# Patient Record
Sex: FEMALE | Race: BLACK OR AFRICAN AMERICAN | Employment: OTHER | URBAN - METROPOLITAN AREA
[De-identification: names, ages, dates, MRNs, and addresses within clinical notes are randomized per-mention and may not be internally consistent; named-entity substitution may affect disease eponyms.]

---

## 2017-05-25 ENCOUNTER — HOSPITAL ENCOUNTER (EMERGENCY)
Age: 57
Discharge: HOME | End: 2017-05-25
Attending: STUDENT IN AN ORGANIZED HEALTH CARE EDUCATION/TRAINING PROGRAM | Admitting: STUDENT IN AN ORGANIZED HEALTH CARE EDUCATION/TRAINING PROGRAM
Payer: SELF-PAY

## 2017-05-25 VITALS
BODY MASS INDEX: 20.57 KG/M2 | SYSTOLIC BLOOD PRESSURE: 147 MMHG | DIASTOLIC BLOOD PRESSURE: 70 MMHG | HEART RATE: 107 BPM | TEMPERATURE: 98.1 F | OXYGEN SATURATION: 96 % | RESPIRATION RATE: 18 BRPM | HEIGHT: 66 IN | WEIGHT: 128 LBS

## 2017-05-25 PROCEDURE — 99284 EMERGENCY DEPT VISIT MOD MDM: CPT

## 2017-05-25 NOTE — ED NOTES
Patient walked to restroom, found walking down the rose eating whole sugar packets. Patient escorted back to room and given crackers.

## 2017-05-25 NOTE — ED NOTES
Patient acting out in hallway and pulling own pants down. Patient moved to [address] [place] [address] waiting room in view of nurses.
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
## 2017-05-25 NOTE — ED TRIAGE NOTES
Patient to ER via EMS c/o mental health issues[facility] Patient is oriented to person.

## 2017-05-26 ENCOUNTER — APPOINTMENT (OUTPATIENT)
Dept: GENERAL RADIOLOGY | Age: 57
End: 2017-05-26
Attending: STUDENT IN AN ORGANIZED HEALTH CARE EDUCATION/TRAINING PROGRAM

## 2017-05-26 ENCOUNTER — HOSPITAL ENCOUNTER (EMERGENCY)
Age: 57
Discharge: HOME | End: 2017-05-26
Attending: STUDENT IN AN ORGANIZED HEALTH CARE EDUCATION/TRAINING PROGRAM

## 2017-05-26 VITALS
WEIGHT: 125 LBS | DIASTOLIC BLOOD PRESSURE: 82 MMHG | HEART RATE: 104 BPM | TEMPERATURE: 98.6 F | RESPIRATION RATE: 20 BRPM | OXYGEN SATURATION: 93 % | BODY MASS INDEX: 20.18 KG/M2 | SYSTOLIC BLOOD PRESSURE: 138 MMHG

## 2017-05-26 DIAGNOSIS — D50.9 MICROCYTIC ANEMIA: ICD-10-CM

## 2017-05-26 DIAGNOSIS — M79.10 MYALGIA: Primary | ICD-10-CM

## 2017-05-26 LAB
ALBUMIN SERPL BCP-MCNC: 3 G/DL (ref 3.5–5)
ALBUMIN/GLOB SERPL: 0.5 {RATIO} (ref 1.1–2.2)
ALP SERPL-CCNC: 121 U/L (ref 45–117)
ALT SERPL-CCNC: 30 U/L (ref 12–78)
ANION GAP BLD CALC-SCNC: 8 MMOL/L (ref 5–15)
AST SERPL W P-5'-P-CCNC: 38 U/L (ref 15–37)
ATRIAL RATE: 91 BPM
BASOPHILS # BLD AUTO: 0 K/UL (ref 0–0.1)
BASOPHILS # BLD: 0 % (ref 0–1)
BILIRUB SERPL-MCNC: 0.4 MG/DL (ref 0.2–1)
BUN SERPL-MCNC: 10 MG/DL (ref 6–20)
BUN/CREAT SERPL: 16 (ref 12–20)
CALCIUM SERPL-MCNC: 8.8 MG/DL (ref 8.5–10.1)
CALCULATED P AXIS, ECG09: 69 DEGREES
CALCULATED R AXIS, ECG10: 66 DEGREES
CALCULATED T AXIS, ECG11: 64 DEGREES
CHLORIDE SERPL-SCNC: 105 MMOL/L (ref 97–108)
CO2 SERPL-SCNC: 26 MMOL/L (ref 21–32)
CREAT SERPL-MCNC: 0.62 MG/DL (ref 0.55–1.02)
DIAGNOSIS, 93000: NORMAL
DIFFERENTIAL METHOD BLD: ABNORMAL
EOSINOPHIL # BLD: 0 K/UL (ref 0–0.4)
EOSINOPHIL NFR BLD: 1 % (ref 0–7)
ERYTHROCYTE [DISTWIDTH] IN BLOOD BY AUTOMATED COUNT: 20.3 % (ref 11.5–14.5)
GLOBULIN SER CALC-MCNC: 6.2 G/DL (ref 2–4)
GLUCOSE SERPL-MCNC: 92 MG/DL (ref 65–100)
HCT VFR BLD AUTO: 30.9 % (ref 35–47)
HGB BLD-MCNC: 9 G/DL (ref 11.5–16)
LYMPHOCYTES # BLD AUTO: 33 % (ref 12–49)
LYMPHOCYTES # BLD: 1.3 K/UL (ref 0.8–3.5)
MCH RBC QN AUTO: 20.2 PG (ref 26–34)
MCHC RBC AUTO-ENTMCNC: 29.1 G/DL (ref 30–36.5)
MCV RBC AUTO: 69.4 FL (ref 80–99)
MONOCYTES # BLD: 0.3 K/UL (ref 0–1)
MONOCYTES NFR BLD AUTO: 8 % (ref 5–13)
NEUTS SEG # BLD: 2.3 K/UL (ref 1.8–8)
NEUTS SEG NFR BLD AUTO: 58 % (ref 32–75)
P-R INTERVAL, ECG05: 152 MS
PLATELET # BLD AUTO: 186 K/UL (ref 150–400)
POTASSIUM SERPL-SCNC: 3.7 MMOL/L (ref 3.5–5.1)
PROT SERPL-MCNC: 9.2 G/DL (ref 6.4–8.2)
Q-T INTERVAL, ECG07: 374 MS
QRS DURATION, ECG06: 84 MS
QTC CALCULATION (BEZET), ECG08: 460 MS
RBC # BLD AUTO: 4.45 M/UL (ref 3.8–5.2)
RBC MORPH BLD: ABNORMAL
SODIUM SERPL-SCNC: 139 MMOL/L (ref 136–145)
TROPONIN I SERPL-MCNC: <0.04 NG/ML
VENTRICULAR RATE, ECG03: 91 BPM
WBC # BLD AUTO: 3.9 K/UL (ref 3.6–11)

## 2017-05-26 PROCEDURE — 71010 XR CHEST PORT: CPT

## 2017-05-26 PROCEDURE — 99284 EMERGENCY DEPT VISIT MOD MDM: CPT

## 2017-05-26 PROCEDURE — 84484 ASSAY OF TROPONIN QUANT: CPT | Performed by: STUDENT IN AN ORGANIZED HEALTH CARE EDUCATION/TRAINING PROGRAM

## 2017-05-26 PROCEDURE — 36415 COLL VENOUS BLD VENIPUNCTURE: CPT | Performed by: STUDENT IN AN ORGANIZED HEALTH CARE EDUCATION/TRAINING PROGRAM

## 2017-05-26 PROCEDURE — 93005 ELECTROCARDIOGRAM TRACING: CPT

## 2017-05-26 PROCEDURE — 80053 COMPREHEN METABOLIC PANEL: CPT | Performed by: STUDENT IN AN ORGANIZED HEALTH CARE EDUCATION/TRAINING PROGRAM

## 2017-05-26 PROCEDURE — 85025 COMPLETE CBC W/AUTO DIFF WBC: CPT | Performed by: STUDENT IN AN ORGANIZED HEALTH CARE EDUCATION/TRAINING PROGRAM
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
## 2017-05-26 NOTE — ED NOTES
Discharge instructions reviewed with and give to pt. By ER MD.  Ambulatory on own accord for discharge.

## 2017-05-26 NOTE — DISCHARGE INSTRUCTIONS
We hope that we have addressed all of your medical concerns. The examination and treatment you received in the Emergency Department were for an emergent problem and were not intended as complete care. It is important that you follow up with your healthcare provider(s) for ongoing care. If your symptoms worsen or do not improve as expected, and you are unable to reach your usual health care provider(s), you should return to the Emergency Department. Today's healthcare is undergoing tremendous change, and patient satisfaction surveys are one of the many tools to assess the quality of medical care. You may receive a survey from the [employer] organization regarding your experience in the Emergency Department. I hope that your experience has been completely positive, particularly the medical care that I provided. As such, please participate in the survey; anything less than excellent does not meet my expectations or intentions. [facility] and [facility] participate in nationally recognized quality of care measures. If your blood pressure is greater than 120/80, as reported below, we urge that you seek medical care to address the potential of high blood pressure, commonly known as hypertension. Hypertension can be hereditary or can be caused by certain medical conditions, pain, stress, or \"white coat syndrome. \"       Please make an appointment with your health care provider(s) for follow up of your Emergency Department visit. VITALS:   Patient Vitals for the past 8 hrs:   Temp Pulse Resp BP SpO2   05/26/17 0858 98.6 °F (37 °C) (!) 104 20 138/82 93 %          Thank you for allowing us to provide you with medical care today. We realize that you have many choices for your emergency care needs. Please choose us in the future for any continued health care needs. Mayra Moran, [facility].   Office: [phone]            Recent Results (from the past 24 hour(s))   EKG, 12 LEAD, INITIAL    Collection Time: 05/26/17  9:08 AM   Result Value Ref Range    Ventricular Rate 91 BPM    Atrial Rate 91 BPM    P-R Interval 152 ms    QRS Duration 84 ms    Q-T Interval 374 ms    QTC Calculation (Bezet) 460 ms    Calculated P Axis 69 degrees    Calculated R Axis 66 degrees    Calculated T Axis 64 degrees    Diagnosis       ** Poor data quality, interpretation may be adversely affected  Normal sinus rhythm  Left ventricular hypertrophy  No previous ECGs available     CBC WITH AUTOMATED DIFF    Collection Time: 05/26/17  9:14 AM   Result Value Ref Range    WBC 3.9 3.6 - 11.0 K/uL    RBC 4.45 3.80 - 5.20 M/uL    HGB 9.0 (L) 11.5 - 16.0 g/dL    HCT 30.9 (L) 35.0 - 47.0 %    MCV 69.4 (L) 80.0 - 99.0 FL    MCH 20.2 (L) 26.0 - 34.0 PG    MCHC 29.1 (L) 30.0 - 36.5 g/dL    RDW 20.3 (H) 11.5 - 14.5 %    PLATELET 567 145 - 623 K/uL    NEUTROPHILS 58 32 - 75 %    LYMPHOCYTES 33 12 - 49 %    MONOCYTES 8 5 - 13 %    EOSINOPHILS 1 0 - 7 %    BASOPHILS 0 0 - 1 %    ABS. NEUTROPHILS 2.3 1.8 - 8.0 K/UL    ABS. LYMPHOCYTES 1.3 0.8 - 3.5 K/UL    ABS. MONOCYTES 0.3 0.0 - 1.0 K/UL    ABS. EOSINOPHILS 0.0 0.0 - 0.4 K/UL    ABS.  BASOPHILS 0.0 0.0 - 0.1 K/UL    DF SMEAR SCANNED      RBC COMMENTS ANISOCYTOSIS  1+        RBC COMMENTS HYPOCHROMIA  1+        RBC COMMENTS SCHISTOCYTES  1+       METABOLIC PANEL, COMPREHENSIVE    Collection Time: 05/26/17  9:14 AM   Result Value Ref Range    Sodium 139 136 - 145 mmol/L    Potassium 3.7 3.5 - 5.1 mmol/L    Chloride 105 97 - 108 mmol/L    CO2 26 21 - 32 mmol/L    Anion gap 8 5 - 15 mmol/L    Glucose 92 65 - 100 mg/dL    BUN 10 6 - 20 MG/DL    Creatinine 0.62 0.55 - 1.02 MG/DL    BUN/Creatinine ratio 16 12 - 20      GFR est AA >60 >60 ml/min/1.73m2    GFR est non-AA >60 >60 ml/min/1.73m2    Calcium 8.8 8.5 - 10.1 MG/DL    Bilirubin, total 0.4 0.2 - 1.0 MG/DL    ALT (SGPT) 30 12 - 78 U/L    AST (SGOT) 38 (H) 15 - 37 U/L    Alk. phosphatase 121 (H) 45 - 117 U/L    Protein, total 9.2 (H) 6.4 - 8.2 g/dL    Albumin 3.0 (L) 3.5 - 5.0 g/dL    Globulin 6.2 (H) 2.0 - 4.0 g/dL    A-G Ratio 0.5 (L) 1.1 - 2.2     TROPONIN I    Collection Time: 05/26/17  9:14 AM   Result Value Ref Range    Troponin-I, Qt. <0.04 <0.05 ng/mL       Xr Chest Port    Result Date: 5/26/2017  INDICATION: Chest pain and cough COMPARISON: None A single frontal view was obtained. The time of this study is 0908 hours. Lungs are clear.  The heart and mediastinal shadows are normal.     IMPRESSION: No acute finding

## 2017-05-26 NOTE — ED NOTES
Pt. resting quietly, turning self in bed. Waiting for ER MD to re-eval. Pt.  No changes to assessment.
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
## 2017-05-28 ENCOUNTER — HOSPITAL ENCOUNTER (INPATIENT)
Age: 57
LOS: 1 days | Discharge: LEFT BEFORE DISCHARGE | DRG: 885 | End: 2017-05-29
Attending: PSYCHIATRY & NEUROLOGY | Admitting: PSYCHIATRY & NEUROLOGY
Payer: SELF-PAY

## 2017-05-28 ENCOUNTER — HOSPITAL ENCOUNTER (EMERGENCY)
Age: 57
Discharge: PSYCHIATRIC OR SUBSTANCE USE FACILITY | End: 2017-05-28
Attending: EMERGENCY MEDICINE
Payer: SELF-PAY

## 2017-05-28 VITALS
HEART RATE: 93 BPM | WEIGHT: 137 LBS | RESPIRATION RATE: 18 BRPM | DIASTOLIC BLOOD PRESSURE: 92 MMHG | BODY MASS INDEX: 22.11 KG/M2 | TEMPERATURE: 97.9 F | OXYGEN SATURATION: 95 % | SYSTOLIC BLOOD PRESSURE: 167 MMHG

## 2017-05-28 DIAGNOSIS — F20.89 OTHER SCHIZOPHRENIA (HCC): Primary | ICD-10-CM

## 2017-05-28 LAB
AMPHET UR QL SCN: NEGATIVE
ANION GAP BLD CALC-SCNC: 5 MMOL/L (ref 5–15)
APPEARANCE UR: CLEAR
BACTERIA URNS QL MICRO: NEGATIVE /HPF
BARBITURATES UR QL SCN: NEGATIVE
BASOPHILS # BLD AUTO: 0 K/UL (ref 0–0.1)
BASOPHILS # BLD: 0 % (ref 0–1)
BENZODIAZ UR QL: NEGATIVE
BILIRUB UR QL: NEGATIVE
BUN SERPL-MCNC: 10 MG/DL (ref 6–20)
BUN/CREAT SERPL: 16 (ref 12–20)
CALCIUM SERPL-MCNC: 9.2 MG/DL (ref 8.5–10.1)
CANNABINOIDS UR QL SCN: NEGATIVE
CHLORIDE SERPL-SCNC: 102 MMOL/L (ref 97–108)
CO2 SERPL-SCNC: 29 MMOL/L (ref 21–32)
COCAINE UR QL SCN: NEGATIVE
COLOR UR: ABNORMAL
CREAT SERPL-MCNC: 0.63 MG/DL (ref 0.55–1.02)
DIFFERENTIAL METHOD BLD: ABNORMAL
DRUG SCRN COMMENT,DRGCM: NORMAL
EOSINOPHIL # BLD: 0 K/UL (ref 0–0.4)
EOSINOPHIL NFR BLD: 1 % (ref 0–7)
EPITH CASTS URNS QL MICRO: ABNORMAL /LPF
ERYTHROCYTE [DISTWIDTH] IN BLOOD BY AUTOMATED COUNT: 20.1 % (ref 11.5–14.5)
ETHANOL SERPL-MCNC: <10 MG/DL
GLUCOSE SERPL-MCNC: 100 MG/DL (ref 65–100)
GLUCOSE UR STRIP.AUTO-MCNC: NEGATIVE MG/DL
HCT VFR BLD AUTO: 33.8 % (ref 35–47)
HGB BLD-MCNC: 9.8 G/DL (ref 11.5–16)
HGB UR QL STRIP: ABNORMAL
KETONES UR QL STRIP.AUTO: NEGATIVE MG/DL
LEUKOCYTE ESTERASE UR QL STRIP.AUTO: NEGATIVE
LYMPHOCYTES # BLD AUTO: 36 % (ref 12–49)
LYMPHOCYTES # BLD: 1.3 K/UL (ref 0.8–3.5)
MCH RBC QN AUTO: 20.6 PG (ref 26–34)
MCHC RBC AUTO-ENTMCNC: 29 G/DL (ref 30–36.5)
MCV RBC AUTO: 71.2 FL (ref 80–99)
METHADONE UR QL: NEGATIVE
MONOCYTES # BLD: 0.2 K/UL (ref 0–1)
MONOCYTES NFR BLD AUTO: 6 % (ref 5–13)
NEUTS SEG # BLD: 2.2 K/UL (ref 1.8–8)
NEUTS SEG NFR BLD AUTO: 57 % (ref 32–75)
NITRITE UR QL STRIP.AUTO: NEGATIVE
OPIATES UR QL: NEGATIVE
PCP UR QL: NEGATIVE
PH UR STRIP: 5 [PH] (ref 5–8)
PLATELET # BLD AUTO: 188 K/UL (ref 150–400)
PLATELET COMMENTS,PCOM: ABNORMAL
POTASSIUM SERPL-SCNC: 4.3 MMOL/L (ref 3.5–5.1)
PROT UR STRIP-MCNC: 100 MG/DL
RBC # BLD AUTO: 4.75 M/UL (ref 3.8–5.2)
RBC #/AREA URNS HPF: ABNORMAL /HPF (ref 0–5)
RBC MORPH BLD: ABNORMAL
SODIUM SERPL-SCNC: 136 MMOL/L (ref 136–145)
SP GR UR REFRACTOMETRY: 1.02 (ref 1–1.03)
UROBILINOGEN UR QL STRIP.AUTO: 0.2 EU/DL (ref 0.2–1)
WBC # BLD AUTO: 3.7 K/UL (ref 3.6–11)
WBC URNS QL MICRO: ABNORMAL /HPF (ref 0–4)

## 2017-05-28 PROCEDURE — 81001 URINALYSIS AUTO W/SCOPE: CPT | Performed by: EMERGENCY MEDICINE

## 2017-05-28 PROCEDURE — 80307 DRUG TEST PRSMV CHEM ANLYZR: CPT | Performed by: EMERGENCY MEDICINE

## 2017-05-28 PROCEDURE — 36415 COLL VENOUS BLD VENIPUNCTURE: CPT | Performed by: EMERGENCY MEDICINE

## 2017-05-28 PROCEDURE — 85025 COMPLETE CBC W/AUTO DIFF WBC: CPT | Performed by: EMERGENCY MEDICINE

## 2017-05-28 PROCEDURE — 99284 EMERGENCY DEPT VISIT MOD MDM: CPT

## 2017-05-28 PROCEDURE — 80048 BASIC METABOLIC PNL TOTAL CA: CPT | Performed by: EMERGENCY MEDICINE

## 2017-05-28 PROCEDURE — 65220000003 HC RM SEMIPRIVATE PSYCH

## 2017-05-28 RX ORDER — IBUPROFEN 400 MG/1
400 TABLET ORAL
Status: DISCONTINUED | OUTPATIENT
Start: 2017-05-28 | End: 2017-05-29 | Stop reason: HOSPADM

## 2017-05-28 RX ORDER — OLANZAPINE 5 MG/1
5 TABLET ORAL
Status: DISCONTINUED | OUTPATIENT
Start: 2017-05-28 | End: 2017-05-29 | Stop reason: HOSPADM

## 2017-05-28 RX ORDER — BENZTROPINE MESYLATE 2 MG/1
2 TABLET ORAL
Status: DISCONTINUED | OUTPATIENT
Start: 2017-05-28 | End: 2017-05-29 | Stop reason: HOSPADM

## 2017-05-28 RX ORDER — ADHESIVE BANDAGE
30 BANDAGE TOPICAL DAILY PRN
Status: DISCONTINUED | OUTPATIENT
Start: 2017-05-28 | End: 2017-05-29 | Stop reason: HOSPADM

## 2017-05-28 RX ORDER — IBUPROFEN 200 MG
1 TABLET ORAL
Status: DISCONTINUED | OUTPATIENT
Start: 2017-05-28 | End: 2017-05-29 | Stop reason: HOSPADM

## 2017-05-28 RX ORDER — LORAZEPAM 1 MG/1
1 TABLET ORAL
Status: DISCONTINUED | OUTPATIENT
Start: 2017-05-28 | End: 2017-05-29 | Stop reason: HOSPADM

## 2017-05-28 RX ORDER — BENZTROPINE MESYLATE 1 MG/ML
2 INJECTION INTRAMUSCULAR; INTRAVENOUS
Status: DISCONTINUED | OUTPATIENT
Start: 2017-05-28 | End: 2017-05-29 | Stop reason: HOSPADM

## 2017-05-28 RX ORDER — ZOLPIDEM TARTRATE 10 MG/1
10 TABLET ORAL
Status: DISCONTINUED | OUTPATIENT
Start: 2017-05-28 | End: 2017-05-29 | Stop reason: HOSPADM

## 2017-05-28 RX ORDER — ACETAMINOPHEN 325 MG/1
650 TABLET ORAL
Status: DISCONTINUED | OUTPATIENT
Start: 2017-05-28 | End: 2017-05-29 | Stop reason: HOSPADM

## 2017-05-28 RX ORDER — LORAZEPAM 2 MG/ML
2 INJECTION INTRAMUSCULAR
Status: DISCONTINUED | OUTPATIENT
Start: 2017-05-28 | End: 2017-05-29 | Stop reason: HOSPADM

## 2017-05-28 NOTE — BH NOTES
Pt is admitted voluntarily for being bizarre. Pt visited ER three times in the last three days per ER report. Pt is homeless and is off medications for nine months per ER report. Pt is very disorganized and unable to focus. Body search found no broken skins, no tattoos. Order received from Dr. Patricia Martinez, will monitor q 15 for safety.

## 2017-05-28 NOTE — ED TRIAGE NOTES
TRIAGE NOTE: Pt arrives eating chips and yogurt with complaints of homelessness and \"pain all over the outside of my body\", requesting admission. Pt gesticulating dramatically during triage.

## 2017-05-28 NOTE — BSMART NOTE
Comprehensive Assessment Form Part 1      Section I - Disposition    Axis I - Schizophrenia   Axis II - Deferred  Axis III -   Past Medical History:   Diagnosis Date    Hypertension     Psychotic disorder     Schizophrenia ([facility] [address]        Axis IV - Homeless, medication noncompliance  [place] V - 35      The Medical Doctor to Psychiatrist conference was not completed. The Medical Doctor is in agreement with Psychiatrist disposition because of (reason) Admission pending medical clearance. The plan is admit to [facility]. The on-call Psychiatrist consulted was Dr. Montana Leonard. The admitting Psychiatrist will be Dr. Lilly Najera. The admitting Diagnosis is Schizophrenia. The Payor source is self. Section II - Integrated Summary  Summary:  Patient walked in reporting she \"doesn't feel good. \"  Patient reported body aches at triage. Patient has been seen here Thursday and Friday for similar reasons. Patient has known mental health history and has been admitted psychiatrically on a TDO in 2011. Patient is not actively being treated by any current mental health providers. She is not the best historian but per records has a Schizophrenia diagnosis. She reported she lived in adult homes in the past but on visit Thursday this clinician attempted to call around and find which one and all called either didn't know her or had her in their facility years ago. Patient is currently asking care manager for assistance with housing as she has nowhere to go. Patient does not appear to have ability to formulate a plan and when this clinician asked her what her plan was she reported she wanted to go to [facility]. She denied having family there but claimed she knew friends there. She has no ideas on how to get there or what she would do on arrival.  Patient is not eating properly, not bathing and not taking good care of herself. She is disheveled.   Patient is pacing around the room, throwing trash on the floor,and  spilling coffee on the floor. Patient is oriented to place (hospital, and situation \"I don't feel good. \", season -Spring). Patient denied any hallucinations but is presenting as disorganized. Patient denied any SI or HI. Patient is unable to recall past medications or diagnosis. Patient denied SA other than nicotine. She has agreed to admission to be evaluated psychiatrically if it is recommended. The patienthas demonstrated mental capacity to provide informed consent. The information is given by the patient and past medical records. The Chief Complaint is \"I don't feel good. \"  The Precipitant Factors are homeless, medication noncompliance. Previous Hospitalizations: YES  The patient has been in restraints in the past and has not escaped from them. Current Psychiatrist and/or  is NA. Lethality Assessment:    The potential for suicide noted by the following:Patient is not suicidal .  The potential for homicide is not noted. The patient has not been a perpetrator of sexual or physical abuse. There are not pending charges. The patient is not felt to be at risk for self harm or harm to others. The attending nurse was advised that security has been notified. Section III - Psychosocial  The patient's overall mood and attitude is anxious. Feelings of helplessness and hopelessness are not observed. Generalized anxiety is not observed. Panic is not observed. Phobias are not observed. Obsessive compulsive tendencies are not observed. Section IV - Mental Status Exam  The patient's appearance shows  evidence of impairment/disheveled, poor hygeine. The patient's behavior is restless. The patient is only aware of  time, place and person. The patient's speech shows no evidence of impairment. The patient's mood is anxious. The range of affect is flat. The patient's thought content demonstrates no evidence of impairment. The thought process shows no evidence of impairment.   The patient's perception shows no evidence of impairment. The patient's memory shows no evidence of impairment. The patient's appetite is decreased and shows signs of weight loss. The patient's sleep has evidence of insomnia. The patient shows no insight. The patient's judgement is psychologically impaired. Section V - Substance Abuse  The patient is not using substances. Section VI - Living Arrangements  The patient is single. The patient lives alone. Unknown if patient has children. The patient does not plan to return home upon discharge. The patient does not have legal issues pending. The patient's source of income comes from disability. Christian and cultural practices have not been voiced at this time. The patient's greatest support comes from none and this person will not be involved with the treatment. The patient has not been in an event described as horrible or outside the realm of ordinary life experience either currently or in the past.  The patient has not been a victim of sexual/physical abuse. Section VII - Other Areas of Clinical Concern  The highest grade achieved is NA with the overall quality of school experience being described as NA. The patient is currently disabled and speaks [place] as a primary language. The patient has no communication impairments affecting communication. The patient's preference for learning can be described as: can read and write adequately.   The patient's hearing is normal.  The patient's vision is normal.      Luan Hobbs LPC

## 2017-05-28 NOTE — ED PROVIDER NOTES
HPI Comments: 55 y.o. homeless female with past medical history significant for HTN and schizophrenia who presents from street with chief complaint of generalized body aches. Pt reports she is \"hurting all over\" with 10/10 pain, and her feet hurt because she has been walking around all night. She states she is not supposed to be taking any medications. Pt notes she tried to stay at a homeless shelter yesterday but it was closed. She requests \"a couple hours\" in order to sleep. She states she is originally from [place], and her highest education is 11th grade. There are no other acute medical concerns at this time. Note written by Valorie Huber, as dictated by Lars Roth MD 7:40 AM     The history is provided by the patient. Past Medical History:   Diagnosis Date    Hypertension     Psychotic disorder     Schizophrenia ([facility] [address]        History reviewed. No pertinent surgical history. History reviewed. No pertinent family history. Social History     Social History    Marital status: SINGLE     Spouse name: N/A    Number of children: N/A    Years of education: N/A     Occupational History    Not on file. Social History Main Topics    Smoking status: Current Every Day Smoker     Packs/day: 0.50    Smokeless tobacco: Never Used    Alcohol use 0.5 oz/week     1 Cans of beer per week    Drug use: Yes     Special: Cocaine      Comment: Pt reports a history of crack cocaine use    Sexual activity: Not on file     Other Topics Concern    Not on file     Social History Narrative         ALLERGIES: Review of patient's allergies indicates no known allergies. Review of Systems   Constitutional: Negative for appetite change, chills and fever. HENT: Negative for rhinorrhea, sore throat and trouble swallowing. Eyes: Negative for photophobia. Respiratory: Negative for cough and shortness of breath. Cardiovascular: Negative for chest pain and palpitations.    Gastrointestinal: Negative for abdominal pain, nausea and vomiting. Genitourinary: Negative for dysuria, frequency and hematuria. Musculoskeletal: Positive for myalgias. Negative for arthralgias. Neurological: Negative for dizziness, syncope and weakness. Psychiatric/Behavioral: Negative for behavioral problems. The patient is not nervous/anxious. All other systems reviewed and are negative. Vitals:    05/28/17 0738   BP: (!) 162/111   Pulse: 95   Resp: 18   Temp: 97.9 °F (36.6 °C)   SpO2: 99%   Weight: 62.1 kg (137 lb)            Physical Exam   Constitutional:   Disheveled. Doesn't shake offered hand. More interested in eating yogurt with chips. HENT:   Head: Normocephalic and atraumatic. Mouth/Throat: Oropharynx is clear and moist.   Eyes: EOM are normal. Pupils are equal, round, and reactive to light. Neck: Normal range of motion. Neck supple. Cardiovascular: Normal rate, regular rhythm, normal heart sounds and intact distal pulses. Exam reveals no gallop and no friction rub. No murmur heard. Pulmonary/Chest: Effort normal. No respiratory distress. She has no wheezes. She has no rales. Abdominal: Soft. There is no tenderness. There is no rebound. Musculoskeletal: Normal range of motion. She exhibits no tenderness. Neurological: She is alert. No cranial nerve deficit. Motor; symmetric   Skin: No erythema. Nursing note and vitals reviewed.   Note written by Valorie Dickens, as dictated by Emma Walters MD 7:40 AM    [facility]  ED Course       Procedures

## 2017-05-28 NOTE — IP AVS SNAPSHOT
Brijesh Gibson 
 
 
 [address] Patient: Amie Leonard MRN: [id number] :1960 You are allergic to the following No active allergies Recent Documentation Height Weight Breastfeeding? BMI OB Status Smoking Status 1.6 m 72.6 kg No 28.34 kg/m2 Perimenopausal Current Every Day Smoker Emergency Contacts Name Discharge Info Relation Home Work Mobile No One,To List       
  
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 (Hcc) Your diagnoses also included:  Non-Compliance With Treatment, Essential Hypertension, Alcohol Abuse Providers Seen During Your Hospitalizations Provider Role Specialty Primary office phone Chip Wu MD Attending Provider Psychiatry [phone] Your Primary Care Physician (PCP) Primary Care Physician Office Phone Office Fax Brian Dillon [phone] Follow-up Information Follow up With Details Comments Contact Info [address] [facility]  Please follow up for mental health services with [facility] by accessing walk-in Mon-Thurs starting at 7:30am [address]  [address] 
[phone] [facility]  Please go to [facility] (also called Homeless point of Entry) for shelter, if needed [address] , [address] [place] [address] Darwin Umana MD   [address] [facility] [address] [address] 
[phone] Current Discharge Medication List  
  
Notice You have not been prescribed any medications. Discharge Instructions DISCHARGE SUMMARY from Nurse The following personal items are in your possession at time of discharge: 
 
Dental Appliances: None Visual Aid: None Home Medications: None Clothing: Footwear, Pants, Shirt, Socks Other Valuables: None Personal Items Sent to Safe:  (nothing sent to safe at this time) PATIENT INSTRUCTIONS: 
 
 
 
 
What to do at Home: 
Recommended activity: Activity as tolerated, If I feel that I can not keep these promises and I am at risk of hurting myself or others, I will call the crisis office and speak with a crisis worker who will assist me during my crisis. [address] [facility]  [phone]   [phone] Kian Olivares [facility]  [phone] [facility]  [phone] *  Please give a list of your current medications to your Primary Care Provider. *  Please update this list whenever your medications are discontinued, doses are 
    changed, or new medications (including over-the-counter products) are added. *  Please carry medication information at all times in case of emergency situations. These are general instructions for a healthy lifestyle: No smoking/ No tobacco products/ Avoid exposure to second hand smoke Surgeon General's Warning:  Quitting smoking now greatly reduces serious risk to your health. Obesity, smoking, and sedentary lifestyle greatly increases your risk for illness A healthy diet, regular physical exercise & weight monitoring are important for maintaining a healthy lifestyle Recognize signs and symptoms of STROKE: 
 
F-face looks uneven A-arms unable to move or move unevenly S-speech slurred or non-existent T-time-call 911 as soon as signs and symptoms begin-DO NOT go Back to bed or wait to see if you get better-TIME IS BRAIN. Warning Signs of HEART ATTACK Call 911 if you have these symptoms: ? Chest discomfort. Most heart attacks involve discomfort in the center of the chest that lasts more than a few minutes, or that goes away and comes back. It can feel like uncomfortable pressure, squeezing, fullness, or pain. ? Discomfort in other areas of the upper body. Symptoms can include pain or discomfort in one or both arms, the back, neck, jaw, or stomach. ? Shortness of breath with or without chest discomfort. ? Other signs may include breaking out in a cold sweat, nausea, or lightheadedness. Don't wait more than five minutes to call [address]! Fast action can save your life. Calling 911 is almost always the fastest way to get lifesaving treatment. Emergency Medical Services staff can begin treatment when they arrive  up to an hour sooner than if someone gets to the hospital by car. The discharge information has been reviewed with the patient. The patient verbalized understanding. Discharge medications reviewed with the patient and appropriate educational materials and side effects teaching were provided. Discharge Orders None [employer] Announcement We are excited to announce that we are making your provider's discharge notes available to you in [employer]YouWant. You will see these notes when they are completed and signed by the physician that discharged you from your recent hospital stay. If you have any questions or concerns about any information you see in W[employer], please call the Health Information Department where you were seen or reach out to your Primary Care Provider for more information about your plan of care. Introducing [facility]! Peg Hem introduces [employer] patient portal. Now you can access parts of your medical record, email your doctor's office, and request medication refills online. 1. In your internet browser, go to [email or web address] 2. Click on the First Time User? Click Here link in the Sign In box.  You will see the New Member Sign Up page. 3. Enter your [employer] Access Code exactly as it appears below. You will not need to use this code after youve completed the sign-up process. If you do not sign up before the expiration date, you must request a new code. · [employer] Access Code: [email or web address] Expires: 8/24/2017  8:57 AM 
 
4. Enter the last four digits of your Social Security Number (xxxx) and Date of Birth (mm/dd/yyyy) as indicated and click Submit. You will be taken to the next sign-up page. 5. Create a [employer] ID. This will be your [employer] login ID and cannot be changed, so think of one that is secure and easy to remember. 6. Create a [employer] password. You can change your password at any time. 7. Enter your Password Reset Question and Answer. This can be used at a later time if you forget your password. 8. Enter your e-mail address. You will receive e-mail notification when new information is available in [address] 9. Click Sign Up. You can now view and download portions of your medical record. 10. Click the Download Summary menu link to download a portable copy of your medical information. If you have questions, please visit the Frequently Asked Questions section of the [employer] website. Remember, [employer] is NOT to be used for urgent needs. For medical emergencies, dial 911. Now available from your iPhone and Android! General Information Please provide this summary of care documentation to your next provider. Patient Signature:  ____________________________________________________________ Date:  ____________________________________________________________  
  
Lilliam Damion Provider Signature:  ____________________________________________________________ Date:  ____________________________________________________________

## 2017-05-28 NOTE — ED NOTES
Patient transferred to [facility] via [facility] in stable condition.

## 2017-05-28 NOTE — ED NOTES
Pt was seen here on 5/25 & 5/26 for similar complaints of requesting a bed and having body aches all over

## 2017-05-28 NOTE — PROGRESS NOTES
Received consult regarding pt's need for shelter. Reviewed EMR, spoke with doctor, nurse and [address] [place] [address] counselor in ED. Pt has been to [facility] x 3 days with reports of \"being tired\" and homelessness. Pt has a diagnosis of schizophrenia. CM met with pt at bedside. She explained she has been living on/off streets for past 9 months and has been off of her medication for same time period. When asked about her diagnosis, she couldn't name it but agreed when CM discussed schizophrenia. She reports being from [place], [place], has two brothers and has been in [place] for four years. History of living at different adult residences in [place], [facility], but none current. At present, pt in agreement with stabilizing in hospital on psychotropic medicines. Encouraged pt to remain in ED for treatment and she agreed. However, she seems to quickly change her mind. Pt identified that her biggest needs are to bathe and to rest.      Communicated with MD.    Expect [facility] evaluation.     REANNA Fitzgerald

## 2017-05-29 VITALS
HEIGHT: 63 IN | DIASTOLIC BLOOD PRESSURE: 78 MMHG | WEIGHT: 160 LBS | BODY MASS INDEX: 28.35 KG/M2 | HEART RATE: 86 BPM | TEMPERATURE: 97.2 F | RESPIRATION RATE: 18 BRPM | SYSTOLIC BLOOD PRESSURE: 143 MMHG

## 2017-05-29 PROBLEM — Z91.199 NON-COMPLIANCE WITH TREATMENT: Status: ACTIVE | Noted: 2017-05-29

## 2017-05-29 PROBLEM — F10.10 ALCOHOL ABUSE: Status: ACTIVE | Noted: 2017-05-29

## 2017-05-29 PROBLEM — I10 ESSENTIAL HYPERTENSION: Status: ACTIVE | Noted: 2017-05-29

## 2017-05-29 PROBLEM — F20.9 CHRONIC UNDIFFERENTIATED SCHIZOPHRENIA WITH ACUTE EXACERBATIONS (HCC): Status: ACTIVE | Noted: 2017-05-29

## 2017-05-29 LAB — TSH SERPL DL<=0.05 MIU/L-ACNC: 1.02 UIU/ML (ref 0.36–3.74)

## 2017-05-29 PROCEDURE — 84443 ASSAY THYROID STIM HORMONE: CPT | Performed by: PSYCHIATRY & NEUROLOGY

## 2017-05-29 PROCEDURE — 36415 COLL VENOUS BLD VENIPUNCTURE: CPT | Performed by: PSYCHIATRY & NEUROLOGY

## 2017-05-29 NOTE — H&P
INITIAL PSYCHIATRIC EVALUATION & DISCHARGE SUMMARY           IDENTIFICATION:    Patient Name  Robson Xiao   Date of Birth 1960   [facility] [id number]   Medical Record Number  [id number]      Age  55 y.o. PCP Kd Feldman MD   Admit date:  5/28/2017    Room Number  326/01  @ [facility]   Date of Service  5/29/2017            HISTORY         TYPE OF DISCHARGE: AMA        CONDITION AT DISCHARGE: stable       REASON FOR HOSPITALIZATION:  CC: \"i had cramps\". Pt admitted under a voluntary basis for  psychosis . Pt with 3 ED visits in the last 3 days. HISTORY OF PRESENT ILLNESS:    The patient, Robson Xiao, is a 55 y.o. BLACK OR  female with a past psychiatric history significant for [id number], who presents at this time with complaints of (and/or evidence of) the following emotional symptoms: psychotic behavior. Additional symptomatology include anxiety. The above symptoms have been present for yrs, worse over the last few days. These symptoms are of moderate severity. These symptoms are constant in nature. The patient's condition has been precipitated by psychosocial stressors (homeless--chronically). Patient's condition made worse by treatment noncompliance. UDS: negative; BAL=0. At time of discharge, Robson Xiao is without significant problems of depression and moderate but, baseline problems with psychosis . Patient free of suicidal and homicidal ideations (appears to be at very low risk of suicide or homicide) and reports many positive predictive factors in terms of not attempting suicide or homicide. Overall presentation at time of discharge is most consistent with the diagnosis of schizophrenia. Patient with request for discharge today. There are no grounds to seek a TDO. All members of the treatment team concur with each other in regards to plans for discharge today per patient's request under an AMA basis.   Patient aware and in agreement with discharge and discharge plan. ALLERGIES: No Known Allergies   MEDICATIONS PRIOR TO ADMISSION:   No prescriptions prior to admission. PAST MEDICAL HISTORY:   Past Medical History:   Diagnosis Date    Alcohol abuse 5/29/2017    Hypertension     Psychotic disorder     Schizophrenia ([address]    No past surgical history on file. SOCIAL HISTORY:    Social History     Social History    Marital status: SINGLE     Spouse name: N/A    Number of children: N/A    Years of education: N/A     Occupational History    Not on file. Social History Main Topics    Smoking status: Current Every Day Smoker     Packs/day: 0.50    Smokeless tobacco: Never Used    Alcohol use 0.5 oz/week     1 Cans of beer per week    Drug use: Yes     Special: Cocaine      Comment: Pt reports a history of crack cocaine use    Sexual activity: Not on file     Other Topics Concern    Not on file     Social History Narrative    The patient is single. The patient lives alone. Unknown if patient has children. The patient does not plan to return home upon discharge. The patient does not have legal issues pending. The patient's source of income comes from disability. Mormon and cultural practices have not been voiced at this time. The patient has not been in an event described as horrible or outside the realm of ordinary life experience either currently or in the past.The patient has not been a victim of sexual/physical abuse.           FAMILY HISTORY: History reviewed. No pertinent family history. No family history on file. REVIEW OF SYSTEMS:   Psychological ROS: positive for - disorganized thougth processes--subtle  negative for - hostility, irritability, mood swings or del or leticia  Pertinent items are noted in the History of Present Illness. All other Systems reviewed and are considered negative.            MENTAL STATUS EXAM & VITALS     MENTAL STATUS EXAM (MSE):    MSE FINDINGS ARE WITHIN NORMAL LIMITS (WNL) UNLESS OTHERWISE STATED BELOW. ( ALL OF THE BELOW CATEGORIES OF THE MSE HAVE BEEN REVIEWED (reviewed 5/29/2017) AND UPDATED AS DEEMED APPROPRIATE )  General Presentation age appropriate and disheveled, cooperative   Orientation disorganized, oriented to time, place and person   Vital Signs  See below (reviewed 5/29/2017); Vital Signs (BP, Pulse, & Temp) are within normal limits if not listed below.    Gait and Station Stable/steady, no ataxia   Musculoskeletal System No extrapyramidal symptoms (EPS); no abnormal muscular movements or Tardive Dyskinesia (TD); muscle strength and tone are within normal limits   Language No aphasia or dysarthria   Speech:  normal pitch and normal volume   Thought Processes logical; normal rate of thoughts; fair abstract reasoning/computation   Thought Associations goal directed   Thought Content free of delusions and free of hallucinations   Suicidal Ideations none   Homicidal Ideations none   Mood:  happy   Affect:  euthymic   Memory recent  fair   Memory remote:  fair   Concentration/Attention:  intact   Fund of Knowledge below average   Insight:  limited   Reliability poor   Judgment:  limited          VITALS:     Patient Vitals for the past 24 hrs:   Temp Pulse Resp BP   05/29/17 0541 97.2 °F (36.2 °C) 86 18 143/78   05/28/17 1430 98.3 °F (36.8 °C) 93 18 (!) 136/100     Wt Readings from Last 3 Encounters:   05/28/17 72.6 kg (160 lb)   05/28/17 62.1 kg (137 lb)   05/26/17 56.7 kg (125 lb)     Temp Readings from Last 3 Encounters:   05/29/17 97.2 °F (36.2 °C)   05/28/17 97.9 °F (36.6 °C)   05/26/17 98.6 °F (37 °C)     BP Readings from Last 3 Encounters:   05/29/17 143/78   05/28/17 (!) 167/92   05/26/17 138/82     Pulse Readings from Last 3 Encounters:   05/29/17 86   05/28/17 93   05/26/17 (!) 104            DATA     LABORATORY DATA:  Labs Reviewed   TSH 3RD GENERATION     Admission on 05/28/2017   Component Date Value Ref Range Status    TSH 05/29/2017 1.02  0.36 - 3.74 uIU/mL Final   Admission on 05/28/2017, Discharged on 05/28/2017   Component Date Value Ref Range Status    AMPHETAMINE 05/28/2017 NEGATIVE   NEG   Final    BARBITURATES 05/28/2017 NEGATIVE   NEG   Final    BENZODIAZEPINE 05/28/2017 NEGATIVE   NEG   Final    COCAINE 05/28/2017 NEGATIVE   NEG   Final    METHADONE 05/28/2017 NEGATIVE   NEG   Final    OPIATES 05/28/2017 NEGATIVE   NEG   Final    PCP(PHENCYCLIDINE) 05/28/2017 NEGATIVE   NEG   Final    THC (TH-CANNABINOL) 05/28/2017 NEGATIVE   NEG   Final    Drug screen comment 05/28/2017 (NOTE)   Final    WBC 05/28/2017 3.7  3.6 - 11.0 K/uL Final    RBC 05/28/2017 4.75  3.80 - 5.20 M/uL Final    HGB 05/28/2017 9.8* 11.5 - 16.0 g/dL Final    HCT 05/28/2017 33.8* 35.0 - 47.0 % Final    MCV 05/28/2017 71.2* 80.0 - 99.0 FL Final    MCH 05/28/2017 20.6* 26.0 - 34.0 PG Final    MCHC 05/28/2017 29.0* 30.0 - 36.5 g/dL Final    RDW 05/28/2017 20.1* 11.5 - 14.5 % Final    PLATELET 60/18/6492 988  150 - 400 K/uL Final    NEUTROPHILS 05/28/2017 57  32 - 75 % Final    LYMPHOCYTES 05/28/2017 36  12 - 49 % Final    MONOCYTES 05/28/2017 6  5 - 13 % Final    EOSINOPHILS 05/28/2017 1  0 - 7 % Final    BASOPHILS 05/28/2017 0  0 - 1 % Final    ABS. NEUTROPHILS 05/28/2017 2.2  1.8 - 8.0 K/UL Final    ABS. LYMPHOCYTES 05/28/2017 1.3  0.8 - 3.5 K/UL Final    ABS. MONOCYTES 05/28/2017 0.2  0.0 - 1.0 K/UL Final    ABS. EOSINOPHILS 05/28/2017 0.0  0.0 - 0.4 K/UL Final    ABS.  BASOPHILS 05/28/2017 0.0  0.0 - 0.1 K/UL Final    DF 05/28/2017 SMEAR SCANNED    Final    PLATELET COMMENTS 81/38/7851 LARGE PLATELETS    Final    RBC COMMENTS 05/28/2017     Final                    Value:ANISOCYTOSIS  2+      RBC COMMENTS 05/28/2017     Final                    Value:MICROCYTOSIS  1+      RBC COMMENTS 05/28/2017     Final                    Value:HYPOCHROMIA  1+      Sodium 05/28/2017 136  136 - 145 mmol/L Final    Potassium 05/28/2017 4.3  3.5 - 5.1 mmol/L Final    Chloride 05/28/2017 102  97 - 108 mmol/L Final    CO2 05/28/2017 29  21 - 32 mmol/L Final    Anion gap 05/28/2017 5  5 - 15 mmol/L Final    Glucose 05/28/2017 100  65 - 100 mg/dL Final    BUN 05/28/2017 10  6 - 20 MG/DL Final    Creatinine 05/28/2017 0.63  0.55 - 1.02 MG/DL Final    BUN/Creatinine ratio 05/28/2017 16  12 - 20   Final    GFR est AA 05/28/2017 >60  >60 ml/min/1.73m2 Final    GFR est non-AA 05/28/2017 >60  >60 ml/min/1.73m2 Final    Calcium 05/28/2017 9.2  8.5 - 10.1 MG/DL Final    Color 05/28/2017 YELLOW/STRAW    Final    Appearance 05/28/2017 CLEAR  CLEAR   Final    Specific gravity 05/28/2017 1.023  1.003 - 1.030   Final    pH (UA) 05/28/2017 5.0  5.0 - 8.0   Final    Protein 05/28/2017 100* NEG mg/dL Final    Glucose 05/28/2017 NEGATIVE   NEG mg/dL Final    Ketone 05/28/2017 NEGATIVE   NEG mg/dL Final    Bilirubin 05/28/2017 NEGATIVE   NEG   Final    Blood 05/28/2017 MODERATE* NEG   Final    Urobilinogen 05/28/2017 0.2  0.2 - 1.0 EU/dL Final    Nitrites 05/28/2017 NEGATIVE   NEG   Final    Leukocyte Esterase 05/28/2017 NEGATIVE   NEG   Final    WBC 05/28/2017 0-4  0 - 4 /hpf Final    RBC 05/28/2017 0-5  0 - 5 /hpf Final    Epithelial cells 05/28/2017 FEW  FEW /lpf Final    Bacteria 05/28/2017 NEGATIVE   NEG /hpf Final    ALCOHOL(ETHYL),SERUM 05/28/2017 <10  <10 MG/DL Final        RADIOLOGY REPORTS:    Results from Hospital Encounter encounter on 05/26/17   XR CHEST PORT   Narrative INDICATION: Chest pain and cough    COMPARISON: None    A single frontal view was obtained. The time of this study is 0908 hours. Lungs  are clear. The heart and mediastinal shadows are normal.              Impression IMPRESSION:  No acute finding     Xr Chest Port    Result Date: 5/26/2017  INDICATION: Chest pain and cough COMPARISON: None A single frontal view was obtained. The time of this study is 0908 hours. Lungs are clear.  The heart and mediastinal shadows are normal.     IMPRESSION: No acute finding MEDICATIONS       ALL MEDICATIONS  Current Facility-Administered Medications   Medication Dose Route Frequency    ziprasidone (GEODON) 20 mg in sterile water (preservative free) 1 mL injection  20 mg IntraMUSCular BID PRN    OLANZapine (ZyPREXA) tablet 5 mg  5 mg Oral Q6H PRN    benztropine (COGENTIN) tablet 2 mg  2 mg Oral BID PRN    benztropine (COGENTIN) injection 2 mg  2 mg IntraMUSCular BID PRN    LORazepam (ATIVAN) injection 2 mg  2 mg IntraMUSCular Q4H PRN    LORazepam (ATIVAN) tablet 1 mg  1 mg Oral Q4H PRN    zolpidem (AMBIEN) tablet 10 mg  10 mg Oral QHS PRN    acetaminophen (TYLENOL) tablet 650 mg  650 mg Oral Q4H PRN    ibuprofen (MOTRIN) tablet 400 mg  400 mg Oral Q8H PRN    magnesium hydroxide (MILK OF MAGNESIA) 400 mg/5 mL oral suspension 30 mL  30 mL Oral DAILY PRN    nicotine (NICODERM CQ) 21 mg/24 hr patch 1 Patch  1 Patch TransDERmal DAILY PRN      SCHEDULED MEDICATIONS  Current Facility-Administered Medications   Medication Dose Route Frequency                ASSESSMENT & PLAN        The patient, Esther Quiñonez, is a 55 y.o.  female who presents at this time for treatment of the following diagnoses:  Patient Active Hospital Problem List:   Chronic undifferentiated schizophrenia with acute exacerbations ([facility] [address] (5/29/2017)    Assessment: appears to be close to baseline which would be much better if she were to take meds as directed, she reports no o/p med compliance x couple yrs. Not motivated to get back on them    Plan: encouraged pt to take meds and see an o/p psych f/u   Non-compliance with treatment (5/29/2017)    Assessment: chronic    Plan: pt to consider depot AP   Essential hypertension (5/29/2017)    Assessment: stable, med noncompliant    Plan: f/u internist   Alcohol abuse (5/29/2017)    Assessment: h/o .  Neg UDS    Plan: no need for detox               PROVISIONAL & DISCHARGE DIAGNOSES:    Problem List  Never Reviewed          Codes Class    * (Principal)Chronic undifferentiated schizophrenia with acute exacerbations ([email or web address] ICD-10-CM: F20.5  ICD-9-CM: 295.64         Non-compliance with treatment ICD-10-CM: Z91.19  ICD-9-CM: V15.81         Essential hypertension ICD-10-CM: I10  ICD-9-CM: 401.9         Alcohol abuse ICD-10-CM: F10.10  ICD-9-CM: 305.00               Active Hospital Problems    *Chronic undifferentiated schizophrenia with acute exacerbations ([email or web address]      Non-compliance with treatment      Essential hypertension      Alcohol abuse        DISCHARGE DIAGNOSIS:   Axis I:  SEE ABOVE  Axis II: SEE ABOVE  Axis III: SEE ABOVE  Axis IV:  lack of structure  Axis V:  35 on admission, 40 on discharge (baseline)         A coordinated, multidisplinary treatment team (includes the nurse, unit pharmcist,  and writer) round was conducted for this initial evaluation with the patient present. ESTIMATED LENGTH OF STAY:    1 day per patient's request.       STRENGTHS:  Exercising self-direction/Resourceful and Interpersonal/supportive relationships (family, friends, peers)                 DISPOSITION:    Shelter referral, mother's Home. Patient to f/u with psychiatric/psychotherapy appointments. Pt to f/u with internist as directed. FOLLOW-UP CARE:    Activity as tolerated and no driving for today  Regular Diet  Wound Care: none needed  Follow-up Information     Follow up With Details Comments [id number]  Please follow up for mental health services with [facility] by accessing walk-in Mon-Thurs starting at 7:30am [facility]  [phone]    [facility]  [facility] to [facility] (also called Homeless point of Entry) for shelter, if needed [address], [place]   [address] [place] [address]  [phone]                   PROGNOSIS:  Greatly dependent upon patient's ability to  f/u with psychiatric/psychotherapy appointments. DISCHARGE MEDICATIONS: (no changes made). Informed consent given for the use of following psychotropic medications. There are no discharge medications for this patient.                                    SIGNED:    Mackenzie Lyons MD  5/29/2017

## 2017-05-29 NOTE — BH NOTES
Patient was discharged today. Discharge forms were signed and given to patient after being verified by second nurse. Discharge instructions were explained to patient and patient indicated understanding verbally. Patient was given belongings, and was escorted from the unit by staff.

## 2017-05-29 NOTE — BH NOTES
The patient is acting distracted when you engage her in conversation. She has to be constantly asked to keep her clothing in an appropriate manner. this writer noes the patient tearing up drinking cups in the dayroom and she was asked not to do that. She carries food and juice into her room when she thinks that she is not being watched. In group she distants herself from her peers and watches them. The patient was asked if she has something going on being her behaviors are strange at best and she says no. The patient will continue to be monitored for safety and support Q 15 minutes.

## 2017-05-29 NOTE — BH NOTES
GROUP THERAPY PROGRESS NOTE    Roseannfelicita Singleton is participating in reflections.      Group time: 30 minutes    Personal goal for participation: participate in their treatment plan    Goal orientation: personal    Group therapy participation: Patient sat and listened to group this evening    Therapeutic interventions reviewed and discussed: no, discussion with active group     Impression of participation: Negative participation

## 2017-05-29 NOTE — DISCHARGE SUMMARY
Psychiatric evaluation and discharge summary completed \"all in one\" due to the patient being discharged the same day as seen by writer for the initial evaluation. Please see this report for full details. Patient stable for discharge and considered to be at low risk of harm to self or others. Pt denies s/i and h/i. Pt fully contracts for safety. Pt reports many positive predictive factors in terms of not attempting suicide or homicide. Informed consent given to the use of discharge medications. Treatment rounds held in full. All those in treatment rounds concur with plans for discharge today under an AMA basis.

## 2017-05-29 NOTE — BH NOTES
60-year-old -American female voluntarily admitted for what patient said, \"I wasn't feeling well yesterday, I had cramps\". The patient was pleasant, linear in thought, denied psychotic and depressive symptoms and requested to be discharged. She has been in and out of ER's the past three days for mostly somatic complaints. She has a history of schizophrenia, TBI and said she's not getting treatment anywhere. The patient said she doesn't use drugs, but drinks beer. Her BAL was less than 10 and her drug screen was negative. The patient was knowledgeable about housing resources and said she planned to go to her mother's house today once discharged as they have plans to go out of town this week. Her mother's address is: [address] [place] [address]. The patient does not appear to be an imminent harm to herself or others and was discharged. She was provided with referral to the [facility] and shelter at [address] [place] [address] and continuing care paperwork was faxed. She was provided with bus tickets home.      Antonella Sesay LCSW    [facility]  [address] [facility]  Ph: [phone]  Fax: [phone]

## 2017-05-29 NOTE — DISCHARGE INSTRUCTIONS
DISCHARGE SUMMARY from Nurse    The following personal items are in your possession at time of discharge:    Dental Appliances: None  Visual Aid: None     Home Medications: None     Clothing: Footwear, Pants, Shirt, Socks  Other Valuables: None  Personal Items Sent to Safe:  (nothing sent to safe at this time)          PATIENT INSTRUCTIONS:          What to do at Home:  Recommended activity: Activity as tolerated,     If I feel that I can not keep these promises and I am at risk of hurting myself or others, I will call the crisis office and speak with a crisis worker who will assist me during my crisis. [facility]  [address] [facility]  [phone]         *  Please give a list of your current medications to your Primary Care Provider. *  Please update this list whenever your medications are discontinued, doses are      changed, or new medications (including over-the-counter products) are added. *  Please carry medication information at all times in case of emergency situations. These are general instructions for a healthy lifestyle:    No smoking/ No tobacco products/ Avoid exposure to second hand smoke    Surgeon General's Warning:  Quitting smoking now greatly reduces serious risk to your health. Obesity, smoking, and sedentary lifestyle greatly increases your risk for illness    A healthy diet, regular physical exercise & weight monitoring are important for maintaining a healthy lifestyle      Recognize signs and symptoms of STROKE:    F-face looks uneven    A-arms unable to move or move unevenly    S-speech slurred or non-existent    T-time-call 911 as soon as signs and symptoms begin-DO NOT go       Back to bed or wait to see if you get better-TIME IS BRAIN.     Warning Signs of HEART ATTACK     Call 911 if you have these symptoms:   Chest discomfort. Most heart attacks involve discomfort in the center of the chest that lasts more than a few minutes, or that goes away and comes back. It can feel like uncomfortable pressure, squeezing, fullness, or pain.  Discomfort in other areas of the upper body. Symptoms can include pain or discomfort in one or both arms, the back, neck, jaw, or stomach.  Shortness of breath with or without chest discomfort.  Other signs may include breaking out in a cold sweat, nausea, or lightheadedness. Don't wait more than five minutes to call 911 - MINUTES MATTER! Fast action can save your life. Calling 911 is almost always the fastest way to get lifesaving treatment. Emergency Medical Services staff can begin treatment when they arrive -- up to an hour sooner than if someone gets to the hospital by car. The discharge information has been reviewed with the patient. The patient verbalized understanding. Discharge medications reviewed with the patient and appropriate educational materials and side effects teaching were provided.

## 2020-04-19 ENCOUNTER — HOSPITAL ENCOUNTER (EMERGENCY)
Age: 60
Discharge: HOME | End: 2020-04-19
Attending: EMERGENCY MEDICINE | Admitting: EMERGENCY MEDICINE
Payer: SELF-PAY

## 2020-04-19 VITALS
TEMPERATURE: 98 F | BODY MASS INDEX: 24.8 KG/M2 | HEIGHT: 63 IN | DIASTOLIC BLOOD PRESSURE: 80 MMHG | RESPIRATION RATE: 16 BRPM | WEIGHT: 140 LBS | HEART RATE: 87 BPM | SYSTOLIC BLOOD PRESSURE: 150 MMHG | OXYGEN SATURATION: 100 %

## 2020-04-19 DIAGNOSIS — Z59.00 HOMELESSNESS: Primary | ICD-10-CM

## 2020-04-19 PROCEDURE — 99283 EMERGENCY DEPT VISIT LOW MDM: CPT

## 2020-04-19 SDOH — ECONOMIC STABILITY - HOUSING INSECURITY: HOMELESSNESS UNSPECIFIED: Z59.00

## 2020-04-20 NOTE — ED PROVIDER NOTES
Jeremias Serrano is a 63 y.o. female  who presents by EMS to ER with c/o Patient presents with:  Patient presents with EMS. Per EMS they were called by a bystander who saw patient at [facility] in [place]. Patient complains of being cold and  Needing something to eat. Patient has hospital band from a different hospital on her wrist.     She specifically denies any fevers, chills, nausea, vomiting, chest pain, shortness of breath, headache, rash, diarrhea, abdominal pain, urinary/bowel changes, sweating or weight loss. PCP: Kita Estes MD   PMHx significant for: Past Medical History:  5/29/2017: Alcohol abuse  No date: Hypertension  No date: Psychotic disorder ([facility]  No date: Schizophrenia [facility]   PSHx significant for: History reviewed. No pertinent surgical history. Social Hx: Tobacco use: Social History    Tobacco Use      Smoking status: Current Every Day Smoker        Packs/day: 0.50      Smokeless tobacco: Never Used  ; EtOH use: The patient states she drinks 0 per week.; Illicit Drug use: Allergies:  No Known Allergies    There are no other complaints, changes or physical findings at this time. Past Medical History:   Diagnosis Date    Alcohol abuse 5/29/2017    Hypertension     Psychotic disorder ([facility]     Schizophrenia ([facility]        History reviewed. No pertinent surgical history. History reviewed. No pertinent family history.     Social History     Socioeconomic History    Marital status: SINGLE     Spouse name: Not on file    Number of children: Not on file    Years of education: Not on file    Highest education level: Not on file   Occupational History    Not on file   Social Needs    Financial resource strain: Not on file    Food insecurity     Worry: Not on file     Inability: Not on file    Transportation needs     Medical: Not on file     Non-medical: Not on file   Tobacco Use    Smoking status: Current Every Day Smoker     Packs/day: 0.50    Smokeless tobacco: Never Used   Substance and Sexual Activity    Alcohol use: Yes     Alcohol/week: 0.8 standard drinks     Types: 1 Cans of beer per week    Drug use: Yes     Types: Cocaine     Comment: Pt reports a history of crack cocaine use    Sexual activity: Not on file   Lifestyle    Physical activity     Days per week: Not on file     Minutes per session: Not on file    Stress: Not on file   Relationships    Social connections     Talks on phone: Not on file     Gets together: Not on file     Attends Sikhism service: Not on file     Active member of club or organization: Not on file     Attends meetings of clubs or organizations: Not on file     Relationship status: Not on file    Intimate partner violence     Fear of current or ex partner: Not on file     Emotionally abused: Not on file     Physically abused: Not on file     Forced sexual activity: Not on file   Other Topics Concern    Not on file   Social History Narrative    The patient is single. The patient lives alone. Unknown if patient has children. The patient does not plan to return home upon discharge. The patient does not have legal issues pending. The patient's source of income comes from disability. Hinduism and cultural practices have not been voiced at this time. The patient has not been in an event described as horrible or outside the realm of ordinary life experience either currently or in the past.The patient has not been a victim of sexual/physical abuse.              ALLERGIES: Patient has no known allergies. Review of Systems   Constitutional: Negative for activity change, chills and fever. HENT: Negative for congestion, rhinorrhea and sore throat. Respiratory: Negative for shortness of breath. Cardiovascular: Negative for chest pain and leg swelling. Gastrointestinal: Negative for abdominal pain, diarrhea, nausea and vomiting. Genitourinary: Negative for dysuria, vaginal bleeding and vaginal discharge.    Musculoskeletal: Negative for arthralgias and myalgias. Neurological: Negative for dizziness. Psychiatric/Behavioral: Negative for confusion. All other systems reviewed and are negative. Vitals:    04/19/20 2105   BP: 150/80   Pulse: 87   Resp: 16   Temp: 98 °F (36.7 °C)   SpO2: 100%   Weight: 63.5 kg (140 lb)   Height: 5' 3\" (1.6 m)            Physical Exam  Constitutional:       Appearance: Normal appearance. She is well-developed. HENT:      Head: Normocephalic and atraumatic. Right Ear: External ear normal.      Left Ear: External ear normal.      Nose: Nose normal.      Mouth/Throat:      Pharynx: No oropharyngeal exudate. Eyes:      General: Lids are normal.         Right eye: No discharge. Left eye: No discharge. Conjunctiva/sclera: Conjunctivae normal.   Neck:      Musculoskeletal: Normal range of motion. Thyroid: No thyromegaly. Trachea: No tracheal deviation. Cardiovascular:      Rate and Rhythm: Normal rate and regular rhythm. Heart sounds: Normal heart sounds. Pulmonary:      Effort: Pulmonary effort is normal.      Breath sounds: Normal breath sounds. Abdominal:      General: Bowel sounds are normal.      Palpations: Abdomen is soft. Musculoskeletal: Normal range of motion. Skin:     General: Skin is warm and dry. Neurological:      Mental Status: She is alert and oriented to person, place, and time. Psychiatric:         Judgment: Judgment normal.          MDM  Number of Diagnoses or Management Options  Homelessness:   Diagnosis management comments: Assesment/Plan- 63 y.o. Patient presents with:  Facial Pain  differential includes: homelessness, malingering. Labs and imaging reviewed with N/A. Patient is well appearing, afebrile and tolerating PO. Recommend PCP follow up. Patient educated on reasons to return to the ED.            Procedures

## 2020-04-20 NOTE — ED TRIAGE NOTES
Patient arrives from [facility] in [facility]. Patient states bystander called to help her stay off the streets. Patient arrives with old hospital bands. Patient's only complaints are she is cold and would like something to eat.

## 2020-09-17 ENCOUNTER — HOSPITAL ENCOUNTER (EMERGENCY)
Age: 60
Discharge: PSYCHIATRIC OR SUBSTANCE USE FACILITY | End: 2020-09-19
Attending: EMERGENCY MEDICINE | Admitting: EMERGENCY MEDICINE
Payer: OTHER GOVERNMENT

## 2020-09-17 DIAGNOSIS — F20.1 DISORGANIZED SCHIZOPHRENIA (HCC): Primary | ICD-10-CM

## 2020-09-17 DIAGNOSIS — D72.819 LEUKOPENIA, UNSPECIFIED TYPE: ICD-10-CM

## 2020-09-17 DIAGNOSIS — F14.10 COCAINE ABUSE (HCC): ICD-10-CM

## 2020-09-17 DIAGNOSIS — D64.9 NORMOCYTIC ANEMIA: ICD-10-CM

## 2020-09-17 LAB
ALBUMIN SERPL-MCNC: 2.8 G/DL (ref 3.5–5)
ALBUMIN/GLOB SERPL: 0.5 {RATIO} (ref 1.1–2.2)
ALP SERPL-CCNC: 104 U/L (ref 45–117)
ALT SERPL-CCNC: 38 U/L (ref 12–78)
AMPHET UR QL SCN: NEGATIVE
ANION GAP SERPL CALC-SCNC: 2 MMOL/L (ref 5–15)
APPEARANCE UR: CLEAR
AST SERPL-CCNC: 66 U/L (ref 15–37)
ATRIAL RATE: 90 BPM
BACTERIA URNS QL MICRO: NEGATIVE /HPF
BARBITURATES UR QL SCN: NEGATIVE
BASOPHILS # BLD: 0 K/UL (ref 0–0.1)
BASOPHILS NFR BLD: 0 % (ref 0–1)
BENZODIAZ UR QL: NEGATIVE
BILIRUB SERPL-MCNC: 0.6 MG/DL (ref 0.2–1)
BILIRUB UR QL: NEGATIVE
BUN SERPL-MCNC: 21 MG/DL (ref 6–20)
BUN/CREAT SERPL: 24 (ref 12–20)
CALCIUM SERPL-MCNC: 8.9 MG/DL (ref 8.5–10.1)
CALCULATED P AXIS, ECG09: 54 DEGREES
CALCULATED R AXIS, ECG10: 56 DEGREES
CALCULATED T AXIS, ECG11: 68 DEGREES
CANNABINOIDS UR QL SCN: NEGATIVE
CHLORIDE SERPL-SCNC: 104 MMOL/L (ref 97–108)
CO2 SERPL-SCNC: 30 MMOL/L (ref 21–32)
COCAINE UR QL SCN: POSITIVE
COLOR UR: ABNORMAL
COVID-19 RAPID TEST, COVR: NOT DETECTED
CREAT SERPL-MCNC: 0.89 MG/DL (ref 0.55–1.02)
DIAGNOSIS, 93000: NORMAL
DIFFERENTIAL METHOD BLD: ABNORMAL
DRUG SCRN COMMENT,DRGCM: ABNORMAL
EOSINOPHIL # BLD: 0.1 K/UL (ref 0–0.4)
EOSINOPHIL NFR BLD: 2 % (ref 0–7)
EPITH CASTS URNS QL MICRO: ABNORMAL /LPF
ERYTHROCYTE [DISTWIDTH] IN BLOOD BY AUTOMATED COUNT: 19.3 % (ref 11.5–14.5)
ETHANOL SERPL-MCNC: <10 MG/DL
GLOBULIN SER CALC-MCNC: 5.9 G/DL (ref 2–4)
GLUCOSE SERPL-MCNC: 95 MG/DL (ref 65–100)
GLUCOSE UR STRIP.AUTO-MCNC: NEGATIVE MG/DL
HCT VFR BLD AUTO: 31.4 % (ref 35–47)
HEALTH STATUS, XMCV2T: NORMAL
HGB BLD-MCNC: 10.1 G/DL (ref 11.5–16)
HGB UR QL STRIP: ABNORMAL
HYALINE CASTS URNS QL MICRO: ABNORMAL /LPF (ref 0–5)
IMM GRANULOCYTES # BLD AUTO: 0 K/UL (ref 0–0.04)
IMM GRANULOCYTES NFR BLD AUTO: 0 % (ref 0–0.5)
KETONES UR QL STRIP.AUTO: NEGATIVE MG/DL
LEUKOCYTE ESTERASE UR QL STRIP.AUTO: ABNORMAL
LYMPHOCYTES # BLD: 0.8 K/UL (ref 0.8–3.5)
LYMPHOCYTES NFR BLD: 31 % (ref 12–49)
MCH RBC QN AUTO: 27.9 PG (ref 26–34)
MCHC RBC AUTO-ENTMCNC: 32.2 G/DL (ref 30–36.5)
MCV RBC AUTO: 86.7 FL (ref 80–99)
METHADONE UR QL: NEGATIVE
MONOCYTES # BLD: 0.4 K/UL (ref 0–1)
MONOCYTES NFR BLD: 14 % (ref 5–13)
NEUTS SEG # BLD: 1.2 K/UL (ref 1.8–8)
NEUTS SEG NFR BLD: 53 % (ref 32–75)
NITRITE UR QL STRIP.AUTO: NEGATIVE
NRBC # BLD: 0 K/UL (ref 0–0.01)
NRBC BLD-RTO: 0 PER 100 WBC
OPIATES UR QL: NEGATIVE
P-R INTERVAL, ECG05: 150 MS
PCP UR QL: NEGATIVE
PH UR STRIP: 5 [PH] (ref 5–8)
PLATELET # BLD AUTO: 180 K/UL (ref 150–400)
PMV BLD AUTO: 10.5 FL (ref 8.9–12.9)
POTASSIUM SERPL-SCNC: 3.1 MMOL/L (ref 3.5–5.1)
PROT SERPL-MCNC: 8.7 G/DL (ref 6.4–8.2)
PROT UR STRIP-MCNC: 100 MG/DL
Q-T INTERVAL, ECG07: 390 MS
QRS DURATION, ECG06: 90 MS
QTC CALCULATION (BEZET), ECG08: 477 MS
RBC # BLD AUTO: 3.62 M/UL (ref 3.8–5.2)
RBC #/AREA URNS HPF: ABNORMAL /HPF (ref 0–5)
RBC MORPH BLD: ABNORMAL
SODIUM SERPL-SCNC: 136 MMOL/L (ref 136–145)
SOURCE, COVRS: NORMAL
SP GR UR REFRACTOMETRY: 1.02 (ref 1–1.03)
SPECIMEN SOURCE, FCOV2M: NORMAL
SPECIMEN TYPE, XMCV1T: NORMAL
TSH SERPL DL<=0.05 MIU/L-ACNC: 0.74 UIU/ML (ref 0.36–3.74)
UR CULT HOLD, URHOLD: NORMAL
UROBILINOGEN UR QL STRIP.AUTO: 0.2 EU/DL (ref 0.2–1)
VENTRICULAR RATE, ECG03: 90 BPM
WBC # BLD AUTO: 2.5 K/UL (ref 3.6–11)
WBC URNS QL MICRO: ABNORMAL /HPF (ref 0–4)

## 2020-09-17 PROCEDURE — 80053 COMPREHEN METABOLIC PANEL: CPT

## 2020-09-17 PROCEDURE — 90791 PSYCH DIAGNOSTIC EVALUATION: CPT

## 2020-09-17 PROCEDURE — 87635 SARS-COV-2 COVID-19 AMP PRB: CPT

## 2020-09-17 PROCEDURE — 36415 COLL VENOUS BLD VENIPUNCTURE: CPT

## 2020-09-17 PROCEDURE — 85025 COMPLETE CBC W/AUTO DIFF WBC: CPT

## 2020-09-17 PROCEDURE — 74011250636 HC RX REV CODE- 250/636: Performed by: EMERGENCY MEDICINE

## 2020-09-17 PROCEDURE — 81001 URINALYSIS AUTO W/SCOPE: CPT

## 2020-09-17 PROCEDURE — 99285 EMERGENCY DEPT VISIT HI MDM: CPT

## 2020-09-17 PROCEDURE — 84443 ASSAY THYROID STIM HORMONE: CPT

## 2020-09-17 PROCEDURE — 96372 THER/PROPH/DIAG INJ SC/IM: CPT

## 2020-09-17 PROCEDURE — 93005 ELECTROCARDIOGRAM TRACING: CPT

## 2020-09-17 PROCEDURE — 80307 DRUG TEST PRSMV CHEM ANLYZR: CPT

## 2020-09-17 RX ORDER — LORAZEPAM 2 MG/ML
2 INJECTION INTRAMUSCULAR
Status: COMPLETED | OUTPATIENT
Start: 2020-09-17 | End: 2020-09-17

## 2020-09-17 RX ADMIN — LORAZEPAM 2 MG: 2 INJECTION INTRAMUSCULAR; INTRAVENOUS at 19:08

## 2020-09-17 NOTE — BSMART NOTE
Writer spoke with Carly Lowe of [address] [place] [address] in which a TDO evaluation was requested due to patient lacking capacity to consent and presenting as danger to self and/or others. Carly Lowe stated they will evaluate after patient is medically clear. Sending all TDO requested information minus attending physician's note and test results-will send upon completion. Nursing charge/Harriett notified. Attending nurse notified patient may not leave ED.

## 2020-09-17 NOTE — ED TRIAGE NOTES
Pt arrives with EMS after bystanders called 911 d/t patient running in front of traffic. Per EMS, pt also ran in front of ambulance. Pt refusing vitals. Pt crying intermittently and vocalizing incomprehensibly. On yes/no question pt denies trying to kill herself, (+) hearing voices. Pt oriented to self. States it is 18.

## 2020-09-17 NOTE — BSMART NOTE
Comprehensive Assessment Form Part 1 Section I - Disposition Axis I - Schizophrenia Axis II - R/O Intellectual Disability Axis III - Past Medical History:  
Diagnosis Date  Alcohol abuse 5/29/2017  Hypertension  Psychotic disorder ([facility]  Schizophrenia ([facility] Axis IV - lack of support, treatment noncompliant, SA, homeless The Medical Doctor to Psychiatrist conference was not completed. The Medical Doctor is in agreement with Psychiatrist disposition because of patient meeting admission criteria. The plan is contact [address] [place] [address] for TDO evaluation. The on-call Psychiatrist consulted was Dr. Cj Conner. The admitting Psychiatrist will be Dr. Cj Conner. The admitting Diagnosis is Schizophrenia. The Payor source is self pay. Section II - Integrated Summary Summary: Per triage: \"Pt arrives with EMS after bystanders called 911 d/t patient running in front of traffic. Per EMS, pt also ran in front of ambulance. Pt refusing vitals. Pt crying intermittently and vocalizing incomprehensibly. On yes/no question pt denies trying to kill herself, (+) hearing voices. Pt oriented to self. States it is 18. \" 
 
Writer saw patient face to face at bedside. However, patient only woke a few times as writer attempted to get assessment information. Patient asked if she was suicidal and she stated \"no. \" However, she was found by bystanders running into traffic and then she ran in front of EMS per report. Patient asked if she felt as if she wanted to come into hospital and she stated \"hell no. \" In between falling asleep during assessment patient rambled but what she was saying was incoherent. Patient during rambling event stated she got something to eat. Nurse and attending physician shared patient told one she thought the year was 18 and to the other she thought it to be the year 2000. When writer asked patient if she was hearing or seeing things she mumbled and fell asleep.   However, nurse stated that she did tell her prior to writer coming to access that she was hallucinating. Patient unable to share SA history but due to ED visist 4/2020 patient was consuming etoh daily (beer x1) and using crack cocaine. Patient at this time presents as lacking capacity to care for self and presents as danger to self and/or others. Plan is to call [address] [place] [address] to request TDO evaluation and medically clear patient. The patienthas not demonstrated mental capacity to provide informed consent. The information is given by the patient, EMS personnel and past medical records. The Chief Complaint is reported to be running into traffic and ran in front of EMS. The Precipitant Factors are homeless and medication noncompliance. Previous Hospitalizations: yes The patient has been in restraints in the past and has not escaped from them. Current Psychiatrist and/or  is no one. Lethality Assessment: 
 
The potential for suicide noted by the following: active psychosis . The potential for homicide is not noted. The patient has not been a perpetrator of sexual or physical abuse. There are not pending charges per last assessment 5/2017. The patient is felt to be at risk for self harm or harm to others. The attending nurse was advised TDO assessment being requested. Section III - Psychosocial 
The patient's overall mood and attitude is sleepy. Feelings of helplessness and hopelessness are not able to be accessed at this time. Generalized anxiety is not able to be accessed at this time. Panic is not observed. Phobias are not able able to be accessed at this time. Obsessive compulsive tendencies are not observed. Section IV - Mental Status Exam 
The patient's appearance is unkempt and shows poor hygiene and she was wet and disheveled at admission. The patient's behavior shows retardation and shows poor eye contact. The patient is only aware of  person.   The patient's speech is slurred. The patient's mood sleepy. The range of affect is constricted when awake. The patient's thought content demonstrates no evidence of impairment. The thought process shows no evidence of impairment. The patient's perception demonstrated changes in the following:  auditory  visual hallucinations. The patient's memory is impaired. The patient's appetite is decreased and shows signs of weight loss. The patient's sleep has evidence of insomnia. The patient shows no insight. The patient's judgement is psychologically impaired and is cognitively impaired. Per records there was past mention of borderline intellectual functioning. Section V - Substance Abuse It is unknown if the patient is using substances at this time but there is a SA history per records. Section VI - Living Arrangements Per records: The patient is single. The patient lives alone. The patient has no children. The patient does plan to return home upon discharge. The patient does not have legal issues pending. The patient's source of income comes from disability. Sabianism and cultural practices have not been voiced at this time. Per records: The patient's greatest support comes from no one and this person will not be involved with the treatment. The patient has not been in an event described as horrible or outside the realm of ordinary life experience either currently or in the past. 
The patient has not been a victim of sexual/physical abuse. Section VII - Other Areas of Clinical Concern The highest grade achieved is unknown with the overall quality of school experience being described as N/A. The patient is currently disabled and speaks [place] as a primary language. The patient has the following communication impairment;  Past TBI affecting communication. The patient's preference for learning can be described as: learns best by oral information.   The patient's hearing is normal.  The patient's vision is normal. 
 
 
Elmer Hennessy MS, Resident In [place]

## 2020-09-17 NOTE — BSMART NOTE
Writer let [facility] know that we were still missing UA, UDS and Gretta Roberts for patient but per attending patient is looking as if she will be medically cleared. Writer will send attending physician upon completion along with results review. HPD notified that patient should not leave ED as danger to self and/or others and lacks capacity.
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
## 2020-09-17 NOTE — BSMART NOTE
Per HPD/Teresa patient is being placed under TDO by THE [facility].

## 2020-09-17 NOTE — BSMART NOTE
Labs ready to send to [facility] minus Covid testing and medical clearance from attending physician. Raad from [facility] stated they cannot evaluate patient for TDO until patient is medically clear.

## 2020-09-17 NOTE — BSMART NOTE
Hemanth ENG/Cynthia notified that test results and medical clearance by doctor has been sent. However, patient's covid-19 test is pending. Luke Garibay provided Teresa's/CYNTHIA email to utilize his computer for TDO evaluation.

## 2020-09-18 LAB
COVID-19, XGCOVT: NOT DETECTED
HEALTH STATUS, XMCV2T: NORMAL
SPECIMEN TYPE, XMCV1T: NORMAL

## 2020-09-18 PROCEDURE — 74011000250 HC RX REV CODE- 250: Performed by: EMERGENCY MEDICINE

## 2020-09-18 PROCEDURE — 74011250636 HC RX REV CODE- 250/636: Performed by: EMERGENCY MEDICINE

## 2020-09-18 RX ADMIN — WATER 10 MG: 1 INJECTION INTRAMUSCULAR; INTRAVENOUS; SUBCUTANEOUS at 02:50
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
## 2020-09-18 NOTE — ED NOTES
2000 Verbal shift change report given to Spike Alas RN and Evelio Moreno RN (oncoming nurse) by Jace Herndon RN (offgoing nurse). Report included the following information SBAR, ED Summary, Intake/Output, MAR and Recent Results. Pt resting on ED stretcher, NAD. Pt refusing to wear a mask. 2100 Pt continues to rest on ED stretcher, NAD noted. Pt will not keep a mask on.    2200 Pt continues to rest on ED stretcher, NAD noted. 2300 Pt continues to rest on ED stretcher, NAD noted. 0000 Pt continues to rest on ED stretcher, NAD noted. Pt moved to [address] continues to remove mask when attempting to place it. 0039 Pt more alert now, exposing herself while attempting to scratch her arms. Pulled her shirt back down and covered her back up, pt asking where her clothes are and asking for \"a tray. \"    [address] THE [facility] for an update, they stated that there is not an update at this time and that the pt will be in [facility] ED overnight. Will update charge RN.    0100 Pt resting on ED stretcher in hallway, talking to herself and restless, NAD noted. 0240 Pt not following commands and talking over this, refusing to get back into bed, is taking her clothes off in the hallway. Orders received from MD for Geodon. 46 Geodon given per MD order    0300 Pt moved to ED 11 for decreased stimuli. Pt resting on ED stretcher. 0400 Pt resting on ED stretcher, NAD, deep even respirations noted. 0500 Pt resting on ED stretcher, NAD, deep even respirations noted. 0600 Pt resting on ED stretcher, NAD, deep even respirations noted. 0700 Pt resting on ED stretcher, NAD, deep even respirations noted. 0740 Verbal shift change report given to Chiquis Fernandez RN (oncoming nurse) by Spike Alas RN (offgoing nurse).  Report included the following information SBAR, ED Summary, Intake/Output, MAR and Recent Results

## 2020-09-18 NOTE — CONSULTS
Spoke to Jules, patient is a tdo. She's informed that [address] [place] [address] Pkwy should be actively looking for a bed for her given that she's now a tdo. At this time, RN does not think patient needs medications. She's informed that consult will be canceled.

## 2020-09-18 NOTE — ED NOTES
Spoke to Jaz Shi, Psych NP. Pt is under care of [address] and questions about pt's care should be forwarded to them.

## 2020-09-18 NOTE — ED NOTES
Assumed care of patient from RN. Patient resting in bed, no signs of distress, will continue to monitor. 1:44 PM  HPD remains at bedside, pt resting in bed, [facility] still unable to find bed placement. Will continue to monitor. IP psych consult placed.

## 2020-09-18 NOTE — ED NOTES
Received verbal report from Andreea Bravo [place]. Patient resting comfortably in bed at this time.

## 2020-09-18 NOTE — ED NOTES
Verbal shift change report given to Kyle Fregoso RN and Raman Banda RN (oncoming nurse) by Ulyses Rinne (offgoing nurse). Report included the following information SBAR and ED Summary.

## 2020-09-19 VITALS
TEMPERATURE: 98 F | HEART RATE: 68 BPM | SYSTOLIC BLOOD PRESSURE: 152 MMHG | OXYGEN SATURATION: 100 % | RESPIRATION RATE: 16 BRPM | DIASTOLIC BLOOD PRESSURE: 82 MMHG

## 2020-09-19 NOTE — ED NOTES
Bedside and Verbal shift change report given to Veronika Conrad RN (oncoming nurse) by Brian Brown RN (offgoing nurse). Report included the following information SBAR, ED Summary, MAR and Recent Results.

## 2020-09-19 NOTE — ED NOTES
Pt got out of bed, walking around room. Provided her with gingerale and apple sauce. Pt now back in bed and resting.

## 2020-09-19 NOTE — ED NOTES
Sheets changed. Pt provided soap and lotion with toothbrush/toothpaste for self care. Towels and washcloths at bedside. Pt standing at sink in room.

## 2020-09-19 NOTE — ED NOTES
Pt accepted at [facility]. Report called and EMTALA completed. Awaiting Hemanth Sampson for TDO paperwork and transportation.

## 2020-09-19 NOTE — ED NOTES
DORIAN for signed and scanned into pt's chart. Paperwork given to [place] for transport to [facility]. [facility] to update on ETA. Pt left department in [facility] custody, pt condition stable.

## 2021-09-15 ENCOUNTER — HOSPITAL ENCOUNTER (EMERGENCY)
Age: 61
Discharge: HOME | End: 2021-09-15
Attending: EMERGENCY MEDICINE
Payer: MEDICAID

## 2021-09-15 ENCOUNTER — APPOINTMENT (OUTPATIENT)
Dept: GENERAL RADIOLOGY | Age: 61
End: 2021-09-15
Attending: EMERGENCY MEDICINE
Payer: MEDICAID

## 2021-09-15 VITALS
SYSTOLIC BLOOD PRESSURE: 144 MMHG | TEMPERATURE: 98.2 F | BODY MASS INDEX: 21.22 KG/M2 | OXYGEN SATURATION: 97 % | RESPIRATION RATE: 24 BRPM | HEIGHT: 68 IN | DIASTOLIC BLOOD PRESSURE: 88 MMHG | WEIGHT: 140 LBS | HEART RATE: 96 BPM

## 2021-09-15 DIAGNOSIS — N30.01 ACUTE CYSTITIS WITH HEMATURIA: Primary | ICD-10-CM

## 2021-09-15 LAB
ALBUMIN SERPL-MCNC: 2 G/DL (ref 3.5–5)
ALBUMIN/GLOB SERPL: 0.3 {RATIO} (ref 1.1–2.2)
ALP SERPL-CCNC: 85 U/L (ref 45–117)
ALT SERPL-CCNC: 28 U/L (ref 12–78)
ANION GAP SERPL CALC-SCNC: 5 MMOL/L (ref 5–15)
APPEARANCE UR: ABNORMAL
AST SERPL W P-5'-P-CCNC: 59 U/L (ref 15–37)
ATRIAL RATE: 108 BPM
BACTERIA URNS QL MICRO: ABNORMAL /HPF
BASOPHILS # BLD: 0 K/UL (ref 0–0.1)
BASOPHILS NFR BLD: 0 % (ref 0–1)
BILIRUB SERPL-MCNC: 0.4 MG/DL (ref 0.2–1)
BILIRUB UR QL: NEGATIVE
BUN SERPL-MCNC: 9 MG/DL (ref 6–20)
BUN/CREAT SERPL: 10 (ref 12–20)
CA-I BLD-MCNC: 8.1 MG/DL (ref 8.5–10.1)
CALCULATED P AXIS, ECG09: 106 DEGREES
CALCULATED R AXIS, ECG10: 84 DEGREES
CALCULATED T AXIS, ECG11: 69 DEGREES
CHLORIDE SERPL-SCNC: 98 MMOL/L (ref 97–108)
CO2 SERPL-SCNC: 27 MMOL/L (ref 21–32)
COLOR UR: ABNORMAL
CREAT SERPL-MCNC: 0.94 MG/DL (ref 0.55–1.02)
DIAGNOSIS, 93000: NORMAL
DIFFERENTIAL METHOD BLD: ABNORMAL
EOSINOPHIL # BLD: 0 K/UL (ref 0–0.4)
EOSINOPHIL NFR BLD: 0 % (ref 0–7)
ERYTHROCYTE [DISTWIDTH] IN BLOOD BY AUTOMATED COUNT: 12.7 % (ref 11.5–14.5)
GLOBULIN SER CALC-MCNC: 6.3 G/DL (ref 2–4)
GLUCOSE SERPL-MCNC: 120 MG/DL (ref 65–100)
GLUCOSE UR STRIP.AUTO-MCNC: NEGATIVE MG/DL
HCT VFR BLD AUTO: 33.6 % (ref 35–47)
HGB BLD-MCNC: 11.7 G/DL (ref 11.5–16)
HGB UR QL STRIP: ABNORMAL
HYALINE CASTS URNS QL MICRO: ABNORMAL /LPF (ref 0–5)
IMM GRANULOCYTES # BLD AUTO: 0 K/UL (ref 0–0.04)
IMM GRANULOCYTES NFR BLD AUTO: 0 % (ref 0–0.5)
KETONES UR QL STRIP.AUTO: NEGATIVE MG/DL
LACTATE SERPL-SCNC: 2.6 MMOL/L (ref 0.4–2)
LEUKOCYTE ESTERASE UR QL STRIP.AUTO: ABNORMAL
LYMPHOCYTES # BLD: 0.9 K/UL (ref 0.8–3.5)
LYMPHOCYTES NFR BLD: 27 % (ref 12–49)
MCH RBC QN AUTO: 30.2 PG (ref 26–34)
MCHC RBC AUTO-ENTMCNC: 34.8 G/DL (ref 30–36.5)
MCV RBC AUTO: 86.6 FL (ref 80–99)
MONOCYTES # BLD: 0.3 K/UL (ref 0–1)
MONOCYTES NFR BLD: 11 % (ref 5–13)
MUCOUS THREADS URNS QL MICRO: ABNORMAL /LPF
NEUTS SEG # BLD: 1.9 K/UL (ref 1.8–8)
NEUTS SEG NFR BLD: 62 % (ref 32–75)
NITRITE UR QL STRIP.AUTO: NEGATIVE
NRBC # BLD: 0 K/UL (ref 0–0.01)
NRBC BLD-RTO: 0 PER 100 WBC
P-R INTERVAL, ECG05: 194 MS
PH UR STRIP: 6 [PH] (ref 5–8)
PLATELET # BLD AUTO: 201 K/UL (ref 150–400)
PMV BLD AUTO: 10.1 FL (ref 8.9–12.9)
POTASSIUM SERPL-SCNC: 3.8 MMOL/L (ref 3.5–5.1)
PROT SERPL-MCNC: 8.3 G/DL (ref 6.4–8.2)
PROT UR STRIP-MCNC: 100 MG/DL
Q-T INTERVAL, ECG07: 314 MS
QRS DURATION, ECG06: 74 MS
QTC CALCULATION (BEZET), ECG08: 420 MS
RBC # BLD AUTO: 3.88 M/UL (ref 3.8–5.2)
RBC #/AREA URNS HPF: ABNORMAL /HPF (ref 0–5)
SODIUM SERPL-SCNC: 130 MMOL/L (ref 136–145)
SP GR UR REFRACTOMETRY: 1.01 (ref 1–1.03)
UROBILINOGEN UR QL STRIP.AUTO: 4 EU/DL (ref 0.1–1)
VENTRICULAR RATE, ECG03: 108 BPM
WBC # BLD AUTO: 3.1 K/UL (ref 3.6–11)
WBC URNS QL MICRO: ABNORMAL /HPF (ref 0–4)

## 2021-09-15 PROCEDURE — 80053 COMPREHEN METABOLIC PANEL: CPT

## 2021-09-15 PROCEDURE — 83605 ASSAY OF LACTIC ACID: CPT

## 2021-09-15 PROCEDURE — 87186 SC STD MICRODIL/AGAR DIL: CPT

## 2021-09-15 PROCEDURE — 93005 ELECTROCARDIOGRAM TRACING: CPT

## 2021-09-15 PROCEDURE — 71045 X-RAY EXAM CHEST 1 VIEW: CPT

## 2021-09-15 PROCEDURE — 87077 CULTURE AEROBIC IDENTIFY: CPT

## 2021-09-15 PROCEDURE — 81001 URINALYSIS AUTO W/SCOPE: CPT

## 2021-09-15 PROCEDURE — 87040 BLOOD CULTURE FOR BACTERIA: CPT

## 2021-09-15 PROCEDURE — 36415 COLL VENOUS BLD VENIPUNCTURE: CPT

## 2021-09-15 PROCEDURE — 85025 COMPLETE CBC W/AUTO DIFF WBC: CPT

## 2021-09-15 PROCEDURE — 99284 EMERGENCY DEPT VISIT MOD MDM: CPT

## 2021-09-15 RX ORDER — LEVOFLOXACIN 5 MG/ML
750 INJECTION, SOLUTION INTRAVENOUS
Status: DISCONTINUED | OUTPATIENT
Start: 2021-09-15 | End: 2021-09-15

## 2021-09-15 RX ORDER — SODIUM CHLORIDE 0.9 % (FLUSH) 0.9 %
5-10 SYRINGE (ML) INJECTION AS NEEDED
Status: DISCONTINUED | OUTPATIENT
Start: 2021-09-15 | End: 2021-09-15 | Stop reason: HOSPADM

## 2021-09-15 RX ORDER — CEPHALEXIN 500 MG/1
500 CAPSULE ORAL 4 TIMES DAILY
Qty: 28 CAPSULE | Refills: 0 | Status: SHIPPED | OUTPATIENT
Start: 2021-09-15 | End: 2021-09-22

## 2021-09-15 RX ORDER — ACETAMINOPHEN 325 MG/1
975 TABLET ORAL
Status: DISCONTINUED | OUTPATIENT
Start: 2021-09-15 | End: 2021-09-15

## 2021-09-15 NOTE — ED NOTES
Care assumed and bedside SBAR report endorsed on Dallin Vicente. Pt cardiac monitoring continued , spO2 Monitoring continued, IV reassed, call bell within reach, side rails up x2, resting comfortable but easily arousable, no signs of acute distress. , bed in lowest position, MAR reviewed, Labs reviewed, will continue to monitor

## 2021-09-15 NOTE — ED PROVIDER NOTES
EMERGENCY DEPARTMENT HISTORY AND PHYSICAL EXAM      Date: 9/15/2021  Patient Name: Edgardo De Jesus    History of Presenting Illness     Chief Complaint   Patient presents with   Darrell Olivas Fall    Back Pain       History Provided By: Patient    HPI: Edgardo De Jesus, 62 y.o. female with a past medical history significant schizophrenia, hypertension, and previous alcohol abuse presents to the ED with cc of back pain s/p fall. She reports that she lives in a group setting for people with legal issues and she fell backwards out of her chair while reaching for something resulting in back pain. She denies any other injury. She is a poor historian due to her psychiatric illnesses. She is alert and oriented x 3 and answers questions appropriately and is cooperative. She reports feeling \"fine\" at the present time and denies any back pain at the current time. She denies any recent illness, N/V/D, fever, chills, dysuria, recent alcohol or drug use. Unable to reach any staff at her residence for further information. There are no other complaints, changes, or physical findings at this time. PCP: Renetta Fermin MD    No current facility-administered medications on file prior to encounter. No current outpatient medications on file prior to encounter. Past History     Past Medical History:  Past Medical History:   Diagnosis Date    Alcohol abuse 5/29/2017    Hypertension     [facility]     Schizophrenia ([facility]        Past Surgical History:  No past surgical history on file. Family History:  History reviewed. No pertinent family history. Social History:  Social History     Tobacco Use    Smoking status: Current Every Day Smoker     Packs/day: 0.50    Smokeless tobacco: Never Used   Substance Use Topics    Alcohol use:  Yes     Alcohol/week: 0.8 standard drinks     Types: 1 Cans of beer per week    Drug use: Yes     Types: Cocaine     Comment: Pt reports a history of crack cocaine use       Allergies:  No Known Allergies      Review of Systems     Review of Systems   Constitutional: Negative for chills, fatigue and fever. HENT: Negative for ear pain, postnasal drip, rhinorrhea, sinus pressure, sore throat and trouble swallowing. Eyes: Negative for discharge and visual disturbance. Respiratory: Negative for cough, chest tightness, shortness of breath and wheezing. Cardiovascular: Negative for chest pain, palpitations and leg swelling. Gastrointestinal: Negative for abdominal distention, abdominal pain, blood in stool, constipation, diarrhea, nausea and vomiting. Endocrine: Negative. Genitourinary: Negative for dysuria, frequency and urgency. Musculoskeletal: Negative for back pain, myalgias, neck pain and neck stiffness. Skin: Negative for rash. Allergic/Immunologic: Negative. Neurological: Negative for dizziness, syncope, weakness and headaches. Hematological: Does not bruise/bleed easily. Psychiatric/Behavioral: Negative for confusion, hallucinations, sleep disturbance and suicidal ideas. The patient is not nervous/anxious. All other systems reviewed and are negative. Physical Exam     Physical Exam  Vitals and nursing note reviewed. Constitutional:       Appearance: She is well-developed and normal weight. HENT:      Head: Normocephalic and atraumatic. Right Ear: External ear normal.      Left Ear: External ear normal.      Nose: Nose normal.      Mouth/Throat:      Pharynx: Oropharynx is clear. No oropharyngeal exudate or posterior oropharyngeal erythema. Eyes:      Extraocular Movements: Extraocular movements intact. Pupils: Pupils are equal, round, and reactive to light. Neck:      Vascular: No JVD. Trachea: No tracheal deviation. Cardiovascular:      Rate and Rhythm: Normal rate and regular rhythm. Pulses:           Radial pulses are 2+ on the right side and 2+ on the left side. Heart sounds: Normal heart sounds. No murmur heard. Pulmonary:      Effort: Pulmonary effort is normal. No respiratory distress. Breath sounds: Normal breath sounds. Chest:      Chest wall: No tenderness. Abdominal:      General: Bowel sounds are normal.      Palpations: Abdomen is soft. Tenderness: There is no abdominal tenderness. Musculoskeletal:         General: Normal range of motion. Cervical back: Normal range of motion and neck supple. Right lower leg: No tenderness. No edema. Left lower leg: No tenderness. No edema. Skin:     General: Skin is warm and dry. Capillary Refill: Capillary refill takes less than 2 seconds. Neurological:      General: No focal deficit present. Mental Status: She is alert and oriented to person, place, and time. Psychiatric:         Mood and Affect: Mood normal.         Behavior: Behavior normal.         Lab and Diagnostic Study Results     Labs -     No results found for this or any previous visit (from the past 12 hour(s)). Radiologic Studies -   @lastxrresult@  CT Results  (Last 48 hours)    None        CXR Results  (Last 48 hours)               09/15/21 1430  XR CHEST SNGL V Final result    Narrative:  Chest single view. Comparison single view chest May 26, 2017. Unchanged appearance for the lungs; no gross interstitial or alveolar pulmonary   edema. Cardiac and mediastinal structures unchanged. No pneumothorax or sizable   pleural effusion. Hypertrophic bone noted first costosternal margins. Medical Decision Making   - I am the first provider for this patient. - I reviewed the vital signs, available nursing notes, past medical history, past surgical history, family history and social history. - Initial assessment performed. The patients presenting problems have been discussed, and they are in agreement with the care plan formulated and outlined with them. I have encouraged them to ask questions as they arise throughout their visit.     Vital Signs-Reviewed the patient's vital signs. No data found. Records Reviewed: Nursing Notes and Old Medical Records    The patient presents with back pain with a differential diagnosis of  lumbar strain, pneumonia, pyelonephritis, traumatic injury, viral syndrome and Urinary tract infection, sepsis. ED Course:   Patient was initially worked up with Sepsis protocol labs and interventions. It was determined that her temperature was recorded as febrile of 102.6 in error due to machine malfunction. The patient was treated as a sepsis alert initially but due to her stable clinical physical assessment, it was determined that she was not septic. She was signed out at 1600 to the ED attending physician to continue and complete her ED care with subsequent apparent discharge. Signed patient out to Dr. Arturo Christie for further management and disposition at 1600. Provider Notes (Medical Decision Making):     MDM  Number of Diagnoses or Management Options  Acute cystitis with hematuria: new, needed workup  Diagnosis management comments: Given the patient's initial vital signs and presentation, she was initially worked up as a sepsis alert with IVF, antibiotics, labs, and sepsis protocol. Amount and/or Complexity of Data Reviewed  Clinical lab tests: ordered and reviewed  Tests in the radiology section of CPT®: ordered and reviewed  Discuss the patient with other providers: yes    Patient Progress  Patient progress: improved         Disposition   Disposition: Condition stable  DC- Adult Discharges: All of the diagnostic tests were reviewed and questions answered. Diagnosis, care plan and treatment options were discussed. The patient understands the instructions and will follow up as directed. The patients results have been reviewed with them. They have been counseled regarding their diagnosis.   The patient verbally convey understanding and agreement of the signs, symptoms, diagnosis, treatment and prognosis and additionally agrees to follow up as recommended with their PCP in 24 - 48 hours. They also agree with the care-plan and convey that all of their questions have been answered. I have also put together some discharge instructions for them that include: 1) educational information regarding their diagnosis, 2) how to care for their diagnosis at home, as well a 3) list of reasons why they would want to return to the ED prior to their follow-up appointment, should their condition change. Discharged    DISCHARGE PLAN:  1. There are no discharge medications for this patient. 2.   Follow-up Information     Follow up With Specialties Details Why Contact Info    Renetta Fermin MD Internal Medicine Call in 2 days  [address]  [facility] [address]  [phone]          3. Return to ED if worse   4. Discharge Medication List as of 9/15/2021  5:38 PM            Diagnosis     Clinical Impression:   1. Acute cystitis with hematuria      Patient discharged by Dr Lisa Mueller  Attestations:    Deacon Witt NP    Please note that this dictation was completed with [employer], the computer voice recognition software. Quite often unanticipated grammatical, syntax, homophones, and other interpretive errors are inadvertently transcribed by the computer software. Please disregard these errors. Please excuse any errors that have escaped final proofreading. Thank you.

## 2021-09-16 ENCOUNTER — PATIENT OUTREACH (OUTPATIENT)
Dept: CASE MANAGEMENT | Age: 61
End: 2021-09-16

## 2021-09-16 NOTE — PROGRESS NOTES
ED 9/15/2021:  Fall/Acute cystitis with hematuria    Call #1:  Patient on Cov19 D/C [facility] Enrollment Report/fu Contact. Left message on voice mail with my contact information for return call. Need to assess for d/c needs post ED or Inpatient Admission. And/or [facility] enrollment/fu.
No

## 2021-09-18 LAB
BACTERIA SPEC CULT: ABNORMAL
BACTERIA SPEC CULT: ABNORMAL
SPECIAL REQUESTS,SREQ: ABNORMAL

## 2021-12-24 ENCOUNTER — HOSPITAL ENCOUNTER (EMERGENCY)
Age: 61
Discharge: HOME | End: 2021-12-25
Attending: EMERGENCY MEDICINE
Payer: MEDICAID

## 2021-12-24 ENCOUNTER — APPOINTMENT (OUTPATIENT)
Dept: GENERAL RADIOLOGY | Age: 61
End: 2021-12-24
Attending: EMERGENCY MEDICINE
Payer: MEDICAID

## 2021-12-24 DIAGNOSIS — R06.00 DYSPNEA, UNSPECIFIED TYPE: Primary | ICD-10-CM

## 2021-12-24 DIAGNOSIS — R50.9 FEVER, UNSPECIFIED FEVER CAUSE: ICD-10-CM

## 2021-12-24 LAB
ALBUMIN SERPL-MCNC: 1.8 G/DL (ref 3.5–5)
ALBUMIN/GLOB SERPL: 0.3 {RATIO} (ref 1.1–2.2)
ALP SERPL-CCNC: 81 U/L (ref 45–117)
ALT SERPL-CCNC: 25 U/L (ref 12–78)
ANION GAP SERPL CALC-SCNC: 2 MMOL/L (ref 5–15)
AST SERPL W P-5'-P-CCNC: 58 U/L (ref 15–37)
BILIRUB SERPL-MCNC: 0.6 MG/DL (ref 0.2–1)
BNP SERPL-MCNC: 179 PG/ML
BUN SERPL-MCNC: 14 MG/DL (ref 6–20)
BUN/CREAT SERPL: 16 (ref 12–20)
CA-I BLD-MCNC: 7.9 MG/DL (ref 8.5–10.1)
CHLORIDE SERPL-SCNC: 105 MMOL/L (ref 97–108)
CO2 SERPL-SCNC: 28 MMOL/L (ref 21–32)
COVID-19 RAPID TEST, COVR: NOT DETECTED
CREAT SERPL-MCNC: 0.88 MG/DL (ref 0.55–1.02)
GLOBULIN SER CALC-MCNC: 6.8 G/DL (ref 2–4)
GLUCOSE SERPL-MCNC: 106 MG/DL (ref 65–100)
LACTATE SERPL-SCNC: 1.9 MMOL/L (ref 0.4–2)
POTASSIUM SERPL-SCNC: 3.5 MMOL/L (ref 3.5–5.1)
PROT SERPL-MCNC: 8.6 G/DL (ref 6.4–8.2)
SARS-COV-2, COV2: NORMAL
SODIUM SERPL-SCNC: 135 MMOL/L (ref 136–145)
SPECIMEN SOURCE: NORMAL
TROPONIN-HIGH SENSITIVITY: 14 NG/L (ref 0–51)

## 2021-12-24 PROCEDURE — 87635 SARS-COV-2 COVID-19 AMP PRB: CPT

## 2021-12-24 PROCEDURE — 85025 COMPLETE CBC W/AUTO DIFF WBC: CPT

## 2021-12-24 PROCEDURE — 96361 HYDRATE IV INFUSION ADD-ON: CPT

## 2021-12-24 PROCEDURE — 83880 ASSAY OF NATRIURETIC PEPTIDE: CPT

## 2021-12-24 PROCEDURE — 96360 HYDRATION IV INFUSION INIT: CPT

## 2021-12-24 PROCEDURE — 71045 X-RAY EXAM CHEST 1 VIEW: CPT

## 2021-12-24 PROCEDURE — 99285 EMERGENCY DEPT VISIT HI MDM: CPT

## 2021-12-24 PROCEDURE — 36415 COLL VENOUS BLD VENIPUNCTURE: CPT

## 2021-12-24 PROCEDURE — 83605 ASSAY OF LACTIC ACID: CPT

## 2021-12-24 PROCEDURE — 87040 BLOOD CULTURE FOR BACTERIA: CPT

## 2021-12-24 PROCEDURE — 84484 ASSAY OF TROPONIN QUANT: CPT

## 2021-12-24 PROCEDURE — 74011250636 HC RX REV CODE- 250/636: Performed by: EMERGENCY MEDICINE

## 2021-12-24 PROCEDURE — 93005 ELECTROCARDIOGRAM TRACING: CPT

## 2021-12-24 PROCEDURE — 80053 COMPREHEN METABOLIC PANEL: CPT

## 2021-12-24 PROCEDURE — 87804 INFLUENZA ASSAY W/OPTIC: CPT

## 2021-12-24 RX ADMIN — SODIUM CHLORIDE 1000 ML: 9 INJECTION, SOLUTION INTRAVENOUS at 23:57

## 2021-12-24 RX ADMIN — SODIUM CHLORIDE 2000 ML: 9 INJECTION, SOLUTION INTRAVENOUS at 21:47

## 2021-12-25 VITALS
HEIGHT: 66 IN | DIASTOLIC BLOOD PRESSURE: 81 MMHG | RESPIRATION RATE: 18 BRPM | BODY MASS INDEX: 24.27 KG/M2 | HEART RATE: 96 BPM | SYSTOLIC BLOOD PRESSURE: 136 MMHG | TEMPERATURE: 97.5 F | OXYGEN SATURATION: 97 % | WEIGHT: 151 LBS

## 2021-12-25 LAB
APPEARANCE UR: CLEAR
ATRIAL RATE: 145 BPM
BACTERIA URNS QL MICRO: NEGATIVE /HPF
BASOPHILS # BLD: 0 K/UL (ref 0–0.1)
BASOPHILS NFR BLD: 0 % (ref 0–1)
BILIRUB UR QL: NEGATIVE
CALCULATED P AXIS, ECG09: 58 DEGREES
CALCULATED R AXIS, ECG10: 63 DEGREES
CALCULATED T AXIS, ECG11: 64 DEGREES
COLOR UR: ABNORMAL
DIAGNOSIS, 93000: NORMAL
DIFFERENTIAL METHOD BLD: ABNORMAL
EOSINOPHIL # BLD: 0 K/UL (ref 0–0.4)
EOSINOPHIL NFR BLD: 0 % (ref 0–7)
ERYTHROCYTE [DISTWIDTH] IN BLOOD BY AUTOMATED COUNT: 13.2 % (ref 11.5–14.5)
FLUAV AG NPH QL IA: NEGATIVE
FLUBV AG NOSE QL IA: NEGATIVE
GLUCOSE UR STRIP.AUTO-MCNC: NEGATIVE MG/DL
HCT VFR BLD AUTO: 34 % (ref 35–47)
HGB BLD-MCNC: 11.4 G/DL (ref 11.5–16)
HGB UR QL STRIP: ABNORMAL
HYALINE CASTS URNS QL MICRO: ABNORMAL /LPF (ref 0–5)
IMM GRANULOCYTES # BLD AUTO: 0.1 K/UL (ref 0–0.04)
IMM GRANULOCYTES NFR BLD AUTO: 1 % (ref 0–0.5)
KETONES UR QL STRIP.AUTO: 5 MG/DL
LEUKOCYTE ESTERASE UR QL STRIP.AUTO: NEGATIVE
LYMPHOCYTES # BLD: 0.7 K/UL (ref 0.8–3.5)
LYMPHOCYTES NFR BLD: 9 % (ref 12–49)
MCH RBC QN AUTO: 29.7 PG (ref 26–34)
MCHC RBC AUTO-ENTMCNC: 33.5 G/DL (ref 30–36.5)
MCV RBC AUTO: 88.5 FL (ref 80–99)
MONOCYTES # BLD: 0.3 K/UL (ref 0–1)
MONOCYTES NFR BLD: 4 % (ref 5–13)
NEUTS SEG # BLD: 6.3 K/UL (ref 1.8–8)
NEUTS SEG NFR BLD: 86 % (ref 32–75)
NITRITE UR QL STRIP.AUTO: NEGATIVE
NRBC # BLD: 0 K/UL (ref 0–0.01)
NRBC BLD-RTO: 0 PER 100 WBC
P-R INTERVAL, ECG05: 134 MS
PH UR STRIP: 5 [PH] (ref 5–8)
PLATELET # BLD AUTO: 166 K/UL (ref 150–400)
PMV BLD AUTO: 11.1 FL (ref 8.9–12.9)
PROT UR STRIP-MCNC: 100 MG/DL
Q-T INTERVAL, ECG07: 258 MS
QRS DURATION, ECG06: 70 MS
QTC CALCULATION (BEZET), ECG08: 400 MS
RBC # BLD AUTO: 3.84 M/UL (ref 3.8–5.2)
RBC #/AREA URNS HPF: ABNORMAL /HPF (ref 0–5)
SP GR UR REFRACTOMETRY: 1.01 (ref 1–1.03)
UA: UC IF INDICATED,UAUC: ABNORMAL
UROBILINOGEN UR QL STRIP.AUTO: 2 EU/DL (ref 0.1–1)
VENTRICULAR RATE, ECG03: 145 BPM
WBC # BLD AUTO: 7.3 K/UL (ref 3.6–11)
WBC URNS QL MICRO: ABNORMAL /HPF (ref 0–4)

## 2021-12-25 PROCEDURE — 81001 URINALYSIS AUTO W/SCOPE: CPT

## 2021-12-25 NOTE — ROUTINE PROCESS
[facility] arrived to transport pt back to facility. Report called by night RN. [facility]

## 2021-12-25 NOTE — ED NOTES
Called [facility] and updated them about pt coming back once transport arrives

## 2021-12-25 NOTE — ED TRIAGE NOTES
Pt brought in via EMS from [facility] group home c/o SOB, EMS gave duoneb, decadron and 1L NS in route, pt able to respond, tachycardia noted, ER MD @bedside

## 2021-12-25 NOTE — ED NOTES
Report received from [place], [place]. Pt awaiting transportation back to her facility.

## 2021-12-25 NOTE — ED PROVIDER NOTES
EMERGENCY DEPARTMENT HISTORY AND PHYSICAL EXAM        Date: 12/24/2021  Patient Name: Neftali Cuellar    History of Presenting Illness     Chief Complaint   Patient presents with    Shortness of Breath       History Provided By: Patient, EMS    HPI: Neftali Cuellar, 62 y.o. female with history of alcohol abuse, hypertension, and schizophrenia who presents with difficulty breathing and cough. Symptoms have been present for the last few days. Breathing worsened today and brought in by EMS. Patient is coming from a group home. Patient currently states that she is feeling well and has been asymptomatic for the last couple days. Group home told EMS patient has been having cough and difficulty breathing. PCP: Florin Turner MD        Past History     Past Medical History:  Past Medical History:   Diagnosis Date    Alcohol abuse 5/29/2017    Hypertension     [facility]     Schizophrenia ([facility] [address] [facility]        Past Surgical History:  Reviewed and noncontributory    Family History:  Reviewed and noncontributory    Social History:  Social History     Tobacco Use    Smoking status: Current Every Day Smoker     Packs/day: 0.50    Smokeless tobacco: Never Used   Substance Use Topics    Alcohol use: Yes     Alcohol/week: 0.8 standard drinks     Types: 1 Cans of beer per week    Drug use: Yes     Types: Cocaine     Comment: Pt reports a history of crack cocaine use     Allergies:  No Known Allergies    Review of Systems   Review of Systems   Constitutional: Negative for fever. HENT: Negative for congestion. Eyes: Negative for visual disturbance. Respiratory: Positive for shortness of breath. Cardiovascular: Negative for chest pain. Gastrointestinal: Negative for abdominal pain. Genitourinary: Negative for dysuria. Musculoskeletal: Negative for arthralgias. Skin: Negative for rash. Neurological: Negative for headaches. Physical Exam   Constitutional: Well-nourished. Skin: No rash.   ENT: No rhinorrhea. No cough. Head is normocephalic and atraumatic. Eye: No proptosis or conjunctival injections. Respiratory: No apparent respiratory distress. Lung sounds are diminished. Tachypneic. Cardiovascular: Tachycardic with regular rhythm. No murmurs. 2+ radial pulse. Gastrointestinal: Nondistended. Musculoskeletal: No obvious bony deformities. Psychiatric: Cooperative. Appropriate mood and affect. Diagnostic Study Results     Labs -     Recent Results (from the past 24 hour(s))   METABOLIC PANEL, COMPREHENSIVE    Collection Time: 12/24/21  9:51 PM   Result Value Ref Range    Sodium 135 (L) 136 - 145 mmol/L    Potassium 3.5 3.5 - 5.1 mmol/L    Chloride 105 97 - 108 mmol/L    CO2 28 21 - 32 mmol/L    Anion gap 2 (L) 5 - 15 mmol/L    Glucose 106 (H) 65 - 100 mg/dL    BUN 14 6 - 20 mg/dL    Creatinine 0.88 0.55 - 1.02 mg/dL    BUN/Creatinine ratio 16 12 - 20      GFR est AA >60 >60 ml/min/1.73m2    GFR est non-AA >60 >60 ml/min/1.73m2    Calcium 7.9 (L) 8.5 - 10.1 mg/dL    Bilirubin, total 0.6 0.2 - 1.0 mg/dL    AST (SGOT) 58 (H) 15 - 37 U/L    ALT (SGPT) 25 12 - 78 U/L    Alk.  phosphatase 81 45 - 117 U/L    Protein, total 8.6 (H) 6.4 - 8.2 g/dL    Albumin 1.8 (L) 3.5 - 5.0 g/dL    Globulin 6.8 (H) 2.0 - 4.0 g/dL    A-G Ratio 0.3 (L) 1.1 - 2.2     TROPONIN-HIGH SENSITIVITY    Collection Time: 12/24/21  9:51 PM   Result Value Ref Range    Troponin-High Sensitivity 14 0 - 51 ng/L   LACTIC ACID    Collection Time: 12/24/21  9:51 PM   Result Value Ref Range    Lactic acid 1.9 0.4 - 2.0 mmol/L   NT-PRO BNP    Collection Time: 12/24/21  9:51 PM   Result Value Ref Range    NT pro- (H) <125 pg/mL   SARS-COV-2    Collection Time: 12/24/21  9:52 PM   Result Value Ref Range    SARS-CoV-2 Please find results under separate order     COVID-19 RAPID TEST    Collection Time: 12/24/21  9:52 PM   Result Value Ref Range    Specimen source Nasopharyngeal      COVID-19 rapid test Not Detected Not Detected INFLUENZA A & B AG (RAPID TEST)    Collection Time: 12/24/21 11:49 PM   Result Value Ref Range    Influenza A Antigen Negative Negative      Influenza B Antigen Negative Negative     CBC WITH AUTOMATED DIFF    Collection Time: 12/24/21 11:49 PM   Result Value Ref Range    WBC 7.3 3.6 - 11.0 K/uL    RBC 3.84 3.80 - 5.20 M/uL    HGB 11.4 (L) 11.5 - 16.0 g/dL    HCT 34.0 (L) 35.0 - 47.0 %    MCV 88.5 80.0 - 99.0 FL    MCH 29.7 26.0 - 34.0 PG    MCHC 33.5 30.0 - 36.5 g/dL    RDW 13.2 11.5 - 14.5 %    PLATELET 654 957 - 824 K/uL    MPV 11.1 8.9 - 12.9 FL    NRBC 0.0 0.0  WBC    ABSOLUTE NRBC 0.00 0.00 - 0.01 K/uL    NEUTROPHILS 86 (H) 32 - 75 %    LYMPHOCYTES 9 (L) 12 - 49 %    MONOCYTES 4 (L) 5 - 13 %    EOSINOPHILS 0 0 - 7 %    BASOPHILS 0 0 - 1 %    IMMATURE GRANULOCYTES 1 (H) 0 - 0.5 %    ABS. NEUTROPHILS 6.3 1.8 - 8.0 K/UL    ABS. LYMPHOCYTES 0.7 (L) 0.8 - 3.5 K/UL    ABS. MONOCYTES 0.3 0.0 - 1.0 K/UL    ABS. EOSINOPHILS 0.0 0.0 - 0.4 K/UL    ABS. BASOPHILS 0.0 0.0 - 0.1 K/UL    ABS. IMM. GRANS. 0.1 (H) 0.00 - 0.04 K/UL    DF AUTOMATED     URINALYSIS W/ REFLEX CULTURE    Collection Time: 12/25/21 12:44 AM    Specimen: Urine   Result Value Ref Range    Color Yellow/Straw      Appearance Clear Clear      Specific gravity 1.013 1.003 - 1.030      pH (UA) 5.0 5.0 - 8.0      Protein 100 (A) Negative mg/dL    Glucose Negative Negative mg/dL    Ketone 5 (A) Negative mg/dL    Bilirubin Negative Negative      Blood Moderate (A) Negative      Urobilinogen 2.0 (H) 0.1 - 1.0 EU/dL    Nitrites Negative Negative      Leukocyte Esterase Negative Negative      WBC 0-4 0 - 4 /hpf    RBC 5-10 0 - 5 /hpf    Bacteria Negative Negative /hpf    UA:UC IF INDICATED Culture not indicated by UA result Culture not indicated by UA result      Hyaline cast 2-5 0 - 5 /lpf       Radiologic Studies -   XR CHEST SNGL V   Final Result   No specific acute or active process. .                  CT Results  (Last 48 hours)    None        CXR Results  (Last 48 hours)               12/24/21 2210  XR CHEST SNGL V Final result    Impression:  No specific acute or active process. .                    Narrative:  PROCEDURE: XR CHEST THE V       HISTORY:Cough, dyspnea       COMPARISON:Chest x-ray 15 September 2021           TECHNIQUE: AP portable chest       LIMITATIONS: Patient is rotated to the right which distorts the anatomy. TUBES/LINES: None       LUNG PARENCHYMA/PLEURA: Allowing for rotation the lungs are clear and probably   symmetric. TRACHEA/BRONCHI: Normal   PULMONARY VESSELS: Normal   HEART: Normal   MEDIASTINUM: Normal   BONE/SOFT TISSUES: No acute process       OTHER: None                 Medical Decision Making and ED Course     I reviewed the available vital signs, nursing notes, past medical history, past surgical history, family history, and social history. Vital Signs - Reviewed the patient's vital signs. Patient Vitals for the past 12 hrs:   Temp Pulse Resp BP SpO2   12/25/21 0012 98.4 °F (36.9 °C) (!) 112 22 128/74 96 %   12/24/21 2300  (!) 109 20 108/66 99 %   12/24/21 2238 100.3 °F (37.9 °C) (!) 128 22 130/78 98 %   12/24/21 2229  (!) 124 20 130/78 98 %   12/24/21 2142 100.3 °F (37.9 °C)       12/24/21 2137  (!) 140 21 132/81 99 %     Medical Decision Making:   Presented with dyspnea. The differential diagnosis is pneumonia, COVID-19, anxiety, sepsis, dehydration. Patient given 2 L normal saline. Fever went down on his own. No longer tachycardic. No signs of pneumonia on chest x-ray. Labs reassuring. No Covid or influenza. Unclear etiology of fever however could be viral syndrome. Blood cultures are pending. Do not feel patient would benefit from admission. Discharged home. Disposition     Discharged      DISCHARGE PLAN:  1. There are no discharge medications for this patient.     2.   Follow-up Information     Follow up With Specialties Details Why [address] EMERGENCY DEPT Emergency Medicine Go today As soon as possible if symptoms worsen [address] [address]  [phone]    Primary care doctor  Schedule an appointment as soon as possible for a visit in 3 days          3. Return to ED if worse     Diagnosis     Clinical impression:   1. Dyspnea, unspecified type    2. Fever, unspecified fever cause           Attestation:  Please note that this dictation was completed with [employer], the computer voice recognition software. Quite often unanticipated grammatical, syntax, homophones, and other interpretive errors are inadvertently transcribed by the computer software. Please disregard these errors. Please excuse any errors that have escaped final proofreading. Thank you.   Shanna Gallardo, DO

## 2021-12-25 NOTE — DISCHARGE INSTRUCTIONS
Thank you! Thank you for allowing me to care for you in the emergency department. I sincerely hope that you are satisfied with your visit today. It is my goal to provide you with excellent care. Below you will find a list of your labs and imaging from your visit today. Should you have any questions regarding these results please do not hesitate to call the emergency department. Labs -     Recent Results (from the past 12 hour(s))   METABOLIC PANEL, COMPREHENSIVE    Collection Time: 12/24/21  9:51 PM   Result Value Ref Range    Sodium 135 (L) 136 - 145 mmol/L    Potassium 3.5 3.5 - 5.1 mmol/L    Chloride 105 97 - 108 mmol/L    CO2 28 21 - 32 mmol/L    Anion gap 2 (L) 5 - 15 mmol/L    Glucose 106 (H) 65 - 100 mg/dL    BUN 14 6 - 20 mg/dL    Creatinine 0.88 0.55 - 1.02 mg/dL    BUN/Creatinine ratio 16 12 - 20      GFR est AA >60 >60 ml/min/1.73m2    GFR est non-AA >60 >60 ml/min/1.73m2    Calcium 7.9 (L) 8.5 - 10.1 mg/dL    Bilirubin, total 0.6 0.2 - 1.0 mg/dL    AST (SGOT) 58 (H) 15 - 37 U/L    ALT (SGPT) 25 12 - 78 U/L    Alk.  phosphatase 81 45 - 117 U/L    Protein, total 8.6 (H) 6.4 - 8.2 g/dL    Albumin 1.8 (L) 3.5 - 5.0 g/dL    Globulin 6.8 (H) 2.0 - 4.0 g/dL    A-G Ratio 0.3 (L) 1.1 - 2.2     TROPONIN-HIGH SENSITIVITY    Collection Time: 12/24/21  9:51 PM   Result Value Ref Range    Troponin-High Sensitivity 14 0 - 51 ng/L   LACTIC ACID    Collection Time: 12/24/21  9:51 PM   Result Value Ref Range    Lactic acid 1.9 0.4 - 2.0 mmol/L   NT-PRO BNP    Collection Time: 12/24/21  9:51 PM   Result Value Ref Range    NT pro- (H) <125 pg/mL   SARS-COV-2    Collection Time: 12/24/21  9:52 PM   Result Value Ref Range    SARS-CoV-2 Please find results under separate order     COVID-19 RAPID TEST    Collection Time: 12/24/21  9:52 PM   Result Value Ref Range    Specimen source Nasopharyngeal      COVID-19 rapid test Not Detected Not Detected     INFLUENZA A & B AG (RAPID TEST)    Collection Time: 12/24/21 11:49 PM   Result Value Ref Range    Influenza A Antigen Negative Negative      Influenza B Antigen Negative Negative     CBC WITH AUTOMATED DIFF    Collection Time: 12/24/21 11:49 PM   Result Value Ref Range    WBC 7.3 3.6 - 11.0 K/uL    RBC 3.84 3.80 - 5.20 M/uL    HGB 11.4 (L) 11.5 - 16.0 g/dL    HCT 34.0 (L) 35.0 - 47.0 %    MCV 88.5 80.0 - 99.0 FL    MCH 29.7 26.0 - 34.0 PG    MCHC 33.5 30.0 - 36.5 g/dL    RDW 13.2 11.5 - 14.5 %    PLATELET 457 147 - 199 K/uL    MPV 11.1 8.9 - 12.9 FL    NRBC 0.0 0.0  WBC    ABSOLUTE NRBC 0.00 0.00 - 0.01 K/uL    NEUTROPHILS 86 (H) 32 - 75 %    LYMPHOCYTES 9 (L) 12 - 49 %    MONOCYTES 4 (L) 5 - 13 %    EOSINOPHILS 0 0 - 7 %    BASOPHILS 0 0 - 1 %    IMMATURE GRANULOCYTES 1 (H) 0 - 0.5 %    ABS. NEUTROPHILS 6.3 1.8 - 8.0 K/UL    ABS. LYMPHOCYTES 0.7 (L) 0.8 - 3.5 K/UL    ABS. MONOCYTES 0.3 0.0 - 1.0 K/UL    ABS. EOSINOPHILS 0.0 0.0 - 0.4 K/UL    ABS. BASOPHILS 0.0 0.0 - 0.1 K/UL    ABS. IMM. GRANS. 0.1 (H) 0.00 - 0.04 K/UL    DF AUTOMATED     URINALYSIS W/ REFLEX CULTURE    Collection Time: 12/25/21 12:44 AM    Specimen: Urine   Result Value Ref Range    Color Yellow/Straw      Appearance Clear Clear      Specific gravity 1.013 1.003 - 1.030      pH (UA) 5.0 5.0 - 8.0      Protein 100 (A) Negative mg/dL    Glucose Negative Negative mg/dL    Ketone 5 (A) Negative mg/dL    Bilirubin Negative Negative      Blood Moderate (A) Negative      Urobilinogen 2.0 (H) 0.1 - 1.0 EU/dL    Nitrites Negative Negative      Leukocyte Esterase Negative Negative      WBC 0-4 0 - 4 /hpf    RBC 5-10 0 - 5 /hpf    Bacteria Negative Negative /hpf    UA:UC IF INDICATED Culture not indicated by UA result Culture not indicated by UA result      Hyaline cast 2-5 0 - 5 /lpf       Radiologic Studies -   XR CHEST SNGL V   Final Result   No specific acute or active process. .                  CT Results  (Last 48 hours)      None          CXR Results  (Last 48 hours)                 12/24/21 2210  XR CHEST SNGL V Final result    Impression:  No specific acute or active process. .                    Narrative:  PROCEDURE: XR CHEST THE V       HISTORY:Cough, dyspnea       COMPARISON:Chest x-ray 15 September 2021           TECHNIQUE: AP portable chest       LIMITATIONS: Patient is rotated to the right which distorts the anatomy. TUBES/LINES: None       LUNG PARENCHYMA/PLEURA: Allowing for rotation the lungs are clear and probably   symmetric. TRACHEA/BRONCHI: Normal   PULMONARY VESSELS: Normal   HEART: Normal   MEDIASTINUM: Normal   BONE/SOFT TISSUES: No acute process       OTHER: None                      If you feel that you have not received excellent quality care or timely care, please ask to speak to the nurse manager. Please choose us in the future for your continued health care needs. ------------------------------------------------------------------------------------------------------------  The exam and treatment you received in the Emergency Department were for an urgent problem and are not intended as complete care. It is important that you follow-up with a doctor, nurse practitioner, or physician assistant to:  (1) confirm your diagnosis,  (2) re-evaluation of changes in your illness and treatment, and  (3) for ongoing care. If your symptoms become worse or you do not improve as expected and you are unable to reach your usual health care provider, you should return to the Emergency Department. We are available 24 hours a day. Please take your discharge instructions with you when you go to your follow-up appointment. If you have any problem arranging a follow-up appointment, contact the Emergency Department immediately. If a prescription has been provided, please have it filled as soon as possible to prevent a delay in treatment. Read the entire medication instruction sheet provided to you by the pharmacy.  If you have any questions or reservations about taking the medication due to side effects or interactions with other medications, please call your primary care physician or contact the ER to speak with the charge nurse. Make an appointment with your family doctor or the physician you were referred to for follow-up of this visit as instructed on your discharge paperwork, as this is a mandatory follow-up. Return to the ER if you are unable to be seen or if you are unable to be seen in a timely manner. If you have any problem arranging the follow-up visit, contact the Emergency Department immediately.

## 2021-12-29 ENCOUNTER — HOSPITAL ENCOUNTER (EMERGENCY)
Age: 61
Discharge: OTHER INSTITUTION | End: 2021-12-30
Attending: EMERGENCY MEDICINE
Payer: MEDICAID

## 2021-12-29 DIAGNOSIS — R32 URINARY INCONTINENCE, UNSPECIFIED TYPE: Primary | ICD-10-CM

## 2021-12-29 DIAGNOSIS — R31.9 HEMATURIA, UNSPECIFIED TYPE: ICD-10-CM

## 2021-12-29 LAB
APPEARANCE UR: CLEAR
BACTERIA URNS QL MICRO: NEGATIVE /HPF
BILIRUB UR QL: NEGATIVE
COLOR UR: ABNORMAL
GLUCOSE UR STRIP.AUTO-MCNC: NEGATIVE MG/DL
HGB UR QL STRIP: ABNORMAL
KETONES UR QL STRIP.AUTO: NEGATIVE MG/DL
LEUKOCYTE ESTERASE UR QL STRIP.AUTO: NEGATIVE
MUCOUS THREADS URNS QL MICRO: ABNORMAL /LPF
NITRITE UR QL STRIP.AUTO: NEGATIVE
PH UR STRIP: 6 [PH] (ref 5–8)
PROT UR STRIP-MCNC: 100 MG/DL
RBC #/AREA URNS HPF: ABNORMAL /HPF (ref 0–5)
SP GR UR REFRACTOMETRY: 1.01 (ref 1–1.03)
UROBILINOGEN UR QL STRIP.AUTO: 4 EU/DL (ref 0.1–1)
WBC URNS QL MICRO: ABNORMAL /HPF (ref 0–4)

## 2021-12-29 PROCEDURE — 81001 URINALYSIS AUTO W/SCOPE: CPT

## 2021-12-29 PROCEDURE — 99284 EMERGENCY DEPT VISIT MOD MDM: CPT

## 2021-12-29 RX ORDER — CEPHALEXIN 250 MG/1
250 CAPSULE ORAL 4 TIMES DAILY
Qty: 40 CAPSULE | Refills: 0 | Status: SHIPPED | OUTPATIENT
Start: 2021-12-29 | End: 2022-02-24 | Stop reason: ALTCHOICE

## 2021-12-30 VITALS
HEIGHT: 66 IN | RESPIRATION RATE: 20 BRPM | HEART RATE: 88 BPM | TEMPERATURE: 99.7 F | DIASTOLIC BLOOD PRESSURE: 88 MMHG | SYSTOLIC BLOOD PRESSURE: 125 MMHG | OXYGEN SATURATION: 96 % | BODY MASS INDEX: 24.11 KG/M2 | WEIGHT: 150 LBS

## 2021-12-30 NOTE — ED NOTES
[facility] non-emergency dispatch number [phone] to do well check at pt's residential facility, [facility] for [address] [place] dispatch states they will inform [facility] staff that  ED has been attempting to contact regarding pt.

## 2021-12-30 NOTE — ED TRIAGE NOTES
Transported via EMS from [facility] home d/t urinary incontinence. EMS states that pt was eval in ED within \"the past couple of days\" & diagnosed w/ covid.

## 2021-12-30 NOTE — ED PROVIDER NOTES
EMERGENCY DEPARTMENT HISTORY AND PHYSICAL EXAM      Date: 12/29/2021  Patient Name: Ann Marie Borrero    History of Presenting Illness     Chief Complaint   Patient presents with    Urinary Incontinence       History Provided By: Patient    HPI: Ann Marie Borrero, 62 y.o. female with a past medical history significant Multiple comorbidities presents to the ED with cc of being at the group home and being incontinent of urine. She was sent to the emergency room to be evaluated for urinary tract infection. She is at her neurologic baseline. given the patient's chronic debilitated condition she is not able to provide any history at this point time. There are no other complaints, changes, or physical findings at this time. PCP: Henrietta Sal MD    No current facility-administered medications on file prior to encounter. No current outpatient medications on file prior to encounter. Past History     Past Medical History:  Past Medical History:   Diagnosis Date    Alcohol abuse 5/29/2017    Hypertension     [facility] [address]     Schizophrenia ([facility] [address]        Past Surgical History:  No past surgical history on file. Family History:  No family history on file. Social History:  Social History     Tobacco Use    Smoking status: Current Every Day Smoker     Packs/day: 0.50    Smokeless tobacco: Never Used   Substance Use Topics    Alcohol use: Yes     Alcohol/week: 0.8 standard drinks     Types: 1 Cans of beer per week    Drug use: Yes     Types: Cocaine     Comment: Pt reports a history of crack cocaine use       Allergies:  No Known Allergies      Review of Systems     Review of Systems   Unable to perform ROS: Psychiatric disorder       Physical Exam     Physical Exam  Vitals and nursing note reviewed. Constitutional:       General: She is not in acute distress. Appearance: She is well-developed. HENT:      Head: Normocephalic and atraumatic.       Nose: Nose normal.      Mouth/Throat: Mouth: Mucous membranes are moist.      Pharynx: Oropharynx is clear. No oropharyngeal exudate. Eyes:      General:         Right eye: No discharge. Left eye: No discharge. Conjunctiva/sclera: Conjunctivae normal.      Pupils: Pupils are equal, round, and reactive to light. Cardiovascular:      Rate and Rhythm: Normal rate and regular rhythm. Chest Wall: PMI is not displaced. No thrill. Heart sounds: Normal heart sounds. No murmur heard. No friction rub. No gallop. Pulmonary:      Effort: Pulmonary effort is normal. No respiratory distress. Breath sounds: Normal breath sounds. No wheezing or rales. Chest:      Chest wall: No tenderness. Abdominal:      General: Bowel sounds are normal. There is no distension. Palpations: Abdomen is soft. There is no mass. Tenderness: There is no abdominal tenderness. There is no guarding or rebound. Musculoskeletal:         General: Normal range of motion. Cervical back: Normal range of motion and neck supple. Lymphadenopathy:      Cervical: No cervical adenopathy. Skin:     General: Skin is warm and dry. Capillary Refill: Capillary refill takes less than 2 seconds. Findings: No erythema or rash. Neurological:      Mental Status: She is alert. Cranial Nerves: No cranial nerve deficit.       Coordination: Coordination normal.      Comments: unAble to assess   Psychiatric:      Comments: Unable to assess         Lab and Diagnostic Study Results     Labs -     Recent Results (from the past 12 hour(s))   URINALYSIS W/MICROSCOPIC    Collection Time: 12/29/21  8:46 PM   Result Value Ref Range    Color Yellow/Straw      Appearance Clear Clear      Specific gravity 1.012 1.003 - 1.030      pH (UA) 6.0 5.0 - 8.0      Protein 100 (A) Negative mg/dL    Glucose Negative Negative mg/dL    Ketone Negative Negative mg/dL    Bilirubin Negative Negative      Blood Moderate (A) Negative      Urobilinogen 4.0 (H) 0.1 - 1.0 EU/dL    Nitrites Negative Negative      Leukocyte Esterase Negative Negative      WBC 0-4 0 - 4 /hpf    RBC 10-20 0 - 5 /hpf    Bacteria Negative Negative /hpf    Mucus Trace /lpf       Radiologic Studies -   @lastxrresult@  CT Results  (Last 48 hours)    None        CXR Results  (Last 48 hours)    None            Medical Decision Making   - I am the first provider for this patient. - I reviewed the vital signs, available nursing notes, past medical history, past surgical history, family history and social history. - Initial assessment performed. The patients presenting problems have been discussed, and they are in agreement with the care plan formulated and outlined with them. I have encouraged them to ask questions as they arise throughout their visit. Vital Signs-Reviewed the patient's vital signs. Patient Vitals for the past 12 hrs:   Temp Pulse Resp BP SpO2   12/29/21 2129 99.2 °F (37.3 °C) 84 24 127/85 96 %   12/29/21 2035 98.7 °F (37.1 °C) 97 22 (!) 152/98 96 %     Abscess patient's urine and found that she does have hematuria and there but no appreciable infection at this point time. Will prescribe antibiotic in the event that she does develop a fever. ED Course:          Provider Notes (Medical Decision Making):   41-year-old female with schizophrenia her neurologic baseline does have hematuria. Will be discharging back to the skilled nursing facility. I have attempted to reach out to her emergency contact Ms. Perri Vazquez at [phone]. I have left a message for her to call regarding the care. MDM       Procedures   Medical Decision Makingedical Decision Making  Performed by: Rome Stringer MD  PROCEDURES:  Procedures       Disposition   Disposition: Condition stable    Discharged    DISCHARGE PLAN:  1. There are no discharge medications for this patient.     2.   Follow-up Information     Follow up With Specialties Details Why Porter Carter MD Urology Call in 2 days  [address] [place] [address]  [phone]          3. Return to ED if worse   4. Current Discharge Medication List      START taking these medications    Details   cephALEXin (Keflex) 250 mg capsule Take 1 Capsule by mouth four (4) times daily. Qty: 40 Capsule, Refills: 0  Start date: 12/29/2021               Diagnosis     Clinical Impression:   1. Urinary incontinence, unspecified type    2. Hematuria, unspecified type        Attestations:    Josse Perez MD    Please note that this dictation was completed with [employer] the [employer] voice recognition software. Quite often unanticipated grammatical, syntax, homophones, and other interpretive errors are inadvertently transcribed by the computer software. Please disregard these errors. Please excuse any errors that have escaped final proofreading. Thank you.

## 2021-12-30 NOTE — ED NOTES
Received call from provider Chelsea Banda from [facility] after they received well check from [facility] stating they were having \"phone trouble. \"     TRANSFER - OUT REPORT:    Verbal report given to accepting provider, Chelsea Banda (name) on Irasema Acosta  being transported back to [facility] [phone] (unit) for discharge back to residential facility. Report consisted of patients Situation, Background, Assessment and   Recommendations(SBAR). Information from the following report(s) SBAR was reviewed with the receiving caregiver. Opportunity for questions and clarification was provided.

## 2021-12-30 NOTE — ED NOTES
Called pt's residential facility: [facility] for [address] [place] [address],[address] Joelle Johnson                                                   [phone]    No answer by facility x 3.

## 2021-12-30 NOTE — ED NOTES
Bedside report given to [facility] & care transferred for transport of pt back to [facility]. D/c paperwork to provide to receiving facility & all pt belongings sent w/ ambulance crew.

## 2021-12-30 NOTE — ED NOTES
Contacted [facility] to arrange stretcher transport back to [facility]. Confirmation Number: [id number]. Awaiting call back w/ ETA once trip assigned.

## 2021-12-30 NOTE — ED NOTES
[facility] back to attempt to give report for pt to return w/ no answer x 3 again. ED charge nurse Efren Care informed.

## 2021-12-31 LAB
BACTERIA SPEC CULT: NORMAL
SPECIAL REQUESTS,SREQ: NORMAL

## 2022-02-18 ENCOUNTER — HOSPITAL ENCOUNTER (EMERGENCY)
Age: 62
Discharge: HOME | End: 2022-02-19
Attending: EMERGENCY MEDICINE
Payer: MEDICAID

## 2022-02-18 ENCOUNTER — APPOINTMENT (OUTPATIENT)
Dept: GENERAL RADIOLOGY | Age: 62
End: 2022-02-18
Attending: STUDENT IN AN ORGANIZED HEALTH CARE EDUCATION/TRAINING PROGRAM
Payer: MEDICAID

## 2022-02-18 DIAGNOSIS — E87.1 HYPONATREMIA: Primary | ICD-10-CM

## 2022-02-18 DIAGNOSIS — R06.00 DYSPNEA, UNSPECIFIED TYPE: ICD-10-CM

## 2022-02-18 LAB
ALBUMIN SERPL-MCNC: 2 G/DL (ref 3.5–5)
ALBUMIN/GLOB SERPL: 0.3 {RATIO} (ref 1.1–2.2)
ALP SERPL-CCNC: 130 U/L (ref 45–117)
ALT SERPL-CCNC: 35 U/L (ref 12–78)
ANION GAP SERPL CALC-SCNC: 2 MMOL/L (ref 5–15)
AST SERPL W P-5'-P-CCNC: 83 U/L (ref 15–37)
BASOPHILS # BLD: 0 K/UL (ref 0–0.1)
BASOPHILS NFR BLD: 1 % (ref 0–1)
BILIRUB SERPL-MCNC: 0.5 MG/DL (ref 0.2–1)
BUN SERPL-MCNC: 12 MG/DL (ref 6–20)
BUN/CREAT SERPL: 13 (ref 12–20)
CA-I BLD-MCNC: 8.3 MG/DL (ref 8.5–10.1)
CHLORIDE SERPL-SCNC: 97 MMOL/L (ref 97–108)
CO2 SERPL-SCNC: 30 MMOL/L (ref 21–32)
CREAT SERPL-MCNC: 0.89 MG/DL (ref 0.55–1.02)
DIFFERENTIAL METHOD BLD: NORMAL
EOSINOPHIL # BLD: 0 K/UL (ref 0–0.4)
EOSINOPHIL NFR BLD: 1 % (ref 0–7)
ERYTHROCYTE [DISTWIDTH] IN BLOOD BY AUTOMATED COUNT: 14.1 % (ref 11.5–14.5)
GLOBULIN SER CALC-MCNC: 6.8 G/DL (ref 2–4)
GLUCOSE SERPL-MCNC: 98 MG/DL (ref 65–100)
HCT VFR BLD AUTO: 36.5 % (ref 35–47)
HGB BLD-MCNC: 12.7 G/DL (ref 11.5–16)
IMM GRANULOCYTES # BLD AUTO: 0 K/UL (ref 0–0.04)
IMM GRANULOCYTES NFR BLD AUTO: 0 % (ref 0–0.5)
LYMPHOCYTES # BLD: 1.1 K/UL (ref 0.8–3.5)
LYMPHOCYTES NFR BLD: 29 % (ref 12–49)
MCH RBC QN AUTO: 30.1 PG (ref 26–34)
MCHC RBC AUTO-ENTMCNC: 34.8 G/DL (ref 30–36.5)
MCV RBC AUTO: 86.5 FL (ref 80–99)
MONOCYTES # BLD: 0.4 K/UL (ref 0–1)
MONOCYTES NFR BLD: 10 % (ref 5–13)
NEUTS SEG # BLD: 2.4 K/UL (ref 1.8–8)
NEUTS SEG NFR BLD: 59 % (ref 32–75)
NRBC # BLD: 0 K/UL (ref 0–0.01)
NRBC BLD-RTO: 0 PER 100 WBC
PLATELET # BLD AUTO: 205 K/UL (ref 150–400)
PMV BLD AUTO: 11.8 FL (ref 8.9–12.9)
POTASSIUM SERPL-SCNC: 3.4 MMOL/L (ref 3.5–5.1)
PROT SERPL-MCNC: 8.8 G/DL (ref 6.4–8.2)
RBC # BLD AUTO: 4.22 M/UL (ref 3.8–5.2)
SODIUM SERPL-SCNC: 129 MMOL/L (ref 136–145)
TROPONIN-HIGH SENSITIVITY: 10 NG/L (ref 0–51)
WBC # BLD AUTO: 4 K/UL (ref 3.6–11)

## 2022-02-18 PROCEDURE — 36415 COLL VENOUS BLD VENIPUNCTURE: CPT

## 2022-02-18 PROCEDURE — 84484 ASSAY OF TROPONIN QUANT: CPT

## 2022-02-18 PROCEDURE — 74011250636 HC RX REV CODE- 250/636: Performed by: EMERGENCY MEDICINE

## 2022-02-18 PROCEDURE — 71045 X-RAY EXAM CHEST 1 VIEW: CPT

## 2022-02-18 PROCEDURE — 93005 ELECTROCARDIOGRAM TRACING: CPT

## 2022-02-18 PROCEDURE — 99285 EMERGENCY DEPT VISIT HI MDM: CPT

## 2022-02-18 PROCEDURE — 80053 COMPREHEN METABOLIC PANEL: CPT

## 2022-02-18 PROCEDURE — 85025 COMPLETE CBC W/AUTO DIFF WBC: CPT

## 2022-02-18 RX ADMIN — SODIUM CHLORIDE 250 ML: 9 INJECTION, SOLUTION INTRAVENOUS at 22:24

## 2022-02-18 NOTE — ED TRIAGE NOTES
Patient called for sob that started this morning, she lives in a group home shes also complaining of back pain hx of hep c and hiv

## 2022-02-19 VITALS
DIASTOLIC BLOOD PRESSURE: 99 MMHG | OXYGEN SATURATION: 98 % | TEMPERATURE: 98.7 F | RESPIRATION RATE: 16 BRPM | HEART RATE: 90 BPM | SYSTOLIC BLOOD PRESSURE: 162 MMHG

## 2022-02-19 LAB
ANION GAP SERPL CALC-SCNC: 3 MMOL/L (ref 5–15)
ATRIAL RATE: 97 BPM
BUN SERPL-MCNC: 11 MG/DL (ref 6–20)
BUN/CREAT SERPL: 15 (ref 12–20)
CA-I BLD-MCNC: 8.3 MG/DL (ref 8.5–10.1)
CALCULATED P AXIS, ECG09: 77 DEGREES
CALCULATED R AXIS, ECG10: 76 DEGREES
CALCULATED T AXIS, ECG11: 74 DEGREES
CHLORIDE SERPL-SCNC: 104 MMOL/L (ref 97–108)
CO2 SERPL-SCNC: 29 MMOL/L (ref 21–32)
CREAT SERPL-MCNC: 0.73 MG/DL (ref 0.55–1.02)
DIAGNOSIS, 93000: NORMAL
GLUCOSE SERPL-MCNC: 134 MG/DL (ref 65–100)
P-R INTERVAL, ECG05: 156 MS
POTASSIUM SERPL-SCNC: 3.8 MMOL/L (ref 3.5–5.1)
Q-T INTERVAL, ECG07: 340 MS
QRS DURATION, ECG06: 70 MS
QTC CALCULATION (BEZET), ECG08: 431 MS
SODIUM SERPL-SCNC: 136 MMOL/L (ref 136–145)
VENTRICULAR RATE, ECG03: 97 BPM

## 2022-02-19 PROCEDURE — 36415 COLL VENOUS BLD VENIPUNCTURE: CPT

## 2022-02-19 PROCEDURE — 80048 BASIC METABOLIC PNL TOTAL CA: CPT

## 2022-02-19 NOTE — ED NOTES
Pt ambulatory with steady gait for ambulation trial. Pt O2 sat remained >94% on RA throughout ambulation. Provided meal and beverage and medicated per MAR.

## 2022-02-19 NOTE — ED PROVIDER NOTES
EMERGENCY DEPARTMENT HISTORY AND PHYSICAL EXAM      Date: 2/18/2022  Patient Name: Mahesh Lenz    History of Presenting Illness     Chief Complaint   Patient presents with    Shortness of Breath       History Provided By: Patient    HPI: Mahesh Lenz, 62 y.o. female with a past medical history significant HIV, hepatitis C, alcohol abuse, hypertension, schizophrenia presents to the ED with cc of versus of breath. Patient initially states she was here for leg cramping stated actually her legs do not hurt. She has been short of breath earlier today. She states that shortness of breath has now resolved and she requests a meal.  Denies any fevers or recent illnesses. No sick contacts. Denies any coughing. No leg swelling. There are no other complaints, changes, or physical findings at this time. PCP: None    No current facility-administered medications on file prior to encounter. Current Outpatient Medications on File Prior to Encounter   Medication Sig Dispense Refill    cephALEXin (Keflex) 250 mg capsule Take 1 Capsule by mouth four (4) times daily. 40 Capsule 0       Past History     Past Medical History:  Past Medical History:   Diagnosis Date    Alcohol abuse 5/29/2017    Hepatitis C     [facility]     Hypertension     [facility]     Schizophrenia ([facility]        Past Surgical History:  No past surgical history on file. Family History:  No family history on file. Social History:  Social History     Tobacco Use    Smoking status: Current Every Day Smoker     Packs/day: 0.50    Smokeless tobacco: Never Used   Substance Use Topics    Alcohol use:  Yes     Alcohol/week: 0.8 standard drinks     Types: 1 Cans of beer per week    Drug use: Yes     Types: Cocaine     Comment: Pt reports a history of crack cocaine use       Allergies:  No Known Allergies      Review of Systems     Review of Systems   Constitutional: Negative for activity change and fever. HENT: Negative for rhinorrhea and sore throat. Eyes: Negative for visual disturbance. Respiratory: Positive for shortness of breath. Negative for cough. Cardiovascular: Negative for chest pain. Gastrointestinal: Negative for abdominal pain, diarrhea, nausea and vomiting. Genitourinary: Negative for dysuria. Musculoskeletal: Negative for arthralgias and myalgias. Skin: Negative for rash and wound. Neurological: Negative for syncope and headaches. Psychiatric/Behavioral: Negative for confusion. All other systems reviewed and are negative. Physical Exam     Physical Exam  Vitals and nursing note reviewed. Constitutional:       Appearance: Normal appearance. She is normal weight. HENT:      Head: Normocephalic and atraumatic. Nose: Nose normal.      Mouth/Throat:      Mouth: Mucous membranes are moist.   Eyes:      Conjunctiva/sclera: Conjunctivae normal.   Cardiovascular:      Rate and Rhythm: Normal rate. Pulses: Normal pulses. Pulmonary:      Effort: Pulmonary effort is normal. No accessory muscle usage or respiratory distress. Musculoskeletal:         General: No swelling or deformity. Normal range of motion. Skin:     General: Skin is warm and dry. Findings: No rash. Neurological:      General: No focal deficit present. Mental Status: She is alert. Psychiatric:         Mood and Affect: Mood normal.         Behavior: Behavior normal.         Lab and Diagnostic Study Results     Labs -     No results found for this or any previous visit (from the past 12 hour(s)). Radiologic Studies -   @lastxrresult@  CT Results  (Last 48 hours)    None        CXR Results  (Last 48 hours)               02/18/22 3083  XR CHEST PORT Final result    Impression:  Cardiopericardial silhouette within normal limits. There is   nonspecific central peribronchial cuffing. Nonspecific prominence of perihilar   lung markings.  No evidence of focal airspace consolidation, pleural effusion,   pneumothorax, or pneumomediastinum. Narrative:  Chest, AP views. No comparisons currently available (PACS downtime)                   Medical Decision Making   - I am the first provider for this patient. - I reviewed the vital signs, available nursing notes, past medical history, past surgical history, family history and social history. - Initial assessment performed. The patients presenting problems have been discussed, and they are in agreement with the care plan formulated and outlined with them. I have encouraged them to ask questions as they arise throughout their visit. Vital Signs-Reviewed the patient's vital signs. No data found. Records Reviewed: Nursing Notes      ED Course:     ED Course as of 02/19/22 2256   Fri Feb 18, 2022   240 71-year-old female with a past medical history of HIV and hep C who states that she was having shortness of breath earlier today. States that the shortness of breath has not resolved. Now she would just like to eat a meal and go home. She denies any chest pain or recent illnesses. She has not had any fevers. On exam she is well-appearing with no respiratory distress. Differential diagnosis includes pneumonia versus ACS versus pneumothorax versus electrolyte disarray. Get labs including CBC, CMP and troponin as well as a chest x-ray. EKG performed at 1927. Normal sinus rhythm with a rate of 97. Normal HI, normal QRS, normal QTC. [LW]   1094 Patient signed out to nighttime physician. Awaiting repeat BMP after meal and IVF. [LW]      ED Course User Index  [LW] Cihp Yusuf MD     Rpt Na normalized. Patient discharged. Disposition   Disposition: DC- Adult Discharges: All of the diagnostic tests were reviewed and questions answered. Diagnosis, care plan and treatment options were discussed. The patient understands the instructions and will follow up as directed.  The patients results have been reviewed with them.  They have been counseled regarding their diagnosis. The patient verbally convey understanding and agreement of the signs, symptoms, diagnosis, treatment and prognosis and additionally agrees to follow up as recommended with their PCP in 24 - 48 hours. They also agree with the care-plan and convey that all of their questions have been answered. I have also put together some discharge instructions for them that include: 1) educational information regarding their diagnosis, 2) how to care for their diagnosis at home, as well a 3) list of reasons why they would want to return to the ED prior to their follow-up appointment, should their condition change. Discharged    DISCHARGE PLAN:  1. Current Discharge Medication List      CONTINUE these medications which have NOT CHANGED    Details   cephALEXin (Keflex) 250 mg capsule Take 1 Capsule by mouth four (4) times daily. Qty: 40 Capsule, Refills: 0           2. Follow-up Information     Follow up With Specialties Details Why [facility] EMERGENCY DEPT Emergency Medicine Go today As soon as possible if symptoms worsen [address] [address]  [phone]    Primary care doctor  Schedule an appointment as soon as possible for a visit in 3 days          3. Return to ED if worse   4. Discharge Medication List as of 2/19/2022  1:56 AM            Diagnosis     Clinical Impression:   1. Hyponatremia    2. Dyspnea, unspecified type        Attestations:    Zeenat Puente MD    Please note that this dictation was completed with [employer], the computer voice recognition software. Quite often unanticipated grammatical, syntax, homophones, and other interpretive errors are inadvertently transcribed by the computer software. Please disregard these errors. Please excuse any errors that have escaped final proofreading. Thank you.

## 2022-02-24 ENCOUNTER — HOSPITAL ENCOUNTER (OUTPATIENT)
Age: 62
Setting detail: OBSERVATION
Discharge: HOME | End: 2022-02-25
Attending: STUDENT IN AN ORGANIZED HEALTH CARE EDUCATION/TRAINING PROGRAM | Admitting: INTERNAL MEDICINE
Payer: MEDICAID

## 2022-02-24 ENCOUNTER — APPOINTMENT (OUTPATIENT)
Dept: GENERAL RADIOLOGY | Age: 62
End: 2022-02-24
Attending: STUDENT IN AN ORGANIZED HEALTH CARE EDUCATION/TRAINING PROGRAM
Payer: MEDICAID

## 2022-02-24 DIAGNOSIS — R41.82 ALTERED MENTAL STATUS, UNSPECIFIED ALTERED MENTAL STATUS TYPE: ICD-10-CM

## 2022-02-24 DIAGNOSIS — J18.9 PNEUMONIA OF RIGHT LOWER LOBE DUE TO INFECTIOUS ORGANISM: ICD-10-CM

## 2022-02-24 DIAGNOSIS — A41.9 SEPSIS, DUE TO UNSPECIFIED ORGANISM, UNSPECIFIED WHETHER ACUTE ORGAN DYSFUNCTION PRESENT (HCC): Primary | ICD-10-CM

## 2022-02-24 LAB
ALBUMIN SERPL-MCNC: 1.8 G/DL (ref 3.5–5)
ALBUMIN/GLOB SERPL: 0.3 {RATIO} (ref 1.1–2.2)
ALP SERPL-CCNC: 112 U/L (ref 45–117)
ALT SERPL-CCNC: 36 U/L (ref 12–78)
ANION GAP SERPL CALC-SCNC: 8 MMOL/L (ref 5–15)
APPEARANCE UR: ABNORMAL
AST SERPL W P-5'-P-CCNC: 91 U/L (ref 15–37)
BACTERIA URNS QL MICRO: NEGATIVE /HPF
BACTERIA URNS QL MICRO: NEGATIVE /HPF
BASOPHILS # BLD: 0 K/UL (ref 0–0.1)
BASOPHILS NFR BLD: 0 % (ref 0–1)
BILIRUB SERPL-MCNC: 0.8 MG/DL (ref 0.2–1)
BILIRUB UR QL: NEGATIVE
BUN SERPL-MCNC: 8 MG/DL (ref 6–20)
BUN/CREAT SERPL: 7 (ref 12–20)
CA-I BLD-MCNC: 8.3 MG/DL (ref 8.5–10.1)
CHLORIDE SERPL-SCNC: 98 MMOL/L (ref 97–108)
CO2 SERPL-SCNC: 26 MMOL/L (ref 21–32)
COLOR UR: ABNORMAL
COVID-19 RAPID TEST, COVR: NOT DETECTED
CREAT SERPL-MCNC: 1.21 MG/DL (ref 0.55–1.02)
DIFFERENTIAL METHOD BLD: ABNORMAL
EOSINOPHIL # BLD: 0 K/UL (ref 0–0.4)
EOSINOPHIL NFR BLD: 0 % (ref 0–7)
ERYTHROCYTE [DISTWIDTH] IN BLOOD BY AUTOMATED COUNT: 14.1 % (ref 11.5–14.5)
FLUAV AG NPH QL IA: NEGATIVE
FLUBV AG NOSE QL IA: NEGATIVE
GLOBULIN SER CALC-MCNC: 6.7 G/DL (ref 2–4)
GLUCOSE SERPL-MCNC: 149 MG/DL (ref 65–100)
GLUCOSE UR STRIP.AUTO-MCNC: NEGATIVE MG/DL
HCT VFR BLD AUTO: 36.1 % (ref 35–47)
HGB BLD-MCNC: 12.6 G/DL (ref 11.5–16)
HGB UR QL STRIP: ABNORMAL
HYALINE CASTS URNS QL MICRO: >20 /LPF (ref 0–5)
HYALINE CASTS URNS QL MICRO: >20 /LPF (ref 0–5)
IMM GRANULOCYTES # BLD AUTO: 0.1 K/UL (ref 0–0.04)
IMM GRANULOCYTES NFR BLD AUTO: 0 % (ref 0–0.5)
KETONES UR QL STRIP.AUTO: NEGATIVE MG/DL
LACTATE SERPL-SCNC: 1.9 MMOL/L (ref 0.4–2)
LACTATE SERPL-SCNC: 3 MMOL/L (ref 0.4–2)
LACTATE SERPL-SCNC: 3.6 MMOL/L (ref 0.4–2)
LEUKOCYTE ESTERASE UR QL STRIP.AUTO: NEGATIVE
LYMPHOCYTES # BLD: 1.2 K/UL (ref 0.8–3.5)
LYMPHOCYTES NFR BLD: 11 % (ref 12–49)
MCH RBC QN AUTO: 30 PG (ref 26–34)
MCHC RBC AUTO-ENTMCNC: 34.9 G/DL (ref 30–36.5)
MCV RBC AUTO: 86 FL (ref 80–99)
MONOCYTES # BLD: 0.8 K/UL (ref 0–1)
MONOCYTES NFR BLD: 7 % (ref 5–13)
MUCOUS THREADS URNS QL MICRO: ABNORMAL /LPF
MUCOUS THREADS URNS QL MICRO: ABNORMAL /LPF
NEUTS SEG # BLD: 9.7 K/UL (ref 1.8–8)
NEUTS SEG NFR BLD: 82 % (ref 32–75)
NITRITE UR QL STRIP.AUTO: NEGATIVE
NRBC # BLD: 0 K/UL (ref 0–0.01)
NRBC BLD-RTO: 0 PER 100 WBC
PH UR STRIP: 5 [PH] (ref 5–8)
PLATELET # BLD AUTO: 171 K/UL (ref 150–400)
PMV BLD AUTO: 12.5 FL (ref 8.9–12.9)
POTASSIUM SERPL-SCNC: 3.5 MMOL/L (ref 3.5–5.1)
PROT SERPL-MCNC: 8.5 G/DL (ref 6.4–8.2)
PROT UR STRIP-MCNC: >300 MG/DL
RBC # BLD AUTO: 4.2 M/UL (ref 3.8–5.2)
RBC #/AREA URNS HPF: ABNORMAL /HPF (ref 0–5)
RBC #/AREA URNS HPF: ABNORMAL /HPF (ref 0–5)
SODIUM SERPL-SCNC: 132 MMOL/L (ref 136–145)
SP GR UR REFRACTOMETRY: 1.02 (ref 1–1.03)
SPECIMEN SOURCE: NORMAL
TROPONIN-HIGH SENSITIVITY: 22 NG/L (ref 0–51)
UROBILINOGEN UR QL STRIP.AUTO: 4 EU/DL (ref 0.1–1)
WBC # BLD AUTO: 11.7 K/UL (ref 3.6–11)
WBC URNS QL MICRO: ABNORMAL /HPF (ref 0–4)
WBC URNS QL MICRO: ABNORMAL /HPF (ref 0–4)

## 2022-02-24 PROCEDURE — 96376 TX/PRO/DX INJ SAME DRUG ADON: CPT

## 2022-02-24 PROCEDURE — 84484 ASSAY OF TROPONIN QUANT: CPT

## 2022-02-24 PROCEDURE — 96374 THER/PROPH/DIAG INJ IV PUSH: CPT

## 2022-02-24 PROCEDURE — 74011000250 HC RX REV CODE- 250: Performed by: STUDENT IN AN ORGANIZED HEALTH CARE EDUCATION/TRAINING PROGRAM

## 2022-02-24 PROCEDURE — 80307 DRUG TEST PRSMV CHEM ANLYZR: CPT

## 2022-02-24 PROCEDURE — G0378 HOSPITAL OBSERVATION PER HR: HCPCS

## 2022-02-24 PROCEDURE — 74011000258 HC RX REV CODE- 258: Performed by: STUDENT IN AN ORGANIZED HEALTH CARE EDUCATION/TRAINING PROGRAM

## 2022-02-24 PROCEDURE — 74011250636 HC RX REV CODE- 250/636: Performed by: INTERNAL MEDICINE

## 2022-02-24 PROCEDURE — 81001 URINALYSIS AUTO W/SCOPE: CPT

## 2022-02-24 PROCEDURE — 96375 TX/PRO/DX INJ NEW DRUG ADDON: CPT

## 2022-02-24 PROCEDURE — 74011250637 HC RX REV CODE- 250/637: Performed by: INTERNAL MEDICINE

## 2022-02-24 PROCEDURE — 93005 ELECTROCARDIOGRAM TRACING: CPT

## 2022-02-24 PROCEDURE — 87804 INFLUENZA ASSAY W/OPTIC: CPT

## 2022-02-24 PROCEDURE — 87040 BLOOD CULTURE FOR BACTERIA: CPT

## 2022-02-24 PROCEDURE — 65270000029 HC RM PRIVATE

## 2022-02-24 PROCEDURE — 36415 COLL VENOUS BLD VENIPUNCTURE: CPT

## 2022-02-24 PROCEDURE — 83605 ASSAY OF LACTIC ACID: CPT

## 2022-02-24 PROCEDURE — 96361 HYDRATE IV INFUSION ADD-ON: CPT

## 2022-02-24 PROCEDURE — 87635 SARS-COV-2 COVID-19 AMP PRB: CPT

## 2022-02-24 PROCEDURE — 74011250636 HC RX REV CODE- 250/636: Performed by: STUDENT IN AN ORGANIZED HEALTH CARE EDUCATION/TRAINING PROGRAM

## 2022-02-24 PROCEDURE — 99285 EMERGENCY DEPT VISIT HI MDM: CPT

## 2022-02-24 PROCEDURE — 71045 X-RAY EXAM CHEST 1 VIEW: CPT

## 2022-02-24 PROCEDURE — 85025 COMPLETE CBC W/AUTO DIFF WBC: CPT

## 2022-02-24 PROCEDURE — 74011250637 HC RX REV CODE- 250/637: Performed by: STUDENT IN AN ORGANIZED HEALTH CARE EDUCATION/TRAINING PROGRAM

## 2022-02-24 PROCEDURE — 80053 COMPREHEN METABOLIC PANEL: CPT

## 2022-02-24 RX ORDER — SODIUM CHLORIDE 0.9 % (FLUSH) 0.9 %
5-40 SYRINGE (ML) INJECTION EVERY 8 HOURS
Status: DISCONTINUED | OUTPATIENT
Start: 2022-02-24 | End: 2022-02-25 | Stop reason: HOSPADM

## 2022-02-24 RX ORDER — FAMOTIDINE 40 MG/1
40 TABLET, FILM COATED ORAL
COMMUNITY

## 2022-02-24 RX ORDER — GUAIFENESIN 600 MG/1
600 TABLET, EXTENDED RELEASE ORAL EVERY 12 HOURS
Status: DISCONTINUED | OUTPATIENT
Start: 2022-02-24 | End: 2022-02-25 | Stop reason: HOSPADM

## 2022-02-24 RX ORDER — ACETAMINOPHEN 650 MG/1
650 SUPPOSITORY RECTAL
Status: DISCONTINUED | OUTPATIENT
Start: 2022-02-24 | End: 2022-02-25 | Stop reason: HOSPADM

## 2022-02-24 RX ORDER — POLYETHYLENE GLYCOL 3350 17 G/17G
17 POWDER, FOR SOLUTION ORAL DAILY PRN
Status: DISCONTINUED | OUTPATIENT
Start: 2022-02-24 | End: 2022-02-25 | Stop reason: HOSPADM

## 2022-02-24 RX ORDER — SODIUM CHLORIDE 9 MG/ML
50 INJECTION, SOLUTION INTRAVENOUS CONTINUOUS
Status: DISCONTINUED | OUTPATIENT
Start: 2022-02-24 | End: 2022-02-25 | Stop reason: HOSPADM

## 2022-02-24 RX ORDER — RISPERIDONE 2 MG/1
2 TABLET, FILM COATED ORAL 2 TIMES DAILY
COMMUNITY

## 2022-02-24 RX ORDER — DIVALPROEX SODIUM 250 MG/1
250 TABLET, DELAYED RELEASE ORAL 2 TIMES DAILY
COMMUNITY
End: 2022-03-18 | Stop reason: SDUPTHER

## 2022-02-24 RX ORDER — ACETAMINOPHEN 500 MG
1000 TABLET ORAL
Status: COMPLETED | OUTPATIENT
Start: 2022-02-24 | End: 2022-02-24

## 2022-02-24 RX ORDER — ENOXAPARIN SODIUM 100 MG/ML
40 INJECTION SUBCUTANEOUS DAILY
Status: DISCONTINUED | OUTPATIENT
Start: 2022-02-25 | End: 2022-02-25 | Stop reason: HOSPADM

## 2022-02-24 RX ORDER — CLONIDINE HYDROCHLORIDE 0.1 MG/1
0.1 TABLET ORAL DAILY
COMMUNITY
End: 2022-03-29

## 2022-02-24 RX ORDER — ONDANSETRON 2 MG/ML
4 INJECTION INTRAMUSCULAR; INTRAVENOUS
Status: DISCONTINUED | OUTPATIENT
Start: 2022-02-24 | End: 2022-02-25 | Stop reason: HOSPADM

## 2022-02-24 RX ORDER — SODIUM CHLORIDE 0.9 % (FLUSH) 0.9 %
5-40 SYRINGE (ML) INJECTION AS NEEDED
Status: DISCONTINUED | OUTPATIENT
Start: 2022-02-24 | End: 2022-02-25 | Stop reason: HOSPADM

## 2022-02-24 RX ORDER — IBUPROFEN 400 MG/1
400 TABLET ORAL
Status: COMPLETED | OUTPATIENT
Start: 2022-02-24 | End: 2022-02-24

## 2022-02-24 RX ORDER — LISINOPRIL 20 MG/1
20 TABLET ORAL DAILY
COMMUNITY

## 2022-02-24 RX ORDER — LANOLIN ALCOHOL/MO/W.PET/CERES
CREAM (GRAM) TOPICAL
COMMUNITY
End: 2022-03-29

## 2022-02-24 RX ORDER — METOPROLOL TARTRATE 5 MG/5ML
1.25 INJECTION INTRAVENOUS
Status: DISCONTINUED | OUTPATIENT
Start: 2022-02-24 | End: 2022-02-25 | Stop reason: HOSPADM

## 2022-02-24 RX ORDER — BISMUTH SUBSALICYLATE 262 MG
1 TABLET,CHEWABLE ORAL DAILY
COMMUNITY

## 2022-02-24 RX ORDER — DOXYCYCLINE 100 MG/1
100 CAPSULE ORAL EVERY 12 HOURS
Status: DISCONTINUED | OUTPATIENT
Start: 2022-02-24 | End: 2022-02-25 | Stop reason: HOSPADM

## 2022-02-24 RX ORDER — ACETAMINOPHEN 325 MG/1
650 TABLET ORAL
Status: DISCONTINUED | OUTPATIENT
Start: 2022-02-24 | End: 2022-02-25 | Stop reason: HOSPADM

## 2022-02-24 RX ORDER — ONDANSETRON 4 MG/1
4 TABLET, ORALLY DISINTEGRATING ORAL
Status: DISCONTINUED | OUTPATIENT
Start: 2022-02-24 | End: 2022-02-25 | Stop reason: HOSPADM

## 2022-02-24 RX ORDER — AMLODIPINE BESYLATE 10 MG/1
1 TABLET ORAL DAILY
COMMUNITY
Start: 2017-12-29 | End: 2022-03-29

## 2022-02-24 RX ORDER — BENZTROPINE MESYLATE 0.5 MG/1
1 TABLET ORAL 2 TIMES DAILY
COMMUNITY
Start: 2017-12-29

## 2022-02-24 RX ORDER — IPRATROPIUM BROMIDE AND ALBUTEROL SULFATE 2.5; .5 MG/3ML; MG/3ML
3 SOLUTION RESPIRATORY (INHALATION)
Status: DISCONTINUED | OUTPATIENT
Start: 2022-02-24 | End: 2022-02-25 | Stop reason: HOSPADM

## 2022-02-24 RX ADMIN — PIPERACILLIN AND TAZOBACTAM 3.38 G: 3; .375 INJECTION, POWDER, LYOPHILIZED, FOR SOLUTION INTRAVENOUS at 10:17

## 2022-02-24 RX ADMIN — IBUPROFEN 400 MG: 400 TABLET, FILM COATED ORAL at 09:50

## 2022-02-24 RX ADMIN — SODIUM CHLORIDE 1000 ML: 9 INJECTION, SOLUTION INTRAVENOUS at 11:29

## 2022-02-24 RX ADMIN — METHYLPREDNISOLONE SODIUM SUCCINATE 20 MG: 40 INJECTION, POWDER, FOR SOLUTION INTRAMUSCULAR; INTRAVENOUS at 15:03

## 2022-02-24 RX ADMIN — SODIUM CHLORIDE, PRESERVATIVE FREE 10 ML: 5 INJECTION INTRAVENOUS at 21:19

## 2022-02-24 RX ADMIN — METHYLPREDNISOLONE SODIUM SUCCINATE 20 MG: 40 INJECTION, POWDER, FOR SOLUTION INTRAMUSCULAR; INTRAVENOUS at 21:19

## 2022-02-24 RX ADMIN — DOXYCYCLINE HYCLATE 100 MG: 100 CAPSULE ORAL at 15:03

## 2022-02-24 RX ADMIN — GUAIFENESIN 600 MG: 600 TABLET, EXTENDED RELEASE ORAL at 15:03

## 2022-02-24 RX ADMIN — SODIUM CHLORIDE 50 ML/HR: 9 INJECTION, SOLUTION INTRAVENOUS at 13:03

## 2022-02-24 RX ADMIN — SODIUM CHLORIDE 500 ML: 9 INJECTION, SOLUTION INTRAVENOUS at 10:05

## 2022-02-24 RX ADMIN — ACETAMINOPHEN 1000 MG: 500 TABLET ORAL at 09:50

## 2022-02-24 RX ADMIN — PIPERACILLIN AND TAZOBACTAM 3.38 G: 3; .375 INJECTION, POWDER, LYOPHILIZED, FOR SOLUTION INTRAVENOUS at 15:37

## 2022-02-24 NOTE — PROGRESS NOTES
Reason for Admission:  PNA                     RUR Score:    13%                 Plan for utilizing home health: None @ this time/uses no DME. PCP: First and Last name: Cassie Fernandez     Name of Practice:    Are you a current patient: Yes/No: Yes   Approximate date of last visit: Seen last week. Can you participate in a virtual visit with your PCP:                   Yes/Call  Current Advanced Directive/Advance Care Plan: Full Code      Healthcare Decision Maker:              Primary Decision Maker: Keerthi Roach [facility]) - Caregiver - [phone]                  Transition of Care Plan: D/C Plan is to return to [facility] upon discharge - staff to transport possibly upon discharge . Message left.

## 2022-02-24 NOTE — H&P
History and Physical    Patient: Vini Bai MRN: [id number]  SSN: [id number]    YOB: 1960  Age: 62 y.o. Sex: female      Subjective: Vini Bai is a 62 y.o. female with past medical history of HIV, hepatitis C, hypertension, alcohol abuse, and schizophrenia presenting to the ED from group home with a primary complaint of altered mental status. At baseline patient is confused, however group home reported she has been more somnolent and not following commands. Patient is a poor historian, difficult to obtain full ROS. In the ED, temp 103.3, pulse 134 bpm, tachypneic with SPO2 86% on room air. Initially placed on 6L nasal cannula with improvement in SPO2. Received 1.5 L bolus IV fluids. Repeat labs showing WBC 11.7 and lactic acid 3.6. On exam at bedside, patient denies any episodes of nausea or vomiting. She denies any past cardiac or pulmonary history. Chest x-ray showing right lung base opacity. Started on IV Zosyn for aspiration coverage. Speech therapy consult. Past Medical History:   Diagnosis Date    Alcohol abuse 5/29/2017    Hepatitis C     [facility] immunodeficiency virus infection) [facility]     Hypertension     Psychotic disorder ([facility]     Schizophrenia ([facility]      No past surgical history on file. No family history on file. Social History     Tobacco Use    Smoking status: Current Every Day Smoker     Packs/day: 0.50    Smokeless tobacco: Never Used   Substance Use Topics    Alcohol use: Yes     Alcohol/week: 0.8 standard drinks     Types: 1 Cans of beer per week      Prior to Admission medications    Medication Sig Start Date End Date Taking? Authorizing Provider   cephALEXin (Keflex) 250 mg capsule Take 1 Capsule by mouth four (4) times daily.  12/29/21   López Wynn MD        No Known Allergies    Review of Systems:  Unable to obtain: acuity of condition      Objective:     Vitals:    02/24/22 1003 02/24/22 1007 02/24/22 1030 02/24/22 1123 BP: 116/70  130/70 127/61   Pulse: (!) 128  (!) 118 (!) 104   Resp: (!) 32  27 26   Temp:   99.5 °F (37.5 °C) 98.7 °F (37.1 °C)   SpO2: 100% 97% 98% 96%   Weight:       Height:            Physical Exam:  General: Ill-appearing. Older than reported age. Alert, cooperative, no distress  Eye: conjunctivae/corneas clear. PERRL, EOM's intact. Throat and Neck: normal and no erythema or exudates noted. No mass   Lung: Symmetric chest wall expansion. Decreased air entry bases right > left. No wheezing or rhonchi. On 2 L/min NC. Heart: regular rate and rhythm, +S1/S2. No murmur or gallop. Abdomen: soft, non-tender. Bowel sounds normal. No masses,  Extremities:  able to move all extremities normal, atraumatic  Skin: Poor turgor, dry. No rashes or lesions  Neurologic: Alert, disoriented. Cranial nerves 2-12 and sensation grossly intact. Psychiatric: non focal    Recent Results (from the past 24 hour(s))   CBC WITH AUTOMATED DIFF    Collection Time: 02/24/22  9:37 AM   Result Value Ref Range    WBC 11.7 (H) 3.6 - 11.0 K/uL    RBC 4.20 3.80 - 5.20 M/uL    HGB 12.6 11.5 - 16.0 g/dL    HCT 36.1 35.0 - 47.0 %    MCV 86.0 80.0 - 99.0 FL    MCH 30.0 26.0 - 34.0 PG    MCHC 34.9 30.0 - 36.5 g/dL    RDW 14.1 11.5 - 14.5 %    PLATELET 123 757 - 248 K/uL    MPV 12.5 8.9 - 12.9 FL    NRBC 0.0 0.0  WBC    ABSOLUTE NRBC 0.00 0.00 - 0.01 K/uL    NEUTROPHILS 82 (H) 32 - 75 %    LYMPHOCYTES 11 (L) 12 - 49 %    MONOCYTES 7 5 - 13 %    EOSINOPHILS 0 0 - 7 %    BASOPHILS 0 0 - 1 %    IMMATURE GRANULOCYTES 0 0 - 0.5 %    ABS. NEUTROPHILS 9.7 (H) 1.8 - 8.0 K/UL    ABS. LYMPHOCYTES 1.2 0.8 - 3.5 K/UL    ABS. MONOCYTES 0.8 0.0 - 1.0 K/UL    ABS. EOSINOPHILS 0.0 0.0 - 0.4 K/UL    ABS. BASOPHILS 0.0 0.0 - 0.1 K/UL    ABS. IMM.  GRANS. 0.1 (H) 0.00 - 0.04 K/UL    DF AUTOMATED     METABOLIC PANEL, COMPREHENSIVE    Collection Time: 02/24/22  9:37 AM   Result Value Ref Range    Sodium 132 (L) 136 - 145 mmol/L    Potassium 3.5 3.5 - 5.1 mmol/L Chloride 98 97 - 108 mmol/L    CO2 26 21 - 32 mmol/L    Anion gap 8 5 - 15 mmol/L    Glucose 149 (H) 65 - 100 mg/dL    BUN 8 6 - 20 mg/dL    Creatinine 1.21 (H) 0.55 - 1.02 mg/dL    BUN/Creatinine ratio 7 (L) 12 - 20      GFR est AA 55 (L) >60 ml/min/1.73m2    GFR est non-AA 45 (L) >60 ml/min/1.73m2    Calcium 8.3 (L) 8.5 - 10.1 mg/dL    Bilirubin, total 0.8 0.2 - 1.0 mg/dL    AST (SGOT) 91 (H) 15 - 37 U/L    ALT (SGPT) 36 12 - 78 U/L    Alk.  phosphatase 112 45 - 117 U/L    Protein, total 8.5 (H) 6.4 - 8.2 g/dL    Albumin 1.8 (L) 3.5 - 5.0 g/dL    Globulin 6.7 (H) 2.0 - 4.0 g/dL    A-G Ratio 0.3 (L) 1.1 - 2.2     LACTIC ACID    Collection Time: 02/24/22  9:37 AM   Result Value Ref Range    Lactic acid 3.6 (HH) 0.4 - 2.0 mmol/L   INFLUENZA A & B AG (RAPID TEST)    Collection Time: 02/24/22  9:38 AM   Result Value Ref Range    Influenza A Antigen Negative Negative      Influenza B Antigen Negative Negative     COVID-19 RAPID TEST    Collection Time: 02/24/22  9:39 AM   Result Value Ref Range    Specimen source Nasopharyngeal      COVID-19 rapid test Not Detected Not Detected     TROPONIN-HIGH SENSITIVITY    Collection Time: 02/24/22  9:55 AM   Result Value Ref Range    Troponin-High Sensitivity 22 0 - 51 ng/L   URINALYSIS W/ RFLX MICROSCOPIC    Collection Time: 02/24/22 10:00 AM   Result Value Ref Range    Color Ondina      Appearance Turbid (A) Clear      Specific gravity 1.016 1.003 - 1.030      pH (UA) 5.0 5.0 - 8.0      Protein >300 (A) Negative mg/dL    Glucose Negative Negative mg/dL    Ketone Negative Negative mg/dL    Bilirubin Negative Negative      Blood Moderate (A) Negative      Urobilinogen 4.0 (H) 0.1 - 1.0 EU/dL    Nitrites Negative Negative      Leukocyte Esterase Negative Negative      WBC 5-10 0 - 4 /hpf    RBC 20-50 0 - 5 /hpf    Bacteria Negative Negative /hpf    Hyaline cast >20 (H) 0 - 5 /lpf    Mucus 1+ /lpf   URINE MICROSCOPIC    Collection Time: 02/24/22 10:00 AM   Result Value Ref Range WBC 5-10 0 - 4 /hpf    RBC 20-50 0 - 5 /hpf    Bacteria Negative Negative /hpf    Mucus 1+ (A) Negative /lpf    Hyaline cast >20 (H) 0 - 5 /lpf       XR Results (maximum last 3): Results from [place] encounter on 02/24/22    XR CHEST PORT    Narrative  EXAM: XR CHEST PORT    INDICATION: tachypnea    COMPARISON: February 18, 2022    FINDINGS: New opacity in the right lung base, likely in the lower lobe. Unremarkable cardiomediastinal contours. No pneumothorax or pleural effusion. No  acute fracture or dislocation. Impression  New opacity in the right lung base, likely in the lower lobe,  favoring infectious/inflammatory process such as pneumonia. Radiographic  follow-up to resolution after treatment is recommended. Communication: The result was communicated with Dr. Dave Alvarenga via telephone. Results from [place] encounter on 02/18/22    XR CHEST PORT    Narrative  Chest, AP views. No comparisons currently available (PACS downtime)    Impression  Cardiopericardial silhouette within normal limits. There is  nonspecific central peribronchial cuffing. Nonspecific prominence of perihilar  lung markings. No evidence of focal airspace consolidation, pleural effusion,  pneumothorax, or pneumomediastinum. Results from [place] encounter on 12/24/21    XR CHEST SNGL V    Narrative  PROCEDURE: XR CHEST THE V    HISTORY:Cough, dyspnea    COMPARISON:Chest x-ray 15 September 2021      TECHNIQUE: AP portable chest    LIMITATIONS: Patient is rotated to the right which distorts the anatomy. TUBES/LINES: None    LUNG PARENCHYMA/PLEURA: Allowing for rotation the lungs are clear and probably  symmetric. TRACHEA/BRONCHI: Normal  PULMONARY VESSELS: Normal  HEART: Normal  MEDIASTINUM: Normal  BONE/SOFT TISSUES: No acute process    OTHER: None    Impression  No specific acute or active process. .      CT Results (maximum last 3): No results found for this or any previous visit.       MRI Results (maximum last 3): No results found for this or any previous visit. Nuclear Medicine Results (maximum last 3): No results found for this or any previous visit. US Results (maximum last 3): No results found for this or any previous visit. Active Problems:    Pneumonia (2/24/2022)        Assessment/Plan:     1. Sepsis  - temp 103.3, tachycardic, tachypnea, and leukocytosis. Likely s/t pneumonia.   - Lactic acid elevated  - S/p 1.5 L bolus IV fluids. Continue IV fluids. 2. Acute hypoxic respiratory failure   3. Pneumonia  - Chest x-ray showing right lung base opacity  - Will coverage for aspiration pneumonia empirically with IV Zosyn  - Speech therapy consult  - Continue supplemental oxygen to maintain saturations >90%: currently on 2 L  - Pulmonary consult    4. Hx HIV  5. Hx hepatitis C  - No home medications    6. Hypertension  - No home medications. Will start IV lopressor PRN.     7. Schizophrenia  - Home medications not listed ?  - Consider [facility] consult for med rec    DVT Prophylaxis: Lovenox   Code Status: Full  POA    Total Time >55 minutes      Signed By: Maxime Perez PA-C     February 24, 2022

## 2022-02-24 NOTE — CONSULTS
[address]    Name:  Syed Donis  MR#:  [id number]  :  1960  ACCOUNT #:  [de-identified]  DATE OF SERVICE:  2022    ATTENDING PHYSICIAN:  Darien Arizmendi MD    REASON FOR CONSULTATION:  Aspiration pneumonia and hypoxia. HISTORY OF PRESENT ILLNESS:  The patient is a 62-year-old [facility] female. Information is obtained mostly from current medical records. She was evaluated in the emergency room and discussed with the RN. Apparently, she was initially started on 2 liters oxygen but now she is on 5 liters. She is now awake. She has a history of HIV, hepatitis C, alcohol abuse, and schizophrenia. She is a resident of a group home facility. She was brought over to the hospital because of altered mental status. She is still confused but is somewhat better. Very poor historian. When I asked her if she has been vaccinated against COVID-19, her reply was Yvrose Jassi is this? \"  She does admit to tobacco abuse. She started smoking when she was very young and still smokes about a pack a day. Review of current records shows that on initial presentation, she had a fever and was tachypneic. She was tachycardic. Chest x-ray shows right lower lobe pneumonia. There is concern about aspiration. She states that she is short of breath and was coughing. Does not recall any emesis. No further information can be obtained. PAST MEDICAL HISTORY:  Significant for HIV, hepatitis C, history of alcohol abuse. She is a resident of a group home facility and has schizophrenia. Alcohol abuse. ALLERGIES:  NO KNOWN DRUG ALLERGIES. MEDICATIONS:  Currently, the patient is started on:  1. Lovenox 40 mg subcutaneously daily. 2.  Zosyn 3.375 g IV q.8 hourly. 3.  Other p.r.n. medications. SOCIAL HISTORY:  She lives in a group home facility. Admits to tobacco abuse. Denies drug or alcohol abuse, but she is a very poor historian.   There is history of alcohol abuse as per current records. OCCUPATIONAL HISTORY:  Insignificant. FAMILY HISTORY:  Noncontributory. REVIEW OF SYSTEMS:  Complete review of systems was undertaken. Pertinent findings are discussed above. PHYSICAL EXAMINATION:  GENERAL:  The patient is a middle-aged-appearing  female who does not appear to be in any distress. Very poor historian. VITAL SIGNS:  Temperature T-max 103.3 and repeat is 98.1. Pulse is 93 per minute, respiratory rate is 18 per minute, and blood pressure is 147/82. She is currently on 5 liters of oxygen with a saturation of 95%. HEENT:  Showed pupils are equal and reactive to light. No pallor or icterus. Nasal passages are patent. Oropharynx is without thrush. Oral hygiene is very poor. NECK:  Supple. Trachea is central.  No JVD or lymphadenopathy. She is not using accessory muscles of respiration. CHEST:  Symmetrical with equal and fair air entry bilaterally. She has right basilar crackles. No rhonchi. Occasional expiratory wheeze. With deep inspiration, she begins to cough. HEART:  Rhythm is regular without any loud murmur or gallop. ABDOMEN:  Soft and benign. Bowel sounds are audible. No masses or organomegaly. EXTREMITIES:  Do not show any cyanosis or clubbing. No pitting edema. Pulses are palpable. NEUROLOGIC SYSTEM:  Grossly intact. SKIN:  Warm and dry. DIAGNOSTIC AND LABORATORY DATA:  Portable chest x-ray done today shows a new right lower lobe pneumonia as compared to a prior study of 02/18/2022. WBC count is 11.7 with neutrophils of 82%, hemoglobin is 12.6, platelet count of 637. Lactic acid initially was 3.6 and repeat is 3.0. Troponin is 22. Electrolytes are mostly within normal limits. BUN is 8, creatinine is 1.21, blood glucose of 149. Influenza A and B as well as rapid COVID test was all negative. ASSESSMENT AND PLAN:  1. The patient appears to have right lower lobe pneumonia.   Either aspiration pneumonia or community-acquired pneumonia. She also presented with signs and symptoms compatible with sepsis. Sepsis protocol was followed and she was fluid bolused with 1.5 liters. Blood cultures have been obtained. She has a history of very extensive ongoing tobacco abuse and also appears to have underlying chronic obstructive pulmonary disease with exacerbation. Furthermore, she has a history of human immunodeficiency virus. Her CD4 cell count or viral load is not known at this time. Not sure whether she has been compliant with her human immunodeficiency virus medications. At this time, she is empirically started on IV Zosyn. I would add IV doxycycline to broaden the spectrum. I would add Mucinex to help with expectoration and bronchodilator treatments on a p.r.n. basis. Additionally, I will add small dose of IV Solu-Medrol. She is currently hypoxic requiring 5 liters of oxygen. I believe that this is due to the pneumonia and chronic obstructive pulmonary disease. Continue supplemental oxygen and keep saturation more than 90%. 2.  Human immunodeficiency virus positive. Recommend Infectious Disease consultation for further evaluation and management. 3.  Hepatitis C and hypertension. 4.  History of alcohol abuse and schizophrenia. She is a resident of a group home facility. 5.  For deep vein thrombosis prophylaxis, she is started on Lovenox subcutaneously. Thank you for allowing me to participate in the care of this patient. I will follow the patient closely with you. The patient was evaluated in the emergency room and discussed with the RN.       MD SOLOMON [email or web address]  D:  02/24/2022 14:16  T:  02/24/2022 16:34  JOB #:  [id number]

## 2022-02-24 NOTE — ROUTINE PROCESS
TRANSFER - OUT REPORT:    Verbal report given to Elizabeth Cortés  being transferred to ) for routine progression of care       Report consisted of patients Situation, Background, Assessment and   Recommendations(SBAR). Information from the following report(s) SBAR, ED Summary and Recent Results was reviewed with the receiving nurse. Lines:   Peripheral IV 02/24/22 Left Antecubital (Active)   Site Assessment Clean, dry, & intact 02/24/22 0925   Phlebitis Assessment 0 02/24/22 0925   Infiltration Assessment 0 02/24/22 0925   Dressing Status Clean, dry, & intact 02/24/22 0925   Dressing Type Transparent 02/24/22 0925   Alcohol Cap Used Yes 02/24/22 1060        Opportunity for questions and clarification was provided.       Patient transported with:   [employer]

## 2022-02-24 NOTE — ED TRIAGE NOTES
Pt from adult day care, h/o schizo effective, where staff states she wasn't acting like herself.  Pt responsive to self

## 2022-02-24 NOTE — CONSULTS
Full consultation dictated. [id number]. Right lower lobe pneumonia, possible aspiration as well as acute exacerbation of COPD. Hypoxic requiring 5 L oxygen. We will add doxy, nebs and small doses of Solu-Medrol. HIV positive. Status unknown. Recommend to consult ID. Thank you.

## 2022-02-24 NOTE — ACP (ADVANCE CARE PLANNING)
Advance Care Planning   Healthcare Decision Maker:       Primary Decision Maker: Tj Streeter [facility]) - caregiver - [phone]

## 2022-02-24 NOTE — ACP (ADVANCE CARE PLANNING)
Advance Care Planning   Healthcare Decision Maker:       Primary Decision Maker: Laci Hebert - Other Non-relative - [phone]

## 2022-02-24 NOTE — ED PROVIDER NOTES
EMERGENCY DEPARTMENT HISTORY AND PHYSICAL EXAM      Date: 2/24/2022  Patient Name: Kvng Thompson    History of Presenting Illness     Chief Complaint   Patient presents with    Altered mental status       History Provided By: Patient, EMS and Nursing Home/SNF/Rehab Center    HPI: Kvng Thompson, 62 y.o. female with a past medical history significant HIV, hypertension, schizophrenia presents to the ED with cc of altered mental status. Patient presents from group home chief complaint of worsening mentation today. At baseline patient's confused however group home reported patient more somnolent not following commands. Patient awake on my assessment, follows commands however slightly confused, slow to respond. Denies any complaints. There are no other complaints, changes, or physical findings at this time. PCP: None    Current Facility-Administered Medications   Medication Dose Route Frequency Provider Last Rate Last Admin    piperacillin-tazobactam (ZOSYN) 3.375 g in 0.9% sodium chloride (MBP/ADV) 100 mL MBP  3.375 g IntraVENous NOW Kina Mcmahon MD 25 mL/hr at 02/24/22 1017 3.375 g at 02/24/22 1017    sodium chloride 0.9 % bolus infusion 1,000 mL  1,000 mL IntraVENous ONCE Kina Mcmahon MD         Current Outpatient Medications   Medication Sig Dispense Refill    cephALEXin (Keflex) 250 mg capsule Take 1 Capsule by mouth four (4) times daily. 40 Capsule 0       Past History     Past Medical History:  Past Medical History:   Diagnosis Date    Alcohol abuse 5/29/2017    Hepatitis C     [facility]) [facility]     Hypertension     Psychotic disorder ([facility] [facility]     Schizophrenia ([facility] [address] [facility]        Past Surgical History:  No past surgical history on file. Family History:  No family history on file.     Social History:  Social History     Tobacco Use    Smoking status: Current Every Day Smoker     Packs/day: 0.50    Smokeless tobacco: Never Used   Substance Use Topics    Alcohol use: Yes     Alcohol/week: 0.8 standard drinks     Types: 1 Cans of beer per week    Drug use: Yes     Types: Cocaine     Comment: Pt reports a history of crack cocaine use       Allergies:  No Known Allergies      Review of Systems     Review of Systems   Unable to perform ROS: Mental status change       Physical Exam     Physical Exam  Vitals and nursing note reviewed. Constitutional:       General: She is not in acute distress. Appearance: Normal appearance. She is normal weight. She is not ill-appearing. HENT:      Head: Normocephalic and atraumatic. Nose: Nose normal.      Mouth/Throat:      Mouth: Mucous membranes are moist.   Eyes:      Extraocular Movements: Extraocular movements intact. Pupils: Pupils are equal, round, and reactive to light. Cardiovascular:      Rate and Rhythm: Normal rate and regular rhythm. Pulses: Normal pulses. Pulmonary:      Effort: Pulmonary effort is normal.   Abdominal:      General: Abdomen is flat. Bowel sounds are normal. There is distension. Palpations: Abdomen is soft. Tenderness: There is no abdominal tenderness. There is no guarding. Musculoskeletal:         General: No tenderness. Normal range of motion. Cervical back: Normal range of motion and neck supple. No muscular tenderness. Skin:     General: Skin is warm and dry. Neurological:      General: No focal deficit present. Mental Status: She is lethargic. Sensory: No sensory deficit. Motor: No weakness.          Diagnostic Study Results     Labs -     Recent Results (from the past 12 hour(s))   CBC WITH AUTOMATED DIFF    Collection Time: 02/24/22  9:37 AM   Result Value Ref Range    WBC 11.7 (H) 3.6 - 11.0 K/uL    RBC 4.20 3.80 - 5.20 M/uL    HGB 12.6 11.5 - 16.0 g/dL    HCT 36.1 35.0 - 47.0 %    MCV 86.0 80.0 - 99.0 FL    MCH 30.0 26.0 - 34.0 PG    MCHC 34.9 30.0 - 36.5 g/dL    RDW 14.1 11.5 - 14.5 %    PLATELET 913 026 - 839 K/uL    MPV 12.5 8.9 - 12.9 FL NRBC 0.0 0.0  WBC    ABSOLUTE NRBC 0.00 0.00 - 0.01 K/uL    NEUTROPHILS 82 (H) 32 - 75 %    LYMPHOCYTES 11 (L) 12 - 49 %    MONOCYTES 7 5 - 13 %    EOSINOPHILS 0 0 - 7 %    BASOPHILS 0 0 - 1 %    IMMATURE GRANULOCYTES 0 0 - 0.5 %    ABS. NEUTROPHILS 9.7 (H) 1.8 - 8.0 K/UL    ABS. LYMPHOCYTES 1.2 0.8 - 3.5 K/UL    ABS. MONOCYTES 0.8 0.0 - 1.0 K/UL    ABS. EOSINOPHILS 0.0 0.0 - 0.4 K/UL    ABS. BASOPHILS 0.0 0.0 - 0.1 K/UL    ABS. IMM. GRANS. 0.1 (H) 0.00 - 0.04 K/UL    DF AUTOMATED     METABOLIC PANEL, COMPREHENSIVE    Collection Time: 02/24/22  9:37 AM   Result Value Ref Range    Sodium 132 (L) 136 - 145 mmol/L    Potassium 3.5 3.5 - 5.1 mmol/L    Chloride 98 97 - 108 mmol/L    CO2 26 21 - 32 mmol/L    Anion gap 8 5 - 15 mmol/L    Glucose 149 (H) 65 - 100 mg/dL    BUN 8 6 - 20 mg/dL    Creatinine 1.21 (H) 0.55 - 1.02 mg/dL    BUN/Creatinine ratio 7 (L) 12 - 20      GFR est AA 55 (L) >60 ml/min/1.73m2    GFR est non-AA 45 (L) >60 ml/min/1.73m2    Calcium 8.3 (L) 8.5 - 10.1 mg/dL    Bilirubin, total 0.8 0.2 - 1.0 mg/dL    AST (SGOT) 91 (H) 15 - 37 U/L    ALT (SGPT) 36 12 - 78 U/L    Alk.  phosphatase 112 45 - 117 U/L    Protein, total 8.5 (H) 6.4 - 8.2 g/dL    Albumin 1.8 (L) 3.5 - 5.0 g/dL    Globulin 6.7 (H) 2.0 - 4.0 g/dL    A-G Ratio 0.3 (L) 1.1 - 2.2     LACTIC ACID    Collection Time: 02/24/22  9:37 AM   Result Value Ref Range    Lactic acid 3.6 (HH) 0.4 - 2.0 mmol/L   INFLUENZA A & B AG (RAPID TEST)    Collection Time: 02/24/22  9:38 AM   Result Value Ref Range    Influenza A Antigen Negative Negative      Influenza B Antigen Negative Negative     COVID-19 RAPID TEST    Collection Time: 02/24/22  9:39 AM   Result Value Ref Range    Specimen source Nasopharyngeal      COVID-19 rapid test Not Detected Not Detected     URINALYSIS W/ RFLX MICROSCOPIC    Collection Time: 02/24/22 10:00 AM   Result Value Ref Range    Color Ondina      Appearance Turbid (A) Clear      Specific gravity 1.016 1.003 - 1.030 pH (UA) 5.0 5.0 - 8.0      Protein >300 (A) Negative mg/dL    Glucose Negative Negative mg/dL    Ketone Negative Negative mg/dL    Bilirubin Negative Negative      Blood Moderate (A) Negative      Urobilinogen 4.0 (H) 0.1 - 1.0 EU/dL    Nitrites Negative Negative      Leukocyte Esterase Negative Negative      WBC 5-10 0 - 4 /hpf    RBC 20-50 0 - 5 /hpf    Bacteria Negative Negative /hpf    Hyaline cast >20 (H) 0 - 5 /lpf    Mucus 1+ /lpf   URINE MICROSCOPIC    Collection Time: 02/24/22 10:00 AM   Result Value Ref Range    WBC 5-10 0 - 4 /hpf    RBC 20-50 0 - 5 /hpf    Bacteria Negative Negative /hpf    Mucus 1+ (A) Negative /lpf    Hyaline cast >20 (H) 0 - 5 /lpf       Radiologic Studies -   [unfilled]  CT Results  (Last 48 hours)    None        CXR Results  (Last 48 hours)               02/24/22 1037  XR CHEST PORT Final result    Impression:  New opacity in the right lung base, likely in the lower lobe,   favoring infectious/inflammatory process such as pneumonia. Radiographic   follow-up to resolution after treatment is recommended. Communication: The result was communicated with Dr. Apollo Gonzalez via telephone. Narrative:  EXAM: XR CHEST PORT       INDICATION: tachypnea       COMPARISON: February 18, 2022       FINDINGS: New opacity in the right lung base, likely in the lower lobe. Unremarkable cardiomediastinal contours. No pneumothorax or pleural effusion. No   acute fracture or dislocation. Medical Decision Making and ED Course   I am the first provider for this patient. I reviewed the vital signs, available nursing notes, past medical history, past surgical history, family history and social history. Vital Signs-Reviewed the patient's vital signs.   Patient Vitals for the past 12 hrs:   Temp Pulse Resp BP SpO2   02/24/22 1030 99.5 °F (37.5 °C) (!) 118 27 130/70 98 %   02/24/22 1007     97 %   02/24/22 1003  (!) 128 (!) 32 116/70 100 %   02/24/22 0936     95 % 02/24/22 0928 (!) 103.3 °F (39.6 °C) (!) 134 (!) 32 (!) 142/76 (!) 86 %       EKG interpretation: (Preliminary)  Sinus tach 126, no ST elevation, normal axis    Records Reviewed: Nursing Notes and Old Medical Records    The patient presents with altered mental status, fever with a differential diagnosis of sepsis, UTI, pneumonia, COVID-19      Provider Notes (Medical Decision Making):     MDM   81-year-old female, history of schizophrenia, hep C, HIV, presents from group home for evaluation of altered mental status, increased lethargy    Physical exam shows febrile female, hypoxic at 86% on room air, normotensive. Patient responded well to 2 L nasal cannula, oxygenation improved to 94%. On my assessment patient awake, alert, slow to respond however does follow commands, no focal extremity weakness noted, patient not in any respiratory distress, abdomen soft mild distention nontender. Sepsis work-up initiated, IV fluids, Tylenol ordered. Presumptive treatment initiated with Zosyn. ED Course:   Initial assessment performed. The patients presenting problems have been discussed, and they are in agreement with the care plan formulated and outlined with them. I have encouraged them to ask questions as they arise throughout their visit. ED Course as of 02/24/22 1106   Thu Feb 24, 2022   1105 Patient's vitals improved, defervesced, metabolic panel shows mild dehydration, CBC shows a leukocytosis at 11,000, lactic acid markedly elevated 3.6, patient's chest x-ray suspicious for right lower lobe infiltrate which is consistent with clinical picture. We will treat for pneumonia. Patient has already received Zosyn. No respiratory compromise at this time do not feel patient is exhibiting signs of septic shock.   Discussed case with hospitalist Dr. Aggie Evangelista, will admit patient for sepsis, pneumonia, ams.  [PZ]      ED Course User Index  [PZ] Adenike Pino MD         Procedures       Alessandra Prieto, MD  Procedures             Disposition         Admit    Diagnosis     Clinical Impression:   1. Sepsis, due to unspecified organism, unspecified whether acute organ dysfunction present ([facility] [address]    2. Pneumonia of right lower lobe due to infectious organism    3. Altered mental status, unspecified altered mental status type        Attestations:    Yolie Omer MD    Please note that this dictation was completed with MyCube, the computer voice recognition software. Quite often unanticipated grammatical, syntax, homophones, and other interpretive errors are inadvertently transcribed by the computer software. Please disregard these errors. Please excuse any errors that have escaped final proofreading. Thank you.

## 2022-02-24 NOTE — PROGRESS NOTES
2 person skin assessment performed by [facility] RN and Svetlana RN, no wounds noted

## 2022-02-24 NOTE — PROGRESS NOTES
2/24/22. PCP is Dr. Idalmis Jacobo. - seen last week per pt. D/C Plan is to return to group home [facility] ( [phone]) upon discharge. Group home called [facility] @ [phone]) - message left. Per pt she uses no DME/no home health @ this time. Group home to possibly transport upon discharge.

## 2022-02-25 VITALS
HEART RATE: 91 BPM | WEIGHT: 140 LBS | OXYGEN SATURATION: 99 % | TEMPERATURE: 97.6 F | SYSTOLIC BLOOD PRESSURE: 137 MMHG | RESPIRATION RATE: 18 BRPM | DIASTOLIC BLOOD PRESSURE: 82 MMHG | BODY MASS INDEX: 22.5 KG/M2 | HEIGHT: 66 IN

## 2022-02-25 PROBLEM — J96.01 ACUTE RESPIRATORY FAILURE WITH HYPOXIA (HCC): Status: ACTIVE | Noted: 2022-02-25

## 2022-02-25 LAB
AMPHET UR QL SCN: NEGATIVE
ANION GAP SERPL CALC-SCNC: 5 MMOL/L (ref 5–15)
ATRIAL RATE: 126 BPM
BARBITURATES UR QL SCN: NEGATIVE
BENZODIAZ UR QL: NEGATIVE
BUN SERPL-MCNC: 13 MG/DL (ref 6–20)
BUN/CREAT SERPL: 16 (ref 12–20)
CA-I BLD-MCNC: 8.7 MG/DL (ref 8.5–10.1)
CALCULATED P AXIS, ECG09: 100 DEGREES
CALCULATED R AXIS, ECG10: 91 DEGREES
CALCULATED T AXIS, ECG11: 89 DEGREES
CANNABINOIDS UR QL SCN: NEGATIVE
CHLORIDE SERPL-SCNC: 106 MMOL/L (ref 97–108)
CO2 SERPL-SCNC: 25 MMOL/L (ref 21–32)
COCAINE UR QL SCN: NEGATIVE
CREAT SERPL-MCNC: 0.83 MG/DL (ref 0.55–1.02)
DIAGNOSIS, 93000: NORMAL
DRUG SCRN COMMENT,DRGCM: NORMAL
ERYTHROCYTE [DISTWIDTH] IN BLOOD BY AUTOMATED COUNT: 13.9 % (ref 11.5–14.5)
GLUCOSE SERPL-MCNC: 156 MG/DL (ref 65–100)
HCT VFR BLD AUTO: 32.6 % (ref 35–47)
HGB BLD-MCNC: 11.7 G/DL (ref 11.5–16)
MCH RBC QN AUTO: 30.3 PG (ref 26–34)
MCHC RBC AUTO-ENTMCNC: 35.9 G/DL (ref 30–36.5)
MCV RBC AUTO: 84.5 FL (ref 80–99)
METHADONE UR QL: NEGATIVE
NRBC # BLD: 0 K/UL (ref 0–0.01)
NRBC BLD-RTO: 0 PER 100 WBC
OPIATES UR QL: NEGATIVE
P-R INTERVAL, ECG05: 176 MS
PCP UR QL: NEGATIVE
PLATELET # BLD AUTO: 172 K/UL (ref 150–400)
PMV BLD AUTO: 12.5 FL (ref 8.9–12.9)
POTASSIUM SERPL-SCNC: 3.4 MMOL/L (ref 3.5–5.1)
Q-T INTERVAL, ECG07: 286 MS
QRS DURATION, ECG06: 66 MS
QTC CALCULATION (BEZET), ECG08: 414 MS
RBC # BLD AUTO: 3.86 M/UL (ref 3.8–5.2)
SODIUM SERPL-SCNC: 136 MMOL/L (ref 136–145)
VENTRICULAR RATE, ECG03: 126 BPM
WBC # BLD AUTO: 9.2 K/UL (ref 3.6–11)

## 2022-02-25 PROCEDURE — 92610 EVALUATE SWALLOWING FUNCTION: CPT

## 2022-02-25 PROCEDURE — G0378 HOSPITAL OBSERVATION PER HR: HCPCS

## 2022-02-25 PROCEDURE — 94760 N-INVAS EAR/PLS OXIMETRY 1: CPT

## 2022-02-25 PROCEDURE — 85027 COMPLETE CBC AUTOMATED: CPT

## 2022-02-25 PROCEDURE — 96372 THER/PROPH/DIAG INJ SC/IM: CPT

## 2022-02-25 PROCEDURE — 74011250636 HC RX REV CODE- 250/636: Performed by: STUDENT IN AN ORGANIZED HEALTH CARE EDUCATION/TRAINING PROGRAM

## 2022-02-25 PROCEDURE — 74011000258 HC RX REV CODE- 258: Performed by: STUDENT IN AN ORGANIZED HEALTH CARE EDUCATION/TRAINING PROGRAM

## 2022-02-25 PROCEDURE — 36415 COLL VENOUS BLD VENIPUNCTURE: CPT

## 2022-02-25 PROCEDURE — 74011250636 HC RX REV CODE- 250/636: Performed by: INTERNAL MEDICINE

## 2022-02-25 PROCEDURE — 74011250637 HC RX REV CODE- 250/637: Performed by: INTERNAL MEDICINE

## 2022-02-25 PROCEDURE — 77010033678 HC OXYGEN DAILY

## 2022-02-25 PROCEDURE — 80048 BASIC METABOLIC PNL TOTAL CA: CPT

## 2022-02-25 PROCEDURE — 74011000250 HC RX REV CODE- 250: Performed by: STUDENT IN AN ORGANIZED HEALTH CARE EDUCATION/TRAINING PROGRAM

## 2022-02-25 PROCEDURE — 96376 TX/PRO/DX INJ SAME DRUG ADON: CPT

## 2022-02-25 RX ORDER — ALBUTEROL SULFATE 90 UG/1
2 AEROSOL, METERED RESPIRATORY (INHALATION)
Qty: 18 G | Refills: 0 | Status: SHIPPED | OUTPATIENT
Start: 2022-02-25

## 2022-02-25 RX ORDER — LANOLIN ALCOHOL/MO/W.PET/CERES
1 CREAM (GRAM) TOPICAL
Status: DISCONTINUED | OUTPATIENT
Start: 2022-02-26 | End: 2022-02-25 | Stop reason: HOSPADM

## 2022-02-25 RX ORDER — FAMOTIDINE 20 MG/1
40 TABLET, FILM COATED ORAL
Status: DISCONTINUED | OUTPATIENT
Start: 2022-02-25 | End: 2022-02-25 | Stop reason: HOSPADM

## 2022-02-25 RX ORDER — LISINOPRIL 10 MG/1
20 TABLET ORAL DAILY
Status: DISCONTINUED | OUTPATIENT
Start: 2022-02-26 | End: 2022-02-25 | Stop reason: HOSPADM

## 2022-02-25 RX ORDER — CLONIDINE HYDROCHLORIDE 0.1 MG/1
0.1 TABLET ORAL DAILY
Status: DISCONTINUED | OUTPATIENT
Start: 2022-02-26 | End: 2022-02-25 | Stop reason: HOSPADM

## 2022-02-25 RX ORDER — PREDNISONE 10 MG/1
TABLET ORAL
Qty: 11 TABLET | Refills: 0 | Status: SHIPPED | OUTPATIENT
Start: 2022-02-25 | End: 2022-03-04

## 2022-02-25 RX ORDER — AMOXICILLIN AND CLAVULANATE POTASSIUM 875; 125 MG/1; MG/1
1 TABLET, FILM COATED ORAL EVERY 12 HOURS
Qty: 20 TABLET | Refills: 0 | Status: SHIPPED | OUTPATIENT
Start: 2022-02-25 | End: 2022-03-07

## 2022-02-25 RX ORDER — RISPERIDONE 2 MG/1
2 TABLET, FILM COATED ORAL 2 TIMES DAILY
Status: DISCONTINUED | OUTPATIENT
Start: 2022-02-25 | End: 2022-02-25 | Stop reason: HOSPADM

## 2022-02-25 RX ORDER — DIVALPROEX SODIUM 250 MG/1
250 TABLET, DELAYED RELEASE ORAL 2 TIMES DAILY
Status: DISCONTINUED | OUTPATIENT
Start: 2022-02-25 | End: 2022-02-25 | Stop reason: HOSPADM

## 2022-02-25 RX ORDER — AMLODIPINE BESYLATE 5 MG/1
10 TABLET ORAL DAILY
Status: DISCONTINUED | OUTPATIENT
Start: 2022-02-26 | End: 2022-02-25 | Stop reason: HOSPADM

## 2022-02-25 RX ORDER — BENZTROPINE MESYLATE 1 MG/1
0.5 TABLET ORAL 2 TIMES DAILY
Status: DISCONTINUED | OUTPATIENT
Start: 2022-02-25 | End: 2022-02-25 | Stop reason: HOSPADM

## 2022-02-25 RX ORDER — DOXYCYCLINE 100 MG/1
100 CAPSULE ORAL EVERY 12 HOURS
Qty: 16 CAPSULE | Refills: 0 | Status: SHIPPED | OUTPATIENT
Start: 2022-02-26 | End: 2022-03-06

## 2022-02-25 RX ADMIN — SODIUM CHLORIDE 50 ML/HR: 9 INJECTION, SOLUTION INTRAVENOUS at 03:45

## 2022-02-25 RX ADMIN — ENOXAPARIN SODIUM 40 MG: 100 INJECTION SUBCUTANEOUS at 09:00

## 2022-02-25 RX ADMIN — METHYLPREDNISOLONE SODIUM SUCCINATE 20 MG: 40 INJECTION, POWDER, FOR SOLUTION INTRAMUSCULAR; INTRAVENOUS at 06:19

## 2022-02-25 RX ADMIN — GUAIFENESIN 600 MG: 600 TABLET, EXTENDED RELEASE ORAL at 09:00

## 2022-02-25 RX ADMIN — PIPERACILLIN AND TAZOBACTAM 3.38 G: 3; .375 INJECTION, POWDER, LYOPHILIZED, FOR SOLUTION INTRAVENOUS at 10:40

## 2022-02-25 RX ADMIN — PIPERACILLIN AND TAZOBACTAM 3.38 G: 3; .375 INJECTION, POWDER, LYOPHILIZED, FOR SOLUTION INTRAVENOUS at 01:44

## 2022-02-25 RX ADMIN — DOXYCYCLINE HYCLATE 100 MG: 100 CAPSULE ORAL at 09:00

## 2022-02-25 RX ADMIN — SODIUM CHLORIDE, PRESERVATIVE FREE 10 ML: 5 INJECTION INTRAVENOUS at 06:20

## 2022-02-25 NOTE — PROGRESS NOTES
DC Plan: [facility] KO spoke with Mirela West from [facility] [phone] and notified her of discharge. Transportation was discussed. Mirela West indicated the pt will need to use a Medicaid cab. Pt can discharge back to their facility anytime today. Mirela West indicated she does not need report from our nurse. Efren verified home address pt will be returning to: [address]. Efren spoke with pt's nurse. Discharge plan of care/case management plan validated with provider's discharge order.

## 2022-02-25 NOTE — PROGRESS NOTES
Hospitalist Progress Note    Subjective:   Daily Progress Note: 2/25/2022 11:50 AM    Hospital Course: Denzel Ramirez is a 62 y.o. female with past medical history of HIV, hepatitis C, hypertension, alcohol abuse, and schizophrenia presenting to the ED from group home with a primary complaint of altered mental status. At baseline patient is confused, however group home reported she has been more somnolent and not following commands. Patient is a poor historian, difficult to obtain full ROS. In the ED, temp 103.3, pulse 134 bpm, tachypneic with SPO2 86% on room air. Initially placed on 6L nasal cannula with improvement in SPO2. Received 1.5 L bolus IV fluids. Repeat labs showing WBC 11.7 and lactic acid 3.6. On exam at bedside, patient denies any episodes of nausea or vomiting. She denies any past cardiac or pulmonary history. Chest x-ray showing right lung base opacity. Started on IV Zosyn for aspiration coverage. Speech therapy consult to evaluate for aspiration risk. Pulmonary consult. Added doxycycline. Patient weaned to room air with SpO2 100%. Subjective:    Patient seen and examined at bedside. Baseline cognitive dysfunction. Weaned to room air.      Current Facility-Administered Medications   Medication Dose Route Frequency    sodium chloride (NS) flush 5-40 mL  5-40 mL IntraVENous Q8H    sodium chloride (NS) flush 5-40 mL  5-40 mL IntraVENous PRN    acetaminophen (TYLENOL) tablet 650 mg  650 mg Oral Q6H PRN    Or    acetaminophen (TYLENOL) suppository 650 mg  650 mg Rectal Q6H PRN    polyethylene glycol (MIRALAX) packet 17 g  17 g Oral DAILY PRN    ondansetron (ZOFRAN ODT) tablet 4 mg  4 mg Oral Q8H PRN    Or    ondansetron (ZOFRAN) injection 4 mg  4 mg IntraVENous Q6H PRN    enoxaparin (LOVENOX) injection 40 mg  40 mg SubCUTAneous DAILY    0.9% sodium chloride infusion  50 mL/hr IntraVENous CONTINUOUS    piperacillin-tazobactam (ZOSYN) 3.375 g in 0.9% sodium chloride (MBP/ADV) 100 mL MBP  3.375 g IntraVENous Q8H    albuterol-ipratropium (DUO-NEB) 2.5 MG-0.5 MG/3 ML  3 mL Nebulization Q6H PRN    metoprolol (LOPRESSOR) injection 1.25 mg  1.25 mg IntraVENous Q6H PRN    doxycycline (VIBRAMYCIN) capsule 100 mg  100 mg Oral Q12H    guaiFENesin ER (MUCINEX) tablet 600 mg  600 mg Oral Q12H    methylPREDNISolone (PF) (SOLU-MEDROL) injection 20 mg  20 mg IntraVENous Q8H        Review of Systems  Unable to obtain: acuity of condition      Objective:     Visit Vitals  /82 (BP 1 Location: Right upper arm)   Pulse 91   Temp 97.6 °F (36.4 °C)   Resp 18   Ht 5' 6\" (1.676 m)   Wt 63.5 kg (140 lb)   SpO2 99%   BMI 22.60 kg/m²    O2 Flow Rate (L/min): 2 l/min O2 Device: Nasal cannula    Temp (24hrs), Av °F (36.7 °C), Min:97.4 °F (36.3 °C), Max:98.6 °F (37 °C)      No intake/output data recorded.  1901 -  0700  In: 1000 [I.V.:1000]  Out: -     PHYSICAL EXAM:  General: Ill-appearing. Older than reported age. Alert, cooperative, no distress  Eye: conjunctivae/corneas clear. PERRL, EOM's intact. Throat and Neck: normal and no erythema or exudates noted. No mass   Lung: Symmetric chest wall expansion. Decreased air entry bases right > left. No wheezing or rhonchi. On 2 L/min NC. Heart: regular rate and rhythm, +S1/S2. No murmur or gallop. Abdomen: soft, non-tender. Bowel sounds normal. No masses,  Extremities:  able to move all extremities normal, atraumatic  Skin: Poor turgor, dry. No rashes or lesions  Neurologic: Alert, disoriented. Cranial nerves 2-12 and sensation grossly intact.   Psychiatric: non focal      Data Review    Recent Results (from the past 24 hour(s))   LACTIC ACID    Collection Time: 22  1:19 PM   Result Value Ref Range    Lactic acid 1.9 0.4 - 2.0 mmol/L   METABOLIC PANEL, BASIC    Collection Time: 22 10:41 AM   Result Value Ref Range    Sodium 136 136 - 145 mmol/L    Potassium 3.4 (L) 3.5 - 5.1 mmol/L    Chloride 106 97 - 108 mmol/L    CO2 25 21 - 32 mmol/L Anion gap 5 5 - 15 mmol/L    Glucose 156 (H) 65 - 100 mg/dL    BUN 13 6 - 20 mg/dL    Creatinine 0.83 0.55 - 1.02 mg/dL    BUN/Creatinine ratio 16 12 - 20      GFR est AA >60 >60 ml/min/1.73m2    GFR est non-AA >60 >60 ml/min/1.73m2    Calcium 8.7 8.5 - 10.1 mg/dL   CBC W/O DIFF    Collection Time: 02/25/22 10:41 AM   Result Value Ref Range    WBC 9.2 3.6 - 11.0 K/uL    RBC 3.86 3.80 - 5.20 M/uL    HGB 11.7 11.5 - 16.0 g/dL    HCT 32.6 (L) 35.0 - 47.0 %    MCV 84.5 80.0 - 99.0 FL    MCH 30.3 26.0 - 34.0 PG    MCHC 35.9 30.0 - 36.5 g/dL    RDW 13.9 11.5 - 14.5 %    PLATELET 488 513 - 560 K/uL    MPV 12.5 8.9 - 12.9 FL    NRBC 0.0 0.0  WBC    ABSOLUTE NRBC 0.00 0.00 - 0.01 K/uL       XR CHEST PORT   Final Result   New opacity in the right lung base, likely in the lower lobe,   favoring infectious/inflammatory process such as pneumonia. Radiographic   follow-up to resolution after treatment is recommended. Communication: The result was communicated with Dr. Marquis Matias via telephone. Principal Problem:    Sepsis ([facility] [address] (2/24/2022)    Active Problems:    Pneumonia (2/24/2022)        Assessment/Plan:     1. Sepsis  - temp 103.3, tachycardic, tachypnea, and leukocytosis. Likely s/t pneumonia.   - Lactic acid elevated, now normal   - S/p 1.5 L bolus IV fluids. Continue IV fluids.  - Resolved     2. Acute hypoxic respiratory failure   3. Pneumonia  - Chest x-ray showing right lung base opacity  - Continue IV Zosyn and doxycycline   - Started on IV solu-medrol   - Speech therapy consult  - Continue supplemental oxygen to maintain saturations >90%: currently on RA  - Pulmonary consult     4. Hx HIV  5. Hx hepatitis C  - No home medications     6.  Hypertension  - Continue lisinopril, Norvasc, and clonidine      7. Schizophrenia  - Continue depakote and Risperdal   - Continue benztropine     DVT Prophylaxis: Lovenox   Code Status: Full  POA    Discharge Barriers:    - D/c back to group home later today pending Pulmonary clearance    Care Plan discussed with: patient and nursing    Total time spent with patient: >35 minutes.

## 2022-02-25 NOTE — DISCHARGE SUMMARY
Hospitalist Discharge Summary     Patient ID:    Angella Smyth  [id number]  89 y.o.  1960    Admit date: 2/24/2022    Discharge date : 2/25/2022    Chronic Diagnoses:    Problem List as of 2/25/2022 Never Reviewed          Codes Class Noted - Resolved    Acute respiratory failure with hypoxia [facility]) ICD-10-CM: J96.01  ICD-9-CM: 518.81  2/25/2022 - Present        Pneumonia ICD-10-CM: J18.9  ICD-9-CM: 826  2/24/2022 - Present        * (Principal) Sepsis ([facility] ICD-10-CM: A41.9  ICD-9-CM: 038.9, 995.91  2/24/2022 - Present        Chronic undifferentiated schizophrenia with acute exacerbations ([facility] ICD-10-CM: F20.9  ICD-9-CM: 295.64  5/29/2017 - Present        Non-compliance with treatment ICD-10-CM: Z91.19  ICD-9-CM: V15.81  5/29/2017 - Present        Essential hypertension ICD-10-CM: I10  ICD-9-CM: 401.9  5/29/2017 - Present        Alcohol abuse ICD-10-CM: F10.10  ICD-9-CM: 305.00  5/29/2017 - Present          22    Final Diagnoses:   Principal Problem:    Sepsis ([facility] (2/24/2022)    Active Problems:    Pneumonia (2/24/2022)      Acute respiratory failure with hypoxia ([facility] (2/25/2022)      Hospital Course: Ruthie Birch a 62 y.o. female with past medical history of HIV, hepatitis C, hypertension, alcohol abuse, and schizophrenia presenting to the ED from group home with a primary complaint of altered mental status.  At baseline patient is confused, however group home reported she has been more somnolent and not following commands.  Patient is a poor historian, difficult to obtain full ROS. In the ED, temp 103.3, pulse 134 bpm, tachypneic with SPO2 86% on room air.  Initially placed on 6L nasal cannula with improvement in SPO2.  Received 1.5 L bolus IV fluids.  Repeat labs showing WBC 11.7 and lactic acid 3.6.  On exam at bedside, patient denies any episodes of nausea or vomiting.  She denies any past cardiac or pulmonary history.  Chest x-ray showing right lung base opacity.  Started on IV Zosyn for aspiration coverage. Speech therapy consult, evaluation showing normal swallow function. Pulmonary consult. Added doxycycline. Patient weaned to room air with SpO2 100%. Afebrile, leukocytosis resolved, lactic acid within normal limits. Continue on oral Augmentin and doxycyline per Pulmonary at discharge. Vitals and labs stable. Discharge Medications:   Current Discharge Medication List      START taking these medications    Details   doxycycline (VIBRAMYCIN) 100 mg capsule Take 1 Capsule by mouth every twelve (12) hours for 8 days. Qty: 16 Capsule, Refills: 0  Start date: 2/26/2022, End date: 3/6/2022      amoxicillin-clavulanate (Augmentin) 875-125 mg per tablet Take 1 Tablet by mouth every twelve (12) hours for 10 days. Qty: 20 Tablet, Refills: 0  Start date: 2/25/2022, End date: 3/7/2022      predniSONE (DELTASONE) 10 mg tablet Take 10 mg by mouth two (2) times a day for 4 days, THEN 10 mg daily for 3 days. Qty: 11 Tablet, Refills: 0  Start date: 2/25/2022, End date: 3/4/2022      albuterol (ProAir HFA) 90 mcg/actuation inhaler Take 2 Puffs by inhalation every six (6) hours as needed for Wheezing or Shortness of Breath. Qty: 18 g, Refills: 0  Start date: 2/25/2022         CONTINUE these medications which have NOT CHANGED    Details   amLODIPine (NORVASC) 10 mg tablet Take 1 Tablet by mouth daily. benztropine (COGENTIN) 0.5 mg tablet Take 1 Tablet by mouth two (2) times a day. cloNIDine HCL (CATAPRES) 0.1 mg tablet Take 0.1 mg by mouth daily. divalproex DR (DEPAKOTE) 250 mg tablet Take 250 mg by mouth two (2) times a day. famotidine (PEPCID) 40 mg tablet Take 40 mg by mouth nightly. ferrous sulfate 325 mg (65 mg iron) tablet Take  by mouth Daily (before breakfast). lisinopriL (PRINIVIL, ZESTRIL) 20 mg tablet Take 20 mg by mouth daily. risperiDONE (RisperDAL) 2 mg tablet Take 2 mg by mouth two (2) times a day.       multivitamin (ONE A DAY) tablet Take 1 Tablet by mouth daily. Follow up Care:    1. None in 1-2 weeks. Follow-up Information     Follow up With Specialties Details Why Contact Info    None    None ([phone]) Patient stated that they have no PCP      Claudette Ho MD Pulmonary Disease Schedule an appointment as soon as possible for a visit in 2 weeks Hospital follow-up community-acquired pneumonia, hypoxia. [address]  [phone]      [address] [facility] [address] DEP Emergency Medicine  As needed, If symptoms worsen [address] [facility]  [phone]            Patient Follow Up Instructions: Activity: Activity at tolerated   Diet:  Resume previous diet  Wound care: None required    Condition at Discharge:  Stable  __________________________________________________________________    Disposition  Home or Self Care  ____________________________________________________________________    Code Status:  Full Code  ___________________________________________________________________    Discharge Exam:  Patient seen and examined by me on discharge day. Pertinent Findings:    Gen:    Not in distress  Chest: Symmetric chest wall expansion. Decreased air entry right base > left. No wheezing or rhonchi. On room air. CVS:   Regular rate and rhythm. +S1/S2. No murmur or gallop. No edema  Abd:  Soft, not distended, not tender. Active bowel sounds. Neuro:  Alert, baseline confusion s/t cognitive dysfunction. CN II-XII grossly intact.      CONSULTATIONS: Pulmonary    Significant Diagnostic Studies:   Recent Results (from the past 24 hour(s))   LACTIC ACID    Collection Time: 02/24/22  1:19 PM   Result Value Ref Range    Lactic acid 1.9 0.4 - 2.0 mmol/L   METABOLIC PANEL, BASIC    Collection Time: 02/25/22 10:41 AM   Result Value Ref Range    Sodium 136 136 - 145 mmol/L    Potassium 3.4 (L) 3.5 - 5.1 mmol/L    Chloride 106 97 - 108 mmol/L    CO2 25 21 - 32 mmol/L    Anion gap 5 5 - 15 mmol/L    Glucose 156 (H) 65 - 100 mg/dL BUN 13 6 - 20 mg/dL    Creatinine 0.83 0.55 - 1.02 mg/dL    BUN/Creatinine ratio 16 12 - 20      GFR est AA >60 >60 ml/min/1.73m2    GFR est non-AA >60 >60 ml/min/1.73m2    Calcium 8.7 8.5 - 10.1 mg/dL   CBC W/O DIFF    Collection Time: 02/25/22 10:41 AM   Result Value Ref Range    WBC 9.2 3.6 - 11.0 K/uL    RBC 3.86 3.80 - 5.20 M/uL    HGB 11.7 11.5 - 16.0 g/dL    HCT 32.6 (L) 35.0 - 47.0 %    MCV 84.5 80.0 - 99.0 FL    MCH 30.3 26.0 - 34.0 PG    MCHC 35.9 30.0 - 36.5 g/dL    RDW 13.9 11.5 - 14.5 %    PLATELET 267 036 - 082 K/uL    MPV 12.5 8.9 - 12.9 FL    NRBC 0.0 0.0  WBC    ABSOLUTE NRBC 0.00 0.00 - 0.01 K/uL     XR CHEST PORT   Final Result   New opacity in the right lung base, likely in the lower lobe,   favoring infectious/inflammatory process such as pneumonia. Radiographic   follow-up to resolution after treatment is recommended. Communication: The result was communicated with Dr. Apollo Gonzalez via telephone.                  Signed:  Ursula Miller PA-C  2/25/2022  1:16 PM

## 2022-02-25 NOTE — PROGRESS NOTES
Patient provided with discharge instructions, report not called to group home (per Fransisco Burciaga), Linda Ding to be patient up

## 2022-02-25 NOTE — PROGRESS NOTES
Pulmonary Progress Note      NAME: Ofe Reid   :  1960  MRM:  [id number]    Date/Time: 2022  12:35 PM         Subjective:     Patient seen and examined. Discussed with RN. She has been off oxygen. She is much more awake and alert. Having a meal without any problem. She is asking for discharge. She does appear to have somewhat productive cough. Past Medical History reviewed and unchanged from Admission History and Physical       Objective:     Physical Exam     Vitals:      Last 24hrs VS reviewed since prior progress note. Most recent are:    Visit Vitals  /82 (BP 1 Location: Right upper arm)   Pulse 91   Temp 97.6 °F (36.4 °C)   Resp 18   Ht 5' 6\" (1.676 m)   Wt 63.5 kg (140 lb)   SpO2 99%   BMI 22.60 kg/m²     SpO2 Readings from Last 6 Encounters:   22 99%   22 98%   21 96%   21 97%   09/15/21 97%   20 100%    O2 Flow Rate (L/min): 2 l/min       Intake/Output Summary (Last 24 hours) at 2022 1235  Last data filed at 2022 1254  Gross per 24 hour   Intake 1000 ml   Output    Net 1000 ml      GENERAL:  The patient is a middle-aged-appearing  female who does not appear to be in any distress. She is much more awake and alert. Currently on room air. HEENT:  Showed pupils are equal and reactive to light. No pallor or icterus. Nasal passages are patent. Oropharynx is without thrush. Oral hygiene is very poor. NECK:  Supple. Trachea is central.  No JVD or lymphadenopathy. She is not using accessory muscles of respiration. CHEST:  Symmetrical with equal and fair air entry bilaterally. She has right basilar crackles. No rhonchi. Occasional expiratory wheeze. With deep inspiration, she begins to cough. HEART:  Rhythm is regular without any loud murmur or gallop. ABDOMEN:  Soft and benign. Bowel sounds are audible. No masses or organomegaly. EXTREMITIES:  Do not show any cyanosis or clubbing. No pitting edema.   Pulses are palpable. NEUROLOGIC SYSTEM:  Grossly intact. SKIN:  Warm and dry. XR CHEST PORT   Final Result   New opacity in the right lung base, likely in the lower lobe,   favoring infectious/inflammatory process such as pneumonia. Radiographic   follow-up to resolution after treatment is recommended. Communication: The result was communicated with Dr. Kvng Wong via telephone.              Lab Data Reviewed: (see below)      Medications:  Current Facility-Administered Medications   Medication Dose Route Frequency    [START ON 2/26/2022] amLODIPine (NORVASC) tablet 10 mg  10 mg Oral DAILY    benztropine (COGENTIN) tablet 0.5 mg  0.5 mg Oral BID    [START ON 2/26/2022] cloNIDine HCL (CATAPRES) tablet 0.1 mg  0.1 mg Oral DAILY    divalproex DR (DEPAKOTE) tablet 250 mg  250 mg Oral BID    famotidine (PEPCID) tablet 40 mg  40 mg Oral QHS    [START ON 2/26/2022] ferrous sulfate tablet 325 mg  1 Tablet Oral DAILY WITH BREAKFAST    [START ON 2/26/2022] lisinopriL (PRINIVIL, ZESTRIL) tablet 20 mg  20 mg Oral DAILY    risperiDONE (RisperDAL) tablet 2 mg  2 mg Oral BID    sodium chloride (NS) flush 5-40 mL  5-40 mL IntraVENous Q8H    sodium chloride (NS) flush 5-40 mL  5-40 mL IntraVENous PRN    acetaminophen (TYLENOL) tablet 650 mg  650 mg Oral Q6H PRN    Or    acetaminophen (TYLENOL) suppository 650 mg  650 mg Rectal Q6H PRN    polyethylene glycol (MIRALAX) packet 17 g  17 g Oral DAILY PRN    ondansetron (ZOFRAN ODT) tablet 4 mg  4 mg Oral Q8H PRN    Or    ondansetron (ZOFRAN) injection 4 mg  4 mg IntraVENous Q6H PRN    enoxaparin (LOVENOX) injection 40 mg  40 mg SubCUTAneous DAILY    0.9% sodium chloride infusion  50 mL/hr IntraVENous CONTINUOUS    piperacillin-tazobactam (ZOSYN) 3.375 g in 0.9% sodium chloride (MBP/ADV) 100 mL MBP  3.375 g IntraVENous Q8H    albuterol-ipratropium (DUO-NEB) 2.5 MG-0.5 MG/3 ML  3 mL Nebulization Q6H PRN    metoprolol (LOPRESSOR) injection 1.25 mg  1.25 mg IntraVENous Q6H PRN    doxycycline (VIBRAMYCIN) capsule 100 mg  100 mg Oral Q12H    guaiFENesin ER (MUCINEX) tablet 600 mg  600 mg Oral Q12H    methylPREDNISolone (PF) (SOLU-MEDROL) injection 20 mg  20 mg IntraVENous Q8H       ______________________________________________________________________      Lab Review:     Recent Labs     02/25/22  1041 02/24/22  0937   WBC 9.2 11.7*   HGB 11.7 12.6   HCT 32.6* 36.1    171     Recent Labs     02/25/22  1041 02/24/22  0937    132*   K 3.4* 3.5    98   CO2 25 26   * 149*   BUN 13 8   CREA 0.83 1.21*   CA 8.7 8.3*   ALB  --  1.8*   ALT  --  36     No components found for: GLPOC  No results for input(s): PH, PCO2, PO2, HCO3, FIO2 in the last 72 hours. No results for input(s): INR, INREXT, INREXT in the last 72 hours. Other pertinent lab:   Labs on admission: Portable chest x-ray done 2/24 shows a new right lower lobe pneumonia as compared to a prior study of 02/18/2022. Lactic acid initially was 3.6 and repeat is 3.0. Troponin is 22. Influenza A and B as well as rapid COVID test was all negative. Assessment & Plan:        1. The patient appears to have right lower lobe pneumonia. Either aspiration pneumonia or community-acquired pneumonia. She also presented with signs and symptoms compatible with sepsis. Sepsis protocol was followed and she was fluid bolused with 1.5 liters. Blood cultures have been obtained. She has a history of very extensive ongoing tobacco abuse and also appears to have underlying chronic obstructive pulmonary disease with exacerbation. Furthermore, she has a history of human immunodeficiency virus. Her CD4 cell count or viral load is not known at this time. Not sure whether she has been compliant with her human immunodeficiency virus medications. This morning she is much more awake and alert. She has been weaned off oxygen. Initially she was on 5 L of oxygen. Continue with IV Zosyn and doxycycline.  On Mucinex to help with expectoration and bronchodilator treatments on a p.r.n. basis. On small dose of IV Solu-Medrol. Her hypoxia was due to pneumonia and chronic obstructive pulmonary disease. 2.  Human immunodeficiency virus positive. Recommend Infectious Disease consultation for further evaluation and management. 3.  Hepatitis C and hypertension. 4.  History of alcohol abuse and schizophrenia. She is a resident of a group home facility. 5.  For deep vein thrombosis prophylaxis, she is started on Lovenox subcutaneously.     Thank you for allowing me to participate in the care of this patient. I will follow the patient closely with you. Discussed with the primary attending and also the RN. RN is going to verify her oxygen levels on room air. From pulmonary standpoint since she has improved significantly she can possibly be discharged either today or tomorrow back to the group home on oral Augmentin and doxycycline to finish 1 week of therapy and tapering dose of prednisone. The patient was advised to not resume tobacco abuse.        Caren Rosas MD

## 2022-02-25 NOTE — PROGRESS NOTES
SPEECH LANGUAGE PATHOLOGY BEDSIDE SWALLOW EVALUATION  Patient: Roxanne Hooker (78 y.o. female)  Date: 2/25/2022  Primary Diagnosis: Pneumonia [J18.9]       Precautions: Aspiration, Fall       ASSESSMENT :  Patient seen at bedside, oriented x2. Admitted for altered mentation. Hx of HIV, Hep C, schizophrenia, and alcohol abuse. Nsg reported that patient's mentation has improved since the previous day. Nsg also reported patient is tolerating current regular,thin diet well with no s/s of aspiration noted. IV appeared to be coming out of L arm, nsg notified. Required mod verbal/visual cues for appropriate upright positioning for PO trials. Based on the objective data described below, the patient presents with normal swallow function. Oral phase c/b timely mastication given limited dentition and efficient A-P transit. Pt does not report use of dentures. ~25% of oral residue noted with solid, cleared with liquid wash. Pharyngeal phase c/b timely swallow initiation and functional HLE upon digital palpation. No s/s of aspiration noted. Pt denies history or s/s of reflux/GERD. PLAN :  Recommendations and Planned Interventions:  Continue regular thin diet with aspiration/GERD precautions. Monitor pt closely for s/s aspiration. Ensure patient is sitting upright for all meals. Follow up x1 with ST. Discharge Recommendations: Home health ST. If concerns for aspiration persist, recommend MBSS to rule out silent aspiration. SUBJECTIVE:   Patient stated that she liked her meal she was given for breakfast.   Pt was shaking at the time of the evaluation and stated that she was cold. An additional blanket was provided. OBJECTIVE:     CXR Results  (Last 48 hours)               02/24/22 1037  XR CHEST PORT Final result    Impression:  New opacity in the right lung base, likely in the lower lobe,   favoring infectious/inflammatory process such as pneumonia.  Radiographic   follow-up to resolution after treatment is recommended. Communication: The result was communicated with Dr. Sugar Siddiqui via telephone. Narrative:  EXAM: XR CHEST PORT       INDICATION: tachypnea       COMPARISON: February 18, 2022       FINDINGS: New opacity in the right lung base, likely in the lower lobe. Unremarkable cardiomediastinal contours. No pneumothorax or pleural effusion. No   acute fracture or dislocation. Past Medical History:   Diagnosis Date    Alcohol abuse 5/29/2017    Hepatitis C     HIV (human immunodeficiency virus infection) ([facility]     Hypertension     Psychotic disorder ([facility]     Schizophrenia ([facility]    History reviewed. No pertinent surgical history. Prior Level of Function/Home Situation:   Home Situation  Support Systems: Caregiver/Home Care Staff (From [place] [id number])  Patient [facility]s [facility] be [facility]  Diet prior to admission: Regular, thin  Current Diet:  Regular, thin  Cognitive and Communication Status:  Neurologic State: Alert  Orientation Level: Oriented to person,Oriented to place  Cognition: Follows commands,Impaired decision making        Swallowing Evaluation:   Oral Assessment:  Oral Assessment  Labial: No impairment  Dentition: Other (comment) (missing ~1/2 on top and bottom)  Lingual: No impairment  Velum: No impairment  Mandible: No impairment  Gag Reflex: No impairment  P.O. Trials:  Patient Position: Upright  Vocal quality prior to P.O.: No impairment  Consistency Presented: Mixed consistency;Puree; Solid; Thin liquid  How Presented: Self-fed/presented     Bolus Acceptance: No impairment  Bolus Formation/Control: No impairment     Propulsion: No impairment  Oral Residue: 10-50% of bolus  Initiation of Swallow: No impairment  Laryngeal Elevation: Functional  Aspiration Signs/Symptoms: None  Pharyngeal Phase Characteristics: No impairment, issues, or problems        Oral Phase Severity: No impairment  Pharyngeal Phase Severity : No impairment    Voice:  Vocal Quality: No impairment or change in vocal quality after PO intake. Pain:  Pain Scale 1: Visual  Pain Intensity 1: 0       After treatment:   Patient left in no apparent distress in bed, Call bell within reach and Nursing notified    COMMUNICATION/EDUCATION:   Patient was educated regarding purpose of SLP assessment, POC, sw safety precautions. Patient demonstrated Good understanding as evidenced by answering appropriate yes/no follow up questions. The patient's plan of care including recommendations, planned interventions, and recommended diet changes were discussed with: Registered nurse. Thank you for this referral.  LYUBOV Cleary  CCC-SLP  Time Calculation: 13 mins

## 2022-03-02 LAB
BACTERIA SPEC CULT: NORMAL
SPECIAL REQUESTS,SREQ: NORMAL

## 2022-03-17 ENCOUNTER — HOSPITAL ENCOUNTER (INPATIENT)
Age: 62
LOS: 11 days | Discharge: HOME | DRG: 720 | End: 2022-03-29
Attending: STUDENT IN AN ORGANIZED HEALTH CARE EDUCATION/TRAINING PROGRAM | Admitting: HOSPITALIST
Payer: MEDICAID

## 2022-03-17 ENCOUNTER — APPOINTMENT (OUTPATIENT)
Dept: GENERAL RADIOLOGY | Age: 62
DRG: 720 | End: 2022-03-17
Attending: EMERGENCY MEDICINE
Payer: MEDICAID

## 2022-03-17 DIAGNOSIS — J69.0 ASPIRATION PNEUMONIA, UNSPECIFIED ASPIRATION PNEUMONIA TYPE, UNSPECIFIED LATERALITY, UNSPECIFIED PART OF LUNG (HCC): Primary | ICD-10-CM

## 2022-03-17 DIAGNOSIS — A41.9 SEPSIS, DUE TO UNSPECIFIED ORGANISM, UNSPECIFIED WHETHER ACUTE ORGAN DYSFUNCTION PRESENT (HCC): ICD-10-CM

## 2022-03-17 LAB
ALBUMIN SERPL-MCNC: 1.8 G/DL (ref 3.5–5)
ALBUMIN/GLOB SERPL: 0.3 {RATIO} (ref 1.1–2.2)
ALP SERPL-CCNC: 92 U/L (ref 45–117)
ALT SERPL-CCNC: 21 U/L (ref 12–78)
ANION GAP SERPL CALC-SCNC: 5 MMOL/L (ref 5–15)
APPEARANCE UR: CLEAR
AST SERPL W P-5'-P-CCNC: 85 U/L (ref 15–37)
BACTERIA URNS QL MICRO: NEGATIVE /HPF
BACTERIA URNS QL MICRO: NEGATIVE /HPF
BASOPHILS # BLD: 0 K/UL (ref 0–0.1)
BASOPHILS NFR BLD: 0 % (ref 0–1)
BILIRUB SERPL-MCNC: 0.6 MG/DL (ref 0.2–1)
BILIRUB UR QL: NEGATIVE
BNP SERPL-MCNC: 250 PG/ML
BUN SERPL-MCNC: 28 MG/DL (ref 6–20)
BUN/CREAT SERPL: 24 (ref 12–20)
CA-I BLD-MCNC: 8.3 MG/DL (ref 8.5–10.1)
CHLORIDE SERPL-SCNC: 107 MMOL/L (ref 97–108)
CO2 SERPL-SCNC: 26 MMOL/L (ref 21–32)
COLOR UR: ABNORMAL
COVID-19 RAPID TEST, COVR: NOT DETECTED
CREAT SERPL-MCNC: 1.19 MG/DL (ref 0.55–1.02)
DIFFERENTIAL METHOD BLD: ABNORMAL
EOSINOPHIL # BLD: 0 K/UL (ref 0–0.4)
EOSINOPHIL NFR BLD: 0 % (ref 0–7)
ERYTHROCYTE [DISTWIDTH] IN BLOOD BY AUTOMATED COUNT: 14.5 % (ref 11.5–14.5)
FLUAV AG NPH QL IA: NEGATIVE
FLUBV AG NOSE QL IA: NEGATIVE
GLOBULIN SER CALC-MCNC: 5.8 G/DL (ref 2–4)
GLUCOSE SERPL-MCNC: 108 MG/DL (ref 65–100)
GLUCOSE UR STRIP.AUTO-MCNC: NEGATIVE MG/DL
HCT VFR BLD AUTO: 32.5 % (ref 35–47)
HGB BLD-MCNC: 10.9 G/DL (ref 11.5–16)
HGB UR QL STRIP: ABNORMAL
IMM GRANULOCYTES # BLD AUTO: 0 K/UL (ref 0–0.04)
IMM GRANULOCYTES NFR BLD AUTO: 0 % (ref 0–0.5)
KETONES UR QL STRIP.AUTO: NEGATIVE MG/DL
LACTATE SERPL-SCNC: 1.9 MMOL/L (ref 0.4–2)
LEUKOCYTE ESTERASE UR QL STRIP.AUTO: NEGATIVE
LYMPHOCYTES # BLD: 1 K/UL (ref 0.8–3.5)
LYMPHOCYTES NFR BLD: 22 % (ref 12–49)
MCH RBC QN AUTO: 30.1 PG (ref 26–34)
MCHC RBC AUTO-ENTMCNC: 33.5 G/DL (ref 30–36.5)
MCV RBC AUTO: 89.8 FL (ref 80–99)
MONOCYTES # BLD: 0.2 K/UL (ref 0–1)
MONOCYTES NFR BLD: 5 % (ref 5–13)
NEUTS SEG # BLD: 3.3 K/UL (ref 1.8–8)
NEUTS SEG NFR BLD: 73 % (ref 32–75)
NITRITE UR QL STRIP.AUTO: NEGATIVE
NRBC # BLD: 0 K/UL (ref 0–0.01)
NRBC BLD-RTO: 0 PER 100 WBC
PH UR STRIP: 5 [PH] (ref 5–8)
PLATELET # BLD AUTO: 193 K/UL (ref 150–400)
PMV BLD AUTO: 11.8 FL (ref 8.9–12.9)
POTASSIUM SERPL-SCNC: 4.7 MMOL/L (ref 3.5–5.1)
PROT SERPL-MCNC: 7.6 G/DL (ref 6.4–8.2)
PROT UR STRIP-MCNC: 100 MG/DL
RBC # BLD AUTO: 3.62 M/UL (ref 3.8–5.2)
RBC #/AREA URNS HPF: ABNORMAL /HPF (ref 0–5)
RBC #/AREA URNS HPF: NORMAL /HPF (ref 0–5)
SODIUM SERPL-SCNC: 138 MMOL/L (ref 136–145)
SP GR UR REFRACTOMETRY: 1.01 (ref 1–1.03)
UROBILINOGEN UR QL STRIP.AUTO: 4 EU/DL (ref 0.1–1)
WBC # BLD AUTO: 4.4 K/UL (ref 3.6–11)
WBC URNS QL MICRO: ABNORMAL /HPF (ref 0–4)
WBC URNS QL MICRO: NORMAL /HPF (ref 0–4)

## 2022-03-17 PROCEDURE — 74011250637 HC RX REV CODE- 250/637: Performed by: STUDENT IN AN ORGANIZED HEALTH CARE EDUCATION/TRAINING PROGRAM

## 2022-03-17 PROCEDURE — 83880 ASSAY OF NATRIURETIC PEPTIDE: CPT

## 2022-03-17 PROCEDURE — 74011250636 HC RX REV CODE- 250/636: Performed by: STUDENT IN AN ORGANIZED HEALTH CARE EDUCATION/TRAINING PROGRAM

## 2022-03-17 PROCEDURE — 87040 BLOOD CULTURE FOR BACTERIA: CPT

## 2022-03-17 PROCEDURE — 81001 URINALYSIS AUTO W/SCOPE: CPT

## 2022-03-17 PROCEDURE — 99285 EMERGENCY DEPT VISIT HI MDM: CPT

## 2022-03-17 PROCEDURE — 87804 INFLUENZA ASSAY W/OPTIC: CPT

## 2022-03-17 PROCEDURE — 71045 X-RAY EXAM CHEST 1 VIEW: CPT

## 2022-03-17 PROCEDURE — 83605 ASSAY OF LACTIC ACID: CPT

## 2022-03-17 PROCEDURE — 36415 COLL VENOUS BLD VENIPUNCTURE: CPT

## 2022-03-17 PROCEDURE — 93005 ELECTROCARDIOGRAM TRACING: CPT

## 2022-03-17 PROCEDURE — 85025 COMPLETE CBC W/AUTO DIFF WBC: CPT

## 2022-03-17 PROCEDURE — 87635 SARS-COV-2 COVID-19 AMP PRB: CPT

## 2022-03-17 PROCEDURE — 80053 COMPREHEN METABOLIC PANEL: CPT

## 2022-03-17 RX ORDER — ACETAMINOPHEN 650 MG/1
975 SUPPOSITORY RECTAL
Status: COMPLETED | OUTPATIENT
Start: 2022-03-17 | End: 2022-03-17

## 2022-03-17 RX ORDER — ACETAMINOPHEN 500 MG
1000 TABLET ORAL
Status: ACTIVE | OUTPATIENT
Start: 2022-03-17 | End: 2022-03-18

## 2022-03-17 RX ADMIN — ACETAMINOPHEN 975 MG: 650 SUPPOSITORY RECTAL at 22:17

## 2022-03-17 RX ADMIN — SODIUM CHLORIDE 1905 ML: 9 INJECTION, SOLUTION INTRAVENOUS at 22:00

## 2022-03-18 ENCOUNTER — APPOINTMENT (OUTPATIENT)
Dept: CT IMAGING | Age: 62
DRG: 720 | End: 2022-03-18
Attending: HOSPITALIST
Payer: MEDICAID

## 2022-03-18 PROBLEM — J96.01 ACUTE RESPIRATORY FAILURE WITH HYPOXEMIA (HCC): Status: ACTIVE | Noted: 2022-03-18

## 2022-03-18 PROBLEM — J69.0 ASPIRATION PNEUMONIA (HCC): Status: ACTIVE | Noted: 2022-03-18

## 2022-03-18 PROBLEM — F10.10 ALCOHOL ABUSE: Status: ACTIVE | Noted: 2017-05-29

## 2022-03-18 LAB
ALBUMIN SERPL-MCNC: 1.5 G/DL (ref 3.5–5)
ANION GAP SERPL CALC-SCNC: 1 MMOL/L (ref 5–15)
ARTERIAL PATENCY WRIST A: ABNORMAL
ATRIAL RATE: 116 BPM
BASE EXCESS BLDA CALC-SCNC: 1.1 MMOL/L (ref 0–2)
BASE EXCESS BLDA CALC-SCNC: 5.2 MMOL/L (ref 0–2)
BASE EXCESS BLDA CALC-SCNC: 5.9 MMOL/L (ref 0–2)
BASOPHILS # BLD: 0 K/UL (ref 0–0.1)
BASOPHILS NFR BLD: 0 % (ref 0–1)
BDY SITE: ABNORMAL
BUN SERPL-MCNC: 25 MG/DL (ref 6–20)
BUN/CREAT SERPL: 27 (ref 12–20)
CA-I BLD-MCNC: 7.8 MG/DL (ref 8.5–10.1)
CALCULATED P AXIS, ECG09: 84 DEGREES
CALCULATED R AXIS, ECG10: 95 DEGREES
CALCULATED T AXIS, ECG11: 73 DEGREES
CHLORIDE SERPL-SCNC: 111 MMOL/L (ref 97–108)
CO2 SERPL-SCNC: 26 MMOL/L (ref 21–32)
CREAT SERPL-MCNC: 0.92 MG/DL (ref 0.55–1.02)
CRP SERPL-MCNC: 0.73 MG/DL (ref 0–0.6)
DIAGNOSIS, 93000: NORMAL
DIFFERENTIAL METHOD BLD: ABNORMAL
EOSINOPHIL # BLD: 0 K/UL (ref 0–0.4)
EOSINOPHIL NFR BLD: 1 % (ref 0–7)
EPAP/CPAP/PEEP, PAPEEP: 0
ERYTHROCYTE [DISTWIDTH] IN BLOOD BY AUTOMATED COUNT: 14.7 % (ref 11.5–14.5)
FIO2 ON VENT: 50 %
FIO2 ON VENT: 50 %
GAS FLOW.O2 O2 DELIVERY SYS: 15 L/MIN
GLUCOSE SERPL-MCNC: 78 MG/DL (ref 65–100)
HCO3 BLDA-SCNC: 25 MMOL/L (ref 22–26)
HCO3 BLDA-SCNC: 29 MMOL/L (ref 22–26)
HCO3 BLDA-SCNC: 29 MMOL/L (ref 22–26)
HCT VFR BLD AUTO: 31.5 % (ref 35–47)
HGB BLD-MCNC: 10.5 G/DL (ref 11.5–16)
IMM GRANULOCYTES # BLD AUTO: 0 K/UL (ref 0–0.04)
IMM GRANULOCYTES NFR BLD AUTO: 1 % (ref 0–0.5)
LYMPHOCYTES # BLD: 1 K/UL (ref 0.8–3.5)
LYMPHOCYTES NFR BLD: 25 % (ref 12–49)
MCH RBC QN AUTO: 30.1 PG (ref 26–34)
MCHC RBC AUTO-ENTMCNC: 33.3 G/DL (ref 30–36.5)
MCV RBC AUTO: 90.3 FL (ref 80–99)
MONOCYTES # BLD: 0.1 K/UL (ref 0–1)
MONOCYTES NFR BLD: 3 % (ref 5–13)
NEUTS SEG # BLD: 2.9 K/UL (ref 1.8–8)
NEUTS SEG NFR BLD: 70 % (ref 32–75)
NRBC # BLD: 0 K/UL (ref 0–0.01)
NRBC BLD-RTO: 0 PER 100 WBC
P-R INTERVAL, ECG05: 134 MS
PCO2 BLDA: 36 MMHG (ref 35–45)
PCO2 BLDA: 39 MMHG (ref 35–45)
PCO2 BLDA: 43 MMHG (ref 35–45)
PH BLDA: 7.4 [PH] (ref 7.35–7.45)
PH BLDA: 7.49 [PH] (ref 7.35–7.45)
PH BLDA: 7.5 [PH] (ref 7.35–7.45)
PHOSPHATE SERPL-MCNC: 4.5 MG/DL (ref 2.6–4.7)
PLATELET # BLD AUTO: 198 K/UL (ref 150–400)
PMV BLD AUTO: 12.2 FL (ref 8.9–12.9)
PO2 BLDA: 116 MMHG (ref 75–100)
PO2 BLDA: 30 MMHG (ref 75–100)
PO2 BLDA: 60 MMHG (ref 75–100)
POTASSIUM SERPL-SCNC: ABNORMAL MMOL/L (ref 3.5–5.1)
Q-T INTERVAL, ECG07: 290 MS
QRS DURATION, ECG06: 64 MS
QTC CALCULATION (BEZET), ECG08: 403 MS
RBC # BLD AUTO: 3.49 M/UL (ref 3.8–5.2)
SAO2 % BLD: 56 %
SAO2 % BLD: 89 %
SAO2 % BLD: 99 %
SAO2% DEVICE SAO2% SENSOR NAME: ABNORMAL
SAO2% DEVICE SAO2% SENSOR NAME: ABNORMAL
SODIUM SERPL-SCNC: 138 MMOL/L (ref 136–145)
SPECIMEN SITE: ABNORMAL
TSH SERPL DL<=0.05 MIU/L-ACNC: 1.74 UIU/ML (ref 0.36–3.74)
URATE SERPL-MCNC: 7.3 MG/DL (ref 2.6–6)
VENTRICULAR RATE, ECG03: 116 BPM
WBC # BLD AUTO: 4 K/UL (ref 3.6–11)

## 2022-03-18 PROCEDURE — 85025 COMPLETE CBC W/AUTO DIFF WBC: CPT

## 2022-03-18 PROCEDURE — 74011250636 HC RX REV CODE- 250/636: Performed by: HOSPITALIST

## 2022-03-18 PROCEDURE — 74011000250 HC RX REV CODE- 250: Performed by: HOSPITALIST

## 2022-03-18 PROCEDURE — 71250 CT THORAX DX C-: CPT

## 2022-03-18 PROCEDURE — 74011250637 HC RX REV CODE- 250/637: Performed by: HOSPITALIST

## 2022-03-18 PROCEDURE — 82803 BLOOD GASES ANY COMBINATION: CPT

## 2022-03-18 PROCEDURE — 84550 ASSAY OF BLOOD/URIC ACID: CPT

## 2022-03-18 PROCEDURE — 65610000006 HC RM INTENSIVE CARE

## 2022-03-18 PROCEDURE — 74011250636 HC RX REV CODE- 250/636: Performed by: INTERNAL MEDICINE

## 2022-03-18 PROCEDURE — 74011000258 HC RX REV CODE- 258: Performed by: HOSPITALIST

## 2022-03-18 PROCEDURE — 86140 C-REACTIVE PROTEIN: CPT

## 2022-03-18 PROCEDURE — 87070 CULTURE OTHR SPECIMN AEROBIC: CPT

## 2022-03-18 PROCEDURE — 36600 WITHDRAWAL OF ARTERIAL BLOOD: CPT

## 2022-03-18 PROCEDURE — 94640 AIRWAY INHALATION TREATMENT: CPT

## 2022-03-18 PROCEDURE — 74011250637 HC RX REV CODE- 250/637: Performed by: INTERNAL MEDICINE

## 2022-03-18 PROCEDURE — 77010033678 HC OXYGEN DAILY

## 2022-03-18 PROCEDURE — 36415 COLL VENOUS BLD VENIPUNCTURE: CPT

## 2022-03-18 PROCEDURE — 80069 RENAL FUNCTION PANEL: CPT

## 2022-03-18 PROCEDURE — 92610 EVALUATE SWALLOWING FUNCTION: CPT

## 2022-03-18 PROCEDURE — 84443 ASSAY THYROID STIM HORMONE: CPT

## 2022-03-18 RX ORDER — ALBUTEROL SULFATE 2.5 MG/.5ML
2.5 SOLUTION RESPIRATORY (INHALATION)
Status: DISCONTINUED | OUTPATIENT
Start: 2022-03-18 | End: 2022-03-29 | Stop reason: HOSPADM

## 2022-03-18 RX ORDER — IPRATROPIUM BROMIDE AND ALBUTEROL SULFATE 2.5; .5 MG/3ML; MG/3ML
3 SOLUTION RESPIRATORY (INHALATION)
Status: DISCONTINUED | OUTPATIENT
Start: 2022-03-18 | End: 2022-03-21

## 2022-03-18 RX ORDER — SODIUM CHLORIDE 0.9 % (FLUSH) 0.9 %
5-40 SYRINGE (ML) INJECTION AS NEEDED
Status: DISCONTINUED | OUTPATIENT
Start: 2022-03-18 | End: 2022-03-29 | Stop reason: HOSPADM

## 2022-03-18 RX ORDER — AMLODIPINE BESYLATE 5 MG/1
10 TABLET ORAL DAILY
Status: DISCONTINUED | OUTPATIENT
Start: 2022-03-18 | End: 2022-03-20

## 2022-03-18 RX ORDER — LEVOFLOXACIN 5 MG/ML
750 INJECTION, SOLUTION INTRAVENOUS EVERY 24 HOURS
Status: COMPLETED | OUTPATIENT
Start: 2022-03-18 | End: 2022-03-24

## 2022-03-18 RX ORDER — MULTIVITAMIN
1 TABLET ORAL DAILY
Status: DISCONTINUED | OUTPATIENT
Start: 2022-03-18 | End: 2022-03-29 | Stop reason: HOSPADM

## 2022-03-18 RX ORDER — DIVALPROEX SODIUM 250 MG/1
250 TABLET, EXTENDED RELEASE ORAL 2 TIMES DAILY
COMMUNITY
Start: 2022-03-16

## 2022-03-18 RX ORDER — CLONIDINE HYDROCHLORIDE 0.1 MG/1
0.1 TABLET ORAL DAILY
Status: DISCONTINUED | OUTPATIENT
Start: 2022-03-18 | End: 2022-03-29 | Stop reason: HOSPADM

## 2022-03-18 RX ORDER — FAMOTIDINE 20 MG/1
20 TABLET, FILM COATED ORAL
Status: DISCONTINUED | OUTPATIENT
Start: 2022-03-18 | End: 2022-03-29 | Stop reason: HOSPADM

## 2022-03-18 RX ORDER — DIVALPROEX SODIUM 250 MG/1
250 TABLET, DELAYED RELEASE ORAL EVERY 12 HOURS
Status: DISCONTINUED | OUTPATIENT
Start: 2022-03-18 | End: 2022-03-20

## 2022-03-18 RX ORDER — BENZTROPINE MESYLATE 1 MG/1
0.5 TABLET ORAL 2 TIMES DAILY
Status: DISCONTINUED | OUTPATIENT
Start: 2022-03-18 | End: 2022-03-29 | Stop reason: HOSPADM

## 2022-03-18 RX ORDER — ACETAMINOPHEN 325 MG/1
650 TABLET ORAL
Status: DISCONTINUED | OUTPATIENT
Start: 2022-03-18 | End: 2022-03-29 | Stop reason: HOSPADM

## 2022-03-18 RX ORDER — HYDROXYZINE PAMOATE 25 MG/1
25 CAPSULE ORAL
Status: DISCONTINUED | OUTPATIENT
Start: 2022-03-18 | End: 2022-03-29 | Stop reason: HOSPADM

## 2022-03-18 RX ORDER — SODIUM CHLORIDE 0.9 % (FLUSH) 0.9 %
5-40 SYRINGE (ML) INJECTION EVERY 8 HOURS
Status: DISCONTINUED | OUTPATIENT
Start: 2022-03-18 | End: 2022-03-29 | Stop reason: HOSPADM

## 2022-03-18 RX ORDER — LISINOPRIL 20 MG/1
20 TABLET ORAL DAILY
Status: DISCONTINUED | OUTPATIENT
Start: 2022-03-18 | End: 2022-03-29 | Stop reason: HOSPADM

## 2022-03-18 RX ORDER — ENOXAPARIN SODIUM 100 MG/ML
40 INJECTION SUBCUTANEOUS EVERY 24 HOURS
Status: DISCONTINUED | OUTPATIENT
Start: 2022-03-18 | End: 2022-03-29 | Stop reason: HOSPADM

## 2022-03-18 RX ORDER — RISPERIDONE 2 MG/1
2 TABLET, FILM COATED ORAL 2 TIMES DAILY
Status: DISCONTINUED | OUTPATIENT
Start: 2022-03-18 | End: 2022-03-29 | Stop reason: HOSPADM

## 2022-03-18 RX ORDER — FUROSEMIDE 10 MG/ML
40 INJECTION INTRAMUSCULAR; INTRAVENOUS DAILY
Status: DISCONTINUED | OUTPATIENT
Start: 2022-03-18 | End: 2022-03-29 | Stop reason: HOSPADM

## 2022-03-18 RX ORDER — LORAZEPAM 2 MG/ML
0.5 INJECTION INTRAMUSCULAR
Status: DISCONTINUED | OUTPATIENT
Start: 2022-03-18 | End: 2022-03-29 | Stop reason: HOSPADM

## 2022-03-18 RX ADMIN — PIPERACILLIN AND TAZOBACTAM 3.38 G: 3; .375 INJECTION, POWDER, LYOPHILIZED, FOR SOLUTION INTRAVENOUS at 20:27

## 2022-03-18 RX ADMIN — DIVALPROEX SODIUM 250 MG: 250 TABLET, DELAYED RELEASE ORAL at 22:30

## 2022-03-18 RX ADMIN — LEVOFLOXACIN 750 MG: 5 INJECTION, SOLUTION INTRAVENOUS at 10:13

## 2022-03-18 RX ADMIN — ENOXAPARIN SODIUM 40 MG: 100 INJECTION SUBCUTANEOUS at 04:06

## 2022-03-18 RX ADMIN — SODIUM CHLORIDE, PRESERVATIVE FREE 10 ML: 5 INJECTION INTRAVENOUS at 06:00

## 2022-03-18 RX ADMIN — CLONIDINE HYDROCHLORIDE 0.1 MG: 0.1 TABLET ORAL at 10:12

## 2022-03-18 RX ADMIN — BENZTROPINE MESYLATE 0.5 MG: 1 TABLET ORAL at 22:30

## 2022-03-18 RX ADMIN — HYDROXYZINE PAMOATE 25 MG: 25 CAPSULE ORAL at 11:44

## 2022-03-18 RX ADMIN — MULTIVITAMIN TABLET 1 TABLET: TABLET at 10:12

## 2022-03-18 RX ADMIN — RISPERIDONE 2 MG: 2 TABLET, FILM COATED ORAL at 22:30

## 2022-03-18 RX ADMIN — RISPERIDONE 2 MG: 2 TABLET, FILM COATED ORAL at 10:12

## 2022-03-18 RX ADMIN — SODIUM CHLORIDE, PRESERVATIVE FREE 10 ML: 5 INJECTION INTRAVENOUS at 22:30

## 2022-03-18 RX ADMIN — BENZTROPINE MESYLATE 0.5 MG: 1 TABLET ORAL at 10:12

## 2022-03-18 RX ADMIN — HYDROXYZINE PAMOATE 25 MG: 25 CAPSULE ORAL at 16:41

## 2022-03-18 RX ADMIN — SODIUM CHLORIDE, PRESERVATIVE FREE 10 ML: 5 INJECTION INTRAVENOUS at 13:00

## 2022-03-18 RX ADMIN — AMLODIPINE BESYLATE 10 MG: 5 TABLET ORAL at 10:11

## 2022-03-18 RX ADMIN — IPRATROPIUM BROMIDE AND ALBUTEROL SULFATE 3 ML: .5; 3 SOLUTION RESPIRATORY (INHALATION) at 11:03

## 2022-03-18 RX ADMIN — FAMOTIDINE 20 MG: 20 TABLET, FILM COATED ORAL at 22:30

## 2022-03-18 RX ADMIN — LISINOPRIL 20 MG: 20 TABLET ORAL at 10:12

## 2022-03-18 RX ADMIN — SODIUM CHLORIDE, PRESERVATIVE FREE 10 ML: 5 INJECTION INTRAVENOUS at 05:18

## 2022-03-18 RX ADMIN — LORAZEPAM 0.5 MG: 2 INJECTION INTRAMUSCULAR at 14:20

## 2022-03-18 RX ADMIN — SODIUM CHLORIDE, PRESERVATIVE FREE 10 ML: 5 INJECTION INTRAVENOUS at 05:17

## 2022-03-18 RX ADMIN — IPRATROPIUM BROMIDE AND ALBUTEROL SULFATE 3 ML: .5; 3 SOLUTION RESPIRATORY (INHALATION) at 07:58

## 2022-03-18 RX ADMIN — PIPERACILLIN AND TAZOBACTAM 3.38 G: 3; .375 INJECTION, POWDER, LYOPHILIZED, FOR SOLUTION INTRAVENOUS at 04:06

## 2022-03-18 RX ADMIN — PIPERACILLIN AND TAZOBACTAM 3.38 G: 3; .375 INJECTION, POWDER, LYOPHILIZED, FOR SOLUTION INTRAVENOUS at 11:20

## 2022-03-18 RX ADMIN — IPRATROPIUM BROMIDE AND ALBUTEROL SULFATE 3 ML: .5; 3 SOLUTION RESPIRATORY (INHALATION) at 20:00

## 2022-03-18 RX ADMIN — FUROSEMIDE 40 MG: 10 INJECTION, SOLUTION INTRAMUSCULAR; INTRAVENOUS at 11:20

## 2022-03-18 RX ADMIN — HYDROXYZINE PAMOATE 25 MG: 25 CAPSULE ORAL at 22:29

## 2022-03-18 RX ADMIN — DIVALPROEX SODIUM 250 MG: 250 TABLET, DELAYED RELEASE ORAL at 10:12

## 2022-03-18 NOTE — H&P
History and Physical              Subjective :   Chief Complaint : Altered mental status, hypoxia    Source of information : Mostly from the records and ED provider and nursing staff. History of present illness:   62 y.o. female with a history of COPD, alcohol abuse and recent history of aspiration pneumonia is brought to the emergency room due to altered mental status. As per the home she has some cough and becoming very slow for last 2 days and today worsened just not responding. Usually she is oriented x4 according to them. EMS found patient with a temperature, tachycardic gurgly breath sounds and congestive. Rhonchi noted. She is in significant respiratory distress, even with nasal cannula not much improvement in oxygenation. Increased oxygen, brought to the emergency room. Still on nasal cannula she is becoming hypoxic, whenever the oxygen saturations improved she is more alert according to the nursing staff. So oxygen is changed to Ventimask with much improvement in her oxygenation she is more awake but still not oriented completely. Chest x-ray with suspected pneumonia versus edema, but clinical picture looks more like pneumonia. No evidence of fluid overload and BNP is not elevated. No leukocytosis noted but patient with immunosuppressive with her previous medical history sometimes we do not expect. Past Medical History:   Diagnosis Date    Alcohol abuse 5/29/2017    Hepatitis C     HIV (human immunodeficiency virus infection) ([facility]     Hypertension     [facility]     Schizophrenia ([facility]      Surgical history: Unable to obtain    Family history: Unable to obtain. Social History     Tobacco Use    Smoking status: Current Every Day Smoker     Packs/day: 0.50    Smokeless tobacco: Never Used   Substance Use Topics    Alcohol use:  Yes     Alcohol/week: 0.8 standard drinks     Types: 1 Cans of beer per week       Prior to Admission medications    Medication Sig Start Date End Date Taking? Authorizing Provider   divalproex ER (DEPAKOTE ER) 250 mg ER tablet Take 250 mg by mouth two (2) times a day. 3/16/22   Provider, Historical   albuterol (ProAir HFA) 90 mcg/actuation inhaler Take 2 Puffs by inhalation every six (6) hours as needed for Wheezing or Shortness of Breath. 2/25/22   Roxy Sharma PA-C   amLODIPine (NORVASC) 10 mg tablet Take 1 Tablet by mouth daily. 12/29/17   Provider, Historical   benztropine (COGENTIN) 0.5 mg tablet Take 1 Tablet by mouth two (2) times a day. 12/29/17   Provider, Historical   cloNIDine HCL (CATAPRES) 0.1 mg tablet Take 0.1 mg by mouth daily. Provider, Historical   famotidine (PEPCID) 40 mg tablet Take 40 mg by mouth nightly. Provider, Historical   ferrous sulfate 325 mg (65 mg iron) tablet Take  by mouth Daily (before breakfast). Provider, Historical   lisinopriL (PRINIVIL, ZESTRIL) 20 mg tablet Take 20 mg by mouth daily. Provider, Historical   risperiDONE (RisperDAL) 2 mg tablet Take 2 mg by mouth two (2) times a day. Provider, Historical   multivitamin (ONE A DAY) tablet Take 1 Tablet by mouth daily. Provider, Historical     No Known Allergies          Review of Systems: With altered mental status unable to get any information. Vitals:     Patient Vitals for the past 12 hrs:   Temp Pulse Resp BP SpO2   03/18/22 0256  94 22 (!) 140/80 95 %   03/18/22 0231  90 21 130/67 91 %   03/18/22 0228     91 %   03/18/22 0206  87 20 120/67 (!) 89 %   03/18/22 0200     (!) 85 %   03/18/22 0038 98.1 °F (36.7 °C) 98 18 139/74 92 %   03/18/22 0034  96 20  92 %   03/17/22 2254 100 °F (37.8 °C) (!) 106 24 (!) 154/86 97 %   03/17/22 2214     90 %   03/17/22 2213  (!) 112 21 (!) 150/90 90 %   03/17/22 2134 100.2 °F (37.9 °C) (!) 130 18 (!) 142/92 (!) 74 %       Physical Exam:   General : Laying in bed not much responsive, no acute respiratory distress but hypoxic. Kolton Garcia   HEANDREA :  atraumatic normocephalic, Normal ear and nose.  Neck : Supple, no JVD, no masses noted, no carotid bruit. Lungs : Breath sounds with moderate air entry bilaterally, not taking deep breaths, rhonchi and Rales present. CVS : Rhythm rate regular, difficult to hear heart sounds due to rhonchi. Abdomen : Soft, nontender,  bowel sounds active. Extremities : No edema noted,  pedal pulses palpable. Musculoskeletal : no joint swelling or effusion, muscle tone and power appears normal.   Skin : Moist, warm,  no pathological rash. Lymphatic : No cervical lymphadenopathy. Neurological : With altered mental status. Psychiatric : Unable to evaluate. Data Review:   Recent Results (from the past 24 hour(s))   CBC WITH AUTOMATED DIFF    Collection Time: 03/17/22  9:46 PM   Result Value Ref Range    WBC 4.4 3.6 - 11.0 K/uL    RBC 3.62 (L) 3.80 - 5.20 M/uL    HGB 10.9 (L) 11.5 - 16.0 g/dL    HCT 32.5 (L) 35.0 - 47.0 %    MCV 89.8 80.0 - 99.0 FL    MCH 30.1 26.0 - 34.0 PG    MCHC 33.5 30.0 - 36.5 g/dL    RDW 14.5 11.5 - 14.5 %    PLATELET 226 473 - 179 K/uL    MPV 11.8 8.9 - 12.9 FL    NRBC 0.0 0.0  WBC    ABSOLUTE NRBC 0.00 0.00 - 0.01 K/uL    NEUTROPHILS 73 32 - 75 %    LYMPHOCYTES 22 12 - 49 %    MONOCYTES 5 5 - 13 %    EOSINOPHILS 0 0 - 7 %    BASOPHILS 0 0 - 1 %    IMMATURE GRANULOCYTES 0 0 - 0.5 %    ABS. NEUTROPHILS 3.3 1.8 - 8.0 K/UL    ABS. LYMPHOCYTES 1.0 0.8 - 3.5 K/UL    ABS. MONOCYTES 0.2 0.0 - 1.0 K/UL    ABS. EOSINOPHILS 0.0 0.0 - 0.4 K/UL    ABS. BASOPHILS 0.0 0.0 - 0.1 K/UL    ABS. IMM.  GRANS. 0.0 0.00 - 0.04 K/UL    DF AUTOMATED     METABOLIC PANEL, COMPREHENSIVE    Collection Time: 03/17/22  9:46 PM   Result Value Ref Range    Sodium 138 136 - 145 mmol/L    Potassium 4.7 3.5 - 5.1 mmol/L    Chloride 107 97 - 108 mmol/L    CO2 26 21 - 32 mmol/L    Anion gap 5 5 - 15 mmol/L    Glucose 108 (H) 65 - 100 mg/dL    BUN 28 (H) 6 - 20 mg/dL    Creatinine 1.19 (H) 0.55 - 1.02 mg/dL    BUN/Creatinine ratio 24 (H) 12 - 20      GFR est AA 56 (L) >60 ml/min/1.73m2    GFR est non-AA 46 (L) >60 ml/min/1.73m2    Calcium 8.3 (L) 8.5 - 10.1 mg/dL    Bilirubin, total 0.6 0.2 - 1.0 mg/dL    AST (SGOT) 85 (H) 15 - 37 U/L    ALT (SGPT) 21 12 - 78 U/L    Alk. phosphatase 92 45 - 117 U/L    Protein, total 7.6 6.4 - 8.2 g/dL    Albumin 1.8 (L) 3.5 - 5.0 g/dL    Globulin 5.8 (H) 2.0 - 4.0 g/dL    A-G Ratio 0.3 (L) 1.1 - 2.2     LACTIC ACID    Collection Time: 03/17/22  9:46 PM   Result Value Ref Range    Lactic acid 1.9 0.4 - 2.0 mmol/L   URINALYSIS W/ RFLX MICROSCOPIC    Collection Time: 03/17/22  9:46 PM   Result Value Ref Range    Color Yellow/Straw      Appearance Clear Clear      Specific gravity 1.012 1.003 - 1.030      pH (UA) 5.0 5.0 - 8.0      Protein 100 (A) Negative mg/dL    Glucose Negative Negative mg/dL    Ketone Negative Negative mg/dL    Bilirubin Negative Negative      Blood Small (A) Negative      Urobilinogen 4.0 (H) 0.1 - 1.0 EU/dL    Nitrites Negative Negative      Leukocyte Esterase Negative Negative      WBC 0-5 0 - 4 /hpf    RBC 0-5 0 - 5 /hpf    Bacteria Negative Negative /hpf   COVID-19 RAPID TEST    Collection Time: 03/17/22  9:46 PM   Result Value Ref Range    COVID-19 rapid test Not Detected Not Detected     NT-PRO BNP    Collection Time: 03/17/22  9:46 PM   Result Value Ref Range    NT pro- (H) <125 pg/mL   URINE MICROSCOPIC    Collection Time: 03/17/22  9:46 PM   Result Value Ref Range    WBC 0-4 0 - 4 /hpf    RBC 0-5 0 - 5 /hpf    Bacteria Negative Negative /hpf   INFLUENZA A & B AG (RAPID TEST)    Collection Time: 03/17/22  9:49 PM   Result Value Ref Range    Influenza A Antigen Negative Negative      Influenza B Antigen Negative Negative         Radiologic Studies :   CT Results  (Last 48 hours)    None        CXR Results  (Last 48 hours)               03/17/22 2157  XR CHEST PORT Final result    Impression:  Diffuse predominantly interstitial process in both lungs with some   greater involvement on the left.  Differential considerations include pulmonary   edema and diffuse infection. Narrative:  PROCEDURE: XR CHEST PORT       HISTORY:Short of breath       COMPARISON:Chest x-ray 24th of February 2022           TECHNIQUE: AP portable chest       LIMITATIONS: None       TUBES/LINES: None       LUNG PARENCHYMA/PLEURA: The lungs show increased interstitial markings   diffusely, more extensive on the left than the right. No focal alveolar   infiltrate. TRACHEA/BRONCHI: Normal   PULMONARY VESSELS: Pulmonary vessels are prominent   HEART: Heart size stable   MEDIASTINUM: Normal   BONE/SOFT TISSUES: No acute process       OTHER: None                   Assessment and Plan :     Metabolic encephalopathy with altered mental status secondary to hypoxia    Acute respiratory failure with hypoxia: Looks like secondary to pneumonia most possible is aspiration. Aspiration pneumonia bilateral: Started on Zosyn, will request consultation from pulmonary for further evaluation and management. Benign essential hypertension: We will continue home medications    History of psychiatric problems on multiple medications which we will continue    History of alcohol abuse, notable for HIV and hep C but not sure about the status of the disease. Anemia mild: Secondary to chronic disease, no further interventions at this time unless it drops further. Admitted to ICU, full CODE STATUS by default, patient cannot make any decisions with understanding at this time. Home medications reviewed with external Rx history. CC : None  Signed By: Briseida Call MD     March 18, 2022      This dictation was done by dragon, computer voice recognition software. Often unanticipated grammatical, syntax, [place] phones and other interpretive errors are inadvertently transcribed. Please excuse errors that have escaped final proofreading.

## 2022-03-18 NOTE — ED NOTES
TRANSFER - OUT REPORT:    Verbal report given to [facility] RN(name) on Foster Jolly  being transferred to ICU Room 280 (unit) for routine progression of care       Report consisted of patients Situation, Background, Assessment and   Recommendations(SBAR). Information from the following report(s) SBAR was reviewed with the receiving nurse. Lines:   Peripheral IV 03/17/22 Left Antecubital (Active)   Site Assessment Clean, dry, & intact 03/17/22 2143   Phlebitis Assessment 0 03/17/22 2143   Dressing Status Clean, dry, & intact 03/17/22 2143   Hub Color/Line Status Pink 03/17/22 2143   Action Taken Blood drawn 03/17/22 2143       Peripheral IV 03/18/22 Left;Posterior Hand (Active)   Site Assessment Clean, dry, & intact 03/18/22 0400   Phlebitis Assessment 0 03/18/22 0400   Infiltration Assessment 0 03/18/22 0400   Dressing Status Clean, dry, & intact 03/18/22 0400   Dressing Type Tape;Transparent 03/18/22 0400   Hub Color/Line Status Pink;Flushed;Patent 03/18/22 0400   Action Taken Blood drawn 03/18/22 0400        Opportunity for questions and clarification was provided.       Patient transported with:   Monitor  O2 @ 15 liters  Registered Nurse

## 2022-03-18 NOTE — PROGRESS NOTES
Hospitalist Progress Note               Daily Progress Note: 3/18/2022      Subjective: The patient is seen for follow up. She is a 52-year-old female with a history of COPD, alcohol abuse, HIV, schizophrenia and hepatitis C, lives in a group home. She was admitted about 3 weeks ago with aspiration pneumonia treated with Zosyn and doxycycline. Sent to the ED last night due to altered mental status and cough. She was febrile, tachycardic and with gurgling breath sounds. She was hypoxic and started on a Ventimask. X-ray showed diffuse predominantly interstitial process in both lungs greater on the left. Admitted to ICU and started on IV Zosyn    This morning she is awake although slightly obtunded. She answers simple questions, unable to tell me where she is.   She is on a Ventimask presently    Problem List:  Problem List as of 3/18/2022 Date Reviewed: 3/18/2022          Codes Class Noted - Resolved    Asp[facility] ([facility] ICD-10-CM: J69.0  ICD-9-CM: 507.0  3/18/2022 - Present        Acute respiratory failure with hypoxemia ([facility] ICD-10-CM: J96.01  ICD-9-CM: 518.81  3/18/2022 - Present        Acute respiratory failure with hypoxia ([facility] ICD-10-CM: J96.01  ICD-9-CM: 518.81  2/25/2022 - Present        Pneumonia ICD-10-CM: J18.9  ICD-9-CM: 486  2/24/2022 - Present        Sepsis ([facility] ICD-10-CM: A41.9  ICD-9-CM: 038.9, 995.91  2/24/2022 - Present        Chronic undifferentiated schizophrenia with acute exacerbations ([facility] ICD-10-CM: F20.9  ICD-9-CM: 295.64  5/29/2017 - Present        Non-compliance with treatment ICD-10-CM: Z91.19  ICD-9-CM: V15.81  5/29/2017 - Present        Essential hypertension ICD-10-CM: I10  ICD-9-CM: 401.9  5/29/2017 - Present        Alcohol abuse ICD-10-CM: F10.10  ICD-9-CM: 305.00  5/29/2017 - Present              Medications reviewed  Current Facility-Administered Medications   Medication Dose Route Frequency    amLODIPine (NORVASC) tablet 10 mg  10 mg Oral DAILY    benztropine (COGENTIN) tablet 0.5 mg  0.5 mg Oral BID    cloNIDine HCL (CATAPRES) tablet 0.1 mg  0.1 mg Oral DAILY    famotidine (PEPCID) tablet 20 mg  20 mg Oral QHS    lisinopriL (PRINIVIL, ZESTRIL) tablet 20 mg  20 mg Oral DAILY    risperiDONE (RisperDAL) tablet 2 mg  2 mg Oral BID    multivitamin with folic acid (ONE DAILY WITH FOLIC ACID) tablet 1 Tablet  1 Tablet Oral DAILY    divalproex DR (DEPAKOTE) tablet 250 mg  250 mg Oral Q12H    piperacillin-tazobactam (ZOSYN) 3.375 g in 0.9% sodium chloride (MBP/ADV) 100 mL MBP  3.375 g IntraVENous Q8H    sodium chloride (NS) flush 5-40 mL  5-40 mL IntraVENous Q8H    sodium chloride (NS) flush 5-40 mL  5-40 mL IntraVENous PRN    acetaminophen (TYLENOL) tablet 650 mg  650 mg Oral Q4H PRN    enoxaparin (LOVENOX) injection 40 mg  40 mg SubCUTAneous Q24H    albuterol CONCENTRATE 2.5mg/0.5 mL neb soln  2.5 mg Nebulization Q4H PRN    albuterol-ipratropium (DUO-NEB) 2.5 MG-0.5 MG/3 ML  3 mL Nebulization QID RT    acetaminophen (TYLENOL) tablet 1,000 mg  1,000 mg Oral NOW       Review of Systems:   Review of systems not obtained due to patient factors. Objective:   Physical Exam:     Visit Vitals  /71 (BP 1 Location: Right leg, BP Patient Position: At rest)   Pulse (!) 123   Temp 98.8 °F (37.1 °C)   Resp (!) 36   Ht 5' 4\" (1.626 m)   Wt 63.5 kg (140 lb)   SpO2 96%   BMI 24.03 kg/m²    O2 Flow Rate (L/min): 15 l/min O2 Device: Venturi-mask    Temp (24hrs), Av.1 °F (37.3 °C), Min:98.1 °F (36.7 °C), Max:100.2 °F (37.9 °C)    No intake/output data recorded.  1901 -  0700  In: 100 [I.V.:100]  Out: -     General:   Slightly obtunded but answers questions   Lungs:    Crackles bilateral   Chest wall:  No tenderness or deformity. Heart:  Regular rate and rhythm, S1, S2 normal, no murmur, click, rub or gallop. Abdomen:   Soft, non-tender. Bowel sounds normal. No masses,  No organomegaly.    Extremities: Extremities normal, atraumatic, no cyanosis or edema. Pulses: 2+ and symmetric all extremities. Skin: Skin color, texture, turgor normal. No rashes or lesions   Neurologic: CNII-XII intact. No gross focal deficits         Data Review:       Recent Days:  Recent Labs     03/18/22  0351 03/17/22 2146   WBC 4.0 4.4   HGB 10.5* 10.9*   HCT 31.5* 32.5*    193     Recent Labs     03/18/22  0351 03/17/22 2146    138   K Hemolyzed, recollect requested 4.7   * 107   CO2 26 26   GLU 78 108*   BUN 25* 28*   CREA 0.92 1.19*   CA 7.8* 8.3*   PHOS 4.5  --    ALB 1.5* 1.8*   TBILI  --  0.6   ALT  --  21     Recent Labs     03/18/22  0345   PH 7.40   PCO2 43   PO2 60*   HCO3 25   FIO2 50.0       24 Hour Results:  Recent Results (from the past 24 hour(s))   CBC WITH AUTOMATED DIFF    Collection Time: 03/17/22  9:46 PM   Result Value Ref Range    WBC 4.4 3.6 - 11.0 K/uL    RBC 3.62 (L) 3.80 - 5.20 M/uL    HGB 10.9 (L) 11.5 - 16.0 g/dL    HCT 32.5 (L) 35.0 - 47.0 %    MCV 89.8 80.0 - 99.0 FL    MCH 30.1 26.0 - 34.0 PG    MCHC 33.5 30.0 - 36.5 g/dL    RDW 14.5 11.5 - 14.5 %    PLATELET 239 591 - 448 K/uL    MPV 11.8 8.9 - 12.9 FL    NRBC 0.0 0.0  WBC    ABSOLUTE NRBC 0.00 0.00 - 0.01 K/uL    NEUTROPHILS 73 32 - 75 %    LYMPHOCYTES 22 12 - 49 %    MONOCYTES 5 5 - 13 %    EOSINOPHILS 0 0 - 7 %    BASOPHILS 0 0 - 1 %    IMMATURE GRANULOCYTES 0 0 - 0.5 %    ABS. NEUTROPHILS 3.3 1.8 - 8.0 K/UL    ABS. LYMPHOCYTES 1.0 0.8 - 3.5 K/UL    ABS. MONOCYTES 0.2 0.0 - 1.0 K/UL    ABS. EOSINOPHILS 0.0 0.0 - 0.4 K/UL    ABS. BASOPHILS 0.0 0.0 - 0.1 K/UL    ABS. IMM.  GRANS. 0.0 0.00 - 0.04 K/UL    DF AUTOMATED     METABOLIC PANEL, COMPREHENSIVE    Collection Time: 03/17/22  9:46 PM   Result Value Ref Range    Sodium 138 136 - 145 mmol/L    Potassium 4.7 3.5 - 5.1 mmol/L    Chloride 107 97 - 108 mmol/L    CO2 26 21 - 32 mmol/L    Anion gap 5 5 - 15 mmol/L    Glucose 108 (H) 65 - 100 mg/dL    BUN 28 (H) 6 - 20 mg/dL    Creatinine 1.19 (H) 0.55 - 1.02 mg/dL BUN/Creatinine ratio 24 (H) 12 - 20      GFR est AA 56 (L) >60 ml/min/1.73m2    GFR est non-AA 46 (L) >60 ml/min/1.73m2    Calcium 8.3 (L) 8.5 - 10.1 mg/dL    Bilirubin, total 0.6 0.2 - 1.0 mg/dL    AST (SGOT) 85 (H) 15 - 37 U/L    ALT (SGPT) 21 12 - 78 U/L    Alk.  phosphatase 92 45 - 117 U/L    Protein, total 7.6 6.4 - 8.2 g/dL    Albumin 1.8 (L) 3.5 - 5.0 g/dL    Globulin 5.8 (H) 2.0 - 4.0 g/dL    A-G Ratio 0.3 (L) 1.1 - 2.2     LACTIC ACID    Collection Time: 03/17/22  9:46 PM   Result Value Ref Range    Lactic acid 1.9 0.4 - 2.0 mmol/L   URINALYSIS W/ RFLX MICROSCOPIC    Collection Time: 03/17/22  9:46 PM   Result Value Ref Range    Color Yellow/Straw      Appearance Clear Clear      Specific gravity 1.012 1.003 - 1.030      pH (UA) 5.0 5.0 - 8.0      Protein 100 (A) Negative mg/dL    Glucose Negative Negative mg/dL    Ketone Negative Negative mg/dL    Bilirubin Negative Negative      Blood Small (A) Negative      Urobilinogen 4.0 (H) 0.1 - 1.0 EU/dL    Nitrites Negative Negative      Leukocyte Esterase Negative Negative      WBC 0-5 0 - 4 /hpf    RBC 0-5 0 - 5 /hpf    Bacteria Negative Negative /hpf   COVID-19 RAPID TEST    Collection Time: 03/17/22  9:46 PM   Result Value Ref Range    COVID-19 rapid test Not Detected Not Detected     NT-PRO BNP    Collection Time: 03/17/22  9:46 PM   Result Value Ref Range    NT pro- (H) <125 pg/mL   URINE MICROSCOPIC    Collection Time: 03/17/22  9:46 PM   Result Value Ref Range    WBC 0-4 0 - 4 /hpf    RBC 0-5 0 - 5 /hpf    Bacteria Negative Negative /hpf   INFLUENZA A & B AG (RAPID TEST)    Collection Time: 03/17/22  9:49 PM   Result Value Ref Range    Influenza A Antigen Negative Negative      Influenza B Antigen Negative Negative     EKG, 12 LEAD, INITIAL    Collection Time: 03/17/22 10:31 PM   Result Value Ref Range    Ventricular Rate 116 BPM    Atrial Rate 116 BPM    P-R Interval 134 ms    QRS Duration 64 ms    Q-T Interval 290 ms    QTC Calculation (Bezet) 403 ms    Calculated P Axis 84 degrees    Calculated R Axis 95 degrees    Calculated T Axis 73 degrees    Diagnosis       Sinus tachycardia  Rightward axis  Low voltage QRS  Cannot rule out Anterior infarct , age undetermined  Abnormal ECG  No previous ECGs available  Confirmed by [facility] MD, Parker Castillo ([id number]) on 3/18/2022 7:25:46 AM     BLOOD GAS, ARTERIAL    Collection Time: 03/18/22  3:45 AM   Result Value Ref Range    pH 7.40 7.35 - 7.45      PCO2 43 35 - 45 mmHg    PO2 60 (L) 75 - 100 mmHg    O2 SAT 89 (L) >95 %    BICARBONATE 25 22 - 26 mmol/L    BASE EXCESS 1.1 0 - 2 mmol/L    O2 METHOD Venti mask      O2 FLOW RATE 15 L/min    FIO2 50.0 %    Sample source Arterial      SITE Left Radial      IVAN'S TEST PASS     C REACTIVE PROTEIN, QT    Collection Time: 03/18/22  3:51 AM   Result Value Ref Range    C-Reactive protein 0.73 (H) 0.00 - 0.60 mg/dL   TSH 3RD GENERATION    Collection Time: 03/18/22  3:51 AM   Result Value Ref Range    TSH 1.74 0.36 - 3.74 uIU/mL   URIC ACID    Collection Time: 03/18/22  3:51 AM   Result Value Ref Range    Uric acid 7.3 (H) 2.6 - 6.0 mg/dL   RENAL FUNCTION PANEL    Collection Time: 03/18/22  3:51 AM   Result Value Ref Range    Sodium 138 136 - 145 mmol/L    Potassium Hemolyzed, recollect requested 3.5 - 5.1 mmol/L    Chloride 111 (H) 97 - 108 mmol/L    CO2 26 21 - 32 mmol/L    Anion gap 1 (L) 5 - 15 mmol/L    Glucose 78 65 - 100 mg/dL    BUN 25 (H) 6 - 20 mg/dL    Creatinine 0.92 0.55 - 1.02 mg/dL    BUN/Creatinine ratio 27 (H) 12 - 20      GFR est AA >60 >60 ml/min/1.73m2    GFR est non-AA >60 >60 ml/min/1.73m2    Calcium 7.8 (L) 8.5 - 10.1 mg/dL    Phosphorus 4.5 2.6 - 4.7 mg/dL    Albumin 1.5 (L) 3.5 - 5.0 g/dL   CBC WITH AUTOMATED DIFF    Collection Time: 03/18/22  3:51 AM   Result Value Ref Range    WBC 4.0 3.6 - 11.0 K/uL    RBC 3.49 (L) 3.80 - 5.20 M/uL    HGB 10.5 (L) 11.5 - 16.0 g/dL    HCT 31.5 (L) 35.0 - 47.0 %    MCV 90.3 80.0 - 99.0 FL    MCH 30.1 26.0 - 34.0 PG    MCHC 33.3 30.0 - 36.5 g/dL    RDW 14.7 (H) 11.5 - 14.5 %    PLATELET 096 020 - 046 K/uL    MPV 12.2 8.9 - 12.9 FL    NRBC 0.0 0.0  WBC    ABSOLUTE NRBC 0.00 0.00 - 0.01 K/uL    NEUTROPHILS 70 32 - 75 %    LYMPHOCYTES 25 12 - 49 %    MONOCYTES 3 (L) 5 - 13 %    EOSINOPHILS 1 0 - 7 %    BASOPHILS 0 0 - 1 %    IMMATURE GRANULOCYTES 1 (H) 0 - 0.5 %    ABS. NEUTROPHILS 2.9 1.8 - 8.0 K/UL    ABS. LYMPHOCYTES 1.0 0.8 - 3.5 K/UL    ABS. MONOCYTES 0.1 0.0 - 1.0 K/UL    ABS. EOSINOPHILS 0.0 0.0 - 0.4 K/UL    ABS. BASOPHILS 0.0 0.0 - 0.1 K/UL    ABS. IMM. GRANS. 0.0 0.00 - 0.04 K/UL    DF AUTOMATED         XR CHEST PORT   Final Result   Diffuse predominantly interstitial process in both lungs with some   greater involvement on the left. Differential considerations include pulmonary   edema and diffuse infection. CT CHEST WO CONT    (Results Pending)        Assessment:  Bilateral healthcare associated pneumonia    Acute respiratory failure with hypoxia    Severe sepsis with fever, tachypnea, tachycardia and altered mental status    Metabolic/septic encephalopathy    Schizophrenia    HIV disease, details unknown. Does not appear to be on HAART    History of alcohol abuse    COPD      Plan:  Continue Zosyn, add levofloxacin for adequate coverage of healthcare associated pneumonia  Continue IV fluids and supportive care    Care Plan discussed with: Nurse    Disposition: Continue ICU care for 24-hours    Total time spent with patient: 30 minutes.     Ernesto Ugarte MD

## 2022-03-18 NOTE — ED NOTES
Pt resting comfortably with IV fluids infusing per order. IV was established by ems and received approximately 500 mL of NaCl. Sepsis alert initiated upon arrival. Pt is awake but confused. Oriented only to self. Pt will follow basic commands but required frequent redirects. Pt arrived by [facility] without paperwork from sending facility. Pt was transported for fever and increased confusion. Pt was cleaned for urinary incontinence upon arrival. Sheets had a foul odor of urine. Blood work, straight cath for urine, nasal swabs and EKG obtained. IV fluid bolus 30 mL/kg per order tolerated well by pt. No respiratory distress. Pt has had a dry cough since arriving. Pt was hypoxic but denied any SOB. Denies CP.

## 2022-03-18 NOTE — PROGRESS NOTES
SPEECH LANGUAGE PATHOLOGY BEDSIDE SWALLOW EVALUATIONS  Patient: Alice Guillen  (72 y.o. )  Date: 3/18/2022  Primary Diagnosis: Aspiration pneumonia   Precautions:  aspiration    ASSESSMENT :  Patient is A%Ox1, confused, follows basic commands w/ repetition, and disengaged w/ evaluation. Patient presents w/ mild oral dysphagia. Oral phase c/b reduced bolus awareness, disorganized mastication, mild oral residue of solids. Pharyngeal phase c/b timely swallow and HLE is wfl upon palpation. Overt s/s of pen/asp delayed cough x1 w/ hard solids. Given concerns w/ recurrent pna rec MBS to r/o silent aspiration. Patient will benefit from skilled intervention to address the above impairments. Patients rehabilitation potential is considered to be Fair     PLAN :  Recommendations and Planned Interventions:  Rec minced and moist diet w/ thin liquids and strict asp/GERD precautions. Rec MBS to further assess oropharyngeal phase of swallow and r/o silent aspiration. Frequency/Duration: Patient will be followed by speech-language pathology 3 times a week to address goals. Discharge Recommendations: To Be Determined     SUBJECTIVE:   Patient recently     OBJECTIVE:   Patient admitted w/ SOB and hypoxia dx w/ aspiration pna. Past Medical History:   Diagnosis Date    Alcohol abuse 5/29/2017    Hepatitis C     HIV (human immunodeficiency virus infection) ([facility]     Hypertension     Psychotic disorder ([facility]     Schizophrenia ([facility]        CXR Results  (Last 48 hours)                 03/17/22 2157  XR CHEST PORT Final result    Impression:  Diffuse predominantly interstitial process in both lungs with some   greater involvement on the left. Differential considerations include pulmonary   edema and diffuse infection.                     Narrative:  PROCEDURE: XR CHEST PORT       HISTORY:Short of breath       COMPARISON:Chest x-ray 24th of February 2022           TECHNIQUE: AP portable chest       LIMITATIONS: None TUBES/LINES: None       LUNG PARENCHYMA/PLEURA: The lungs show increased interstitial markings   diffusely, more extensive on the left than the right. No focal alveolar   infiltrate. TRACHEA/BRONCHI: Normal   PULMONARY VESSELS: Pulmonary vessels are prominent   HEART: Heart size stable   MEDIASTINUM: Normal   BONE/SOFT TISSUES: No acute process       OTHER: None                   Diet prior to admission: Regular  Current Diet:  DIET NPO     Cognitive and Communication Status:  Neurologic State: Alert,Confused  Orientation Level: Oriented to person  Cognition: Decreased attention/concentration,Decreased command following,Impaired decision making,Impulsive  Perception: Appears intact  Perseveration: No perseveration noted  Safety/Judgement: Decreased awareness of environment,Decreased awareness of need for assistance,Decreased awareness of need for safety,Decreased insight into deficits  Swallowing Evaluation:   Oral Assessment:  Oral Assessment  Labial: No impairment  Dentition: Limited  Oral Hygiene: caries, xerstomia  Lingual: No impairment  Velum: No impairment  Mandible: No impairment  P.O. Trials:  Patient Position: upright in bed  Vocal quality prior to P.O.: No impairment  Consistency Presented: Ice chips;Puree; Solid; Thin liquid  How Presented: SLP-fed/presented;Cup/sip;Spoon;Straw;Successive swallows     Bolus Acceptance: No impairment  Bolus Formation/Control: Impaired  Type of Impairment: Delayed;Mastication  Propulsion: Delayed (# of seconds); Discoordination  Oral Residue: Less than 10% of bolus  Initiation of Swallow: Delayed (# of seconds)  Laryngeal Elevation: Functional  Aspiration Signs/Symptoms: Delayed cough/throat clear  Pharyngeal Phase Characteristics: Audible swallow;Effortful swallow             Oral Phase Severity: Mild  Pharyngeal Phase Severity : Mild  Voice:  Vocal Quality: No impairment          Pain:  Pain Scale 1: FLACC  Pain Intensity 1: 0         After treatment:   Patient left in no apparent distress in bed, Call bell within reach, Nursing notified, and Caregiver / family present    COMMUNICATION/EDUCATION:   Patient's confusion negatively impacts comprehension and carryover of education. The patient's plan of care including recommendations, planned interventions, and recommended diet changes were discussed with: Registered nurse. Patient is unable to participate in goal setting and plan of care. Problem: Dysphagia (Adult)  Goal: *Acute Goals and Plan of Care (Insert Text)  Description: Speech Therapy Swallow Goals  Initiated 3/18/2022  -Patient will tolerate minced and moist diet with thin liquids without clinical indicators of aspiration given minimal cues within 7 day(s). -Patient will tolerate PO trials without clinical indicators of aspiration given minimal cues within 7 day(s). -Patient will participate in modified barium swallow study within 7 day(s). -Patient will demonstrate understanding of swallow safety precautions and aspiration precautions, diet recs with minimal cues within 7 day(s).            Outcome: Initial     Thank you for this referral.  Marne Homans, M.S., M.Ed., CCC-SLP  Time Calculation: 20 mins

## 2022-03-18 NOTE — ED TRIAGE NOTES
Patient comes from [facility] where they report that she has been altered for the last two days they say that she is normally alert and oriented x 4.  Patient is febrile and EMS reports some bilateral rhonci with a SPO2 of 90% on room air

## 2022-03-18 NOTE — PROGRESS NOTES
Reason for Admission:   Aspiration PNA & Acute Respiratory Failure w/Hypoxia                    RUR Score:     17% Moderate             PCP: First and Last name:  Dr. Giacomo Conrad    Name of Practice:    Are you a current patient: Yes/No:    Approximate date of last visit: March 2022   Can you participate in a virtual visit if needed:     Do you (patient/family) have any concerns for transition/discharge? Plan for utilizing home health:       Current Advanced Directive/Advance Care Plan:  Full Code      Healthcare Decision Maker:   Click here to complete [place] including selection of the Healthcare Decision Maker Relationship (ie \"Primary\")            Primary Decision Maker: Pilar Garcia - Caregiver - [phone]    Transition of Care Plan:          [facility] Spoke to manager Debbie Dunn. Writer informed patient is her own guardian and makes her own decisions. No POA or guardian. Patient attends a structured day program (0800-1600PM). Last seen in old PCP (Dr. Giacomo Conrad) office March 2022. Has to establish care w/new PCP Dr. Garrett Wilkins. Dr. Sandi Bruno comes to the [facility] on Monday's to see all of the residents. [facility] utilizes [employer]. Staff provides all transportation to/from appointment's. Independent w/all ADL's & IADL's and requires no assistance. No home O2 or DME. If HH is recommended patient is able to return to [facility] w/HH. Will require Medicaid transport back to [facility]. Care Management Interventions  PCP Verified by CM: Yes (March 2022)  Mode of Transport at Discharge:  Other (see comment) (Medicaid Transport)  Transition of Care Consult (CM Consult): Discharge Planning  Discharge Durable Medical Equipment: No (No home O2 or DME)  Support Systems: Caregiver/Home Care Staff  Confirm Follow Up Transport: Other (see comment) (Medicaid Transport)  Discharge Location  Patient Expects to be Discharged to[de-identified] Assisted [address] [place] [address], [facility]

## 2022-03-18 NOTE — CONSULTS
Pulmonary and Critical Care Consult    Subjective:   Consult Note: 3/18/2022 @no control      Chief Complaint:   Chief Complaint   Patient presents with    Fever    Altered mental status        This patient has been seen and evaluated at the request of Dr. Radha Stewart. 60-year-old  lady  I am asked to see for acute respiratory failure with hypoxia    She is awake and responsive  However she remains confused  History obtained from review of medical records    Patient has a past medical history of COPD, alcohol abuse, HIV, schizophrenia and hepatitis C, lives in a group home. She was admitted about 3 weeks ago with aspiration pneumonia treated with Zosyn and doxycycline. Sent to the ED last night due to altered mental status and cough. She was febrile, tachycardic and with gurgling breath sounds. She was hypoxic and started on a Ventimask. X-ray showed diffuse predominantly interstitial process in both lungs greater on the left. Admitted to ICU and started on IV Zosyn     This morning she is awake although confused. She answers simple questions, unable to tell me where she is. Was on BiPAP initially  On 15 L 50% Ventimask    ABG showed pH of 7.40, PCO2 43, PO2 60, bicarb 25, saturation 99%    Review of Systems:  Review of systems not obtained due to patient factors. Past Medical History:   Diagnosis Date    Alcohol abuse 5/29/2017    Hepatitis C     HIV [facility] immunodeficiency virus infection) [facility]     Hypertension     Psychotic disorder ([facility]     Schizophrenia ([facility]      History reviewed. No pertinent surgical history. Family History   Family history unknown: Yes     Social History     Tobacco Use    Smoking status: Current Every Day Smoker     Packs/day: 0.50    Smokeless tobacco: Never Used   Substance Use Topics    Alcohol use:  Yes     Alcohol/week: 0.8 standard drinks     Types: 1 Cans of beer per week      Current Facility-Administered Medications Medication Dose Route Frequency Provider Last Rate Last Admin    amLODIPine (NORVASC) tablet 10 mg  10 mg Oral DAILY Daryn Oates MD   10 mg at 03/18/22 1011    benztropine (COGENTIN) tablet 0.5 mg  0.5 mg Oral BID Daryn Oates MD   0.5 mg at 03/18/22 1012    cloNIDine HCL (CATAPRES) tablet 0.1 mg  0.1 mg Oral DAILY Daryn Oates MD   0.1 mg at 03/18/22 1012    famotidine (PEPCID) tablet 20 mg  20 mg Oral QHS Daryn Oates MD        lisinopriL (PRINIVIL, ZESTRIL) tablet 20 mg  20 mg Oral DAILY Daryn Oates MD   20 mg at 03/18/22 1012    risperiDONE (RisperDAL) tablet 2 mg  2 mg Oral BID Daryn Oates MD   2 mg at 03/18/22 1012    multivitamin with folic acid (ONE DAILY WITH FOLIC ACID) tablet 1 Tablet  1 Tablet Oral DAILY Daryn Oates MD   1 Tablet at 03/18/22 1012    divalproex DR (DEPAKOTE) tablet 250 mg  250 mg Oral Q12H Daryn Oates MD   250 mg at 03/18/22 1012    piperacillin-tazobactam (ZOSYN) 3.375 g in 0.9% sodium chloride (MBP/ADV) 100 mL MBP  3.375 g IntraVENous Q8H Daryn Oates MD 25 mL/hr at 03/18/22 0406 3.375 g at 03/18/22 0406    sodium chloride (NS) flush 5-40 mL  5-40 mL IntraVENous Q8H Daryn Oates MD   10 mL at 03/18/22 0600    sodium chloride (NS) flush 5-40 mL  5-40 mL IntraVENous PRN Daryn Oates MD   10 mL at 03/18/22 0518    acetaminophen (TYLENOL) tablet 650 mg  650 mg Oral Q4H PRN Daryn Oates MD        enoxaparin (LOVENOX) injection 40 mg  40 mg SubCUTAneous Q24H Daryn Oates MD   40 mg at 03/18/22 0406    albuterol CONCENTRATE 2.5mg/0.5 mL neb soln  2.5 mg Nebulization Q4H PRN Daryn Oates MD        albuterol-ipratropium (DUO-NEB) 2.5 MG-0.5 MG/3 ML  3 mL Nebulization QID RT Daryn Oates MD   3 mL at 03/18/22 0758    levoFLOXacin (LEVAQUIN) 750 mg in D5W IVPB  750 mg IntraVENous Q24H Kanwal Delaney  mL/hr at 03/18/22 1013 750 mg at 22 1013          No Known Allergies        Objective:     Blood pressure (!) 168/83, pulse (!) 125, temperature 98.8 °F (37.1 °C), resp. rate 30, height 5' 4\" (1.626 m), weight 63.5 kg (140 lb), SpO2 97 %. Temp (24hrs), Av.1 °F (37.3 °C), Min:98.1 °F (36.7 °C), Max:100.2 °F (37.9 °C)      Intake and Output:  Current Shift: No intake/output data recorded. Last 3 Shifts:  1901 -  0700  In: 100 [I.V.:100]  Out: -     Intake/Output Summary (Last 24 hours) at 3/18/2022 1024  Last data filed at 3/18/2022 0600  Gross per 24 hour   Intake 100 ml   Output    Net 100 ml        Physical Exam:     General: Lying in bed in mild respiratory distress, on Ventimask  Eye: Reactive, symmetric  Throat and Neck: Supple  Lung: Reduced air entry bilaterally with prolonged exhalation but no wheezing. Occasional crackles. Heart: S1+S2. No murmurs  Abdomen: soft, non-tender. Bowel sounds normal. No masses; obese  Extremities: No edema  : Not done  Skin: No cyanosis  Neurologic:  Alert and awake, pleasantly confused, grossly nonfocal  Psychiatric:  Unable to perform due to patient's condition      Lab/Data Review:    Recent Results (from the past 24 hour(s))   CBC WITH AUTOMATED DIFF    Collection Time: 22  9:46 PM   Result Value Ref Range    WBC 4.4 3.6 - 11.0 K/uL    RBC 3.62 (L) 3.80 - 5.20 M/uL    HGB 10.9 (L) 11.5 - 16.0 g/dL    HCT 32.5 (L) 35.0 - 47.0 %    MCV 89.8 80.0 - 99.0 FL    MCH 30.1 26.0 - 34.0 PG    MCHC 33.5 30.0 - 36.5 g/dL    RDW 14.5 11.5 - 14.5 %    PLATELET 607 207 - 500 K/uL    MPV 11.8 8.9 - 12.9 FL    NRBC 0.0 0.0  WBC    ABSOLUTE NRBC 0.00 0.00 - 0.01 K/uL    NEUTROPHILS 73 32 - 75 %    LYMPHOCYTES 22 12 - 49 %    MONOCYTES 5 5 - 13 %    EOSINOPHILS 0 0 - 7 %    BASOPHILS 0 0 - 1 %    IMMATURE GRANULOCYTES 0 0 - 0.5 %    ABS. NEUTROPHILS 3.3 1.8 - 8.0 K/UL    ABS. LYMPHOCYTES 1.0 0.8 - 3.5 K/UL    ABS. MONOCYTES 0.2 0.0 - 1.0 K/UL    ABS.  EOSINOPHILS 0.0 0.0 - 0.4 K/UL    ABS. BASOPHILS 0.0 0.0 - 0.1 K/UL    ABS. IMM. GRANS. 0.0 0.00 - 0.04 K/UL    DF AUTOMATED     METABOLIC PANEL, COMPREHENSIVE    Collection Time: 03/17/22  9:46 PM   Result Value Ref Range    Sodium 138 136 - 145 mmol/L    Potassium 4.7 3.5 - 5.1 mmol/L    Chloride 107 97 - 108 mmol/L    CO2 26 21 - 32 mmol/L    Anion gap 5 5 - 15 mmol/L    Glucose 108 (H) 65 - 100 mg/dL    BUN 28 (H) 6 - 20 mg/dL    Creatinine 1.19 (H) 0.55 - 1.02 mg/dL    BUN/Creatinine ratio 24 (H) 12 - 20      GFR est AA 56 (L) >60 ml/min/1.73m2    GFR est non-AA 46 (L) >60 ml/min/1.73m2    Calcium 8.3 (L) 8.5 - 10.1 mg/dL    Bilirubin, total 0.6 0.2 - 1.0 mg/dL    AST (SGOT) 85 (H) 15 - 37 U/L    ALT (SGPT) 21 12 - 78 U/L    Alk.  phosphatase 92 45 - 117 U/L    Protein, total 7.6 6.4 - 8.2 g/dL    Albumin 1.8 (L) 3.5 - 5.0 g/dL    Globulin 5.8 (H) 2.0 - 4.0 g/dL    A-G Ratio 0.3 (L) 1.1 - 2.2     LACTIC ACID    Collection Time: 03/17/22  9:46 PM   Result Value Ref Range    Lactic acid 1.9 0.4 - 2.0 mmol/L   URINALYSIS W/ RFLX MICROSCOPIC    Collection Time: 03/17/22  9:46 PM   Result Value Ref Range    Color Yellow/Straw      Appearance Clear Clear      Specific gravity 1.012 1.003 - 1.030      pH (UA) 5.0 5.0 - 8.0      Protein 100 (A) Negative mg/dL    Glucose Negative Negative mg/dL    Ketone Negative Negative mg/dL    Bilirubin Negative Negative      Blood Small (A) Negative      Urobilinogen 4.0 (H) 0.1 - 1.0 EU/dL    Nitrites Negative Negative      Leukocyte Esterase Negative Negative      WBC 0-5 0 - 4 /hpf    RBC 0-5 0 - 5 /hpf    Bacteria Negative Negative /hpf   COVID-19 RAPID TEST    Collection Time: 03/17/22  9:46 PM   Result Value Ref Range    COVID-19 rapid test Not Detected Not Detected     NT-PRO BNP    Collection Time: 03/17/22  9:46 PM   Result Value Ref Range    NT pro- (H) <125 pg/mL   URINE MICROSCOPIC    Collection Time: 03/17/22  9:46 PM   Result Value Ref Range    WBC 0-4 0 - 4 /hpf    RBC 0-5 0 - 5 /hpf    Bacteria Negative Negative /hpf   INFLUENZA A & B AG (RAPID TEST)    Collection Time: 03/17/22  9:49 PM   Result Value Ref Range    Influenza A Antigen Negative Negative      Influenza B Antigen Negative Negative     EKG, 12 LEAD, INITIAL    Collection Time: 03/17/22 10:31 PM   Result Value Ref Range    Ventricular Rate 116 BPM    Atrial Rate 116 BPM    P-R Interval 134 ms    QRS Duration 64 ms    Q-T Interval 290 ms    QTC Calculation (Bezet) 403 ms    Calculated P Axis 84 degrees    Calculated R Axis 95 degrees    Calculated T Axis 73 degrees    Diagnosis       Sinus tachycardia  Rightward axis  Low voltage QRS  Cannot rule out Anterior infarct , age undetermined  Abnormal ECG  No previous ECGs available  Confirmed by Shahida David MD, Chung Ruelas ([id number]) on 3/18/2022 7:25:46 AM     BLOOD GAS, ARTERIAL    Collection Time: 03/18/22  3:45 AM   Result Value Ref Range    pH 7.40 7.35 - 7.45      PCO2 43 35 - 45 mmHg    PO2 60 (L) 75 - 100 mmHg    O2 SAT 89 (L) >95 %    BICARBONATE 25 22 - 26 mmol/L    BASE EXCESS 1.1 0 - 2 mmol/L    O2 METHOD Venti mask      O2 FLOW RATE 15 L/min    FIO2 50.0 %    Sample source Arterial      SITE Left Radial      IVAN'S TEST PASS     C REACTIVE PROTEIN, QT    Collection Time: 03/18/22  3:51 AM   Result Value Ref Range    C-Reactive protein 0.73 (H) 0.00 - 0.60 mg/dL   TSH 3RD GENERATION    Collection Time: 03/18/22  3:51 AM   Result Value Ref Range    TSH 1.74 0.36 - 3.74 uIU/mL   URIC ACID    Collection Time: 03/18/22  3:51 AM   Result Value Ref Range    Uric acid 7.3 (H) 2.6 - 6.0 mg/dL   RENAL FUNCTION PANEL    Collection Time: 03/18/22  3:51 AM   Result Value Ref Range    Sodium 138 136 - 145 mmol/L    Potassium Hemolyzed, recollect requested 3.5 - 5.1 mmol/L    Chloride 111 (H) 97 - 108 mmol/L    CO2 26 21 - 32 mmol/L    Anion gap 1 (L) 5 - 15 mmol/L    Glucose 78 65 - 100 mg/dL    BUN 25 (H) 6 - 20 mg/dL    Creatinine 0.92 0.55 - 1.02 mg/dL    BUN/Creatinine ratio 27 (H) 12 - 20 GFR est AA >60 >60 ml/min/1.73m2    GFR est non-AA >60 >60 ml/min/1.73m2    Calcium 7.8 (L) 8.5 - 10.1 mg/dL    Phosphorus 4.5 2.6 - 4.7 mg/dL    Albumin 1.5 (L) 3.5 - 5.0 g/dL   CBC WITH AUTOMATED DIFF    Collection Time: 03/18/22  3:51 AM   Result Value Ref Range    WBC 4.0 3.6 - 11.0 K/uL    RBC 3.49 (L) 3.80 - 5.20 M/uL    HGB 10.5 (L) 11.5 - 16.0 g/dL    HCT 31.5 (L) 35.0 - 47.0 %    MCV 90.3 80.0 - 99.0 FL    MCH 30.1 26.0 - 34.0 PG    MCHC 33.3 30.0 - 36.5 g/dL    RDW 14.7 (H) 11.5 - 14.5 %    PLATELET 014 234 - 557 K/uL    MPV 12.2 8.9 - 12.9 FL    NRBC 0.0 0.0  WBC    ABSOLUTE NRBC 0.00 0.00 - 0.01 K/uL    NEUTROPHILS 70 32 - 75 %    LYMPHOCYTES 25 12 - 49 %    MONOCYTES 3 (L) 5 - 13 %    EOSINOPHILS 1 0 - 7 %    BASOPHILS 0 0 - 1 %    IMMATURE GRANULOCYTES 1 (H) 0 - 0.5 %    ABS. NEUTROPHILS 2.9 1.8 - 8.0 K/UL    ABS. LYMPHOCYTES 1.0 0.8 - 3.5 K/UL    ABS. MONOCYTES 0.1 0.0 - 1.0 K/UL    ABS. EOSINOPHILS 0.0 0.0 - 0.4 K/UL    ABS. BASOPHILS 0.0 0.0 - 0.1 K/UL    ABS. IMM. GRANS. 0.0 0.00 - 0.04 K/UL    DF AUTOMATED         XR CHEST PORT   Final Result   Diffuse predominantly interstitial process in both lungs with some   greater involvement on the left. Differential considerations include pulmonary   edema and diffuse infection. CT CHEST WO CONT    (Results Pending)       CT Results  (Last 48 hours)    None            Assessment:     1. Acute respiratory failure with hypoxia  2. Bilateral healthcare associated pneumonia  3. Acute CHF  4. Severe sepsis  5. Altered mental status/acute metabolic encephalopathy  6. COPD  7. HIV  8. Schizophrenia  9.   History of alcohol abuse    Plan:     Patient admitted to the ICU  We will be watching her closely    Critically ill  Currently on Ventimask oxygen  Was on BiPAP overnight  We will use Ventimask and BiPAP as needed next 24 hours    Patient has COPD  Continue nebulizers  Will use steroids if develops worsening shortness of breath and signs of exacerbation    Patient has combination of CHF and healthcare associated pneumonia  Continue broad-spectrum antibiotics  Cultures sent  Further changes in antibiotics based on clinical response and culture results    Not requiring vasopressors at present  We will use if needed    I believe x-ray suggests CHF    C IV fluids  Started on Lasix IV twice daily since blood pressure stable  Intake and output charting  Check electrolytes and replace as needed  Monitor renal function with fluid change    Monitor mental status     DVT and GI prophylaxis    Clinical status  Monitor in ICU overnight    Case discussed in detail with RN, RT, and care team  Thank you for involving me in the care of this patient  I will follow with you closely during hospitalization    Time spent more than 60 minutes in direct patient care with no overlap reviewing results and records, decision making, and answering questions.       Popeye Nicholson MD  [facility]  3/18/2022  10:24 AM

## 2022-03-18 NOTE — ED PROVIDER NOTES
EMERGENCY DEPARTMENT HISTORY AND PHYSICAL EXAM      Date: 3/17/2022  Patient Name: Sana Diaz      History of Presenting Illness     Chief Complaint   Patient presents with    Fever    Altered mental status       History Provided By: Patient and EMS    HPI: Sana Diaz, 62 y.o. female with PMH of HIV, hep C, HTN, schizophrenia and alcohol abuse who presents from SNF for altered mental status and hypoxia. Reportedly symptoms started a couple days ago. Per SNF staff the patient normally is alert and oriented x4. EMS were called and they noted that the patient is febrile. Noted to be hypoxic and placed on oxygen brought to the ED. Was unable to obtain detailed HPI or history due to patient confusion. Review of her records revealed that she was discharged approximately a month earlier after admission for pneumonia. There are no other complaints, changes, or physical findings at this time.     PCP: None    Current Facility-Administered Medications   Medication Dose Route Frequency Provider Last Rate Last Admin    amLODIPine (NORVASC) tablet 10 mg  10 mg Oral DAILY Jc Johnson MD        benztropine (COGENTIN) tablet 0.5 mg  0.5 mg Oral BID Jc Johnson MD        cloNIDine HCL (CATAPRES) tablet 0.1 mg  0.1 mg Oral DAILY Jc Johnson MD        famotidine (PEPCID) tablet 20 mg  20 mg Oral QHS Jc Johnson MD        lisinopriL (PRINIVIL, ZESTRIL) tablet 20 mg  20 mg Oral DAILY Jc Johnson MD        risperiDONE (RisperDAL) tablet 2 mg  2 mg Oral BID Jc Johnson MD        multivitamin with folic acid (ONE DAILY WITH FOLIC ACID) tablet 1 Tablet  1 Tablet Oral DAILY Jc Johnson MD        divalproex DR (DEPAKOTE) tablet 250 mg  250 mg Oral Q12H Jc Johnson MD        piperacillin-tazobactam (ZOSYN) 3.375 g in 0.9% sodium chloride (MBP/ADV) 100 mL MBP  3.375 g IntraVENous Q8H Jc Johnson MD 25 mL/hr at 03/18/22 0407 3.375 g at 03/18/22 0406    sodium chloride (NS) flush 5-40 mL  5-40 mL IntraVENous Q8H Edy Holliday MD        sodium chloride (NS) flush 5-40 mL  5-40 mL IntraVENous PRN Edy Holliday MD        acetaminophen (TYLENOL) tablet 650 mg  650 mg Oral Q4H PRN Edy Holliday MD        enoxaparin (LOVENOX) injection 40 mg  40 mg SubCUTAneous Q24H Edy Holliday MD   40 mg at 03/18/22 0406    albuterol (PROVENTIL VENTOLIN) nebulizer solution 2.5 mg  2.5 mg Nebulization Q4H PRN Edy Holliday MD        albuterol-ipratropium (DUO-NEB) 2.5 MG-0.5 MG/3 ML  3 mL Nebulization QID RT Edy Holliday MD        acetaminophen (TYLENOL) tablet 1,000 mg  1,000 mg Oral NOW Maxim Rodriguez MD         Current Outpatient Medications   Medication Sig Dispense Refill    divalproex ER (DEPAKOTE ER) 250 mg ER tablet Take 250 mg by mouth two (2) times a day.  albuterol (ProAir HFA) 90 mcg/actuation inhaler Take 2 Puffs by inhalation every six (6) hours as needed for Wheezing or Shortness of Breath. 18 g 0    amLODIPine (NORVASC) 10 mg tablet Take 1 Tablet by mouth daily.  benztropine (COGENTIN) 0.5 mg tablet Take 1 Tablet by mouth two (2) times a day.  cloNIDine HCL (CATAPRES) 0.1 mg tablet Take 0.1 mg by mouth daily.  famotidine (PEPCID) 40 mg tablet Take 40 mg by mouth nightly.  ferrous sulfate 325 mg (65 mg iron) tablet Take  by mouth Daily (before breakfast).  lisinopriL (PRINIVIL, ZESTRIL) 20 mg tablet Take 20 mg by mouth daily.  risperiDONE (RisperDAL) 2 mg tablet Take 2 mg by mouth two (2) times a day.  multivitamin (ONE A DAY) tablet Take 1 Tablet by mouth daily.          Past History     Past Medical History:  Past Medical History:   Diagnosis Date    Alcohol abuse 5/29/2017    Hepatitis C     HIV (human immunodeficiency virus infection) ([facility]     Hypertension     Psychotic disorder ([facility]     Schizophrenia ([facility]          Past Surgical History:  History reviewed. No pertinent surgical history. Family History:  Family History   Family history unknown: Yes       Social History:  Social History     Tobacco Use    Smoking status: Current Every Day Smoker     Packs/day: 0.50    Smokeless tobacco: Never Used   Substance Use Topics    Alcohol use: Yes     Alcohol/week: 0.8 standard drinks     Types: 1 Cans of beer per week    Drug use: Yes     Types: Cocaine     Comment: Pt reports a history of crack cocaine use       Allergies:  No Known Allergies      Review of Systems     Review of Systems    A 10 point review system was performed was negative except as noted above in HPI    Physical Exam     Physical Exam  Vitals and nursing note reviewed. Constitutional:       General: She is not in acute distress. Appearance: She is well-developed. She is ill-appearing. She is not diaphoretic. HENT:      Head: Normocephalic and atraumatic. Eyes:      Extraocular Movements: Extraocular movements intact. Conjunctiva/sclera: Conjunctivae normal.   Cardiovascular:      Rate and Rhythm: Regular rhythm. Tachycardia present. Heart sounds: Normal heart sounds. Pulmonary:      Effort: Tachypnea present. Breath sounds: Rhonchi present. Abdominal:      Palpations: Abdomen is soft. Tenderness: There is no abdominal tenderness. Musculoskeletal:      Cervical back: Neck supple. Right lower leg: No tenderness. No edema. Left lower leg: No tenderness. No edema. Neurological:      General: No focal deficit present. Mental Status: She is alert. She is disoriented.          Lab and Diagnostic Study Results     Labs -     Recent Results (from the past 12 hour(s))   CBC WITH AUTOMATED DIFF    Collection Time: 03/17/22  9:46 PM   Result Value Ref Range    WBC 4.4 3.6 - 11.0 K/uL    RBC 3.62 (L) 3.80 - 5.20 M/uL    HGB 10.9 (L) 11.5 - 16.0 g/dL    HCT 32.5 (L) 35.0 - 47.0 %    MCV 89.8 80.0 - 99.0 FL    MCH 30.1 26.0 - 34.0 PG    MCHC 33.5 30.0 - 36.5 g/dL    RDW 14.5 11.5 - 14.5 %    PLATELET 511 446 - 429 K/uL    MPV 11.8 8.9 - 12.9 FL    NRBC 0.0 0.0  WBC    ABSOLUTE NRBC 0.00 0.00 - 0.01 K/uL    NEUTROPHILS 73 32 - 75 %    LYMPHOCYTES 22 12 - 49 %    MONOCYTES 5 5 - 13 %    EOSINOPHILS 0 0 - 7 %    BASOPHILS 0 0 - 1 %    IMMATURE GRANULOCYTES 0 0 - 0.5 %    ABS. NEUTROPHILS 3.3 1.8 - 8.0 K/UL    ABS. LYMPHOCYTES 1.0 0.8 - 3.5 K/UL    ABS. MONOCYTES 0.2 0.0 - 1.0 K/UL    ABS. EOSINOPHILS 0.0 0.0 - 0.4 K/UL    ABS. BASOPHILS 0.0 0.0 - 0.1 K/UL    ABS. IMM. GRANS. 0.0 0.00 - 0.04 K/UL    DF AUTOMATED     METABOLIC PANEL, COMPREHENSIVE    Collection Time: 03/17/22  9:46 PM   Result Value Ref Range    Sodium 138 136 - 145 mmol/L    Potassium 4.7 3.5 - 5.1 mmol/L    Chloride 107 97 - 108 mmol/L    CO2 26 21 - 32 mmol/L    Anion gap 5 5 - 15 mmol/L    Glucose 108 (H) 65 - 100 mg/dL    BUN 28 (H) 6 - 20 mg/dL    Creatinine 1.19 (H) 0.55 - 1.02 mg/dL    BUN/Creatinine ratio 24 (H) 12 - 20      GFR est AA 56 (L) >60 ml/min/1.73m2    GFR est non-AA 46 (L) >60 ml/min/1.73m2    Calcium 8.3 (L) 8.5 - 10.1 mg/dL    Bilirubin, total 0.6 0.2 - 1.0 mg/dL    AST (SGOT) 85 (H) 15 - 37 U/L    ALT (SGPT) 21 12 - 78 U/L    Alk.  phosphatase 92 45 - 117 U/L    Protein, total 7.6 6.4 - 8.2 g/dL    Albumin 1.8 (L) 3.5 - 5.0 g/dL    Globulin 5.8 (H) 2.0 - 4.0 g/dL    A-G Ratio 0.3 (L) 1.1 - 2.2     LACTIC ACID    Collection Time: 03/17/22  9:46 PM   Result Value Ref Range    Lactic acid 1.9 0.4 - 2.0 mmol/L   URINALYSIS W/ RFLX MICROSCOPIC    Collection Time: 03/17/22  9:46 PM   Result Value Ref Range    Color Yellow/Straw      Appearance Clear Clear      Specific gravity 1.012 1.003 - 1.030      pH (UA) 5.0 5.0 - 8.0      Protein 100 (A) Negative mg/dL    Glucose Negative Negative mg/dL    Ketone Negative Negative mg/dL    Bilirubin Negative Negative      Blood Small (A) Negative      Urobilinogen 4.0 (H) 0.1 - 1.0 EU/dL    Nitrites Negative Negative      Leukocyte Esterase Negative Negative      WBC 0-5 0 - 4 /hpf    RBC 0-5 0 - 5 /hpf    Bacteria Negative Negative /hpf   COVID-19 RAPID TEST    Collection Time: 03/17/22  9:46 PM   Result Value Ref Range    COVID-19 rapid test Not Detected Not Detected     NT-PRO BNP    Collection Time: 03/17/22  9:46 PM   Result Value Ref Range    NT pro- (H) <125 pg/mL   URINE MICROSCOPIC    Collection Time: 03/17/22  9:46 PM   Result Value Ref Range    WBC 0-4 0 - 4 /hpf    RBC 0-5 0 - 5 /hpf    Bacteria Negative Negative /hpf   INFLUENZA A & B AG (RAPID TEST)    Collection Time: 03/17/22  9:49 PM   Result Value Ref Range    Influenza A Antigen Negative Negative      Influenza B Antigen Negative Negative     BLOOD GAS, ARTERIAL    Collection Time: 03/18/22  3:45 AM   Result Value Ref Range    pH 7.40 7.35 - 7.45      PCO2 43 35 - 45 mmHg    PO2 60 (L) 75 - 100 mmHg    O2 SAT 89 (L) >95 %    BICARBONATE 25 22 - 26 mmol/L    BASE EXCESS 1.1 0 - 2 mmol/L    O2 METHOD Venti mask      O2 FLOW RATE 15 L/min    FIO2 50.0 %    Sample source Arterial      SITE Left Radial      IVAN'S TEST PASS         Radiologic Studies -   [unfilled]  CT Results  (Last 48 hours)    None        CXR Results  (Last 48 hours)               03/17/22 2157  XR CHEST PORT Final result    Impression:  Diffuse predominantly interstitial process in both lungs with some   greater involvement on the left. Differential considerations include pulmonary   edema and diffuse infection. Narrative:  PROCEDURE: XR CHEST PORT       HISTORY:Short of breath       COMPARISON:Chest x-ray 24th of February 2022           TECHNIQUE: AP portable chest       LIMITATIONS: None       TUBES/LINES: None       LUNG PARENCHYMA/PLEURA: The lungs show increased interstitial markings   diffusely, more extensive on the left than the right. No focal alveolar   infiltrate.    TRACHEA/BRONCHI: Normal   PULMONARY VESSELS: Pulmonary vessels are prominent HEART: Heart size stable   MEDIASTINUM: Normal   BONE/SOFT TISSUES: No acute process       OTHER: None                 Medical Decision Making and ED Course   - I am the first and primary provider for this patient AND AM THE PRIMARY PROVIDER OF RECORD. - I reviewed the vital signs, available nursing notes, past medical history, past surgical history, family history and social history. - Initial assessment performed. The patients presenting problems have been discussed, and the staff are in agreement with the care plan formulated and outlined with them. I have encouraged them to ask questions as they arise throughout their visit. Vital Signs-Reviewed the patient's vital signs. Patient Vitals for the past 24 hrs:   Temp Pulse Resp BP SpO2   03/18/22 0406 98.4 °F (36.9 °C) 94 26 122/69 97 %   03/18/22 0340     98 %   03/18/22 0256  94 22 (!) 140/80 95 %   03/18/22 0231  90 21 130/67 91 %   03/18/22 0228     91 %   03/18/22 0206  87 20 120/67 (!) 89 %   03/18/22 0200     (!) 85 %   03/18/22 0038 98.1 °F (36.7 °C) 98 18 139/74 92 %   03/18/22 0034  96 20  92 %   03/17/22 2254 100 °F (37.8 °C) (!) 106 24 (!) 154/86 97 %   03/17/22 2214     90 %   03/17/22 2213  (!) 112 21 (!) 150/90 90 %   03/17/22 2134 100.2 °F (37.9 °C) (!) 130 18 (!) 142/92 (!) 74 %       Records Reviewed: Nursing Notes and Old Medical Records    Provider Notes (Medical Decision Making):     Patient presented to the ED with altered mental status. She was noted to be hypoxic. Concerns for Covid pneumonia versus community-acquired pneumonia. While initiate sepsis pathway and if patient remains fluid and get blood work including CBC, chemistry, lactate, blood cultures. Will get also chest x-ray. Currently, patient is on nasal cannula. ED Course:     Patient tachycardia improved significantly with intravenous fluids. Work-up revealed that the patient is having pneumonia.   Will admit the patient to the hospital.      Disposition     Disposition: Condition improved  Admitted to ICU Medical ICU the case was discussed with the admitting physician Dr. Claryce Scheuermann    Admitted    Diagnosis     Clinical Impression:   1. Aspiration pneumonia, unspecified aspiration pneumonia type, unspecified laterality, unspecified part of lung ([address]    2. Sepsis, due to unspecified organism, unspecified whether acute organ dysfunction present [facility])        Attestations: Doroteo Briceno MD    Please note that this dictation was completed with [employer], the computer voice recognition software. Quite often unanticipated grammatical, syntax, homophones, and other interpretive errors are inadvertently transcribed by the computer software. Please disregard these errors. Please excuse any errors that have escaped final proofreading. Thank you.

## 2022-03-19 ENCOUNTER — APPOINTMENT (OUTPATIENT)
Dept: GENERAL RADIOLOGY | Age: 62
DRG: 720 | End: 2022-03-19
Attending: INTERNAL MEDICINE
Payer: MEDICAID

## 2022-03-19 PROBLEM — Z91.199 NON-COMPLIANCE WITH TREATMENT: Status: ACTIVE | Noted: 2017-05-29

## 2022-03-19 PROBLEM — J96.01 ACUTE RESPIRATORY FAILURE WITH HYPOXIA (HCC): Status: ACTIVE | Noted: 2022-02-25

## 2022-03-19 PROBLEM — F20.5 CHRONIC UNDIFFERENTIATED SCHIZOPHRENIA WITH ACUTE EXACERBATIONS (HCC): Status: ACTIVE | Noted: 2017-05-29

## 2022-03-19 PROBLEM — A41.9 SEPSIS (HCC): Status: ACTIVE | Noted: 2022-02-24

## 2022-03-19 PROBLEM — F20.9 CHRONIC UNDIFFERENTIATED SCHIZOPHRENIA WITH ACUTE EXACERBATIONS (HCC): Status: ACTIVE | Noted: 2017-05-29

## 2022-03-19 PROBLEM — I10 ESSENTIAL HYPERTENSION: Status: ACTIVE | Noted: 2017-05-29

## 2022-03-19 LAB
ALBUMIN SERPL-MCNC: 1.5 G/DL (ref 3.5–5)
ALBUMIN SERPL-MCNC: 1.5 G/DL (ref 3.5–5)
ALBUMIN/GLOB SERPL: 0.3 {RATIO} (ref 1.1–2.2)
ALP SERPL-CCNC: 63 U/L (ref 45–117)
ALT SERPL-CCNC: 18 U/L (ref 12–78)
ANION GAP SERPL CALC-SCNC: 2 MMOL/L (ref 5–15)
ANION GAP SERPL CALC-SCNC: 3 MMOL/L (ref 5–15)
AST SERPL W P-5'-P-CCNC: 97 U/L (ref 15–37)
ATRIAL RATE: 111 BPM
BASOPHILS # BLD: 0 K/UL (ref 0–0.1)
BASOPHILS NFR BLD: 0 % (ref 0–1)
BILIRUB SERPL-MCNC: 0.6 MG/DL (ref 0.2–1)
BUN SERPL-MCNC: 17 MG/DL (ref 6–20)
BUN SERPL-MCNC: 17 MG/DL (ref 6–20)
BUN/CREAT SERPL: 17 (ref 12–20)
BUN/CREAT SERPL: 17 (ref 12–20)
CA-I BLD-MCNC: 8.2 MG/DL (ref 8.5–10.1)
CA-I BLD-MCNC: 8.3 MG/DL (ref 8.5–10.1)
CALCULATED R AXIS, ECG10: 70 DEGREES
CALCULATED T AXIS, ECG11: 0 DEGREES
CHLORIDE SERPL-SCNC: 107 MMOL/L (ref 97–108)
CHLORIDE SERPL-SCNC: 108 MMOL/L (ref 97–108)
CO2 SERPL-SCNC: 30 MMOL/L (ref 21–32)
CO2 SERPL-SCNC: 30 MMOL/L (ref 21–32)
CREAT SERPL-MCNC: 0.99 MG/DL (ref 0.55–1.02)
CREAT SERPL-MCNC: 1.01 MG/DL (ref 0.55–1.02)
DIAGNOSIS, 93000: NORMAL
DIFFERENTIAL METHOD BLD: ABNORMAL
EOSINOPHIL # BLD: 0 K/UL (ref 0–0.4)
EOSINOPHIL NFR BLD: 0 % (ref 0–7)
ERYTHROCYTE [DISTWIDTH] IN BLOOD BY AUTOMATED COUNT: 14.4 % (ref 11.5–14.5)
GLOBULIN SER CALC-MCNC: 5.8 G/DL (ref 2–4)
GLUCOSE BLD STRIP.AUTO-MCNC: 90 MG/DL (ref 65–117)
GLUCOSE SERPL-MCNC: 73 MG/DL (ref 65–100)
GLUCOSE SERPL-MCNC: 75 MG/DL (ref 65–100)
HCT VFR BLD AUTO: 32.3 % (ref 35–47)
HGB BLD-MCNC: 10.9 G/DL (ref 11.5–16)
IMM GRANULOCYTES # BLD AUTO: 0 K/UL (ref 0–0.04)
IMM GRANULOCYTES NFR BLD AUTO: 0 % (ref 0–0.5)
LYMPHOCYTES # BLD: 1.1 K/UL (ref 0.8–3.5)
LYMPHOCYTES NFR BLD: 20 % (ref 12–49)
MAGNESIUM SERPL-MCNC: 1.6 MG/DL (ref 1.6–2.4)
MCH RBC QN AUTO: 29.9 PG (ref 26–34)
MCHC RBC AUTO-ENTMCNC: 33.7 G/DL (ref 30–36.5)
MCV RBC AUTO: 88.7 FL (ref 80–99)
MONOCYTES # BLD: 0.1 K/UL (ref 0–1)
MONOCYTES NFR BLD: 2 % (ref 5–13)
NEUTS SEG # BLD: 4.1 K/UL (ref 1.8–8)
NEUTS SEG NFR BLD: 78 % (ref 32–75)
NRBC # BLD: 0 K/UL (ref 0–0.01)
NRBC BLD-RTO: 0 PER 100 WBC
PERFORMED BY, TECHID: NORMAL
PHOSPHATE SERPL-MCNC: 3.6 MG/DL (ref 2.6–4.7)
PLATELET # BLD AUTO: 199 K/UL (ref 150–400)
PMV BLD AUTO: 11.4 FL (ref 8.9–12.9)
POTASSIUM SERPL-SCNC: 4 MMOL/L (ref 3.5–5.1)
POTASSIUM SERPL-SCNC: 4 MMOL/L (ref 3.5–5.1)
PROT SERPL-MCNC: 7.3 G/DL (ref 6.4–8.2)
Q-T INTERVAL, ECG07: 198 MS
QRS DURATION, ECG06: 202 MS
QTC CALCULATION (BEZET), ECG08: 384 MS
RBC # BLD AUTO: 3.64 M/UL (ref 3.8–5.2)
SODIUM SERPL-SCNC: 140 MMOL/L (ref 136–145)
SODIUM SERPL-SCNC: 140 MMOL/L (ref 136–145)
VENTRICULAR RATE, ECG03: 226 BPM
WBC # BLD AUTO: 5.3 K/UL (ref 3.6–11)

## 2022-03-19 PROCEDURE — 94640 AIRWAY INHALATION TREATMENT: CPT

## 2022-03-19 PROCEDURE — 74011250636 HC RX REV CODE- 250/636: Performed by: INTERNAL MEDICINE

## 2022-03-19 PROCEDURE — 71045 X-RAY EXAM CHEST 1 VIEW: CPT

## 2022-03-19 PROCEDURE — 74011250637 HC RX REV CODE- 250/637: Performed by: INTERNAL MEDICINE

## 2022-03-19 PROCEDURE — 83735 ASSAY OF MAGNESIUM: CPT

## 2022-03-19 PROCEDURE — 74011000258 HC RX REV CODE- 258: Performed by: HOSPITALIST

## 2022-03-19 PROCEDURE — 36415 COLL VENOUS BLD VENIPUNCTURE: CPT

## 2022-03-19 PROCEDURE — 74011000250 HC RX REV CODE- 250: Performed by: HOSPITALIST

## 2022-03-19 PROCEDURE — 74011000258 HC RX REV CODE- 258: Performed by: INTERNAL MEDICINE

## 2022-03-19 PROCEDURE — 93005 ELECTROCARDIOGRAM TRACING: CPT

## 2022-03-19 PROCEDURE — 77010033678 HC OXYGEN DAILY

## 2022-03-19 PROCEDURE — 80053 COMPREHEN METABOLIC PANEL: CPT

## 2022-03-19 PROCEDURE — 85025 COMPLETE CBC W/AUTO DIFF WBC: CPT

## 2022-03-19 PROCEDURE — 74011250637 HC RX REV CODE- 250/637: Performed by: HOSPITALIST

## 2022-03-19 PROCEDURE — 82962 GLUCOSE BLOOD TEST: CPT

## 2022-03-19 PROCEDURE — 80069 RENAL FUNCTION PANEL: CPT

## 2022-03-19 PROCEDURE — 92526 ORAL FUNCTION THERAPY: CPT

## 2022-03-19 PROCEDURE — 74011250636 HC RX REV CODE- 250/636: Performed by: HOSPITALIST

## 2022-03-19 PROCEDURE — 65610000006 HC RM INTENSIVE CARE

## 2022-03-19 RX ORDER — MAGNESIUM SULFATE 1 G/100ML
1 INJECTION INTRAVENOUS ONCE
Status: COMPLETED | OUTPATIENT
Start: 2022-03-19 | End: 2022-03-19

## 2022-03-19 RX ORDER — METOPROLOL TARTRATE 5 MG/5ML
5 INJECTION INTRAVENOUS
Status: DISCONTINUED | OUTPATIENT
Start: 2022-03-19 | End: 2022-03-29 | Stop reason: HOSPADM

## 2022-03-19 RX ADMIN — HYDROXYZINE PAMOATE 25 MG: 25 CAPSULE ORAL at 21:20

## 2022-03-19 RX ADMIN — ACETAMINOPHEN 650 MG: 325 TABLET ORAL at 10:53

## 2022-03-19 RX ADMIN — BENZTROPINE MESYLATE 0.5 MG: 1 TABLET ORAL at 09:29

## 2022-03-19 RX ADMIN — BENZTROPINE MESYLATE 0.5 MG: 1 TABLET ORAL at 21:18

## 2022-03-19 RX ADMIN — DEXTROSE MONOHYDRATE 150 MG: 50 INJECTION, SOLUTION INTRAVENOUS at 09:00

## 2022-03-19 RX ADMIN — CLONIDINE HYDROCHLORIDE 0.1 MG: 0.1 TABLET ORAL at 09:29

## 2022-03-19 RX ADMIN — FUROSEMIDE 40 MG: 10 INJECTION, SOLUTION INTRAMUSCULAR; INTRAVENOUS at 09:29

## 2022-03-19 RX ADMIN — DIVALPROEX SODIUM 250 MG: 250 TABLET, DELAYED RELEASE ORAL at 09:34

## 2022-03-19 RX ADMIN — SODIUM CHLORIDE, PRESERVATIVE FREE 10 ML: 5 INJECTION INTRAVENOUS at 22:47

## 2022-03-19 RX ADMIN — LISINOPRIL 20 MG: 20 TABLET ORAL at 09:00

## 2022-03-19 RX ADMIN — SODIUM CHLORIDE, PRESERVATIVE FREE 10 ML: 5 INJECTION INTRAVENOUS at 14:36

## 2022-03-19 RX ADMIN — RISPERIDONE 2 MG: 2 TABLET, FILM COATED ORAL at 21:18

## 2022-03-19 RX ADMIN — PIPERACILLIN AND TAZOBACTAM 3.38 G: 3; .375 INJECTION, POWDER, LYOPHILIZED, FOR SOLUTION INTRAVENOUS at 18:34

## 2022-03-19 RX ADMIN — IPRATROPIUM BROMIDE AND ALBUTEROL SULFATE 3 ML: .5; 3 SOLUTION RESPIRATORY (INHALATION) at 07:20

## 2022-03-19 RX ADMIN — LEVOFLOXACIN 750 MG: 5 INJECTION, SOLUTION INTRAVENOUS at 09:31

## 2022-03-19 RX ADMIN — FAMOTIDINE 20 MG: 20 TABLET, FILM COATED ORAL at 21:19

## 2022-03-19 RX ADMIN — IPRATROPIUM BROMIDE AND ALBUTEROL SULFATE 3 ML: .5; 3 SOLUTION RESPIRATORY (INHALATION) at 16:00

## 2022-03-19 RX ADMIN — SODIUM CHLORIDE, PRESERVATIVE FREE 10 ML: 5 INJECTION INTRAVENOUS at 06:00

## 2022-03-19 RX ADMIN — AMLODIPINE BESYLATE 10 MG: 5 TABLET ORAL at 09:28

## 2022-03-19 RX ADMIN — PIPERACILLIN AND TAZOBACTAM 3.38 G: 3; .375 INJECTION, POWDER, LYOPHILIZED, FOR SOLUTION INTRAVENOUS at 03:18

## 2022-03-19 RX ADMIN — MULTIVITAMIN TABLET 1 TABLET: TABLET at 09:29

## 2022-03-19 RX ADMIN — IPRATROPIUM BROMIDE AND ALBUTEROL SULFATE 3 ML: .5; 3 SOLUTION RESPIRATORY (INHALATION) at 20:07

## 2022-03-19 RX ADMIN — MAGNESIUM SULFATE IN DEXTROSE 1 G: 10 INJECTION, SOLUTION INTRAVENOUS at 09:00

## 2022-03-19 RX ADMIN — PIPERACILLIN AND TAZOBACTAM 3.38 G: 3; .375 INJECTION, POWDER, LYOPHILIZED, FOR SOLUTION INTRAVENOUS at 10:53

## 2022-03-19 RX ADMIN — LORAZEPAM 0.5 MG: 2 INJECTION INTRAMUSCULAR at 14:35

## 2022-03-19 RX ADMIN — ENOXAPARIN SODIUM 40 MG: 100 INJECTION SUBCUTANEOUS at 03:18

## 2022-03-19 RX ADMIN — DIVALPROEX SODIUM 250 MG: 250 TABLET, DELAYED RELEASE ORAL at 21:19

## 2022-03-19 RX ADMIN — RISPERIDONE 2 MG: 2 TABLET, FILM COATED ORAL at 09:29

## 2022-03-19 RX ADMIN — IPRATROPIUM BROMIDE AND ALBUTEROL SULFATE 3 ML: .5; 3 SOLUTION RESPIRATORY (INHALATION) at 13:50

## 2022-03-19 NOTE — PROGRESS NOTES
Patients HR in 200's sustained. Amio bolus per ACLS protocol was given. Received telephone order from Dr. Greer Persaud to give 1gram of Magnesium and Amio bolus. EKG was also performed which showed Sinus Tachy with wide QRS. Cardiology consult was placed at this time.

## 2022-03-19 NOTE — PROGRESS NOTES
Pulmonary Progress Note      NAME: Carol Brandon   :  1960  MRM:  [id number]    Date/Time: 3/19/2022  4:22 PM         Subjective:     Patient seen and examined. Discussed with the nursing staff. I was called earlier this morning as well. Patient had apparently developed a wide complex tachycardia patient was given amiodarone and magnesium. Cardiology was consulted. Apparently she was in sinus tachycardia it was thought to be due to underlying infection. Her heart rate is now much well controlled. She is on 50% Ventimask. She is awake and is talking. She is on modified diet. Past Medical History reviewed and unchanged from Admission History and Physical       Objective:     Physical Exam     Vitals:      Last 24hrs VS reviewed since prior progress note. Most recent are:    Visit Vitals  BP (!) 100/59 (BP 1 Location: Left upper arm, BP Patient Position: At rest)   Pulse 99   Temp 97.8 °F (36.6 °C)   Resp 21   Ht 5' 4\" (1.626 m)   Wt 63.5 kg (140 lb)   SpO2 97%   BMI 24.03 kg/m²     SpO2 Readings from Last 6 Encounters:   22 97%   22 99%   22 98%   21 96%   21 97%   09/15/21 97%    O2 Flow Rate (L/min): 15 l/min       Intake/Output Summary (Last 24 hours) at 3/19/2022 1622  Last data filed at 3/19/2022 1057  Gross per 24 hour   Intake    Output 2100 ml   Net -2100 ml      General: Lying in bed in mild respiratory distress, on Ventimask  Eye: Pupils are equal and reactive to light. Throat and Neck: Oropharynx without thrush. Neck is supple and trachea central.  No obvious lymphadenopathy. Lung: Reduced air entry bilaterally with prolonged exhalation but no wheezing. Occasional crackles. Heart: S1+S2. No murmurs  Abdomen: soft, non-tender. Bowel sounds normal. No masses; obese  Extremities: No edema, no cyanosis or clubbing. Pulses are palpable.   : Not done  Skin: No cyanosis  Neurologic:  Alert and awake, pleasantly confused, grossly nonfocal  Psychiatric:  Unable to perform due to patient's condition    XR CHEST PORT   Final Result   FINDINGS/IMPRESSION:   Persistent diffuse airspace opacity throughout the lungs. Cardiac silhouette stable in size. Negative for pneumothorax. CT CHEST WO CONT   Final Result   Extensive diffuse bilateral pneumonitis and patchy pneumonia      XR CHEST PORT   Final Result   Diffuse predominantly interstitial process in both lungs with some   greater involvement on the left. Differential considerations include pulmonary   edema and diffuse infection.                     Lab Data Reviewed: (see below)      Medications:  Current Facility-Administered Medications   Medication Dose Route Frequency    metoprolol (LOPRESSOR) injection 5 mg  5 mg IntraVENous Q6H PRN    amLODIPine (NORVASC) tablet 10 mg  10 mg Oral DAILY    benztropine (COGENTIN) tablet 0.5 mg  0.5 mg Oral BID    cloNIDine HCL (CATAPRES) tablet 0.1 mg  0.1 mg Oral DAILY    famotidine (PEPCID) tablet 20 mg  20 mg Oral QHS    lisinopriL (PRINIVIL, ZESTRIL) tablet 20 mg  20 mg Oral DAILY    risperiDONE (RisperDAL) tablet 2 mg  2 mg Oral BID    multivitamin with folic acid (ONE DAILY WITH FOLIC ACID) tablet 1 Tablet  1 Tablet Oral DAILY    divalproex DR (DEPAKOTE) tablet 250 mg  250 mg Oral Q12H    piperacillin-tazobactam (ZOSYN) 3.375 g in 0.9% sodium chloride (MBP/ADV) 100 mL MBP  3.375 g IntraVENous Q8H    sodium chloride (NS) flush 5-40 mL  5-40 mL IntraVENous Q8H    sodium chloride (NS) flush 5-40 mL  5-40 mL IntraVENous PRN    acetaminophen (TYLENOL) tablet 650 mg  650 mg Oral Q4H PRN    enoxaparin (LOVENOX) injection 40 mg  40 mg SubCUTAneous Q24H    albuterol CONCENTRATE 2.5mg/0.5 mL neb soln  2.5 mg Nebulization Q4H PRN    albuterol-ipratropium (DUO-NEB) 2.5 MG-0.5 MG/3 ML  3 mL Nebulization QID RT    levoFLOXacin (LEVAQUIN) 750 mg in D5W IVPB  750 mg IntraVENous Q24H    furosemide (LASIX) injection 40 mg  40 mg IntraVENous DAILY    hydrOXYzine pamoate (VISTARIL) capsule 25 mg  25 mg Oral Q6H PRN    LORazepam (ATIVAN) injection 0.5 mg  0.5 mg IntraVENous Q4H PRN       ______________________________________________________________________      Lab Review:     Recent Labs     03/19/22  0520 03/18/22 0351 03/17/22 2146   WBC 5.3 4.0 4.4   HGB 10.9* 10.5* 10.9*   HCT 32.3* 31.5* 32.5*    198 193     Recent Labs     03/19/22  0900 03/19/22  0520 03/18/22 0351 03/17/22 2146   NA  --  140  140 138 138   K  --  4.0  4.0 Hemolyzed, recollect requested 4.7   CL  --  107  108 111* 107   CO2  --  30  30 26 26   GLU  --  75  73 78 108*   BUN  --  17  17 25* 28*   CREA  --  1.01  0.99 0.92 1.19*   CA  --  8.3*  8.2* 7.8* 8.3*   MG 1.6  --   --   --    PHOS  --  3.6 4.5  --    ALB  --  1.5*  1.5* 1.5* 1.8*   ALT  --  18  --  21     No components found for: [place]  Recent Labs     03/18/22 2021 03/18/22  1800 03/18/22  0345   PH 7.50* 7.49* 7.40   PCO2 36 39 43   PO2 116* 30* 60*   HCO3 29* 29* 25   FIO2  --  50.0 50.0     No results for input(s): INR, INREXT, INREXT in the last 72 hours. Other pertinent lab:          Assessment & Plan:      Assessment:      1. Acute respiratory failure with hypoxia due to #2 and 3  2. Bilateral healthcare associated pneumonia  3. Acute CHF and sinus tachycardia due to #4  4. Severe sepsis  5. Altered mental status/acute metabolic encephalopathy  6. COPD  7. HIV  8. Schizophrenia  9. History of alcohol abuse     Plan:      Patient admitted to the ICU  We will be watching her closely     Critically ill  Currently on 50% Ventimask oxygen  Blood gases done 3/18/2022 showed 7.50/36/116 on nonrebreather mask. If she decompensates will initiate BiPAP but she is not a CO2 retainer. Chest x-ray shows bilateral infiltrates. Personally reviewed.     Patient has COPD  Continue nebulizers  Will use steroids if develops worsening shortness of breath and signs of exacerbation     Patient has combination of CHF and healthcare associated pneumonia, speech is also involved. There is a possibility of aspiration pneumonia. Continue broad-spectrum antibiotics, currently on Levaquin and Zosyn. Cultures sent  Further changes in antibiotics based on clinical response and culture results    Not requiring vasopressors at present  We will use if needed  Patient developed sinus tachycardia. She was given magnesium and amiodarone. Now started on metoprolol 5 mg IV every 6 hours as needed. Appreciate input from cardiology.     I believe x-ray suggests CHF    DC IV fluids  Started on Lasix IV daily since blood pressure stable  Intake and output charting  Check electrolytes and replace as needed  Monitor renal function with fluid change     Monitor mental status      DVT and GI prophylaxis     Clinical status  Monitor in ICU overnight     Case discussed in detail with RN, RT, and care team  Thank you for involving me in the care of this patient  I will follow with you closely during hospitalization     Time spent more than 60 minutes in direct patient care with no overlap reviewing results and records, decision making, and answering questions.       Caren Rosas MD  [facility]

## 2022-03-19 NOTE — PROGRESS NOTES
SPEECH LANGUAGE PATHOLOGY DYSPHAGIA TREATMENT  Patient: Angella Smyth (45 y.o. female)  Date: 3/19/2022  Diagnosis: Aspiration pneumonia ([facility] [address] [J69.0]  Acute respiratory failure with hypoxemia ([address] [J96.01] <principal problem not specified>       Precautions: Aspiration       ASSESSMENT:  Per nsg, patient tolerating MM5/thin diet without difficulty. Nsg provided consent for Ventimask to be removed for po trials. Patient alert, oriented to self, in bed, repositioned upright prior to po trials. Patient with impulsivity, attempting to reach for cup while being repositioned and with Ventimask still on. O2 sats 95-96% at baseline. Administered 2 trials thin via cup/straw only s/t respiratory status. Oral phase c/b reduced bolus coordination, delayed a-p transit. Pharyngeal phase c/b mild delay in initiation of swallow, HLE appears adequate to digital palpation. No clinical indicators aspiration or penetration observed. O2 sats declined to 88-89% after each trial, increased WOB; Ventimask replaced between each trial. Nsg notified, reports did not occur during am meal and will continue to monitor with po. PLAN:  Recommendations and Planned Interventions:  Recommend continue with MM5/thin diet at this time with STRICT aspiration precautions. 1:1 supervision with ALL po, feed only when awake and alert, slow rate of intake, allow extra time to recover between sips/bites, small sips, small bites. If patient unable to tolerate po, recommend downgrade diet to NPO, except small single sips liquids. Patient continues to benefit from skilled intervention to address the above impairments. Continue treatment per established plan of care. Discharge Recommendations: To Be Determined     SUBJECTIVE:   Patient reports no difficulty tolerating current diet, states \"I want some ice water. \"    OBJECTIVE:     CXR Results  (Last 48 hours)                 03/19/22 0255  XR CHEST PORT Final result    Impression: FINDINGS/IMPRESSION:   Persistent diffuse airspace opacity throughout the lungs. Cardiac silhouette stable in size. Negative for pneumothorax. Narrative:  XR CHEST PORT       Comparison: Chest CT dated March 18, 2022 03/17/22 2157  XR CHEST PORT Final result    Impression:  Diffuse predominantly interstitial process in both lungs with some   greater involvement on the left. Differential considerations include pulmonary   edema and diffuse infection. Narrative:  PROCEDURE: XR CHEST PORT       HISTORY:Short of breath       COMPARISON:Chest x-ray 24th of February 2022           TECHNIQUE: AP portable chest       LIMITATIONS: None       TUBES/LINES: None       LUNG PARENCHYMA/PLEURA: The lungs show increased interstitial markings   diffusely, more extensive on the left than the right. No focal alveolar   infiltrate. TRACHEA/BRONCHI: Normal   PULMONARY VESSELS: Pulmonary vessels are prominent   HEART: Heart size stable   MEDIASTINUM: Normal   BONE/SOFT TISSUES: No acute process       OTHER: None                 CT Results  (Last 48 hours)                 03/18/22 0446  CT CHEST WO CONT Final result    Impression:  Extensive diffuse bilateral pneumonitis and patchy pneumonia       Narrative:  CT dose reduction was achieved through use of a standardized protocol tailored   for this examination and automatic exposure control for dose modulation. Noncontrast study shows extensive confluent variable density groundglass   opacification throughout most of each lung, relative subpleural sparing and   respiratory artifact. Mild bullous emphysema in the upper lobes. Central airways   are open       No mediastinal or hilar adenopathy. Normal caliber aorta. Advanced coronary   calcification. Trace left pleural effusion.  No significant upper abdominal   finding                  Cognitive and Communication Status:  Neurologic State: Alert,Eyes open spontaneously,Confused  Orientation Level: Oriented to person  Cognition: Decreased attention/concentration,Decreased command following,Impulsive  Perception: Appears intact  Perseveration: No perseveration noted  Safety/Judgement: Decreased awareness of environment,Decreased awareness of need for assistance,Decreased awareness of need for safety,Decreased insight into deficits                                     Pain:  Pain Scale 1: Numeric (0 - 10)  Pain Intensity 1: 0       After treatment:   Patient left in no apparent distress in bed, Call bell within reach, and Nursing notified    COMMUNICATION/EDUCATION:   Patient was educated regarding her deficit(s) of dysphagia, swallow safety precautions, diet recs and POC. She demonstrated fair understanding as evidenced by decreased attention/concentration, decreased insight into deficits, impulsivity, confusion. The patient's plan of care including recommendations, planned interventions, and recommended diet changes were discussed with: Registered nurse. Neema Guadarrama M.S., CCC-SLP               Problem: Dysphagia (Adult)  Goal: *Acute Goals and Plan of Care (Insert Text)  Description: Speech Therapy Swallow Goals  Initiated 3/18/2022  -Patient will tolerate minced and moist diet with thin liquids without clinical indicators of aspiration given minimal cues within 7 day(s). -Patient will tolerate PO trials without clinical indicators of aspiration given minimal cues within 7 day(s). -Patient will participate in modified barium swallow study within 7 day(s). -Patient will demonstrate understanding of swallow safety precautions and aspiration precautions, diet recs with minimal cues within 7 day(s).            Outcome: Progressing Towards Goal

## 2022-03-19 NOTE — PROGRESS NOTES
Hospitalist Progress Note               Daily Progress Note: 3/19/2022      Subjective: The patient is seen for follow up. She is a 28-year-old female with a history of COPD, alcohol abuse, HIV, schizophrenia and hepatitis C, lives in a group home. She was admitted about 3 weeks ago with aspiration pneumonia treated with Zosyn and doxycycline. Sent to the ED last night due to altered mental status and cough. She was febrile, tachycardic and with gurgling breath sounds. She was hypoxic and started on a Ventimask. X-ray showed diffuse predominantly interstitial process in both lungs greater on the left. Admitted to ICU and started on IV Zosyn    ------    Patient seen for follow-up. Her mentation is much   better today. She is awake and alert, knows she is in a hospital.  She had an episode of sustained narrow complex tachycardia earlier at around 200. She was given IV amiodarone and heart rate is now down to around 130, appears to be sinus tachycardia    When I first went in the room her oxygen was off and saturations were 100%.   After a few minutes saturations dropped into the 80s on room air    Problem List:  Problem List as of 3/19/2022 Date Reviewed: 3/18/2022          Codes Class Noted - Resolved    Aspiration pneumonia ([facility] ICD-10-CM: J69.0  ICD-9-CM: 507.0  3/18/2022 - Present        Acute respiratory failure with hypoxemia [facility]) ICD-10-CM: J96.01  ICD-9-CM: 518.81  3/18/2022 - Present        Acute respiratory failure with hypoxia ([facility]) ICD-10-CM: J96.01  ICD-9-CM: 518.81  2/25/2022 - Present        Pneumonia ICD-10-CM: J18.9  ICD-9-CM: 409  2/24/2022 - Present        Sepsis ([facility] ICD-10-CM: A41.9  ICD-9-CM: 038.9, 995.91  2/24/2022 - Present        Chronic undifferentiated schizophrenia with acute exacerbations ([facility] ICD-10-CM: F20.9  ICD-9-CM: 295.64  5/29/2017 - Present        Non-compliance with treatment ICD-10-CM: Z91.19  ICD-9-CM: V15.81  5/29/2017 - Present        Essential hypertension ICD-10-CM: I10  ICD-9-CM: 401.9  5/29/2017 - Present        Alcohol abuse ICD-10-CM: F10.10  ICD-9-CM: 305.00  5/29/2017 - Present              Medications reviewed  Current Facility-Administered Medications   Medication Dose Route Frequency    amiodarone (CORDARONE) 150 mg in dextrose 5% 100 mL bolus infusion  150 mg IntraVENous NOW    magnesium sulfate 1 g/100 ml IVPB (premix or compounded)  1 g IntraVENous ONCE    amLODIPine (NORVASC) tablet 10 mg  10 mg Oral DAILY    benztropine (COGENTIN) tablet 0.5 mg  0.5 mg Oral BID    cloNIDine HCL (CATAPRES) tablet 0.1 mg  0.1 mg Oral DAILY    famotidine (PEPCID) tablet 20 mg  20 mg Oral QHS    lisinopriL (PRINIVIL, ZESTRIL) tablet 20 mg  20 mg Oral DAILY    risperiDONE (RisperDAL) tablet 2 mg  2 mg Oral BID    multivitamin with folic acid (ONE DAILY WITH FOLIC ACID) tablet 1 Tablet  1 Tablet Oral DAILY    divalproex DR (DEPAKOTE) tablet 250 mg  250 mg Oral Q12H    piperacillin-tazobactam (ZOSYN) 3.375 g in 0.9% sodium chloride (MBP/ADV) 100 mL MBP  3.375 g IntraVENous Q8H    sodium chloride (NS) flush 5-40 mL  5-40 mL IntraVENous Q8H    sodium chloride (NS) flush 5-40 mL  5-40 mL IntraVENous PRN    acetaminophen (TYLENOL) tablet 650 mg  650 mg Oral Q4H PRN    enoxaparin (LOVENOX) injection 40 mg  40 mg SubCUTAneous Q24H    albuterol CONCENTRATE 2.5mg/0.5 mL neb soln  2.5 mg Nebulization Q4H PRN    albuterol-ipratropium (DUO-NEB) 2.5 MG-0.5 MG/3 ML  3 mL Nebulization QID RT    levoFLOXacin (LEVAQUIN) 750 mg in D5W IVPB  750 mg IntraVENous Q24H    furosemide (LASIX) injection 40 mg  40 mg IntraVENous DAILY    hydrOXYzine pamoate (VISTARIL) capsule 25 mg  25 mg Oral Q6H PRN    LORazepam (ATIVAN) injection 0.5 mg  0.5 mg IntraVENous Q4H PRN       Review of Systems:   Review of systems not obtained due to patient factors.     Objective:   Physical Exam:     Visit Vitals  /74 (BP 1 Location: Left upper arm, BP Patient Position: At rest) Pulse (!) 121   Temp 99.5 °F (37.5 °C)   Resp (!) 38   Ht 5' 4\" (1.626 m)   Wt 63.5 kg (140 lb)   SpO2 100%   BMI 24.03 kg/m²    O2 Flow Rate (L/min): 15 l/min O2 Device: Venturi-mask    Temp (24hrs), Av.9 °F (37.2 °C), Min:98.4 °F (36.9 °C), Max:99.5 °F (37.5 °C)    No intake/output data recorded.  1901 -  0700  In: 350 [I.V.:350]  Out: 1300 [Urine:1300]    General:   Awake and alert   Lungs:    Crackles bilateral   Chest wall:  No tenderness or deformity. Heart:  Regular rate and rhythm, S1, S2 normal, no murmur, click, rub or gallop. Abdomen:   Soft, non-tender. Bowel sounds normal. No masses,  No organomegaly. Extremities: Extremities normal, atraumatic, no cyanosis or edema. Pulses: 2+ and symmetric all extremities. Skin: Skin color, texture, turgor normal. No rashes or lesions   Neurologic: CNII-XII intact.   No gross focal deficits         Data Review:       Recent Days:  Recent Labs     22  0520 22  0351 22  2146   WBC 5.3 4.0 4.4   HGB 10.9* 10.5* 10.9*   HCT 32.3* 31.5* 32.5*    198 193     Recent Labs     22  0520 22  0351 22  2146     140 138 138   K 4.0  4.0 Hemolyzed, recollect requested 4.7     108 111* 107   CO2 30  30 26 26   GLU 75  73 78 108*   BUN 17  17 25* 28*   CREA 1.01  0.99 0.92 1.19*   CA 8.3*  8.2* 7.8* 8.3*   PHOS 3.6 4.5  --    ALB 1.5*  1.5* 1.5* 1.8*   TBILI 0.6  --  0.6   ALT 18  --  21     Recent Labs     22  1800 22  0345   PH 7.50* 7.49* 7.40   PCO2 36 39 43   PO2 116* 30* 60*   HCO3 29* 29* 25   FIO2  --  50.0 50.0       24 Hour Results:  Recent Results (from the past 24 hour(s))   BLOOD GAS, ARTERIAL    Collection Time: 22  6:00 PM   Result Value Ref Range    pH 7.49 (H) 7.35 - 7.45      PCO2 39 35 - 45 mmHg    PO2 30 (LL) 75 - 100 mmHg    O2 SAT 56 (LL) >95 %    BICARBONATE 29 (H) 22 - 26 mmol/L    BASE EXCESS 5.9 (H) 0 - 2 mmol/L    O2 FLOW RATE 15.0 L/min    FIO2 50.0 %    EPAP/CPAP/PEEP 0      SITE Right Radial      IVAN'S TEST PASS     BLOOD GAS, ARTERIAL    Collection Time: 03/18/22  8:21 PM   Result Value Ref Range    pH 7.50 (H) 7.35 - 7.45      PCO2 36 35 - 45 mmHg    PO2 116 (H) 75 - 100 mmHg    O2 SAT 99 >95 %    BICARBONATE 29 (H) 22 - 26 mmol/L    BASE EXCESS 5.2 (H) 0 - 2 mmol/L    O2 METHOD NRB mask      O2 FLOW RATE 15 L/min    SITE Right Radial      IVAN'S TEST PASS     METABOLIC PANEL, COMPREHENSIVE    Collection Time: 03/19/22  5:20 AM   Result Value Ref Range    Sodium 140 136 - 145 mmol/L    Potassium 4.0 3.5 - 5.1 mmol/L    Chloride 107 97 - 108 mmol/L    CO2 30 21 - 32 mmol/L    Anion gap 3 (L) 5 - 15 mmol/L    Glucose 75 65 - 100 mg/dL    BUN 17 6 - 20 mg/dL    Creatinine 1.01 0.55 - 1.02 mg/dL    BUN/Creatinine ratio 17 12 - 20      GFR est AA >60 >60 ml/min/1.73m2    GFR est non-AA 56 (L) >60 ml/min/1.73m2    Calcium 8.3 (L) 8.5 - 10.1 mg/dL    Bilirubin, total 0.6 0.2 - 1.0 mg/dL    AST (SGOT) 97 (H) 15 - 37 U/L    ALT (SGPT) 18 12 - 78 U/L    Alk. phosphatase 63 45 - 117 U/L    Protein, total 7.3 6.4 - 8.2 g/dL    Albumin 1.5 (L) 3.5 - 5.0 g/dL    Globulin 5.8 (H) 2.0 - 4.0 g/dL    A-G Ratio 0.3 (L) 1.1 - 2.2     CBC WITH AUTOMATED DIFF    Collection Time: 03/19/22  5:20 AM   Result Value Ref Range    WBC 5.3 3.6 - 11.0 K/uL    RBC 3.64 (L) 3.80 - 5.20 M/uL    HGB 10.9 (L) 11.5 - 16.0 g/dL    HCT 32.3 (L) 35.0 - 47.0 %    MCV 88.7 80.0 - 99.0 FL    MCH 29.9 26.0 - 34.0 PG    MCHC 33.7 30.0 - 36.5 g/dL    RDW 14.4 11.5 - 14.5 %    PLATELET 686 109 - 662 K/uL    MPV 11.4 8.9 - 12.9 FL    NRBC 0.0 0.0  WBC    ABSOLUTE NRBC 0.00 0.00 - 0.01 K/uL    NEUTROPHILS 78 (H) 32 - 75 %    LYMPHOCYTES 20 12 - 49 %    MONOCYTES 2 (L) 5 - 13 %    EOSINOPHILS 0 0 - 7 %    BASOPHILS 0 0 - 1 %    IMMATURE GRANULOCYTES 0 0 - 0.5 %    ABS. NEUTROPHILS 4.1 1.8 - 8.0 K/UL    ABS. LYMPHOCYTES 1.1 0.8 - 3.5 K/UL    ABS.  MONOCYTES 0.1 0.0 - 1.0 K/UL ABS. EOSINOPHILS 0.0 0.0 - 0.4 K/UL    ABS. BASOPHILS 0.0 0.0 - 0.1 K/UL    ABS. IMM. GRANS. 0.0 0.00 - 0.04 K/UL    DF AUTOMATED     RENAL FUNCTION PANEL    Collection Time: 03/19/22  5:20 AM   Result Value Ref Range    Sodium 140 136 - 145 mmol/L    Potassium 4.0 3.5 - 5.1 mmol/L    Chloride 108 97 - 108 mmol/L    CO2 30 21 - 32 mmol/L    Anion gap 2 (L) 5 - 15 mmol/L    Glucose 73 65 - 100 mg/dL    BUN 17 6 - 20 mg/dL    Creatinine 0.99 0.55 - 1.02 mg/dL    BUN/Creatinine ratio 17 12 - 20      GFR est AA >60 >60 ml/min/1.73m2    GFR est non-AA 57 (L) >60 ml/min/1.73m2    Calcium 8.2 (L) 8.5 - 10.1 mg/dL    Phosphorus 3.6 2.6 - 4.7 mg/dL    Albumin 1.5 (L) 3.5 - 5.0 g/dL   GLUCOSE, POC    Collection Time: 03/19/22  8:20 AM   Result Value Ref Range    Glucose (POC) 90 65 - 117 mg/dL    Performed by Jacquelyn Mckay        XR CHEST PORT   Final Result   FINDINGS/IMPRESSION:   Persistent diffuse airspace opacity throughout the lungs. Cardiac silhouette stable in size. Negative for pneumothorax. CT CHEST WO CONT   Final Result   Extensive diffuse bilateral pneumonitis and patchy pneumonia      XR CHEST PORT   Final Result   Diffuse predominantly interstitial process in both lungs with some   greater involvement on the left. Differential considerations include pulmonary   edema and diffuse infection. Assessment:  Bilateral healthcare associated pneumonia    Acute respiratory failure with hypoxia. Continues to require a Ventimask    Sustained narrow complex tachycardia, likely SVT    Severe sepsis with fever, tachypnea, tachycardia and altered mental status    Metabolic/septic encephalopathy. Improved    Schizophrenia    HIV disease, details unknown.   Does not appear to be on HAART    History of alcohol abuse    COPD      Plan:  Continue Zosyn and levofloxacin  Continue IV fluids and supportive care  Cardiology consultation    Care Plan discussed with: Nurse    Disposition: Continue ICU care for 24-hours    Total time spent with patient: 30 minutes.     Elisabet Valverde MD

## 2022-03-19 NOTE — CONSULTS
CARDIOLOGY CONSULTATION    REASON FOR CONSULT: Sinus tachycardia     HISTORY OF PRESENT ILLNESS:  Ms. Armen Denton is a 62year old female with PMH for HIV, Hep C, COPD, HTN and recently hospitalized for aspiration PNA. She returns for AMS and cough. We are following for tachycardia rates in 200s. Amiodarone bolus given with some some improvement 121bpm. On examination she is lying in bed with Ventimask. She is confused. Denies chest pain. REVIEW OF SYSTEMS:  Per HPI above    Telemetry reviewed: Sinus tachycardia     Physical examination:  Vital signs are stable, Blood pressure 100/74,  Pulse 121  No apparent distress. Heart is regular, rate and rhythm. Normal S1, S2, no murmurs are appreciated. Lungs crackles bilaterally  Abdomen is soft, nontender, normal bowel sounds. Extremities have no edema. Recent labs results and imaging reviewed. Discussed case with Dr. Isis Carr. This our impression and recommendation:    1. Sinus tachycardia: Likely due to infection  - Given Amiodarone bolus, HR 120s  - Will order Lopressor IV for rate control  - Treat underlying condition  - Keep serum potassium between 4-5 and serum magnesium >2. Magnesium 1.6, continue to replete    2. Hypertension: Blood pressure stable. Thank you for involving us in the care of this patient. Please do not hesitate to call if additional questions arise. Jt Sera Addendum:  Agree with findings and plan noted above. Appears to have sinus tachycardia secondary to the infection.

## 2022-03-20 PROBLEM — J18.9 PNEUMONIA: Status: ACTIVE | Noted: 2022-02-24

## 2022-03-20 LAB
ANION GAP SERPL CALC-SCNC: 4 MMOL/L (ref 5–15)
BASOPHILS # BLD: 0 K/UL (ref 0–0.1)
BASOPHILS NFR BLD: 0 % (ref 0–1)
BUN SERPL-MCNC: 24 MG/DL (ref 6–20)
BUN/CREAT SERPL: 20 (ref 12–20)
CA-I BLD-MCNC: 8 MG/DL (ref 8.5–10.1)
CHLORIDE SERPL-SCNC: 109 MMOL/L (ref 97–108)
CO2 SERPL-SCNC: 26 MMOL/L (ref 21–32)
COVID-19 RAPID TEST, COVR: NOT DETECTED
CREAT SERPL-MCNC: 1.21 MG/DL (ref 0.55–1.02)
DIFFERENTIAL METHOD BLD: ABNORMAL
EOSINOPHIL # BLD: 0 K/UL (ref 0–0.4)
EOSINOPHIL NFR BLD: 0 % (ref 0–7)
ERYTHROCYTE [DISTWIDTH] IN BLOOD BY AUTOMATED COUNT: 14.5 % (ref 11.5–14.5)
GLUCOSE BLD STRIP.AUTO-MCNC: 108 MG/DL (ref 65–117)
GLUCOSE SERPL-MCNC: 80 MG/DL (ref 65–100)
HCT VFR BLD AUTO: 32.4 % (ref 35–47)
HGB BLD-MCNC: 11.1 G/DL (ref 11.5–16)
IMM GRANULOCYTES # BLD AUTO: 0 K/UL (ref 0–0.04)
IMM GRANULOCYTES NFR BLD AUTO: 0 % (ref 0–0.5)
LYMPHOCYTES # BLD: 1.4 K/UL (ref 0.8–3.5)
LYMPHOCYTES NFR BLD: 21 % (ref 12–49)
MAGNESIUM SERPL-MCNC: 1.8 MG/DL (ref 1.6–2.4)
MCH RBC QN AUTO: 30.2 PG (ref 26–34)
MCHC RBC AUTO-ENTMCNC: 34.3 G/DL (ref 30–36.5)
MCV RBC AUTO: 88 FL (ref 80–99)
MONOCYTES # BLD: 0.1 K/UL (ref 0–1)
MONOCYTES NFR BLD: 2 % (ref 5–13)
NEUTS SEG # BLD: 5 K/UL (ref 1.8–8)
NEUTS SEG NFR BLD: 77 % (ref 32–75)
NRBC # BLD: 0 K/UL (ref 0–0.01)
NRBC BLD-RTO: 0 PER 100 WBC
PERFORMED BY, TECHID: NORMAL
PLATELET # BLD AUTO: 194 K/UL (ref 150–400)
PMV BLD AUTO: 11.4 FL (ref 8.9–12.9)
POTASSIUM SERPL-SCNC: 4 MMOL/L (ref 3.5–5.1)
PROCALCITONIN SERPL-MCNC: 1.11 NG/ML
RBC # BLD AUTO: 3.68 M/UL (ref 3.8–5.2)
SODIUM SERPL-SCNC: 139 MMOL/L (ref 136–145)
WBC # BLD AUTO: 6.5 K/UL (ref 3.6–11)

## 2022-03-20 PROCEDURE — 74011000258 HC RX REV CODE- 258: Performed by: HOSPITALIST

## 2022-03-20 PROCEDURE — 80048 BASIC METABOLIC PNL TOTAL CA: CPT

## 2022-03-20 PROCEDURE — 87635 SARS-COV-2 COVID-19 AMP PRB: CPT

## 2022-03-20 PROCEDURE — 84145 PROCALCITONIN (PCT): CPT

## 2022-03-20 PROCEDURE — 86361 T CELL ABSOLUTE COUNT: CPT

## 2022-03-20 PROCEDURE — 65610000006 HC RM INTENSIVE CARE

## 2022-03-20 PROCEDURE — 74011250636 HC RX REV CODE- 250/636: Performed by: INTERNAL MEDICINE

## 2022-03-20 PROCEDURE — 74011250637 HC RX REV CODE- 250/637: Performed by: HOSPITALIST

## 2022-03-20 PROCEDURE — 74011000250 HC RX REV CODE- 250: Performed by: HOSPITALIST

## 2022-03-20 PROCEDURE — 85025 COMPLETE CBC W/AUTO DIFF WBC: CPT

## 2022-03-20 PROCEDURE — 99223 1ST HOSP IP/OBS HIGH 75: CPT | Performed by: INTERNAL MEDICINE

## 2022-03-20 PROCEDURE — 82962 GLUCOSE BLOOD TEST: CPT

## 2022-03-20 PROCEDURE — 83735 ASSAY OF MAGNESIUM: CPT

## 2022-03-20 PROCEDURE — 74011250636 HC RX REV CODE- 250/636: Performed by: HOSPITALIST

## 2022-03-20 PROCEDURE — 74011250637 HC RX REV CODE- 250/637: Performed by: INTERNAL MEDICINE

## 2022-03-20 PROCEDURE — 77010033678 HC OXYGEN DAILY

## 2022-03-20 PROCEDURE — 94640 AIRWAY INHALATION TREATMENT: CPT

## 2022-03-20 RX ORDER — DIVALPROEX SODIUM 125 MG/1
250 CAPSULE, COATED PELLETS ORAL EVERY 12 HOURS
Status: DISCONTINUED | OUTPATIENT
Start: 2022-03-20 | End: 2022-03-29 | Stop reason: HOSPADM

## 2022-03-20 RX ORDER — DILTIAZEM HYDROCHLORIDE 30 MG/1
60 TABLET, FILM COATED ORAL
Status: DISCONTINUED | OUTPATIENT
Start: 2022-03-20 | End: 2022-03-22

## 2022-03-20 RX ADMIN — IPRATROPIUM BROMIDE AND ALBUTEROL SULFATE 3 ML: .5; 3 SOLUTION RESPIRATORY (INHALATION) at 08:49

## 2022-03-20 RX ADMIN — MULTIVITAMIN TABLET 1 TABLET: TABLET at 08:03

## 2022-03-20 RX ADMIN — CLONIDINE HYDROCHLORIDE 0.1 MG: 0.1 TABLET ORAL at 08:03

## 2022-03-20 RX ADMIN — SODIUM CHLORIDE, PRESERVATIVE FREE 10 ML: 5 INJECTION INTRAVENOUS at 13:05

## 2022-03-20 RX ADMIN — AMLODIPINE BESYLATE 10 MG: 5 TABLET ORAL at 08:03

## 2022-03-20 RX ADMIN — DILTIAZEM HYDROCHLORIDE 60 MG: 30 TABLET, FILM COATED ORAL at 10:00

## 2022-03-20 RX ADMIN — FAMOTIDINE 20 MG: 20 TABLET, FILM COATED ORAL at 21:31

## 2022-03-20 RX ADMIN — ACETAMINOPHEN 650 MG: 325 TABLET ORAL at 07:58

## 2022-03-20 RX ADMIN — DIVALPROEX SODIUM 250 MG: 125 CAPSULE, COATED PELLETS ORAL at 20:26

## 2022-03-20 RX ADMIN — IPRATROPIUM BROMIDE AND ALBUTEROL SULFATE 3 ML: .5; 3 SOLUTION RESPIRATORY (INHALATION) at 20:48

## 2022-03-20 RX ADMIN — PIPERACILLIN AND TAZOBACTAM 3.38 G: 3; .375 INJECTION, POWDER, LYOPHILIZED, FOR SOLUTION INTRAVENOUS at 04:55

## 2022-03-20 RX ADMIN — FUROSEMIDE 40 MG: 10 INJECTION, SOLUTION INTRAMUSCULAR; INTRAVENOUS at 08:03

## 2022-03-20 RX ADMIN — SODIUM CHLORIDE, PRESERVATIVE FREE 10 ML: 5 INJECTION INTRAVENOUS at 05:55

## 2022-03-20 RX ADMIN — DILTIAZEM HYDROCHLORIDE 60 MG: 30 TABLET, FILM COATED ORAL at 16:43

## 2022-03-20 RX ADMIN — LEVOFLOXACIN 750 MG: 5 INJECTION, SOLUTION INTRAVENOUS at 08:02

## 2022-03-20 RX ADMIN — HYDROXYZINE PAMOATE 25 MG: 25 CAPSULE ORAL at 20:26

## 2022-03-20 RX ADMIN — RISPERIDONE 2 MG: 2 TABLET, FILM COATED ORAL at 20:26

## 2022-03-20 RX ADMIN — DILTIAZEM HYDROCHLORIDE 60 MG: 30 TABLET, FILM COATED ORAL at 13:05

## 2022-03-20 RX ADMIN — ACETAMINOPHEN 650 MG: 325 TABLET ORAL at 16:43

## 2022-03-20 RX ADMIN — RISPERIDONE 2 MG: 2 TABLET, FILM COATED ORAL at 08:03

## 2022-03-20 RX ADMIN — PIPERACILLIN AND TAZOBACTAM 3.38 G: 3; .375 INJECTION, POWDER, LYOPHILIZED, FOR SOLUTION INTRAVENOUS at 18:44

## 2022-03-20 RX ADMIN — IPRATROPIUM BROMIDE AND ALBUTEROL SULFATE 3 ML: .5; 3 SOLUTION RESPIRATORY (INHALATION) at 16:00

## 2022-03-20 RX ADMIN — ENOXAPARIN SODIUM 40 MG: 100 INJECTION SUBCUTANEOUS at 04:55

## 2022-03-20 RX ADMIN — IPRATROPIUM BROMIDE AND ALBUTEROL SULFATE 3 ML: .5; 3 SOLUTION RESPIRATORY (INHALATION) at 11:07

## 2022-03-20 RX ADMIN — BENZTROPINE MESYLATE 0.5 MG: 1 TABLET ORAL at 20:26

## 2022-03-20 RX ADMIN — ACETAMINOPHEN 650 MG: 325 TABLET ORAL at 20:26

## 2022-03-20 RX ADMIN — SODIUM CHLORIDE, PRESERVATIVE FREE 10 ML: 5 INJECTION INTRAVENOUS at 21:31

## 2022-03-20 RX ADMIN — PIPERACILLIN AND TAZOBACTAM 3.38 G: 3; .375 INJECTION, POWDER, LYOPHILIZED, FOR SOLUTION INTRAVENOUS at 13:05

## 2022-03-20 RX ADMIN — DIVALPROEX SODIUM 250 MG: 125 CAPSULE, COATED PELLETS ORAL at 08:31

## 2022-03-20 RX ADMIN — BENZTROPINE MESYLATE 0.5 MG: 1 TABLET ORAL at 08:03

## 2022-03-20 RX ADMIN — LISINOPRIL 20 MG: 20 TABLET ORAL at 08:03

## 2022-03-20 NOTE — PROGRESS NOTES
Pulmonary Progress Note      NAME: Juan Jaimes   :  1960  MRM:  [id number]    Date/Time: 3/20/2022  4:22 PM         Subjective:     Patient seen and examined. Discussed with the nursing staff. She has been anywhere between 50% Ventimask and sometimes on nonrebreather mask. The patient states that she wants to go home. Her fever is better. However she has been shaking intermittently. ID is on the case . She is taking minced and moist diet without any problems. Past Medical History reviewed and unchanged from Admission History and Physical       Objective:     Physical Exam     Vitals:      Last 24hrs VS reviewed since prior progress note. Most recent are:    Visit Vitals  /68 (BP Patient Position: At rest)   Pulse (!) 102   Temp 97.4 °F (36.3 °C)   Resp 27   Ht 5' 4\" (1.626 m)   Wt 56.1 kg (123 lb 10.9 oz)   SpO2 94%   BMI 21.23 kg/m²     SpO2 Readings from Last 6 Encounters:   22 94%   22 99%   22 98%   21 96%   21 97%   09/15/21 97%    O2 Flow Rate (L/min): 15 l/min       Intake/Output Summary (Last 24 hours) at 3/20/2022 1533  Last data filed at 3/20/2022 0000  Gross per 24 hour   Intake 150 ml   Output 500 ml   Net -350 ml      General: Lying in bed in mild respiratory distress, on Ventimask  Eye: Pupils are equal and reactive to light. Throat and Neck: Oropharynx without thrush. Neck is supple and trachea central.  No obvious lymphadenopathy. Lung: Reduced air entry bilaterally with prolonged exhalation but no wheezing. Occasional crackles. Heart: S1+S2. No murmurs  Abdomen: soft, non-tender. Bowel sounds normal. No masses; obese  Extremities: No edema, no cyanosis or clubbing. Pulses are palpable.   : Not done  Skin: No cyanosis  Neurologic:  Alert and awake, pleasantly confused, grossly nonfocal  Psychiatric:  Unable to perform due to patient's condition    XR CHEST PORT   Final Result   FINDINGS/IMPRESSION:   Persistent diffuse airspace opacity throughout the lungs. Cardiac silhouette stable in size. Negative for pneumothorax. CT CHEST WO CONT   Final Result   Extensive diffuse bilateral pneumonitis and patchy pneumonia      XR CHEST PORT   Final Result   Diffuse predominantly interstitial process in both lungs with some   greater involvement on the left. Differential considerations include pulmonary   edema and diffuse infection.                     Lab Data Reviewed: (see below)      Medications:  Current Facility-Administered Medications   Medication Dose Route Frequency    divalproex (DEPAKOTE SPRINKLE) capsule 250 mg  250 mg Oral Q12H    dilTIAZem IR (CARDIZEM) tablet 60 mg  60 mg Oral TIDAC    metoprolol (LOPRESSOR) injection 5 mg  5 mg IntraVENous Q6H PRN    benztropine (COGENTIN) tablet 0.5 mg  0.5 mg Oral BID    cloNIDine HCL (CATAPRES) tablet 0.1 mg  0.1 mg Oral DAILY    famotidine (PEPCID) tablet 20 mg  20 mg Oral QHS    lisinopriL (PRINIVIL, ZESTRIL) tablet 20 mg  20 mg Oral DAILY    risperiDONE (RisperDAL) tablet 2 mg  2 mg Oral BID    multivitamin with folic acid (ONE DAILY WITH FOLIC ACID) tablet 1 Tablet  1 Tablet Oral DAILY    piperacillin-tazobactam (ZOSYN) 3.375 g in 0.9% sodium chloride (MBP/ADV) 100 mL MBP  3.375 g IntraVENous Q8H    sodium chloride (NS) flush 5-40 mL  5-40 mL IntraVENous Q8H    sodium chloride (NS) flush 5-40 mL  5-40 mL IntraVENous PRN    acetaminophen (TYLENOL) tablet 650 mg  650 mg Oral Q4H PRN    enoxaparin (LOVENOX) injection 40 mg  40 mg SubCUTAneous Q24H    albuterol CONCENTRATE 2.5mg/0.5 mL neb soln  2.5 mg Nebulization Q4H PRN    albuterol-ipratropium (DUO-NEB) 2.5 MG-0.5 MG/3 ML  3 mL Nebulization QID RT    levoFLOXacin (LEVAQUIN) 750 mg in D5W IVPB  750 mg IntraVENous Q24H    furosemide (LASIX) injection 40 mg  40 mg IntraVENous DAILY    hydrOXYzine pamoate (VISTARIL) capsule 25 mg  25 mg Oral Q6H PRN    LORazepam (ATIVAN) injection 0.5 mg  0.5 mg IntraVENous Q4H PRN ______________________________________________________________________      Lab Review:     Recent Labs     03/20/22 0445 03/19/22  0520 03/18/22 0351   WBC 6.5 5.3 4.0   HGB 11.1* 10.9* 10.5*   HCT 32.4* 32.3* 31.5*    199 198     Recent Labs     03/20/22 0445 03/19/22  0900 03/19/22  0520 03/18/22 0351 03/17/22 2146 03/17/22 2146     --  140  140 138   < > 138   K 4.0  --  4.0  4.0 Hemolyzed, recollect requested   < > 4.7   *  --  107  108 111*   < > 107   CO2 26  --  30  30 26   < > 26   GLU 80  --  75  73 78   < > 108*   BUN 24*  --  17  17 25*   < > 28*   CREA 1.21*  --  1.01  0.99 0.92   < > 1.19*   CA 8.0*  --  8.3*  8.2* 7.8*   < > 8.3*   MG 1.8 1.6  --   --   --   --    PHOS  --   --  3.6 4.5  --   --    ALB  --   --  1.5*  1.5* 1.5*  --  1.8*   ALT  --   --  18  --   --  21    < > = values in this interval not displayed. No components found for: [address]     03/18/22 2021 03/18/22  1800 03/18/22  0345   PH 7.50* 7.49* 7.40   PCO2 36 39 43   PO2 116* 30* 60*   HCO3 29* 29* 25   FIO2  --  50.0 50.0     No results for input(s): INR, INREXT, INREXT, INREXT in the last 72 hours. Other pertinent lab:          Assessment & Plan:      Assessment:      1. Acute respiratory failure with hypoxia due to #2 and 3  2. Bilateral healthcare associated pneumonia  3. Acute CHF and sinus tachycardia due to #4  4. Severe sepsis  5. Altered mental status/acute metabolic encephalopathy  6. COPD  7. HIV  8. Schizophrenia  9. History of alcohol abuse     Plan:      Patient admitted to the ICU  We will be watching her closely     Critically ill  Currently on nonrebreather mask. Intermittently she is on 50% Ventimask oxygen  Blood gases done 3/18/2022 showed 7.50/36/116 on nonrebreather mask. If she decompensates will initiate BiPAP but she is not a CO2 retainer. Chest x-ray shows bilateral infiltrates. Personally reviewed.   We will repeat chest x-ray and blood gas in the morning. Patient has COPD  Continue nebulizers  Will use steroids if develops worsening shortness of breath and signs of exacerbation     Patient has combination of CHF and healthcare associated pneumonia, speech is also involved. There is a possibility of aspiration pneumonia. Continue broad-spectrum antibiotics, currently on Levaquin and Zosyn. Agree with consulting ID. Cultures sent  Further changes in antibiotics based on clinical response and culture results    Not requiring vasopressors at present  We will use if needed  Patient developed sinus tachycardia. She was given magnesium and amiodarone. Now started on metoprolol 5 mg IV every 6 hours as needed. Appreciate input from cardiology.     I believe x-ray suggests CHF    DC IV fluids  Started on Lasix IV daily since blood pressure stable  Intake and output charting  Check electrolytes and replace as needed  Monitor renal function with fluid change     Monitor mental status      DVT and GI prophylaxis     Clinical status  Monitor in ICU overnight     Case discussed in detail with RN, RT, and care team  Thank you for involving me in the care of this patient  I will follow with you closely during hospitalization     Time spent more than 50 minutes in direct patient care with no overlap reviewing results and records, decision making, and answering questions.       Nichelle Penaloza MD  [facility]

## 2022-03-20 NOTE — CONSULTS
Consult Date: 3/20/2022    Consults  Manuel, history of HIV disease    Subjective  This is a 62year old female with reported history of HIV infection and hepatitis C, brought to ED from [facility], because of fever and mental status changes. She had a temperature of 100.2, tachycardia and hypoxia. WBC was normal with mildly elevated CRP and unremarkable UA. Initial CXR showed diffuse predominantly interstitial process in both lungs with some greater involvement on the left. Repeat CXR showed persistent diffuse airspace opacity throughout the lungs. CT Chest showed extensive diffuse bilateral pneumonitis and patchy pneumonia  Images reviewed by me. Covid 19 was not detected. Blood cultures have been negative. Patient has been on IV Zosyn and Levaquin. She has also been on Lasix. She remains febrile with Tmax 102.7 while WBC remains normal.  ID has been consulted for this reason. She has also been seen by Pulmonary. No information available in the EMR regarding her HIV or Hepatitis C infection and she is not on antiretrovirals. Patient seen in the ICU at which time she has on Ventimask. She is awake and responsive but confused and disoriented.      Past Medical History:   Diagnosis Date    Alcohol abuse 5/29/2017    Hepatitis C     [facility]     Hypertension     Psychotic disorder ([facility] [facility]     Schizophrenia ([facility] [address] [facility]       History reviewed. No pertinent surgical history. Family History   Family history unknown: Yes      Social History     Tobacco Use    Smoking status: Current Every Day Smoker     Packs/day: 0.50    Smokeless tobacco: Never Used   Substance Use Topics    Alcohol use:  Yes     Alcohol/week: 0.8 standard drinks     Types: 1 Cans of beer per week       Current Facility-Administered Medications   Medication Dose Route Frequency Provider Last Rate Last Admin    divalproex (DEPAKOTE SPRINKLE) capsule 250 mg  250 mg Oral Q12H Beverly Epley, MD   250 mg at 03/20/22 0831    dilTIAZem IR (CARDIZEM) tablet 60 mg  60 mg Oral TIDAC Jenna Bright MD   60 mg at 03/20/22 1000    metoprolol (LOPRESSOR) injection 5 mg  5 mg IntraVENous Q6H PRN JACEK Noe        benztropine (COGENTIN) tablet 0.5 mg  0.5 mg Oral BID Pratik Valente MD   0.5 mg at 03/20/22 0798    cloNIDine HCL (CATAPRES) tablet 0.1 mg  0.1 mg Oral DAILY Pratik Valente MD   0.1 mg at 03/20/22 0803    famotidine (PEPCID) tablet 20 mg  20 mg Oral QHS Pratik Valente MD   20 mg at 03/19/22 2119    lisinopriL (PRINIVIL, ZESTRIL) tablet 20 mg  20 mg Oral DAILY Pratik Valente MD   20 mg at 03/20/22 0245    risperiDONE (RisperDAL) tablet 2 mg  2 mg Oral BID Pratik Valente MD   2 mg at 03/20/22 0803    multivitamin with folic acid (ONE DAILY WITH FOLIC ACID) tablet 1 Tablet  1 Tablet Oral DAILY Pratik Valente MD   1 Tablet at 03/20/22 0803    piperacillin-tazobactam (ZOSYN) 3.375 g in 0.9% sodium chloride (MBP/ADV) 100 mL MBP  3.375 g IntraVENous Q8H Pratik Valente MD 25 mL/hr at 03/20/22 0455 3.375 g at 03/20/22 0455    sodium chloride (NS) flush 5-40 mL  5-40 mL IntraVENous Q8H Pratik Valente MD   10 mL at 03/20/22 0555    sodium chloride (NS) flush 5-40 mL  5-40 mL IntraVENous PRN Pratik Valente MD   10 mL at 03/18/22 0518    acetaminophen (TYLENOL) tablet 650 mg  650 mg Oral Q4H PRN Pratik Valente MD   650 mg at 03/20/22 0758    enoxaparin (LOVENOX) injection 40 mg  40 mg SubCUTAneous Q24H Pratik Valente MD   40 mg at 03/20/22 0455    albuterol CONCENTRATE 2.5mg/0.5 mL neb soln  2.5 mg Nebulization Q4H PRN Pratik Valente MD        albuterol-ipratropium (DUO-NEB) 2.5 MG-0.5 MG/3 ML  3 mL Nebulization QID RT Pratik Valente MD   3 mL at 03/20/22 1107    levoFLOXacin (LEVAQUIN) 750 mg in D5W IVPB  750 mg IntraVENous Q24H Beverly Epley,  mL/hr at 03/20/22 0802 750 mg at 03/20/22 0802    furosemide (LASIX) injection 40 mg  40 mg IntraVENous DAILY Ricci Carey MD   40 mg at 03/20/22 0803    hydrOXYzine pamoate (VISTARIL) capsule 25 mg  25 mg Oral Q6H PRN Estrella Godoy MD   25 mg at 03/19/22 2120    LORazepam (ATIVAN) injection 0.5 mg  0.5 mg IntraVENous Q4H PRN Estrella Godoy MD   0.5 mg at 03/19/22 1435        Review of Systems   Unable to perform ROS: Mental status change       Objective     Vital signs for last 24 hours:  Visit Vitals  /75 (BP 1 Location: Left upper arm, BP Patient Position: At rest)   Pulse (!) 116   Temp 97.3 °F (36.3 °C)   Resp 30   Ht 5' 4\" (1.626 m)   Wt 123 lb 10.9 oz (56.1 kg)   SpO2 97%   BMI 21.23 kg/m²       Intake/Output this shift:  Current Shift: No intake/output data recorded. Last 3 Shifts: 03/18 1901 - 03/20 0700  In: 150 [P.O.:150]  Out: 2600 [Urine:2600]    Data Review:   Recent Results (from the past 24 hour(s))   CBC WITH AUTOMATED DIFF    Collection Time: 03/20/22  4:45 AM   Result Value Ref Range    WBC 6.5 3.6 - 11.0 K/uL    RBC 3.68 (L) 3.80 - 5.20 M/uL    HGB 11.1 (L) 11.5 - 16.0 g/dL    HCT 32.4 (L) 35.0 - 47.0 %    MCV 88.0 80.0 - 99.0 FL    MCH 30.2 26.0 - 34.0 PG    MCHC 34.3 30.0 - 36.5 g/dL    RDW 14.5 11.5 - 14.5 %    PLATELET 480 799 - 187 K/uL    MPV 11.4 8.9 - 12.9 FL    NRBC 0.0 0.0  WBC    ABSOLUTE NRBC 0.00 0.00 - 0.01 K/uL    NEUTROPHILS 77 (H) 32 - 75 %    LYMPHOCYTES 21 12 - 49 %    MONOCYTES 2 (L) 5 - 13 %    EOSINOPHILS 0 0 - 7 %    BASOPHILS 0 0 - 1 %    IMMATURE GRANULOCYTES 0 0 - 0.5 %    ABS. NEUTROPHILS 5.0 1.8 - 8.0 K/UL    ABS. LYMPHOCYTES 1.4 0.8 - 3.5 K/UL    ABS. MONOCYTES 0.1 0.0 - 1.0 K/UL    ABS. EOSINOPHILS 0.0 0.0 - 0.4 K/UL    ABS. BASOPHILS 0.0 0.0 - 0.1 K/UL    ABS. IMM.  GRANS. 0.0 0.00 - 0.04 K/UL    DF AUTOMATED     MAGNESIUM    Collection Time: 03/20/22  4:45 AM   Result Value Ref Range    Magnesium 1.8 1.6 - 2.4 mg/dL   METABOLIC PANEL, BASIC Collection Time: 03/20/22  4:45 AM   Result Value Ref Range    Sodium 139 136 - 145 mmol/L    Potassium 4.0 3.5 - 5.1 mmol/L    Chloride 109 (H) 97 - 108 mmol/L    CO2 26 21 - 32 mmol/L    Anion gap 4 (L) 5 - 15 mmol/L    Glucose 80 65 - 100 mg/dL    BUN 24 (H) 6 - 20 mg/dL    Creatinine 1.21 (H) 0.55 - 1.02 mg/dL    BUN/Creatinine ratio 20 12 - 20      GFR est AA 55 (L) >60 ml/min/1.73m2    GFR est non-AA 45 (L) >60 ml/min/1.73m2    Calcium 8.0 (L) 8.5 - 10.1 mg/dL     CXR (3/19)                                                                CT Chest (3/18)        Physical Exam  Vitals and nursing note reviewed. Constitutional:       General: She is in acute distress. Appearance: She is ill-appearing. HENT:      Head: Normocephalic and atraumatic. Right Ear: External ear normal.      Left Ear: External ear normal.      Nose: Nose normal.      Comments: Ventimask     Mouth/Throat:      Mouth: Mucous membranes are dry. Eyes:      Pupils: Pupils are equal, round, and reactive to light. Cardiovascular:      Rate and Rhythm: Regular rhythm. Tachycardia present. Heart sounds: No murmur heard. Pulmonary:      Effort: Respiratory distress present. Breath sounds: Rhonchi present. Abdominal:      General: Bowel sounds are normal.      Palpations: Abdomen is soft. Tenderness: There is no abdominal tenderness. Genitourinary:     Comments:   External urinary device    Musculoskeletal:      Cervical back: Neck supple. Right lower leg: No edema. Left lower leg: No edema. Skin:     Findings: No rash. Neurological:      General: No focal deficit present. Mental Status: She is alert and oriented to person, place, and time. Psychiatric:      Comments: Patient with abnormal thought content, judgement       ASSESSMENT/PLAN    1. SIRS/Sepsis with fever, tachycardia, elevated CRP and procal  2. Interstitial pneumonitis, bilateral, etiology unclear  3.  Acute hypoxic respiratory failure, Ventimask  4. HIV-1 infection by history  5. Hepatitis C infection by history  6. Renal failure    Comment: CXR consistent with atypical pneumonia including Legionella, Mycoplasma, viral, Pneumocystis. Levaquin covers atypical pathogens. Influenza and Covid-19 negative. I do not have any history on her HIV-1 infection, however, she is not lymphopenic on her CBC, therefore, I would hold off on empiric Bactrim at this time. 1. Continue IV Zosyn and Levaquin  2. Follow-up blood cultures  3. Check urine Legionella antigen  4. Send SARS CoV-2 PCR  5. In am, repeat procal, CRP, check Mycoplasma IgM, HIV-1 RN viral load, HCV RNA Qt  6. Follow-up CD4 count    Jakob Rubio MD

## 2022-03-20 NOTE — PROGRESS NOTES
CARDIOLOGY PROGRESS NOTE    Patient seen and examined. PMH for HIV, Hep C, COPD, HTN and recently hospitalized for aspiration PNA. We are following for sinus tachycardia 200s  in setting of infectious process. Amiodarone bolus given with some some improvement. She is lying in bed with Ventimask. She remains confused. Denies chest pain. Telemetry reviewed: Sinus tachycardia 110s    Physical examination:  Vital signs: Blood pressure 112/75,  Pulse 116  No apparent distress. Heart is tachycardic, rate and rhythm. Normal S1, S2, no murmurs are appreciated. Lungs crackles bilaterally  Abdomen is soft, nontender, normal bowel sounds. Extremities have no edema. Recent labs results and imaging reviewed. Discussed case with Dr. Jose Muñoz. This our impression and recommendation:    1. Sinus tachycardia: Likely due to infection  - Received Amiodarone bolus with some improvement  - Started on Diltiazem PO by primary  -  Lopressor IV for rate control as needed  - Treat underlying infection, febrile 102F  - Keep serum potassium between 4-5 and serum magnesium >2.     2. Hypertension:   - Blood pressure stable. Please do not hesitate to call if additional questions arise. Mikie Fresh Addendum:  Agree with findings and plan noted above. Sinus with aberrancy versus flutter. Rate is acceptable for now.

## 2022-03-20 NOTE — PROGRESS NOTES
Hospitalist Progress Note               Daily Progress Note: 3/20/2022      Subjective: The patient is seen for follow up. She is a 70-year-old female with a history of COPD, alcohol abuse, HIV, schizophrenia and hepatitis C, lives in a group home. She was admitted about 3 weeks ago with aspiration pneumonia treated with Zosyn and doxycycline. Sent to the ED last night due to altered mental status and cough. She was febrile, tachycardic and with gurgling breath sounds. She was hypoxic and started on a Ventimask. X-ray showed diffuse predominantly interstitial process in both lungs greater on the left. Admitted to ICU and started on IV Zosyn    ------    Patient seen for follow-up. She was hypoxic on the 50% Ventimask and is now on 100% nonrebreather with saturations of 95%. She remains tachycardic at around 130.     She is febrile this morning at 102.7      Problem List:  Problem List as of 3/20/2022 Date Reviewed: 3/18/2022          Codes Class Noted - Resolved    Aspiration pneumonia ([facility] ICD-10-CM: J69.0  ICD-9-CM: 507.0  3/18/2022 - Present        Acute respiratory failure with hypoxemia [facility]) ICD-10-CM: J96.01  ICD-9-CM: 518.81  3/18/2022 - Present        Acute respiratory failure with hypoxia ([facility] ICD-10-CM: J96.01  ICD-9-CM: 518.81  2/25/2022 - Present        Pneumonia ICD-10-CM: J18.9  ICD-9-CM: 486  2/24/2022 - Present        Sepsis ([facility] ICD-10-CM: A41.9  ICD-9-CM: 038.9, 995.91  2/24/2022 - Present        Chronic undifferentiated schizophrenia with acute exacerbations ([facility] ICD-10-CM: F20.9  ICD-9-CM: 295.64  5/29/2017 - Present        Non-compliance with treatment ICD-10-CM: Z91.19  ICD-9-CM: V15.81  5/29/2017 - Present        Essential hypertension ICD-10-CM: I10  ICD-9-CM: 401.9  5/29/2017 - Present        Alcohol abuse ICD-10-CM: F10.10  ICD-9-CM: 305.00  5/29/2017 - Present              Medications reviewed  Current Facility-Administered Medications   Medication Dose Route Frequency    metoprolol (LOPRESSOR) injection 5 mg  5 mg IntraVENous Q6H PRN    amLODIPine (NORVASC) tablet 10 mg  10 mg Oral DAILY    benztropine (COGENTIN) tablet 0.5 mg  0.5 mg Oral BID    cloNIDine HCL (CATAPRES) tablet 0.1 mg  0.1 mg Oral DAILY    famotidine (PEPCID) tablet 20 mg  20 mg Oral QHS    lisinopriL (PRINIVIL, ZESTRIL) tablet 20 mg  20 mg Oral DAILY    risperiDONE (RisperDAL) tablet 2 mg  2 mg Oral BID    multivitamin with folic acid (ONE DAILY WITH FOLIC ACID) tablet 1 Tablet  1 Tablet Oral DAILY    divalproex DR (DEPAKOTE) tablet 250 mg  250 mg Oral Q12H    piperacillin-tazobactam (ZOSYN) 3.375 g in 0.9% sodium chloride (MBP/ADV) 100 mL MBP  3.375 g IntraVENous Q8H    sodium chloride (NS) flush 5-40 mL  5-40 mL IntraVENous Q8H    sodium chloride (NS) flush 5-40 mL  5-40 mL IntraVENous PRN    acetaminophen (TYLENOL) tablet 650 mg  650 mg Oral Q4H PRN    enoxaparin (LOVENOX) injection 40 mg  40 mg SubCUTAneous Q24H    albuterol CONCENTRATE 2.5mg/0.5 mL neb soln  2.5 mg Nebulization Q4H PRN    albuterol-ipratropium (DUO-NEB) 2.5 MG-0.5 MG/3 ML  3 mL Nebulization QID RT    levoFLOXacin (LEVAQUIN) 750 mg in D5W IVPB  750 mg IntraVENous Q24H    furosemide (LASIX) injection 40 mg  40 mg IntraVENous DAILY    hydrOXYzine pamoate (VISTARIL) capsule 25 mg  25 mg Oral Q6H PRN    LORazepam (ATIVAN) injection 0.5 mg  0.5 mg IntraVENous Q4H PRN       Review of Systems:   Review of systems not obtained due to patient factors. Objective:   Physical Exam:     Visit Vitals  /75 (BP 1 Location: Left upper arm, BP Patient Position: At rest)   Pulse (!) 116   Temp (!) 102.7 °F (39.3 °C)   Resp 30   Ht 5' 4\" (1.626 m)   Wt 56.1 kg (123 lb 10.9 oz)   SpO2 97%   BMI 21.23 kg/m²    O2 Flow Rate (L/min): 15 l/min O2 Device: Ventimask    Temp (24hrs), Av.2 °F (37.9 °C), Min:97.8 °F (36.6 °C), Max:102.7 °F (39.3 °C)    No intake/output data recorded.     1901 -  0700  In: 150 [P.O.:150]  Out: 2600 [Urine:2600]    General:   Awake and alert   Lungs:    Crackles bilateral   Chest wall:  No tenderness or deformity. Heart:  Regular rate and rhythm, S1, S2 normal, no murmur, click, rub or gallop. Abdomen:   Soft, non-tender. Bowel sounds normal. No masses,  No organomegaly. Extremities: Extremities normal, atraumatic, no cyanosis or edema. Pulses: 2+ and symmetric all extremities. Skin: Skin color, texture, turgor normal. No rashes or lesions   Neurologic: CNII-XII intact. No gross focal deficits         Data Review:       Recent Days:  Recent Labs     03/20/22 0445 03/19/22  0520 03/18/22 0351   WBC 6.5 5.3 4.0   HGB 11.1* 10.9* 10.5*   HCT 32.4* 32.3* 31.5*    199 198     Recent Labs     03/20/22 0445 03/19/22  0900 03/19/22  0520 03/18/22  0351 03/17/22  2146 03/17/22  2146     --  140  140 138   < > 138   K 4.0  --  4.0  4.0 Hemolyzed, recollect requested   < > 4.7   *  --  107  108 111*   < > 107   CO2 26  --  30  30 26   < > 26   GLU 80  --  75  73 78   < > 108*   BUN 24*  --  17  17 25*   < > 28*   CREA 1.21*  --  1.01  0.99 0.92   < > 1.19*   CA 8.0*  --  8.3*  8.2* 7.8*   < > 8.3*   MG 1.8 1.6  --   --   --   --    PHOS  --   --  3.6 4.5  --   --    ALB  --   --  1.5*  1.5* 1.5*  --  1.8*   TBILI  --   --  0.6  --   --  0.6   ALT  --   --  18  --   --  21    < > = values in this interval not displayed.      Recent Labs     03/18/22 2021 03/18/22  1800 03/18/22  0345   PH 7.50* 7.49* 7.40   PCO2 36 39 43   PO2 116* 30* 60*   HCO3 29* 29* 25   FIO2  --  50.0 50.0       24 Hour Results:  Recent Results (from the past 24 hour(s))   EKG, 12 LEAD, INITIAL    Collection Time: 03/19/22  8:02 AM   Result Value Ref Range    Ventricular Rate 226 BPM    Atrial Rate 111 BPM    QRS Duration 202 ms    Q-T Interval 198 ms    QTC Calculation (Bezet) 384 ms    Calculated R Axis 70 degrees    Calculated T Axis 0 degrees    Diagnosis       Wide QRS tachycardia  Non-specific intra-ventricular conduction block  Abnormal ECG  No previous ECGs available  Confirmed by HOSP RODRIGUEZ, [address] ([id number]) on 3/19/2022 3:01:11 PM     GLUCOSE, POC    Collection Time: 03/19/22  8:20 AM   Result Value Ref Range    Glucose (POC) 90 65 - 117 mg/dL    Performed by Zaira Jeffers    MAGNESIUM    Collection Time: 03/19/22  9:00 AM   Result Value Ref Range    Magnesium 1.6 1.6 - 2.4 mg/dL   CBC WITH AUTOMATED DIFF    Collection Time: 03/20/22  4:45 AM   Result Value Ref Range    WBC 6.5 3.6 - 11.0 K/uL    RBC 3.68 (L) 3.80 - 5.20 M/uL    HGB 11.1 (L) 11.5 - 16.0 g/dL    HCT 32.4 (L) 35.0 - 47.0 %    MCV 88.0 80.0 - 99.0 FL    MCH 30.2 26.0 - 34.0 PG    MCHC 34.3 30.0 - 36.5 g/dL    RDW 14.5 11.5 - 14.5 %    PLATELET 787 644 - 263 K/uL    MPV 11.4 8.9 - 12.9 FL    NRBC 0.0 0.0  WBC    ABSOLUTE NRBC 0.00 0.00 - 0.01 K/uL    NEUTROPHILS 77 (H) 32 - 75 %    LYMPHOCYTES 21 12 - 49 %    MONOCYTES 2 (L) 5 - 13 %    EOSINOPHILS 0 0 - 7 %    BASOPHILS 0 0 - 1 %    IMMATURE GRANULOCYTES 0 0 - 0.5 %    ABS. NEUTROPHILS 5.0 1.8 - 8.0 K/UL    ABS. LYMPHOCYTES 1.4 0.8 - 3.5 K/UL    ABS. MONOCYTES 0.1 0.0 - 1.0 K/UL    ABS. EOSINOPHILS 0.0 0.0 - 0.4 K/UL    ABS. BASOPHILS 0.0 0.0 - 0.1 K/UL    ABS. IMM.  GRANS. 0.0 0.00 - 0.04 K/UL    DF AUTOMATED     MAGNESIUM    Collection Time: 03/20/22  4:45 AM   Result Value Ref Range    Magnesium 1.8 1.6 - 2.4 mg/dL   METABOLIC PANEL, BASIC    Collection Time: 03/20/22  4:45 AM   Result Value Ref Range    Sodium 139 136 - 145 mmol/L    Potassium 4.0 3.5 - 5.1 mmol/L    Chloride 109 (H) 97 - 108 mmol/L    CO2 26 21 - 32 mmol/L    Anion gap 4 (L) 5 - 15 mmol/L    Glucose 80 65 - 100 mg/dL    BUN 24 (H) 6 - 20 mg/dL    Creatinine 1.21 (H) 0.55 - 1.02 mg/dL    BUN/Creatinine ratio 20 12 - 20      GFR est AA 55 (L) >60 ml/min/1.73m2    GFR est non-AA 45 (L) >60 ml/min/1.73m2    Calcium 8.0 (L) 8.5 - 10.1 mg/dL       XR CHEST PORT   Final Result FINDINGS/IMPRESSION:   Persistent diffuse airspace opacity throughout the lungs. Cardiac silhouette stable in size. Negative for pneumothorax. CT CHEST WO CONT   Final Result   Extensive diffuse bilateral pneumonitis and patchy pneumonia      XR CHEST PORT   Final Result   Diffuse predominantly interstitial process in both lungs with some   greater involvement on the left. Differential considerations include pulmonary   edema and diffuse infection. Assessment:  Bilateral healthcare associated pneumonia    Acute respiratory failure with hypoxia. Continues to require a Ventimask    Sustained narrow complex tachycardia, likely SVT. Doubt her heart rate of 226 represented sinus tachycardia    Severe sepsis with fever, tachypnea, tachycardia and altered mental status    Metabolic/septic encephalopathy. Improved    Schizophrenia    HIV disease, details unknown. Does not appear to be on HAART    History of alcohol abuse    COPD       Plan:  Continue Zosyn and levofloxacin  Continue IV fluids and supportive care  Stop amlodipine, start diltiazem 60 mg 3 times daily  Check procalcitonin  Check CD4 count  We will request ID consultation    Care Plan discussed with: Nurse    Disposition: Continue ICU care for 24-hours    Total time spent with patient: 30 minutes.     Codie Thurston MD

## 2022-03-21 ENCOUNTER — APPOINTMENT (OUTPATIENT)
Dept: GENERAL RADIOLOGY | Age: 62
DRG: 720 | End: 2022-03-21
Attending: INTERNAL MEDICINE
Payer: MEDICAID

## 2022-03-21 LAB
ALBUMIN SERPL-MCNC: 1.5 G/DL (ref 3.5–5)
ANION GAP SERPL CALC-SCNC: 4 MMOL/L (ref 5–15)
APPEARANCE UR: CLEAR
ARTERIAL PATENCY WRIST A: POSITIVE
BACTERIA URNS QL MICRO: NEGATIVE /HPF
BASE EXCESS BLDA CALC-SCNC: 5.2 MMOL/L (ref 0–2)
BASOPHILS # BLD: 0 K/UL (ref 0–0.1)
BASOPHILS NFR BLD: 0 % (ref 0–1)
BDY SITE: ABNORMAL
BILIRUB UR QL: NEGATIVE
BUN SERPL-MCNC: 30 MG/DL (ref 6–20)
BUN/CREAT SERPL: 24 (ref 12–20)
CA-I BLD-MCNC: 8.8 MG/DL (ref 8.5–10.1)
CHLORIDE SERPL-SCNC: 110 MMOL/L (ref 97–108)
CO2 SERPL-SCNC: 28 MMOL/L (ref 21–32)
COLOR UR: ABNORMAL
CREAT SERPL-MCNC: 1.24 MG/DL (ref 0.55–1.02)
CRP SERPL-MCNC: 5.31 MG/DL (ref 0–0.6)
DIFFERENTIAL METHOD BLD: ABNORMAL
EOSINOPHIL # BLD: 0 K/UL (ref 0–0.4)
EOSINOPHIL NFR BLD: 0 % (ref 0–7)
EPITH CASTS URNS QL MICRO: ABNORMAL /LPF
ERYTHROCYTE [DISTWIDTH] IN BLOOD BY AUTOMATED COUNT: 14.6 % (ref 11.5–14.5)
FIO2 ON VENT: 100 %
GAS FLOW.O2 O2 DELIVERY SYS: 15 L/MIN
GLUCOSE BLD STRIP.AUTO-MCNC: 132 MG/DL (ref 65–117)
GLUCOSE SERPL-MCNC: 72 MG/DL (ref 65–100)
GLUCOSE UR STRIP.AUTO-MCNC: NEGATIVE MG/DL
HCO3 BLDA-SCNC: 29 MMOL/L (ref 22–26)
HCT VFR BLD AUTO: 35.5 % (ref 35–47)
HGB BLD-MCNC: 11.7 G/DL (ref 11.5–16)
HGB UR QL STRIP: NEGATIVE
IMM GRANULOCYTES # BLD AUTO: 0 K/UL (ref 0–0.04)
IMM GRANULOCYTES NFR BLD AUTO: 1 % (ref 0–0.5)
KETONES UR QL STRIP.AUTO: NEGATIVE MG/DL
LEUKOCYTE ESTERASE UR QL STRIP.AUTO: NEGATIVE
LYMPHOCYTES # BLD: 0.7 K/UL (ref 0.8–3.5)
LYMPHOCYTES NFR BLD: 11 % (ref 12–49)
M PNEUMO IGM SER IA-ACNC: NONREACTIVE
MCH RBC QN AUTO: 29.6 PG (ref 26–34)
MCHC RBC AUTO-ENTMCNC: 33 G/DL (ref 30–36.5)
MCV RBC AUTO: 89.9 FL (ref 80–99)
MONOCYTES # BLD: 0.1 K/UL (ref 0–1)
MONOCYTES NFR BLD: 2 % (ref 5–13)
MUCOUS THREADS URNS QL MICRO: ABNORMAL /LPF
NEUTS SEG # BLD: 5.7 K/UL (ref 1.8–8)
NEUTS SEG NFR BLD: 86 % (ref 32–75)
NITRITE UR QL STRIP.AUTO: NEGATIVE
NRBC # BLD: 0 K/UL (ref 0–0.01)
NRBC BLD-RTO: 0 PER 100 WBC
PCO2 BLDA: 46 MMHG (ref 35–45)
PERFORMED BY, TECHID: ABNORMAL
PH BLDA: 7.43 [PH] (ref 7.35–7.45)
PH UR STRIP: 5 [PH] (ref 5–8)
PHOSPHATE SERPL-MCNC: 3 MG/DL (ref 2.6–4.7)
PLATELET # BLD AUTO: 226 K/UL (ref 150–400)
PMV BLD AUTO: 11.8 FL (ref 8.9–12.9)
PO2 BLDA: 73 MMHG (ref 75–100)
POTASSIUM SERPL-SCNC: 4.1 MMOL/L (ref 3.5–5.1)
PROCALCITONIN SERPL-MCNC: 0.75 NG/ML
PROT UR STRIP-MCNC: NEGATIVE MG/DL
RBC # BLD AUTO: 3.95 M/UL (ref 3.8–5.2)
RBC #/AREA URNS HPF: ABNORMAL /HPF (ref 0–5)
SAO2 % BLD: 91 %
SAO2% DEVICE SAO2% SENSOR NAME: ABNORMAL
SARS-COV-2, COV2: NORMAL
SODIUM SERPL-SCNC: 142 MMOL/L (ref 136–145)
SP GR UR REFRACTOMETRY: 1.01 (ref 1–1.03)
SPECIMEN SITE: ABNORMAL
UROBILINOGEN UR QL STRIP.AUTO: 2 EU/DL (ref 0.1–1)
WBC # BLD AUTO: 6.6 K/UL (ref 3.6–11)
WBC URNS QL MICRO: ABNORMAL /HPF (ref 0–4)

## 2022-03-21 PROCEDURE — 74011250637 HC RX REV CODE- 250/637: Performed by: HOSPITALIST

## 2022-03-21 PROCEDURE — 71045 X-RAY EXAM CHEST 1 VIEW: CPT

## 2022-03-21 PROCEDURE — 87449 NOS EACH ORGANISM AG IA: CPT

## 2022-03-21 PROCEDURE — 87536 HIV-1 QUANT&REVRSE TRNSCRPJ: CPT

## 2022-03-21 PROCEDURE — 74011250636 HC RX REV CODE- 250/636: Performed by: INTERNAL MEDICINE

## 2022-03-21 PROCEDURE — 99233 SBSQ HOSP IP/OBS HIGH 50: CPT | Performed by: INTERNAL MEDICINE

## 2022-03-21 PROCEDURE — 85025 COMPLETE CBC W/AUTO DIFF WBC: CPT

## 2022-03-21 PROCEDURE — 74011000250 HC RX REV CODE- 250: Performed by: NURSE PRACTITIONER

## 2022-03-21 PROCEDURE — 94640 AIRWAY INHALATION TREATMENT: CPT

## 2022-03-21 PROCEDURE — U0005 INFEC AGEN DETEC AMPLI PROBE: HCPCS

## 2022-03-21 PROCEDURE — 51798 US URINE CAPACITY MEASURE: CPT

## 2022-03-21 PROCEDURE — 74011250636 HC RX REV CODE- 250/636: Performed by: HOSPITALIST

## 2022-03-21 PROCEDURE — 36600 WITHDRAWAL OF ARTERIAL BLOOD: CPT

## 2022-03-21 PROCEDURE — 80069 RENAL FUNCTION PANEL: CPT

## 2022-03-21 PROCEDURE — 65610000006 HC RM INTENSIVE CARE

## 2022-03-21 PROCEDURE — 87086 URINE CULTURE/COLONY COUNT: CPT

## 2022-03-21 PROCEDURE — 81001 URINALYSIS AUTO W/SCOPE: CPT

## 2022-03-21 PROCEDURE — 82803 BLOOD GASES ANY COMBINATION: CPT

## 2022-03-21 PROCEDURE — 74011000250 HC RX REV CODE- 250: Performed by: HOSPITALIST

## 2022-03-21 PROCEDURE — 87522 HEPATITIS C REVRS TRNSCRPJ: CPT

## 2022-03-21 PROCEDURE — 36415 COLL VENOUS BLD VENIPUNCTURE: CPT

## 2022-03-21 PROCEDURE — 82962 GLUCOSE BLOOD TEST: CPT

## 2022-03-21 PROCEDURE — 86738 MYCOPLASMA ANTIBODY: CPT

## 2022-03-21 PROCEDURE — 74011250637 HC RX REV CODE- 250/637: Performed by: INTERNAL MEDICINE

## 2022-03-21 PROCEDURE — 84145 PROCALCITONIN (PCT): CPT

## 2022-03-21 PROCEDURE — 86140 C-REACTIVE PROTEIN: CPT

## 2022-03-21 PROCEDURE — 74011000258 HC RX REV CODE- 258: Performed by: HOSPITALIST

## 2022-03-21 RX ORDER — IPRATROPIUM BROMIDE AND ALBUTEROL SULFATE 2.5; .5 MG/3ML; MG/3ML
3 SOLUTION RESPIRATORY (INHALATION)
Status: DISCONTINUED | OUTPATIENT
Start: 2022-03-21 | End: 2022-03-29 | Stop reason: HOSPADM

## 2022-03-21 RX ORDER — ACETAMINOPHEN 650 MG/1
650 SUPPOSITORY RECTAL
Status: DISCONTINUED | OUTPATIENT
Start: 2022-03-21 | End: 2022-03-29 | Stop reason: HOSPADM

## 2022-03-21 RX ADMIN — PIPERACILLIN AND TAZOBACTAM 3.38 G: 3; .375 INJECTION, POWDER, LYOPHILIZED, FOR SOLUTION INTRAVENOUS at 03:31

## 2022-03-21 RX ADMIN — BENZTROPINE MESYLATE 0.5 MG: 1 TABLET ORAL at 09:15

## 2022-03-21 RX ADMIN — METHYLPREDNISOLONE SODIUM SUCCINATE 40 MG: 40 INJECTION, POWDER, FOR SOLUTION INTRAMUSCULAR; INTRAVENOUS at 18:04

## 2022-03-21 RX ADMIN — LORAZEPAM 0.5 MG: 2 INJECTION INTRAMUSCULAR at 12:08

## 2022-03-21 RX ADMIN — METOPROLOL TARTRATE 5 MG: 5 INJECTION INTRAVENOUS at 03:43

## 2022-03-21 RX ADMIN — ACETAMINOPHEN 650 MG: 650 SUPPOSITORY RECTAL at 15:25

## 2022-03-21 RX ADMIN — SODIUM CHLORIDE, PRESERVATIVE FREE 10 ML: 5 INJECTION INTRAVENOUS at 05:08

## 2022-03-21 RX ADMIN — SODIUM CHLORIDE, PRESERVATIVE FREE 10 ML: 5 INJECTION INTRAVENOUS at 23:00

## 2022-03-21 RX ADMIN — DIVALPROEX SODIUM 250 MG: 125 CAPSULE, COATED PELLETS ORAL at 09:15

## 2022-03-21 RX ADMIN — RISPERIDONE 2 MG: 2 TABLET, FILM COATED ORAL at 09:16

## 2022-03-21 RX ADMIN — MULTIVITAMIN TABLET 1 TABLET: TABLET at 09:15

## 2022-03-21 RX ADMIN — ENOXAPARIN SODIUM 40 MG: 100 INJECTION SUBCUTANEOUS at 03:31

## 2022-03-21 RX ADMIN — FUROSEMIDE 40 MG: 10 INJECTION, SOLUTION INTRAMUSCULAR; INTRAVENOUS at 09:14

## 2022-03-21 RX ADMIN — RISPERIDONE 2 MG: 2 TABLET, FILM COATED ORAL at 23:00

## 2022-03-21 RX ADMIN — IPRATROPIUM BROMIDE AND ALBUTEROL SULFATE 3 ML: .5; 2.5 SOLUTION RESPIRATORY (INHALATION) at 20:46

## 2022-03-21 RX ADMIN — PIPERACILLIN AND TAZOBACTAM 3.38 G: 3; .375 INJECTION, POWDER, LYOPHILIZED, FOR SOLUTION INTRAVENOUS at 12:08

## 2022-03-21 RX ADMIN — METHYLPREDNISOLONE SODIUM SUCCINATE 40 MG: 40 INJECTION, POWDER, FOR SOLUTION INTRAMUSCULAR; INTRAVENOUS at 13:04

## 2022-03-21 RX ADMIN — HYDROXYZINE PAMOATE 25 MG: 25 CAPSULE ORAL at 09:16

## 2022-03-21 RX ADMIN — LEVOFLOXACIN 750 MG: 5 INJECTION, SOLUTION INTRAVENOUS at 09:15

## 2022-03-21 RX ADMIN — DIVALPROEX SODIUM 250 MG: 125 CAPSULE, COATED PELLETS ORAL at 23:00

## 2022-03-21 RX ADMIN — DILTIAZEM HYDROCHLORIDE 60 MG: 30 TABLET, FILM COATED ORAL at 09:16

## 2022-03-21 RX ADMIN — SODIUM CHLORIDE, PRESERVATIVE FREE 10 ML: 5 INJECTION INTRAVENOUS at 13:05

## 2022-03-21 RX ADMIN — FAMOTIDINE 20 MG: 20 TABLET, FILM COATED ORAL at 23:00

## 2022-03-21 RX ADMIN — DILTIAZEM HYDROCHLORIDE 60 MG: 30 TABLET, FILM COATED ORAL at 18:04

## 2022-03-21 RX ADMIN — LORAZEPAM 0.5 MG: 2 INJECTION INTRAMUSCULAR at 03:43

## 2022-03-21 RX ADMIN — IPRATROPIUM BROMIDE AND ALBUTEROL SULFATE 3 ML: .5; 3 SOLUTION RESPIRATORY (INHALATION) at 11:29

## 2022-03-21 RX ADMIN — IPRATROPIUM BROMIDE AND ALBUTEROL SULFATE 3 ML: .5; 3 SOLUTION RESPIRATORY (INHALATION) at 07:16

## 2022-03-21 RX ADMIN — PIPERACILLIN AND TAZOBACTAM 3.38 G: 3; .375 INJECTION, POWDER, LYOPHILIZED, FOR SOLUTION INTRAVENOUS at 19:54

## 2022-03-21 RX ADMIN — ACETAMINOPHEN 650 MG: 650 SUPPOSITORY RECTAL at 06:03

## 2022-03-21 RX ADMIN — BENZTROPINE MESYLATE 0.5 MG: 1 TABLET ORAL at 23:00

## 2022-03-21 NOTE — PROGRESS NOTES
Progress Note    Patient: Mega Bruno MRN: [id number]  SSN: [id number]    YOB: 1960  Age: 62 y.o. Sex: female      Admit Date: 3/17/2022    LOS: 3 days     Subjective:   Patient followed for SIRS/Sepsis with interstitial pneumonitis and history of HIV infection. Her respiratory failure is worsening along with her CXR. She remains afebrile. Procal is decreasing. Currently on IV Zosyn and Levaquin. Objective:     Vitals:    03/21/22 1200 03/21/22 1300 03/21/22 1400 03/21/22 1500   BP: 105/70 101/65 99/61 101/64   Pulse: 99 98 94 91   Resp: 29 29 (!) 31 (!) 33   Temp:    98.5 °F (36.9 °C)   SpO2: 96% 98% 100% 99%   Weight:       Height:            Intake and Output:  Current Shift: No intake/output data recorded. Last three shifts: 03/19 1901 - 03/21 0700  In: 150 [P.O.:150]  Out: 850 [Urine:850]    Physical Exam:   Vitals and nursing note reviewed. Constitutional:       General: She is in acute distress. Appearance: She is ill-appearing. HENT:      Head: Normocephalic and atraumatic. Right Ear: External ear normal.      Left Ear: External ear normal.      Nose: Nose normal.      Comments: 100% NRB  mask     Mouth/Throat:      Mouth: Mucous membranes are dry. Eyes:      Pupils: Pupils are equal, round, and reactive to light. Cardiovascular:      Rate and Rhythm: Regular rhythm. Tachycardia present. Heart sounds: No murmur heard.       Pulmonary:      Effort: Respiratory distress present. Breath sounds: Rhonchi present. Abdominal:      General: Bowel sounds are normal.      Palpations: Abdomen is soft. Tenderness: There is no abdominal tenderness. Genitourinary:     Comments:   External urinary device    Musculoskeletal:      Cervical back: Neck supple. Right lower leg: No edema. Left lower leg: No edema. Skin:     Findings: No rash. Neurological:      General: No focal deficit present.       Mental Status: She is alert and oriented to person, place, and time. Psychiatric:      Comments: Patient with abnormal thought content, judgement      Lab/Data Review:     WBC 6,600    Procal 0.75 <1.11  CRP 5.31    Mycoplasma IgM Nonreactive    Blood cultures (3/17) No growth 3 days  Urine culture (3/21) Pending    CXR  (3/21) Diffuse opacities in the lungs, increased      Assessment:     Active Problems:    Pneumonia (2/24/2022)      Acute respiratory failure with hypoxia (HCC) (2/25/2022)      Aspiration pneumonia ([address] (3/18/2022)      Acute respiratory failure with hypoxemia ([address] (3/18/2022)      1. SIRS/Sepsis with fever, tachycardia, elevated CRP and procal  2. Interstitial pneumonitis, bilateral, etiology unclear  3. Acute hypoxic respiratory failure, Ventimask  4. HIV-1 infection by history  5. Hepatitis C infection by history  6. Renal failure     Comment: Respiratory failure worsening along with CXR. I am inclined now to start empiric treatment for pneumocystis pending CD4 count. Plan:      1. Continue IV Zosyn and Levaquin  2. Start IV Bactrim pending CD4 count  3. Follow-up blood cultures  4. Follow-up SARS CoV-2 PCR  5. In am, repeat procal, CRP, check fungitell  6. Follow-up urine Legionella angigen, HIV-1 RNA viral load, HCV RNA Qt  7.  Follow-up CD4 count     Signed By: Cari Monae MD     March 21, 2022

## 2022-03-21 NOTE — PROGRESS NOTES
Pulmonary Progress Note      NAME: Ofe Reid   :  1960  MRM:  [id number]    Date/Time: 3/21/2022  4:22 PM         Subjective:     Patient seen and examined. Discussed with the nursing staff. She has been 100% nonrebreather mask. However no distress. Apparently she is retaining urine. Bladder scan was done more than 800 mL. ID input is noted. She is taking minced and moist diet without any problems. Past Medical History reviewed and unchanged from Admission History and Physical       Objective:     Physical Exam     Vitals:      Last 24hrs VS reviewed since prior progress note. Most recent are:    Visit Vitals  /65   Pulse (!) 109   Temp 98.7 °F (37.1 °C)   Resp 26   Ht 5' 4\" (1.626 m)   Wt 56.1 kg (123 lb 10.9 oz)   SpO2 99%   BMI 21.23 kg/m²     SpO2 Readings from Last 6 Encounters:   22 99%   22 99%   22 98%   21 96%   21 97%   09/15/21 97%    O2 Flow Rate (L/min): 15 l/min       Intake/Output Summary (Last 24 hours) at 3/21/2022 1204  Last data filed at 3/21/2022 0331  Gross per 24 hour   Intake    Output 350 ml   Net -350 ml      General: Lying in bed in mild respiratory distress, on nonrebreather mask. Eye: Pupils are equal and reactive to light. Throat and Neck: Oropharynx without thrush. Neck is supple and trachea central.  No obvious lymphadenopathy. Lung: Reduced air entry bilaterally with prolonged exhalation but no wheezing. Occasional crackles. Heart: S1+S2. No murmurs  Abdomen: soft, non-tender. Bowel sounds normal. No masses; obese  Extremities: No edema, no cyanosis or clubbing. Pulses are palpable. : Not done  Skin: No cyanosis  Neurologic:  Alert and awake, pleasantly confused, grossly nonfocal  Psychiatric:  Unable to perform due to patient's condition    XR CHEST PORT   Final Result   Diffuse opacities in the lungs, increased.       XR CHEST PORT   Final Result   FINDINGS/IMPRESSION:   Persistent diffuse airspace opacity throughout the lungs. Cardiac silhouette stable in size. Negative for pneumothorax. CT CHEST WO CONT   Final Result   Extensive diffuse bilateral pneumonitis and patchy pneumonia      XR CHEST PORT   Final Result   Diffuse predominantly interstitial process in both lungs with some   greater involvement on the left. Differential considerations include pulmonary   edema and diffuse infection.                     Lab Data Reviewed: (see below)      Medications:  Current Facility-Administered Medications   Medication Dose Route Frequency    acetaminophen (TYLENOL) suppository 650 mg  650 mg Rectal Q4H PRN    divalproex (DEPAKOTE SPRINKLE) capsule 250 mg  250 mg Oral Q12H    dilTIAZem IR (CARDIZEM) tablet 60 mg  60 mg Oral TIDAC    metoprolol (LOPRESSOR) injection 5 mg  5 mg IntraVENous Q6H PRN    benztropine (COGENTIN) tablet 0.5 mg  0.5 mg Oral BID    cloNIDine HCL (CATAPRES) tablet 0.1 mg  0.1 mg Oral DAILY    famotidine (PEPCID) tablet 20 mg  20 mg Oral QHS    lisinopriL (PRINIVIL, ZESTRIL) tablet 20 mg  20 mg Oral DAILY    risperiDONE (RisperDAL) tablet 2 mg  2 mg Oral BID    multivitamin with folic acid (ONE DAILY WITH FOLIC ACID) tablet 1 Tablet  1 Tablet Oral DAILY    piperacillin-tazobactam (ZOSYN) 3.375 g in 0.9% sodium chloride (MBP/ADV) 100 mL MBP  3.375 g IntraVENous Q8H    sodium chloride (NS) flush 5-40 mL  5-40 mL IntraVENous Q8H    sodium chloride (NS) flush 5-40 mL  5-40 mL IntraVENous PRN    acetaminophen (TYLENOL) tablet 650 mg  650 mg Oral Q4H PRN    enoxaparin (LOVENOX) injection 40 mg  40 mg SubCUTAneous Q24H    albuterol CONCENTRATE 2.5mg/0.5 mL neb soln  2.5 mg Nebulization Q4H PRN    albuterol-ipratropium (DUO-NEB) 2.5 MG-0.5 MG/3 ML  3 mL Nebulization QID RT    levoFLOXacin (LEVAQUIN) 750 mg in D5W IVPB  750 mg IntraVENous Q24H    furosemide (LASIX) injection 40 mg  40 mg IntraVENous DAILY    hydrOXYzine pamoate (VISTARIL) capsule 25 mg  25 mg Oral Q6H PRN    LORazepam (ATIVAN) injection 0.5 mg  0.5 mg IntraVENous Q4H PRN       ______________________________________________________________________      Lab Review:     Recent Labs     03/21/22  0400 03/20/22 0445 03/19/22  0520   WBC 6.6 6.5 5.3   HGB 11.7 11.1* 10.9*   HCT 35.5 32.4* 32.3*    194 199     Recent Labs     03/21/22  0400 03/20/22 0445 03/19/22  0900 03/19/22  0520    139  --  140  140   K 4.1 4.0  --  4.0  4.0   * 109*  --  107  108   CO2 28 26  --  30  30   GLU 72 80  --  75  73   BUN 30* 24*  --  17  17   CREA 1.24* 1.21*  --  1.01  0.99   CA 8.8 8.0*  --  8.3*  8.2*   MG  --  1.8 1.6  --    PHOS 3.0  --   --  3.6   ALB 1.5*  --   --  1.5*  1.5*   ALT  --   --   --  18     No components found for: [place]  Recent Labs     03/21/22  0518 03/18/22 2021 03/18/22  1800   PH 7.43 7.50* 7.49*   PCO2 46* 36 39   PO2 73* 116* 30*   HCO3 29* 29* 29*   FIO2 100  --  50.0     No results for input(s): INR, INREXT, INREXT, INREXT in the last 72 hours. Other pertinent lab:          Assessment & Plan:      Assessment:      1. Acute respiratory failure with hypoxia due to #2 and 3  2. Bilateral healthcare associated pneumonia  3. Acute CHF and sinus tachycardia due to #4  4. Severe sepsis  5. Altered mental status/acute metabolic encephalopathy  6. COPD with exacerbation  7. HIV  8. Schizophrenia  9. History of alcohol abuse     Plan:      Patient admitted to the ICU  We will be watching her closely     Critically ill  Currently on nonrebreather mask. Yesterday she was intermittently on 50% Ventimask oxygen  Blood gases done this morning showed 7.4 3/46/73 on nonrebreather mask. Blood gases done 3/18/2022 showed 7.50/36/116 on nonrebreather mask. If she decompensates will initiate BiPAP but she is not a CO2 retainer. Chest x-ray shows bilateral infiltrates. Personally reviewed. Chest x-ray appears to be somewhat worse today.     We will repeat chest x-ray and blood gas in the morning. Patient has COPD  Continue nebulizers  We will add IV Solu-Medrol. She is aspiration risk. On moist and minced diet. Urinary retention. This is happened twice. Will pass [place] catheter.     Patient has combination of CHF and healthcare associated pneumonia, speech is also involved. There is a possibility of aspiration pneumonia. Continue broad-spectrum antibiotics, currently on Levaquin and Zosyn. Appreciate ID input. In isolation, ruling out COVID-19. Not requiring vasopressors at present  We will use if needed  Patient developed sinus tachycardia. She was given magnesium and amiodarone. Now started on metoprolol 5 mg IV every 6 hours as needed. Appreciate input from cardiology.     Started on Lasix IV daily since blood pressure stable  Intake and output charting  Check electrolytes and replace as needed  Monitor renal function with fluid change     Monitor mental status      DVT and GI prophylaxis     Clinical status  Monitor in ICU overnight     Case discussed in detail with RN, RT, and care team  Thank you for involving me in the care of this patient  I will follow with you closely during hospitalization     Time spent more than 50 minutes in direct patient care with no overlap reviewing results and records, decision making, and answering questions.       Shayna Kelley MD  [facility]

## 2022-03-21 NOTE — PROGRESS NOTES
Attempted to see pt for follow-up tx. Pt is not adequately arousable to participate in tx at this time. Pt remains on Non-rebreather. Nsg reports pt tolerates current minced and moist/thin diet without difficulty. Will cont to follow.

## 2022-03-21 NOTE — PROGRESS NOTES
15: 41PM Outbound call to [facility] @ ([phone]. No answer, will try to reach again. Samuel Miner of the call re: see if [facility] has been able to find any documentation of any family/NOK. On a non re-breather mask. No POA or guardian. Manager [facility]) verbalized to writer on 3/18/2022 patient is her own decision maker.         REANNA White

## 2022-03-21 NOTE — PROGRESS NOTES
Hospitalist Progress Note               Daily Progress Note: 3/21/2022      Subjective: The patient is seen for follow up. She is a 28-year-old female with a history of COPD, alcohol abuse, HIV, schizophrenia and hepatitis C, lives in a group home. She was admitted about 3 weeks ago with aspiration pneumonia treated with Zosyn and doxycycline. Sent to the ED last night due to altered mental status and cough. She was febrile, tachycardic and with gurgling breath sounds. She was hypoxic and started on a Ventimask. X-ray showed diffuse predominantly interstitial process in both lungs greater on the left. Admitted to ICU and started on IV Zosyn    ------    Patient seen for follow-up. Patient is awake and alert. She continues on a nonrebreather. She remains confused, thought she was at [facility]. Appreciate ID input. Numerous serologies have been ordered. Patient continues on Zosyn and levofloxacin. Rapid Covid test is negative. She is febrile at 103.3 this morning. Heart rate is in the 110s.     Problem List:  Problem List as of 3/21/2022 Date Reviewed: 3/18/2022          Codes Class Noted - Resolved    Aspiration pneumonia ([facility] ICD-10-CM: J69.0  ICD-9-CM: 507.0  3/18/2022 - Present        Acute respiratory failure with hypoxemia [facility]) ICD-10-CM: J96.01  ICD-9-CM: 518.81  3/18/2022 - Present        Acute respiratory failure with hypoxia (HCC) ICD-10-CM: J96.01  ICD-9-CM: 518.81  2/25/2022 - Present        Pneumonia ICD-10-CM: J18.9  ICD-9-CM: 486  2/24/2022 - Present        Sepsis ([facility] ICD-10-CM: A41.9  ICD-9-CM: 038.9, 995.91  2/24/2022 - Present        Chronic undifferentiated schizophrenia with acute exacerbations ([facility] ICD-10-CM: F20.9  ICD-9-CM: 295.64  5/29/2017 - Present        Non-compliance with treatment ICD-10-CM: Z91.19  ICD-9-CM: V15.81  5/29/2017 - Present        Essential hypertension ICD-10-CM: I10  ICD-9-CM: 401.9  5/29/2017 - Present        Alcohol abuse ICD-10-CM: F10.10  ICD-9-CM: 305.00  5/29/2017 - Present              Medications reviewed  Current Facility-Administered Medications   Medication Dose Route Frequency    acetaminophen (TYLENOL) suppository 650 mg  650 mg Rectal Q4H PRN    divalproex (DEPAKOTE SPRINKLE) capsule 250 mg  250 mg Oral Q12H    dilTIAZem IR (CARDIZEM) tablet 60 mg  60 mg Oral TIDAC    metoprolol (LOPRESSOR) injection 5 mg  5 mg IntraVENous Q6H PRN    benztropine (COGENTIN) tablet 0.5 mg  0.5 mg Oral BID    cloNIDine HCL (CATAPRES) tablet 0.1 mg  0.1 mg Oral DAILY    famotidine (PEPCID) tablet 20 mg  20 mg Oral QHS    lisinopriL (PRINIVIL, ZESTRIL) tablet 20 mg  20 mg Oral DAILY    risperiDONE (RisperDAL) tablet 2 mg  2 mg Oral BID    multivitamin with folic acid (ONE DAILY WITH FOLIC ACID) tablet 1 Tablet  1 Tablet Oral DAILY    piperacillin-tazobactam (ZOSYN) 3.375 g in 0.9% sodium chloride (MBP/ADV) 100 mL MBP  3.375 g IntraVENous Q8H    sodium chloride (NS) flush 5-40 mL  5-40 mL IntraVENous Q8H    sodium chloride (NS) flush 5-40 mL  5-40 mL IntraVENous PRN    acetaminophen (TYLENOL) tablet 650 mg  650 mg Oral Q4H PRN    enoxaparin (LOVENOX) injection 40 mg  40 mg SubCUTAneous Q24H    albuterol CONCENTRATE 2.5mg/0.5 mL neb soln  2.5 mg Nebulization Q4H PRN    albuterol-ipratropium (DUO-NEB) 2.5 MG-0.5 MG/3 ML  3 mL Nebulization QID RT    levoFLOXacin (LEVAQUIN) 750 mg in D5W IVPB  750 mg IntraVENous Q24H    furosemide (LASIX) injection 40 mg  40 mg IntraVENous DAILY    hydrOXYzine pamoate (VISTARIL) capsule 25 mg  25 mg Oral Q6H PRN    LORazepam (ATIVAN) injection 0.5 mg  0.5 mg IntraVENous Q4H PRN       Review of Systems:   Review of systems not obtained due to patient factors.     Objective:   Physical Exam:     Visit Vitals  /67   Pulse (!) 123   Temp (!) 101 °F (38.3 °C)   Resp (!) 32   Ht 5' 4\" (1.626 m)   Wt 56.1 kg (123 lb 10.9 oz)   SpO2 97%   BMI 21.23 kg/m²    O2 Flow Rate (L/min): 15 l/min O2 Device: Non-rebreather mask    Temp (24hrs), Av.6 °F (37.6 °C), Min:97.2 °F (36.2 °C), Max:103.3 °F (39.6 °C)    No intake/output data recorded.  1901 -  0700  In: 150 [P.O.:150]  Out: 850 [Urine:850]    General:   Awake and alert   Lungs:    Crackles bilateral   Chest wall:  No tenderness or deformity. Heart:  Regular rate and rhythm, S1, S2 normal, no murmur, click, rub or gallop. Abdomen:   Soft, non-tender. Bowel sounds normal. No masses,  No organomegaly. Extremities: Extremities normal, atraumatic, no cyanosis or edema. Pulses: 2+ and symmetric all extremities. Skin: Skin color, texture, turgor normal. No rashes or lesions   Neurologic: CNII-XII intact.   No gross focal deficits         Data Review:       Recent Days:  Recent Labs     22  0400 22  0445 22  0520   WBC 6.6 6.5 5.3   HGB 11.7 11.1* 10.9*   HCT 35.5 32.4* 32.3*    194 199     Recent Labs     22  0400 22  0445 22  0900 22  0520    139  --  140  140   K 4.1 4.0  --  4.0  4.0   * 109*  --  107  108   CO2 28 26  --  30  30   GLU 72 80  --  75  73   BUN 30* 24*  --  17  17   CREA 1.24* 1.21*  --  1.01  0.99   CA 8.8 8.0*  --  8.3*  8.2*   MG  --  1.8 1.6  --    PHOS 3.0  --   --  3.6   ALB 1.5*  --   --  1.5*  1.5*   TBILI  --   --   --  0.6   ALT  --   --   --  18     Recent Labs     22  0518 22  1800   PH 7.43 7.50* 7.49*   PCO2 46* 36 39   PO2 73* 116* 30*   HCO3 29* 29* 29*   FIO2 100  --  50.0       24 Hour Results:  Recent Results (from the past 24 hour(s))   PROCALCITONIN    Collection Time: 22  1:20 PM   Result Value Ref Range    Procalcitonin 1.11 (H) 0 ng/mL   GLUCOSE, POC    Collection Time: 22  6:12 PM   Result Value Ref Range    Glucose (POC) 108 65 - 117 mg/dL    Performed by Aaliyah Mao    COVID-19 RAPID TEST    Collection Time: 22  9:38 PM   Result Value Ref Range    COVID-19 rapid test Not Detected Not Detected     CBC WITH AUTOMATED DIFF    Collection Time: 03/21/22  4:00 AM   Result Value Ref Range    WBC 6.6 3.6 - 11.0 K/uL    RBC 3.95 3.80 - 5.20 M/uL    HGB 11.7 11.5 - 16.0 g/dL    HCT 35.5 35.0 - 47.0 %    MCV 89.9 80.0 - 99.0 FL    MCH 29.6 26.0 - 34.0 PG    MCHC 33.0 30.0 - 36.5 g/dL    RDW 14.6 (H) 11.5 - 14.5 %    PLATELET 834 705 - 089 K/uL    MPV 11.8 8.9 - 12.9 FL    NRBC 0.0 0.0  WBC    ABSOLUTE NRBC 0.00 0.00 - 0.01 K/uL    NEUTROPHILS 86 (H) 32 - 75 %    LYMPHOCYTES 11 (L) 12 - 49 %    MONOCYTES 2 (L) 5 - 13 %    EOSINOPHILS 0 0 - 7 %    BASOPHILS 0 0 - 1 %    IMMATURE GRANULOCYTES 1 (H) 0 - 0.5 %    ABS. NEUTROPHILS 5.7 1.8 - 8.0 K/UL    ABS. LYMPHOCYTES 0.7 (L) 0.8 - 3.5 K/UL    ABS. MONOCYTES 0.1 0.0 - 1.0 K/UL    ABS. EOSINOPHILS 0.0 0.0 - 0.4 K/UL    ABS. BASOPHILS 0.0 0.0 - 0.1 K/UL    ABS. IMM.  GRANS. 0.0 0.00 - 0.04 K/UL    DF AUTOMATED     RENAL FUNCTION PANEL    Collection Time: 03/21/22  4:00 AM   Result Value Ref Range    Sodium 142 136 - 145 mmol/L    Potassium 4.1 3.5 - 5.1 mmol/L    Chloride 110 (H) 97 - 108 mmol/L    CO2 28 21 - 32 mmol/L    Anion gap 4 (L) 5 - 15 mmol/L    Glucose 72 65 - 100 mg/dL    BUN 30 (H) 6 - 20 mg/dL    Creatinine 1.24 (H) 0.55 - 1.02 mg/dL    BUN/Creatinine ratio 24 (H) 12 - 20      GFR est AA 53 (L) >60 ml/min/1.73m2    GFR est non-AA 44 (L) >60 ml/min/1.73m2    Calcium 8.8 8.5 - 10.1 mg/dL    Phosphorus 3.0 2.6 - 4.7 mg/dL    Albumin 1.5 (L) 3.5 - 5.0 g/dL   PROCALCITONIN    Collection Time: 03/21/22  4:00 AM   Result Value Ref Range    Procalcitonin 0.75 (H) 0 ng/mL   C REACTIVE PROTEIN, QT    Collection Time: 03/21/22  4:00 AM   Result Value Ref Range    C-Reactive protein 5.31 (H) 0.00 - 0.60 mg/dL   BLOOD GAS, ARTERIAL    Collection Time: 03/21/22  5:18 AM   Result Value Ref Range    pH 7.43 7.35 - 7.45      PCO2 46 (H) 35 - 45 mmHg    PO2 73 (L) 75 - 100 mmHg    O2 SAT 91 (L) >95 %    BICARBONATE 29 (H) 22 - 26 mmol/L    BASE EXCESS 5.2 (H) 0 - 2 mmol/L    O2 METHOD NRB mask      O2 FLOW RATE 15 L/min    FIO2 100 %    Sample source Arterial      SITE Right Radial      IVAN'S TEST Positive         XR CHEST PORT   Final Result   FINDINGS/IMPRESSION:   Persistent diffuse airspace opacity throughout the lungs. Cardiac silhouette stable in size. Negative for pneumothorax. CT CHEST WO CONT   Final Result   Extensive diffuse bilateral pneumonitis and patchy pneumonia      XR CHEST PORT   Final Result   Diffuse predominantly interstitial process in both lungs with some   greater involvement on the left. Differential considerations include pulmonary   edema and diffuse infection. XR CHEST PORT    (Results Pending)        Assessment:  Bilateral healthcare associated pneumonia, possible atypical pathogens    Acute respiratory failure with hypoxia. Continues to require a Ventimask/NRB    Sustained narrow complex tachycardia, likely SVT versus atrial flutter. Currently with sinus tachycardia    Severe sepsis with fever, tachypnea, tachycardia and altered mental status    Metabolic/septic encephalopathy. Improved    Schizophrenia    HIV disease, details unknown. Not on HAART    History of alcohol abuse    COPD       Plan:  Continue Zosyn and levofloxacin  Continue IV fluids and supportive care  Await Legionella and mycoplasma serologies  Wean oxygen as tolerated    Care Plan discussed with: Nurse    Disposition: Continue ICU care for 24-hours    Total time spent with patient: 30 minutes.     Fco Sampson MD

## 2022-03-21 NOTE — PROGRESS NOTES
CARDIOLOGY PROGRESS NOTE    Patient seen and examined. PMH for HIV, Hep C, COPD, HTN and recently hospitalized for aspiration PNA. Patient is followed for sinus tachycardia in setting of an infectious process. More awake and alert. Remains febrile this am.     Telemetry reviewed: Sinus tachycardia 100-110ss    Physical examination:  Vital signs: Blood pressure 89/54,  Pulse 104  No apparent distress. Heart is tachycardic, rate and rhythm. Normal S1, S2, no murmurs are appreciated. Lungs crackles bilaterally  Abdomen is soft, nontender, normal bowel sounds. Extremities have no edema. Recent labs results and imaging reviewed. Discussed case with Dr. Aura Acevedo and our impression and recommendations are as follows:     1. Sinus tachycardia: remains tachycardic and febrile  - Continue Diltiazem PO by primary  - Lopressor IV for rate control as needed  - Treat underlying infection, febrile 103F this am  - Keep serum potassium between 4-5 and serum magnesium >2.     2. Hypertension:   - Blood pressure stable. - continue with current medications. Thank you for allowing us to care for this patient. Please do not hesitate to call if additional questions arise. Dr. Ventura Lira Cardiology  [address], [facility]  [phone]

## 2022-03-22 ENCOUNTER — APPOINTMENT (OUTPATIENT)
Dept: GENERAL RADIOLOGY | Age: 62
DRG: 720 | End: 2022-03-22
Attending: INTERNAL MEDICINE
Payer: MEDICAID

## 2022-03-22 LAB
ANION GAP SERPL CALC-SCNC: 3 MMOL/L (ref 5–15)
ARTERIAL PATENCY WRIST A: ABNORMAL
BASE EXCESS BLDA CALC-SCNC: 4.6 MMOL/L (ref 0–2)
BASOPHILS # BLD AUTO: 0 X10E3/UL (ref 0–0.2)
BASOPHILS # BLD: 0 K/UL (ref 0–0.1)
BASOPHILS NFR BLD AUTO: 0 %
BASOPHILS NFR BLD: 0 % (ref 0–1)
BDY SITE: ABNORMAL
BUN SERPL-MCNC: 37 MG/DL (ref 6–20)
BUN/CREAT SERPL: 36 (ref 12–20)
CA-I BLD-MCNC: 8.8 MG/DL (ref 8.5–10.1)
CD3+CD4+ CELLS # BLD: 91 /UL (ref 359–1519)
CD3+CD4+ CELLS NFR BLD: 9.1 % (ref 30.8–58.5)
CHLORIDE SERPL-SCNC: 109 MMOL/L (ref 97–108)
CO2 SERPL-SCNC: 29 MMOL/L (ref 21–32)
CREAT SERPL-MCNC: 1.03 MG/DL (ref 0.55–1.02)
CRP SERPL-MCNC: 4.63 MG/DL (ref 0–0.6)
DIFFERENTIAL METHOD BLD: ABNORMAL
EOSINOPHIL # BLD AUTO: 0 X10E3/UL (ref 0–0.4)
EOSINOPHIL # BLD: 0 K/UL (ref 0–0.4)
EOSINOPHIL NFR BLD AUTO: 0 %
EOSINOPHIL NFR BLD: 0 % (ref 0–7)
EPAP/CPAP/PEEP, PAPEEP: 0
ERYTHROCYTE [DISTWIDTH] IN BLOOD BY AUTOMATED COUNT: 13.8 % (ref 11.7–15.4)
ERYTHROCYTE [DISTWIDTH] IN BLOOD BY AUTOMATED COUNT: 14.4 % (ref 11.5–14.5)
FIO2 ON VENT: 100 %
GLUCOSE SERPL-MCNC: 121 MG/DL (ref 65–100)
HCO3 BLDA-SCNC: 29 MMOL/L (ref 22–26)
HCT VFR BLD AUTO: 30.5 % (ref 35–47)
HCT VFR BLD AUTO: 32.9 % (ref 34–46.6)
HGB BLD-MCNC: 10.3 G/DL (ref 11.5–16)
HGB BLD-MCNC: 10.8 G/DL (ref 11.1–15.9)
HIV1 RNA # SERPL NAA+PROBE: NORMAL COPIES/ML
HIV1 RNA SERPL NAA+PROBE-LOG#: 5.76 LOG10COPY/ML
IMM GRANULOCYTES # BLD AUTO: 0 K/UL (ref 0–0.04)
IMM GRANULOCYTES # BLD: 0 X10E3/UL (ref 0–0.1)
IMM GRANULOCYTES NFR BLD AUTO: 0 % (ref 0–0.5)
IMM GRANULOCYTES NFR BLD: 0 %
IMMATURE CELLS, 115398: ABNORMAL
L PNEUMO1 AG UR QL IA: NEGATIVE
LYMPHOCYTES # BLD AUTO: 1 X10E3/UL (ref 0.7–3.1)
LYMPHOCYTES # BLD: 0.6 K/UL (ref 0.8–3.5)
LYMPHOCYTES NFR BLD AUTO: 19 %
LYMPHOCYTES NFR BLD: 16 % (ref 12–49)
MCH RBC QN AUTO: 30.1 PG (ref 26–34)
MCH RBC QN AUTO: 30.3 PG (ref 26.6–33)
MCHC RBC AUTO-ENTMCNC: 32.8 G/DL (ref 31.5–35.7)
MCHC RBC AUTO-ENTMCNC: 33.8 G/DL (ref 30–36.5)
MCV RBC AUTO: 89.2 FL (ref 80–99)
MCV RBC AUTO: 92 FL (ref 79–97)
MONOCYTES # BLD AUTO: 0.1 X10E3/UL (ref 0.1–0.9)
MONOCYTES # BLD: 0.1 K/UL (ref 0–1)
MONOCYTES NFR BLD AUTO: 2 %
MONOCYTES NFR BLD: 2 % (ref 5–13)
MORPHOLOGY BLD-IMP: ABNORMAL
NEUTROPHILS # BLD AUTO: 4.4 X10E3/UL (ref 1.4–7)
NEUTROPHILS NFR BLD AUTO: 79 %
NEUTS SEG # BLD: 3.1 K/UL (ref 1.8–8)
NEUTS SEG NFR BLD: 82 % (ref 32–75)
NRBC # BLD: 0 K/UL (ref 0–0.01)
NRBC BLD AUTO-RTO: ABNORMAL %
NRBC BLD-RTO: 0 PER 100 WBC
PCO2 BLDA: 49 MMHG (ref 35–45)
PH BLDA: 7.4 [PH] (ref 7.35–7.45)
PLATELET # BLD AUTO: 195 K/UL (ref 150–400)
PLATELET # BLD AUTO: 208 X10E3/UL (ref 150–450)
PMV BLD AUTO: 11.6 FL (ref 8.9–12.9)
PO2 BLDA: 107 MMHG (ref 75–100)
POTASSIUM SERPL-SCNC: 3.9 MMOL/L (ref 3.5–5.1)
PROCALCITONIN SERPL-MCNC: 1.06 NG/ML
RBC # BLD AUTO: 3.42 M/UL (ref 3.8–5.2)
RBC # BLD AUTO: 3.57 X10E6/UL (ref 3.77–5.28)
SAO2 % BLD: 98 %
SODIUM SERPL-SCNC: 141 MMOL/L (ref 136–145)
SPECIMEN SOURCE: NORMAL
VENTILATION MODE VENT: ABNORMAL
WBC # BLD AUTO: 3.8 K/UL (ref 3.6–11)
WBC # BLD AUTO: 5.6 X10E3/UL (ref 3.4–10.8)

## 2022-03-22 PROCEDURE — 74011250637 HC RX REV CODE- 250/637: Performed by: INTERNAL MEDICINE

## 2022-03-22 PROCEDURE — 74011000258 HC RX REV CODE- 258: Performed by: HOSPITALIST

## 2022-03-22 PROCEDURE — 74011000250 HC RX REV CODE- 250: Performed by: INTERNAL MEDICINE

## 2022-03-22 PROCEDURE — 74011250636 HC RX REV CODE- 250/636: Performed by: HOSPITALIST

## 2022-03-22 PROCEDURE — 74011000250 HC RX REV CODE- 250: Performed by: HOSPITALIST

## 2022-03-22 PROCEDURE — 74011250636 HC RX REV CODE- 250/636: Performed by: INTERNAL MEDICINE

## 2022-03-22 PROCEDURE — 80048 BASIC METABOLIC PNL TOTAL CA: CPT

## 2022-03-22 PROCEDURE — 87449 NOS EACH ORGANISM AG IA: CPT

## 2022-03-22 PROCEDURE — 65610000006 HC RM INTENSIVE CARE

## 2022-03-22 PROCEDURE — 99233 SBSQ HOSP IP/OBS HIGH 50: CPT | Performed by: INTERNAL MEDICINE

## 2022-03-22 PROCEDURE — 36600 WITHDRAWAL OF ARTERIAL BLOOD: CPT

## 2022-03-22 PROCEDURE — 85025 COMPLETE CBC W/AUTO DIFF WBC: CPT

## 2022-03-22 PROCEDURE — 36415 COLL VENOUS BLD VENIPUNCTURE: CPT

## 2022-03-22 PROCEDURE — 86140 C-REACTIVE PROTEIN: CPT

## 2022-03-22 PROCEDURE — 77010033678 HC OXYGEN DAILY

## 2022-03-22 PROCEDURE — 74011250637 HC RX REV CODE- 250/637: Performed by: HOSPITALIST

## 2022-03-22 PROCEDURE — 84145 PROCALCITONIN (PCT): CPT

## 2022-03-22 PROCEDURE — 82803 BLOOD GASES ANY COMBINATION: CPT

## 2022-03-22 PROCEDURE — 92526 ORAL FUNCTION THERAPY: CPT

## 2022-03-22 PROCEDURE — 71045 X-RAY EXAM CHEST 1 VIEW: CPT

## 2022-03-22 RX ORDER — DILTIAZEM HYDROCHLORIDE 30 MG/1
30 TABLET, FILM COATED ORAL
Status: DISCONTINUED | OUTPATIENT
Start: 2022-03-22 | End: 2022-03-24

## 2022-03-22 RX ADMIN — METHYLPREDNISOLONE SODIUM SUCCINATE 40 MG: 40 INJECTION, POWDER, FOR SOLUTION INTRAMUSCULAR; INTRAVENOUS at 12:14

## 2022-03-22 RX ADMIN — DIVALPROEX SODIUM 250 MG: 125 CAPSULE, COATED PELLETS ORAL at 08:36

## 2022-03-22 RX ADMIN — SULFAMETHOXAZOLE AND TRIMETHOPRIM 187.04 MG: 80; 16 INJECTION, SOLUTION, CONCENTRATE INTRAVENOUS at 17:54

## 2022-03-22 RX ADMIN — FUROSEMIDE 40 MG: 10 INJECTION, SOLUTION INTRAMUSCULAR; INTRAVENOUS at 08:36

## 2022-03-22 RX ADMIN — SODIUM CHLORIDE, PRESERVATIVE FREE 10 ML: 5 INJECTION INTRAVENOUS at 05:51

## 2022-03-22 RX ADMIN — PIPERACILLIN AND TAZOBACTAM 3.38 G: 3; .375 INJECTION, POWDER, LYOPHILIZED, FOR SOLUTION INTRAVENOUS at 12:14

## 2022-03-22 RX ADMIN — SULFAMETHOXAZOLE AND TRIMETHOPRIM 187.04 MG: 80; 16 INJECTION, SOLUTION, CONCENTRATE INTRAVENOUS at 00:32

## 2022-03-22 RX ADMIN — METHYLPREDNISOLONE SODIUM SUCCINATE 40 MG: 40 INJECTION, POWDER, FOR SOLUTION INTRAMUSCULAR; INTRAVENOUS at 00:32

## 2022-03-22 RX ADMIN — ENOXAPARIN SODIUM 40 MG: 100 INJECTION SUBCUTANEOUS at 05:50

## 2022-03-22 RX ADMIN — DIVALPROEX SODIUM 250 MG: 125 CAPSULE, COATED PELLETS ORAL at 20:22

## 2022-03-22 RX ADMIN — RISPERIDONE 2 MG: 2 TABLET, FILM COATED ORAL at 20:22

## 2022-03-22 RX ADMIN — SODIUM CHLORIDE, PRESERVATIVE FREE 10 ML: 5 INJECTION INTRAVENOUS at 21:34

## 2022-03-22 RX ADMIN — SODIUM CHLORIDE, PRESERVATIVE FREE 10 ML: 5 INJECTION INTRAVENOUS at 14:00

## 2022-03-22 RX ADMIN — PIPERACILLIN AND TAZOBACTAM 3.38 G: 3; .375 INJECTION, POWDER, LYOPHILIZED, FOR SOLUTION INTRAVENOUS at 05:51

## 2022-03-22 RX ADMIN — SULFAMETHOXAZOLE AND TRIMETHOPRIM 187.04 MG: 80; 16 INJECTION, SOLUTION, CONCENTRATE INTRAVENOUS at 10:18

## 2022-03-22 RX ADMIN — LEVOFLOXACIN 750 MG: 5 INJECTION, SOLUTION INTRAVENOUS at 08:36

## 2022-03-22 RX ADMIN — CLONIDINE HYDROCHLORIDE 0.1 MG: 0.1 TABLET ORAL at 08:36

## 2022-03-22 RX ADMIN — RISPERIDONE 2 MG: 2 TABLET, FILM COATED ORAL at 08:36

## 2022-03-22 RX ADMIN — LISINOPRIL 20 MG: 20 TABLET ORAL at 08:36

## 2022-03-22 RX ADMIN — METHYLPREDNISOLONE SODIUM SUCCINATE 40 MG: 40 INJECTION, POWDER, FOR SOLUTION INTRAMUSCULAR; INTRAVENOUS at 23:40

## 2022-03-22 RX ADMIN — PIPERACILLIN AND TAZOBACTAM 3.38 G: 3; .375 INJECTION, POWDER, LYOPHILIZED, FOR SOLUTION INTRAVENOUS at 18:47

## 2022-03-22 RX ADMIN — METHYLPREDNISOLONE SODIUM SUCCINATE 40 MG: 40 INJECTION, POWDER, FOR SOLUTION INTRAMUSCULAR; INTRAVENOUS at 05:50

## 2022-03-22 RX ADMIN — ACETAMINOPHEN 650 MG: 325 TABLET ORAL at 20:21

## 2022-03-22 RX ADMIN — MULTIVITAMIN TABLET 1 TABLET: TABLET at 08:36

## 2022-03-22 RX ADMIN — FAMOTIDINE 20 MG: 20 TABLET, FILM COATED ORAL at 21:34

## 2022-03-22 RX ADMIN — BENZTROPINE MESYLATE 0.5 MG: 1 TABLET ORAL at 08:36

## 2022-03-22 RX ADMIN — DILTIAZEM HYDROCHLORIDE 60 MG: 30 TABLET, FILM COATED ORAL at 08:36

## 2022-03-22 RX ADMIN — BENZTROPINE MESYLATE 0.5 MG: 1 TABLET ORAL at 20:21

## 2022-03-22 RX ADMIN — METHYLPREDNISOLONE SODIUM SUCCINATE 40 MG: 40 INJECTION, POWDER, FOR SOLUTION INTRAMUSCULAR; INTRAVENOUS at 17:54

## 2022-03-22 NOTE — PROGRESS NOTES
Spiritual Care Assessment/Progress Note  [facility]      NAME: Lilo Chavarria      MRN: [id number]  AGE: 62 y.o. SEX: female  Sabianist Affiliation: Mandaen   Language: English     3/22/2022     Total Time (in minutes): 30     Spiritual Assessment begun in [facility] 2 CCU through conversation with:         [x]Patient        [] Family    [] Friend(s)        Reason for Consult: Initial/Spiritual assessment, patient floor,Initial visit     Spiritual beliefs: (Please include comment if needed)     [] Identifies with a michael tradition:         [] Supported by a michael community:            [] Claims no spiritual orientation:           [] Seeking spiritual identity:                [] Adheres to an individual form of spirituality:           [x] Not able to assess:      Asleep                     Identified resources for coping:      [] Prayer                               [] Music                  [] Guided Imagery     [] Family/friends                 [] Pet visits     [] Devotional reading                         [x] Unknown     [] Other:                                             Interventions offered during this visit: (See comments for more details)    Patient Interventions: Other (comment),Initial visit (Ministry of presence)           Plan of Care:     [x] Support spiritual and/or cultural needs    [] Support AMD and/or advance care planning process      [] Support grieving process   [] Coordinate Rites and/or Rituals    [] Coordination with community clergy   [] No spiritual needs identified at this time   [] Detailed Plan of Care below (See Comments)  [] Make referral to Music Therapy  [] Make referral to Pet Therapy     [] Make referral to Addiction services  [] Make referral to [facility]  [] Make referral to Spiritual Care Partner  [] No future visits requested        [x] Contact Spiritual Care for further referrals     Comments:  was rounding in the ICU.  At this time patient was asleep and no family was present.  did not wake up patient. Advised nurse to contact [facility] for any further referrals.     [address], [facility]    Please [facility]  in order to get in touch with  for any Spiritual Care Needs   [phone]   OR   Reach out to us on [employer] at [facility].

## 2022-03-22 NOTE — PROGRESS NOTES
CARDIOLOGY PROGRESS NOTE    Patient seen and examined. PMH for HIV, Hep C, COPD, HTN and recently hospitalized for aspiration PNA. Patient is followed for sinus tachycardia in setting of an infectious process. Resting comfortably this am. No events noted overnight per nursing. Telemetry reviewed: Sinus bradycardia 50-70s. Physical examination:  Vital signs: Blood pressure 109/72,  Pulse 71  No apparent distress. Heart is tachycardic, rate and rhythm. Normal S1, S2, no murmurs are appreciated. Lungs crackles bilaterally  Abdomen is soft, nontender, normal bowel sounds. Extremities have no edema. Recent labs results and imaging reviewed. Discussed case with Dr. Carmella Cortés and our impression and recommendations are as follows:     1. Sinus tachycardia: remains tachycardic and febrile  - Decrease Diltiazem to 30 mg Q8hrs   - Lopressor IV for rate control as needed  - Afebrile this morning.  - Keep serum potassium between 4-5 and serum magnesium >2.     2. Hypertension:   - Blood pressure below goal.   - continue with current medications. Thank you for allowing us to care for this patient. Please do not hesitate to call if additional questions arise. Dr. Noreen Velázquez Cardiology  [address], [address] [facility]  [phone]

## 2022-03-22 NOTE — PROGRESS NOTES
Hospitalist Progress Note               Daily Progress Note: 3/22/2022      Subjective: The patient is seen for follow up. She is a 60-year-old female with a history of COPD, alcohol abuse, HIV, schizophrenia and hepatitis C, lives in a group home. She was admitted about 3 weeks ago with aspiration pneumonia treated with Zosyn and doxycycline. Sent to the ED last night due to altered mental status and cough. She was febrile, tachycardic and with gurgling breath sounds. She was hypoxic and started on a Ventimask. X-ray showed diffuse predominantly interstitial process in both lungs greater on the left. Admitted to ICU and started on IV Zosyn    ------    Patient seen for follow-up. Patient is awake and alert. Patient continues on Zosyn and levofloxacin. Rapid Covid test is negative.     Tmax overnight 101.1F  Problem List:  Problem List as of 3/22/2022 Date Reviewed: 3/18/2022          Codes Class Noted - Resolved    Aspiration pneumonia ([facility] ICD-10-CM: J69.0  ICD-9-CM: 507.0  3/18/2022 - Present        Acute respiratory failure with hypoxemia ([facility] ICD-10-CM: J96.01  ICD-9-CM: 518.81  3/18/2022 - Present        Acute respiratory failure with hypoxia (HCC) ICD-10-CM: J96.01  ICD-9-CM: 518.81  2/25/2022 - Present        Pneumonia ICD-10-CM: J18.9  ICD-9-CM: 486  2/24/2022 - Present        Sepsis ([facility] ICD-10-CM: A41.9  ICD-9-CM: 038.9, 995.91  2/24/2022 - Present        Chronic undifferentiated schizophrenia with acute exacerbations ([facility] ICD-10-CM: F20.9  ICD-9-CM: 295.64  5/29/2017 - Present        Non-compliance with treatment ICD-10-CM: Z91.19  ICD-9-CM: V15.81  5/29/2017 - Present        Essential hypertension ICD-10-CM: I10  ICD-9-CM: 401.9  5/29/2017 - Present        Alcohol abuse ICD-10-CM: F10.10  ICD-9-CM: 305.00  5/29/2017 - Present              Medications reviewed  Current Facility-Administered Medications   Medication Dose Route Frequency    acetaminophen (TYLENOL) suppository 650 mg  650 mg Rectal Q4H PRN    methylPREDNISolone (PF) (SOLU-MEDROL) injection 40 mg  40 mg IntraVENous Q6H    albuterol-ipratropium (DUO-NEB) 2.5 MG-0.5 MG/3 ML  3 mL Nebulization Q6HWA RT    trimethoprim-sulfamethoxazole (BACTRIM) 187.04 mg in dextrose 5% 500 mL  10 mg/kg/day IntraVENous Q8H    divalproex (DEPAKOTE SPRINKLE) capsule 250 mg  250 mg Oral Q12H    dilTIAZem IR (CARDIZEM) tablet 60 mg  60 mg Oral TIDAC    metoprolol (LOPRESSOR) injection 5 mg  5 mg IntraVENous Q6H PRN    benztropine (COGENTIN) tablet 0.5 mg  0.5 mg Oral BID    cloNIDine HCL (CATAPRES) tablet 0.1 mg  0.1 mg Oral DAILY    famotidine (PEPCID) tablet 20 mg  20 mg Oral QHS    lisinopriL (PRINIVIL, ZESTRIL) tablet 20 mg  20 mg Oral DAILY    risperiDONE (RisperDAL) tablet 2 mg  2 mg Oral BID    multivitamin with folic acid (ONE DAILY WITH FOLIC ACID) tablet 1 Tablet  1 Tablet Oral DAILY    piperacillin-tazobactam (ZOSYN) 3.375 g in 0.9% sodium chloride (MBP/ADV) 100 mL MBP  3.375 g IntraVENous Q8H    sodium chloride (NS) flush 5-40 mL  5-40 mL IntraVENous Q8H    sodium chloride (NS) flush 5-40 mL  5-40 mL IntraVENous PRN    acetaminophen (TYLENOL) tablet 650 mg  650 mg Oral Q4H PRN    enoxaparin (LOVENOX) injection 40 mg  40 mg SubCUTAneous Q24H    albuterol CONCENTRATE 2.5mg/0.5 mL neb soln  2.5 mg Nebulization Q4H PRN    levoFLOXacin (LEVAQUIN) 750 mg in D5W IVPB  750 mg IntraVENous Q24H    furosemide (LASIX) injection 40 mg  40 mg IntraVENous DAILY    hydrOXYzine pamoate (VISTARIL) capsule 25 mg  25 mg Oral Q6H PRN    LORazepam (ATIVAN) injection 0.5 mg  0.5 mg IntraVENous Q4H PRN       Review of Systems:   Review of systems not obtained due to patient factors.     Objective:   Physical Exam:     Visit Vitals  /72   Pulse 87   Temp 97 °F (36.1 °C)   Resp 27   Ht 5' 4\" (1.626 m)   Wt 56.1 kg (123 lb 10.9 oz)   SpO2 100%   BMI 21.23 kg/m²    O2 Flow Rate (L/min): 15 l/min O2 Device: Non-rebreather mask    Temp (24hrs), Av °F (36.7 °C), Min:96.8 °F (36 °C), Max:98.7 °F (37.1 °C)    No intake/output data recorded.  1901 -  0700  In: 240 [P.O.:240]  Out: 2150 [Urine:2150]    General:   Awake and alert   Lungs:    rhonchi bilateral   Chest wall:  No tenderness or deformity. Heart:  Regular rate and rhythm, S1, S2 normal, no murmur, click, rub or gallop. Abdomen:   Soft, non-tender. Bowel sounds normal. No masses,  No organomegaly. Extremities: Extremities normal, atraumatic, no cyanosis or edema. Pulses: 2+ and symmetric all extremities. Skin: Skin color, texture, turgor normal. No rashes or lesions   Neurologic: CNII-XII intact.   No gross focal deficits         Data Review:       Recent Days:  Recent Labs     22  0455 22  0400 22  1320   WBC 3.8 6.6 5.6   HGB 10.3* 11.7 10.8*   HCT 30.5* 35.5 32.9*    226 208     Recent Labs     22  0455 22  0400 22  0445    142 139   K 3.9 4.1 4.0   * 110* 109*   CO2 29 28 26   * 72 80   BUN 37* 30* 24*   CREA 1.03* 1.24* 1.21*   CA 8.8 8.8 8.0*   MG  --   --  1.8   PHOS  --  3.0  --    ALB  --  1.5*  --      Recent Labs     22  0515 22  0518   PH 7.40 7.43   PCO2 49* 46*   PO2 107* 73*   HCO3 29* 29*   FIO2 100.0 100       24 Hour Results:  Recent Results (from the past 24 hour(s))   GLUCOSE, POC    Collection Time: 22 11:27 AM   Result Value Ref Range    Glucose (POC) 132 (H) 65 - 117 mg/dL    Performed by Ines Roche    SARS-COV-2    Collection Time: 22 12:25 PM   Result Value Ref Range    SARS-CoV-2 by PCR Please find results under separate order     URINALYSIS W/MICROSCOPIC    Collection Time: 22  2:15 PM   Result Value Ref Range    Color Yellow/Straw      Appearance Clear Clear      Specific gravity 1.014 1.003 - 1.030      pH (UA) 5.0 5.0 - 8.0      Protein Negative Negative mg/dL    Glucose Negative Negative mg/dL    Ketone Negative Negative mg/dL    Bilirubin Negative Negative      Blood Negative Negative      Urobilinogen 2.0 (H) 0.1 - 1.0 EU/dL    Nitrites Negative Negative      Leukocyte Esterase Negative Negative      WBC 0-4 0 - 4 /hpf    RBC 0-5 0 - 5 /hpf    Epithelial cells Few Few /lpf    Bacteria Negative Negative /hpf    Mucus Trace (A) Negative /lpf   CBC WITH AUTOMATED DIFF    Collection Time: 03/22/22  4:55 AM   Result Value Ref Range    WBC 3.8 3.6 - 11.0 K/uL    RBC 3.42 (L) 3.80 - 5.20 M/uL    HGB 10.3 (L) 11.5 - 16.0 g/dL    HCT 30.5 (L) 35.0 - 47.0 %    MCV 89.2 80.0 - 99.0 FL    MCH 30.1 26.0 - 34.0 PG    MCHC 33.8 30.0 - 36.5 g/dL    RDW 14.4 11.5 - 14.5 %    PLATELET 864 646 - 546 K/uL    MPV 11.6 8.9 - 12.9 FL    NRBC 0.0 0.0  WBC    ABSOLUTE NRBC 0.00 0.00 - 0.01 K/uL    NEUTROPHILS 82 (H) 32 - 75 %    LYMPHOCYTES 16 12 - 49 %    MONOCYTES 2 (L) 5 - 13 %    EOSINOPHILS 0 0 - 7 %    BASOPHILS 0 0 - 1 %    IMMATURE GRANULOCYTES 0 0 - 0.5 %    ABS. NEUTROPHILS 3.1 1.8 - 8.0 K/UL    ABS. LYMPHOCYTES 0.6 (L) 0.8 - 3.5 K/UL    ABS. MONOCYTES 0.1 0.0 - 1.0 K/UL    ABS. EOSINOPHILS 0.0 0.0 - 0.4 K/UL    ABS. BASOPHILS 0.0 0.0 - 0.1 K/UL    ABS. IMM.  GRANS. 0.0 0.00 - 0.04 K/UL    DF AUTOMATED     METABOLIC PANEL, BASIC    Collection Time: 03/22/22  4:55 AM   Result Value Ref Range    Sodium 141 136 - 145 mmol/L    Potassium 3.9 3.5 - 5.1 mmol/L    Chloride 109 (H) 97 - 108 mmol/L    CO2 29 21 - 32 mmol/L    Anion gap 3 (L) 5 - 15 mmol/L    Glucose 121 (H) 65 - 100 mg/dL    BUN 37 (H) 6 - 20 mg/dL    Creatinine 1.03 (H) 0.55 - 1.02 mg/dL    BUN/Creatinine ratio 36 (H) 12 - 20      GFR est AA >60 >60 ml/min/1.73m2    GFR est non-AA 55 (L) >60 ml/min/1.73m2    Calcium 8.8 8.5 - 10.1 mg/dL   C REACTIVE PROTEIN, QT    Collection Time: 03/22/22  4:55 AM   Result Value Ref Range    C-Reactive protein 4.63 (H) 0.00 - 0.60 mg/dL   PROCALCITONIN    Collection Time: 03/22/22  4:55 AM   Result Value Ref Range    Procalcitonin 1.06 (H) 0 ng/mL   BLOOD GAS, ARTERIAL    Collection Time: 03/22/22  5:15 AM   Result Value Ref Range    pH 7.40 7.35 - 7.45      PCO2 49 (H) 35 - 45 mmHg    PO2 107 (H) 75 - 100 mmHg    O2 SAT 98 >95 %    BICARBONATE 29 (H) 22 - 26 mmol/L    BASE EXCESS 4.6 (H) 0 - 2 mmol/L    O2 METHOD PENDING     FIO2 100.0 %    MODE NRB mask      Tidal volume PENDING     PRESSURE SUPPORT PENDING     EPAP/CPAP/PEEP 0      SITE Right Radial      IVAN'S TEST PASS         XR CHEST PORT   Final Result      XR CHEST PORT   Final Result   Diffuse opacities in the lungs, increased. XR CHEST PORT   Final Result   FINDINGS/IMPRESSION:   Persistent diffuse airspace opacity throughout the lungs. Cardiac silhouette stable in size. Negative for pneumothorax. CT CHEST WO CONT   Final Result   Extensive diffuse bilateral pneumonitis and patchy pneumonia      XR CHEST PORT   Final Result   Diffuse predominantly interstitial process in both lungs with some   greater involvement on the left. Differential considerations include pulmonary   edema and diffuse infection. Assessment:  Bilateral healthcare associated pneumonia, possible atypical pathogens    Acute respiratory failure with hypoxia. Continues to require a Ventimask/NRB    Sustained narrow complex tachycardia, likely SVT versus atrial flutter. Improving    Severe sepsis with fever, tachypnea, tachycardia and altered mental status    Metabolic/septic encephalopathy. Improved    Schizophrenia    HIV disease, details unknown. Not on HAART    History of alcohol abuse    COPD       Plan:  Continue Zosyn and levofloxacin  Continue IV fluids and supportive care  Await Legionella and mycoplasma serologies  Wean oxygen as tolerated    Care Plan discussed with: Nurse    Disposition: Consider transfer to telemetry floor in am    Total time spent with patient: 30 minutes.     Mary Kate Russell MD

## 2022-03-22 NOTE — PROGRESS NOTES
Pulmonary Progress Note      NAME: Roselyn Yusuf   :  1960  MRM:  [id number]    Date/Time: 3/22/2022  4:22 PM         Subjective:     Patient seen and examined. Discussed with the nursing staff. She has been 100% nonrebreather mask. However no distress. Confusion at times is noted. Blood gases and chest x-ray discussed below. She is in droplet plus isolation, ruling out COVID-19. ID input is noted. She is taking minced and moist diet without any problems. Past Medical History reviewed and unchanged from Admission History and Physical       Objective:     Physical Exam     Vitals:      Last 24hrs VS reviewed since prior progress note. Most recent are:    Visit Vitals  BP (!) 86/57   Pulse (!) 59   Temp 97.9 °F (36.6 °C)   Resp 28   Ht 5' 4\" (1.626 m)   Wt 56.1 kg (123 lb 10.9 oz)   SpO2 97%   BMI 21.23 kg/m²     SpO2 Readings from Last 6 Encounters:   22 97%   22 99%   22 98%   21 96%   21 97%   09/15/21 97%    O2 Flow Rate (L/min): 40 l/min       Intake/Output Summary (Last 24 hours) at 3/22/2022 1232  Last data filed at 3/22/2022 1100  Gross per 24 hour   Intake    Output 2550 ml   Net -2550 ml      General: Lying in bed in mild respiratory distress, on nonrebreather mask. Eye: Pupils are equal and reactive to light. Throat and Neck: Oropharynx without thrush. Neck is supple and trachea central.  No obvious lymphadenopathy. Lung: Reduced air entry bilaterally with prolonged exhalation but no wheezing. Occasional crackles. Heart: S1+S2. No murmurs  Abdomen: soft, non-tender. Bowel sounds normal. No masses; obese  Extremities: No edema, no cyanosis or clubbing. Pulses are palpable. : Not done  Skin: No cyanosis  Neurologic:  Alert and awake, pleasantly confused, grossly nonfocal  Psychiatric:  Unable to perform due to patient's condition    XR CHEST PORT   Final Result      XR CHEST PORT   Final Result   Diffuse opacities in the lungs, increased. XR CHEST PORT   Final Result   FINDINGS/IMPRESSION:   Persistent diffuse airspace opacity throughout the lungs. Cardiac silhouette stable in size. Negative for pneumothorax. CT CHEST WO CONT   Final Result   Extensive diffuse bilateral pneumonitis and patchy pneumonia      XR CHEST PORT   Final Result   Diffuse predominantly interstitial process in both lungs with some   greater involvement on the left. Differential considerations include pulmonary   edema and diffuse infection.                     Lab Data Reviewed: (see below)      Medications:  Current Facility-Administered Medications   Medication Dose Route Frequency    acetaminophen (TYLENOL) suppository 650 mg  650 mg Rectal Q4H PRN    methylPREDNISolone (PF) (SOLU-MEDROL) injection 40 mg  40 mg IntraVENous Q6H    albuterol-ipratropium (DUO-NEB) 2.5 MG-0.5 MG/3 ML  3 mL Nebulization Q6HWA RT    trimethoprim-sulfamethoxazole (BACTRIM) 187.04 mg in dextrose 5% 500 mL  10 mg/kg/day IntraVENous Q8H    divalproex (DEPAKOTE SPRINKLE) capsule 250 mg  250 mg Oral Q12H    dilTIAZem IR (CARDIZEM) tablet 60 mg  60 mg Oral TIDAC    metoprolol (LOPRESSOR) injection 5 mg  5 mg IntraVENous Q6H PRN    benztropine (COGENTIN) tablet 0.5 mg  0.5 mg Oral BID    cloNIDine HCL (CATAPRES) tablet 0.1 mg  0.1 mg Oral DAILY    famotidine (PEPCID) tablet 20 mg  20 mg Oral QHS    lisinopriL (PRINIVIL, ZESTRIL) tablet 20 mg  20 mg Oral DAILY    risperiDONE (RisperDAL) tablet 2 mg  2 mg Oral BID    multivitamin with folic acid (ONE DAILY WITH FOLIC ACID) tablet 1 Tablet  1 Tablet Oral DAILY    piperacillin-tazobactam (ZOSYN) 3.375 g in 0.9% sodium chloride (MBP/ADV) 100 mL MBP  3.375 g IntraVENous Q8H    sodium chloride (NS) flush 5-40 mL  5-40 mL IntraVENous Q8H    sodium chloride (NS) flush 5-40 mL  5-40 mL IntraVENous PRN    acetaminophen (TYLENOL) tablet 650 mg  650 mg Oral Q4H PRN    enoxaparin (LOVENOX) injection 40 mg  40 mg SubCUTAneous Q24H    albuterol CONCENTRATE 2.5mg/0.5 mL neb soln  2.5 mg Nebulization Q4H PRN    levoFLOXacin (LEVAQUIN) 750 mg in D5W IVPB  750 mg IntraVENous Q24H    furosemide (LASIX) injection 40 mg  40 mg IntraVENous DAILY    hydrOXYzine pamoate (VISTARIL) capsule 25 mg  25 mg Oral Q6H PRN    LORazepam (ATIVAN) injection 0.5 mg  0.5 mg IntraVENous Q4H PRN       ______________________________________________________________________      Lab Review:     Recent Labs     03/22/22  0455 03/21/22  0400 03/20/22  1320   WBC 3.8 6.6 5.6   HGB 10.3* 11.7 10.8*   HCT 30.5* 35.5 32.9*    226 208     Recent Labs     03/22/22  0455 03/21/22  0400 03/20/22  0445    142 139   K 3.9 4.1 4.0   * 110* 109*   CO2 29 28 26   * 72 80   BUN 37* 30* 24*   CREA 1.03* 1.24* 1.21*   CA 8.8 8.8 8.0*   MG  --   --  1.8   PHOS  --  3.0  --    ALB  --  1.5*  --      No components found for: [place]  Recent Labs     03/22/22  0515 03/21/22  0518   PH 7.40 7.43   PCO2 49* 46*   PO2 107* 73*   HCO3 29* 29*   FIO2 100.0 100     No results for input(s): INR, INREXT, INREXT, INREXT in the last 72 hours. Other pertinent lab:          Assessment & Plan:      Assessment:      1. Acute respiratory failure with hypoxia due to #2 and 3  2. Bilateral healthcare associated pneumonia  3. Acute CHF and sinus tachycardia due to #4  4. Severe sepsis  5. Altered mental status/acute metabolic encephalopathy  6. COPD with exacerbation  7. HIV  8. Schizophrenia  9. History of alcohol abuse     Plan:      Patient admitted to the ICU  We will be watching her closely     Critically ill  Currently on nonrebreather mask. Previously she was intermittently on 50% Ventimask oxygen  Blood gases done this morning showed 7.4 0/49/107 on nonrebreather mask. Blood gases done yesterday morning showed 7.4 3/46/73 on nonrebreather mask. Blood gases done 3/18/2022 showed 7.50/36/116 on nonrebreather mask.     We will try high flow oxygen and wean down as tolerated. Discussed with RT. If she decompensates will initiate BiPAP but she is not a CO2 retainer. Chest x-ray shows bilateral infiltrates. Personally reviewed. No significant change. We will repeat chest x-ray and blood gas in the morning. Patient has COPD  Continue nebulizers  Added IV Solu-Medrol. Appreciate ID input. Treating as possible PCP. Bactrim added. She is aspiration risk. On moist and minced diet. Urinary retention. Has [place] catheter.     Patient has combination of CHF and healthcare associated pneumonia, speech is also involved. There is a possibility of aspiration pneumonia. Continue broad-spectrum antibiotics, currently on Levaquin and Zosyn. Appreciate ID input. In isolation, ruling out COVID-19. Not requiring vasopressors at present  We will use if needed  Patient developed sinus tachycardia. She was given magnesium and amiodarone. Now started on diltiazem. Appreciate input from cardiology.     Started on Lasix IV daily since blood pressure stable  Intake and output charting  Check electrolytes and replace as needed  Monitor renal function with fluid change     Monitor mental status      DVT and GI prophylaxis     Clinical status  Monitor in ICU overnight     Case discussed in detail with RN, RT, and care team  Thank you for involving me in the care of this patient  I will follow with you closely during hospitalization     Time spent more than 50 minutes in direct patient care with no overlap reviewing results and records, decision making, and answering questions.       Carmel Hernandez MD  [facility]

## 2022-03-22 NOTE — PROGRESS NOTES
Problem: Dysphagia (Adult)  Goal: *Acute Goals and Plan of Care (Insert Text)  Description: Speech Therapy Swallow Goals  Initiated 3/18/2022  -Patient will tolerate minced and moist diet with thin liquids without clinical indicators of aspiration given minimal cues within 7 day(s). REVISED    -Patient will tolerate puree diet with thin liquids without clinical indicators of aspiration given minimal cues within 7 day(s). -Patient will tolerate PO trials without clinical indicators of aspiration given minimal cues within 7 day(s). -Patient will participate in modified barium swallow study within 7 day(s). -Patient will demonstrate understanding of swallow safety precautions and aspiration precautions, diet recs with minimal cues within 7 day(s). Outcome: Progressing Towards Goal   SPEECH LANGUAGE PATHOLOGY DYSPHAGIA TREATMENT  Patient: Roselyn Yusuf (7 y.o. female)  Date: 3/22/2022  Diagnosis: Aspiration pneumonia ([address] [J69.0]  Acute respiratory failure with hypoxemia ([address] [J96.01] <principal problem not specified>       Precautions: aspiration      ASSESSMENT:  Pt seen for follow-up, positioned as upright as tolerated in bed. Pt with warming blanket and currently on HFNC02 50%, 40L/min. Nsg reports pt tolerating current diet with fluctuating intake. Pt given trials of thin via straw, puree and soft solids. Oral phase with reduced efficacy of mastication and disorganized mastication present, delayed oral clearance with solids. Overall, oral phase WFL for liquid and puree trials. Pharyngeal phase appears WNL once initiated. Pt tolerates all trials without overt s/s aspiration. PLAN:  Recommendations and Planned Interventions: At this time, recommend diet downgrade to puree/thin to promote ease of deglutition due to oral phase deficits. Will request [facility] to further assess oropharyngeal sw function and safety as indicated when pt is able to be weaned from [facility].   Aspiration concerns persist at this time. Recommend aspiration and GERD precautions. Pt does demonstrate tendency toward poor positioning in bed per nsg and observation. Patient continues to benefit from skilled intervention to address the above impairments. Continue treatment per established plan of care. Frequency/Duration: Patient will be followed by speech-language pathology 3 times a week to address goals. Discharge Recommendations:  Cont SLP tx at this time. SUBJECTIVE:   Patient alert, agreeable, confusion noted. OBJECTIVE:     CXR Results  (Last 48 hours)                 03/22/22 0330  XR CHEST PORT Final result    Narrative:  Chest single view. Comparison single view chest March 21, 2022. Similar diffuse interstitial pulmonary edema. Central air bronchograms. Cardiac   and mediastinal structures unchanged. No pneumothorax or sizable pleural   effusion. 03/21/22 0917  XR CHEST PORT Final result    Impression:  Diffuse opacities in the lungs, increased. Narrative:  Chest, 2 frontal views, 3/21/2022       History: Respiratory failure. Comparison: Including chest 3/19/2022. Findings: The cardiac silhouette is stable. The lungs are underexpanded. Diffuse opacities in the lungs are increased as compared to chest 3/19/2022. No   pneumothorax is identified. The osseous structures are stable.                  Cognitive and Communication Status:  Neurologic State: Confused  Orientation Level: Oriented to person  Cognition: Decreased command following  Perception: Appears intact  Perseveration: No perseveration noted  Safety/Judgement: Decreased awareness of environment,Decreased awareness of need for assistance,Decreased awareness of need for safety,Decreased insight into deficits  Pain:  Pain Scale 1: Numeric (0 - 10)  Pain Intensity 1: 0       After treatment:   Patient left in no apparent distress in bed, Call bell within reach and Nursing notified    COMMUNICATION/EDUCATION:   Patient was educated regarding her deficit(s) of dysphagia, swallow safety precautions, diet recs and POC. She demonstrated Fair understanding as evidenced by verbal responsiveness. The patient's plan of care including recommendations, planned interventions, and recommended diet changes were discussed with: Registered nurse.      Kendra Carlson M.S. CCC-SLP  Time Calculation: 8 mins

## 2022-03-22 NOTE — PROGRESS NOTES
Patient will need PT/OT orders for therapy recommendations when medically appropriate. Per telephone discussion w/manager at [facility] on 3/18/2022 (see note). Writer informed by manager Ingrid Gleason) if [facility] is recommended patient is able to return. Transportation needed at time of discharge.           REANNA Rush

## 2022-03-22 NOTE — PROGRESS NOTES
Progress Note    Patient: Angella Smyth MRN: [id number]  SSN: [id number]    YOB: 1960  Age: 62 y.o. Sex: female      Admit Date: 3/17/2022    LOS: 4 days     Subjective:   Patient followed for SIRS/Sepsis with interstitial pneumonitis and history of HIV infection. Her respiratory failure is worsening along with her CXR but she is now HFL NO2. She remains afebrile. Procal is increasing today. Currently on IV Zosyn, Levaquin and Bactrim. Patient is awake and responsive today. More lucid but still confused, though she knows where she resides. Anxious to go home. Objective:     Vitals:    03/22/22 0500 03/22/22 0600 03/22/22 0700 03/22/22 0803   BP: 109/72 109/72     Pulse: 70 71     Resp: 25 25     Temp:   97 °F (36.1 °C)    SpO2: 99% 100%  100%   Weight:       Height:            Intake and Output:  Current Shift: No intake/output data recorded. Last three shifts: 03/20 1901 - 03/22 0700  In: 240 [P.O.:240]  Out: 2150 [Urine:2150]    Physical Exam:   Vitals and nursing note reviewed. Constitutional:       General: She is in acute distress. Appearance: She is ill-appearing. HENT:      Head: Normocephalic and atraumatic. Right Ear: External ear normal.      Left Ear: External ear normal.      Nose: Nose normal.      Comments: High flow nasal cannula 50% FiO2     Mouth/Throat:      Mouth: Mucous membranes are dry. Eyes:      Pupils: Pupils are equal, round, and reactive to light. Cardiovascular:      Rate and Rhythm: Regular rhythm. Tachycardia present. Heart sounds: No murmur heard.       Pulmonary:      Effort: Respiratory distress present. Breath sounds: Rhonchi present. Abdominal:      General: Bowel sounds are normal.      Palpations: Abdomen is soft. Tenderness: There is no abdominal tenderness. Genitourinary:     Comments:   External urinary device    Musculoskeletal:      Cervical back: Neck supple. Right lower leg: No edema.       Left lower leg: No edema. Skin:     Findings: No rash. Neurological:      General: No focal deficit present. Mental Status: She is alert and oriented to person, place, and time. Psychiatric:      Comments: Patient with abnormal thought content, judgement      Lab/Data Review:     WBC 3,800    Procal 1.06 <0.75 <1.11  CRP 5.31    CD4 count 91/mm3 (9.1%)    Mycoplasma IgM Nonreactive    Blood cultures (3/17) No growth 3 days  Urine culture (3/21) Pending    CXR  (3/22)  Similar diffuse interstitial pulmonary edema. Central air bronchograms      Assessment:     Active Problems:    Pneumonia (2/24/2022)      Acute respiratory failure with hypoxia (HCC) (2/25/2022)      Aspiration pneumonia ([address] (3/18/2022)      Acute respiratory failure with hypoxemia ([address] (3/18/2022)      1. SIRS/Sepsis with fever, tachycardia, elevated CRP and procal  2. Interstitial pneumonitis, bilateral, etiology unclear, possible pneumocystis  3. Acute hypoxic respiratory failure, Ventimask  4. HIV-1 infection with CD4 <100  5. Hepatitis C infection by history  6. Renal failure     Comment: CD4 less than 100 suggests that this could be pneumocystis but could also be CMV. Diagnosis can only reliably be made with bronchoscopy and BAL for DFA. Serum fungitell sometimes positive. For now would continue Bactrim. Plan:      1. Continue IV Zosyn and Levaquin  2. Continue IV Bactrim and Solumedrol  3. Follow-up blood cultures  4. Follow-up SARS CoV-2 PCR  5. In am, repeat procal, CRP, CMV PCR  6. Follow-up urine Legionella angigen, HIV-1 RNA viral load, HCV RNA Qt, serum fungitell  7.  [address] [facility] [address] regarding HIV treatment    Signed By: Naomy Chan MD     March 22, 2022

## 2022-03-23 LAB
ANION GAP SERPL CALC-SCNC: 5 MMOL/L (ref 5–15)
ARTERIAL PATENCY WRIST A: ABNORMAL
BACTERIA SPEC CULT: NORMAL
BASE EXCESS BLDA CALC-SCNC: 3.5 MMOL/L (ref 0–2)
BASOPHILS # BLD: 0 K/UL (ref 0–0.1)
BASOPHILS NFR BLD: 0 % (ref 0–1)
BDY SITE: ABNORMAL
BUN SERPL-MCNC: 49 MG/DL (ref 6–20)
BUN/CREAT SERPL: 35 (ref 12–20)
CA-I BLD-MCNC: 8.5 MG/DL (ref 8.5–10.1)
CHLORIDE SERPL-SCNC: 107 MMOL/L (ref 97–108)
CO2 SERPL-SCNC: 28 MMOL/L (ref 21–32)
CREAT SERPL-MCNC: 1.39 MG/DL (ref 0.55–1.02)
CRP SERPL-MCNC: 2.2 MG/DL (ref 0–0.6)
DIFFERENTIAL METHOD BLD: ABNORMAL
EOSINOPHIL # BLD: 0 K/UL (ref 0–0.4)
EOSINOPHIL NFR BLD: 0 % (ref 0–7)
ERYTHROCYTE [DISTWIDTH] IN BLOOD BY AUTOMATED COUNT: 13.7 % (ref 11.5–14.5)
FIO2 ON VENT: 40 %
GAS FLOW.O2 O2 DELIVERY SYS: 40 L/MIN
GLUCOSE SERPL-MCNC: 152 MG/DL (ref 65–100)
HCO3 BLDA-SCNC: 28 MMOL/L (ref 22–26)
HCT VFR BLD AUTO: 29.8 % (ref 35–47)
HCV RNA SERPL NAA+PROBE-ACNC: NORMAL IU/ML
HCV RNA SERPL NAA+PROBE-ACNC: NORMAL IU/ML
HCV RNA SERPL NAA+PROBE-LOG IU: 7.42 LOG10 IU/ML
HCV RNA SERPL NAA+PROBE-LOG IU: NORMAL LOG10 IU/ML
HGB BLD-MCNC: 10.4 G/DL (ref 11.5–16)
IMM GRANULOCYTES # BLD AUTO: 0 K/UL (ref 0–0.04)
IMM GRANULOCYTES NFR BLD AUTO: 1 % (ref 0–0.5)
LDH SERPL L TO P-CCNC: 857 U/L (ref 81–246)
LYMPHOCYTES # BLD: 0.6 K/UL (ref 0.8–3.5)
LYMPHOCYTES NFR BLD: 9 % (ref 12–49)
MCH RBC QN AUTO: 30.5 PG (ref 26–34)
MCHC RBC AUTO-ENTMCNC: 34.9 G/DL (ref 30–36.5)
MCV RBC AUTO: 87.4 FL (ref 80–99)
MONOCYTES # BLD: 0.1 K/UL (ref 0–1)
MONOCYTES NFR BLD: 2 % (ref 5–13)
NEUTS SEG # BLD: 6.2 K/UL (ref 1.8–8)
NEUTS SEG NFR BLD: 88 % (ref 32–75)
NRBC # BLD: 0 K/UL (ref 0–0.01)
NRBC BLD-RTO: 0 PER 100 WBC
PCO2 BLDA: 43 MMHG (ref 35–45)
PH BLDA: 7.43 [PH] (ref 7.35–7.45)
PLATELET # BLD AUTO: 194 K/UL (ref 150–400)
PMV BLD AUTO: 11.5 FL (ref 8.9–12.9)
PO2 BLDA: 91 MMHG (ref 75–100)
POTASSIUM SERPL-SCNC: 3.5 MMOL/L (ref 3.5–5.1)
PROCALCITONIN SERPL-MCNC: 0.72 NG/ML
RBC # BLD AUTO: 3.41 M/UL (ref 3.8–5.2)
SAO2 % BLD: 97 %
SAO2% DEVICE SAO2% SENSOR NAME: ABNORMAL
SARS-COV-2, XPLCVT: NOT DETECTED
SERIAL MONITORING: NORMAL
SODIUM SERPL-SCNC: 140 MMOL/L (ref 136–145)
SOURCE, COVRS: NORMAL
SPECIAL REQUESTS,SREQ: NORMAL
WBC # BLD AUTO: 6.9 K/UL (ref 3.6–11)

## 2022-03-23 PROCEDURE — 80048 BASIC METABOLIC PNL TOTAL CA: CPT

## 2022-03-23 PROCEDURE — 99233 SBSQ HOSP IP/OBS HIGH 50: CPT | Performed by: INTERNAL MEDICINE

## 2022-03-23 PROCEDURE — 74011250637 HC RX REV CODE- 250/637: Performed by: INTERNAL MEDICINE

## 2022-03-23 PROCEDURE — 74011000258 HC RX REV CODE- 258: Performed by: HOSPITALIST

## 2022-03-23 PROCEDURE — 74011250636 HC RX REV CODE- 250/636: Performed by: INTERNAL MEDICINE

## 2022-03-23 PROCEDURE — 36415 COLL VENOUS BLD VENIPUNCTURE: CPT

## 2022-03-23 PROCEDURE — 74011000250 HC RX REV CODE- 250: Performed by: INTERNAL MEDICINE

## 2022-03-23 PROCEDURE — 85025 COMPLETE CBC W/AUTO DIFF WBC: CPT

## 2022-03-23 PROCEDURE — 74011250637 HC RX REV CODE- 250/637: Performed by: HOSPITALIST

## 2022-03-23 PROCEDURE — 65270000029 HC RM PRIVATE

## 2022-03-23 PROCEDURE — 83615 LACTATE (LD) (LDH) ENZYME: CPT

## 2022-03-23 PROCEDURE — 82803 BLOOD GASES ANY COMBINATION: CPT

## 2022-03-23 PROCEDURE — 74011250637 HC RX REV CODE- 250/637: Performed by: NURSE PRACTITIONER

## 2022-03-23 PROCEDURE — 74011250636 HC RX REV CODE- 250/636: Performed by: HOSPITALIST

## 2022-03-23 PROCEDURE — 87496 CYTOMEG DNA AMP PROBE: CPT

## 2022-03-23 PROCEDURE — 84145 PROCALCITONIN (PCT): CPT

## 2022-03-23 PROCEDURE — 94760 N-INVAS EAR/PLS OXIMETRY 1: CPT

## 2022-03-23 PROCEDURE — 36600 WITHDRAWAL OF ARTERIAL BLOOD: CPT

## 2022-03-23 PROCEDURE — 86140 C-REACTIVE PROTEIN: CPT

## 2022-03-23 PROCEDURE — 77010033711 HC HIGH FLOW OXYGEN

## 2022-03-23 PROCEDURE — 74011000250 HC RX REV CODE- 250: Performed by: HOSPITALIST

## 2022-03-23 PROCEDURE — 94640 AIRWAY INHALATION TREATMENT: CPT

## 2022-03-23 RX ADMIN — SODIUM CHLORIDE, PRESERVATIVE FREE 10 ML: 5 INJECTION INTRAVENOUS at 21:12

## 2022-03-23 RX ADMIN — RISPERIDONE 2 MG: 2 TABLET, FILM COATED ORAL at 20:01

## 2022-03-23 RX ADMIN — ENOXAPARIN SODIUM 40 MG: 100 INJECTION SUBCUTANEOUS at 02:59

## 2022-03-23 RX ADMIN — METHYLPREDNISOLONE SODIUM SUCCINATE 40 MG: 40 INJECTION, POWDER, FOR SOLUTION INTRAMUSCULAR; INTRAVENOUS at 23:17

## 2022-03-23 RX ADMIN — BENZTROPINE MESYLATE 0.5 MG: 1 TABLET ORAL at 08:53

## 2022-03-23 RX ADMIN — IPRATROPIUM BROMIDE AND ALBUTEROL SULFATE 3 ML: .5; 2.5 SOLUTION RESPIRATORY (INHALATION) at 14:29

## 2022-03-23 RX ADMIN — LEVOFLOXACIN 750 MG: 5 INJECTION, SOLUTION INTRAVENOUS at 08:53

## 2022-03-23 RX ADMIN — CLONIDINE HYDROCHLORIDE 0.1 MG: 0.1 TABLET ORAL at 08:53

## 2022-03-23 RX ADMIN — BENZTROPINE MESYLATE 0.5 MG: 1 TABLET ORAL at 20:00

## 2022-03-23 RX ADMIN — METHYLPREDNISOLONE SODIUM SUCCINATE 40 MG: 40 INJECTION, POWDER, FOR SOLUTION INTRAMUSCULAR; INTRAVENOUS at 17:35

## 2022-03-23 RX ADMIN — PIPERACILLIN AND TAZOBACTAM 3.38 G: 3; .375 INJECTION, POWDER, LYOPHILIZED, FOR SOLUTION INTRAVENOUS at 02:59

## 2022-03-23 RX ADMIN — DILTIAZEM HYDROCHLORIDE 30 MG: 30 TABLET, FILM COATED ORAL at 17:35

## 2022-03-23 RX ADMIN — LISINOPRIL 20 MG: 20 TABLET ORAL at 08:54

## 2022-03-23 RX ADMIN — DIVALPROEX SODIUM 250 MG: 125 CAPSULE, COATED PELLETS ORAL at 08:54

## 2022-03-23 RX ADMIN — SODIUM CHLORIDE, PRESERVATIVE FREE 10 ML: 5 INJECTION INTRAVENOUS at 05:42

## 2022-03-23 RX ADMIN — METHYLPREDNISOLONE SODIUM SUCCINATE 40 MG: 40 INJECTION, POWDER, FOR SOLUTION INTRAMUSCULAR; INTRAVENOUS at 11:14

## 2022-03-23 RX ADMIN — PIPERACILLIN AND TAZOBACTAM 3.38 G: 3; .375 INJECTION, POWDER, LYOPHILIZED, FOR SOLUTION INTRAVENOUS at 11:14

## 2022-03-23 RX ADMIN — PIPERACILLIN AND TAZOBACTAM 3.38 G: 3; .375 INJECTION, POWDER, LYOPHILIZED, FOR SOLUTION INTRAVENOUS at 19:57

## 2022-03-23 RX ADMIN — FAMOTIDINE 20 MG: 20 TABLET, FILM COATED ORAL at 21:06

## 2022-03-23 RX ADMIN — DILTIAZEM HYDROCHLORIDE 30 MG: 30 TABLET, FILM COATED ORAL at 11:14

## 2022-03-23 RX ADMIN — SULFAMETHOXAZOLE AND TRIMETHOPRIM 187.04 MG: 80; 16 INJECTION, SOLUTION, CONCENTRATE INTRAVENOUS at 17:38

## 2022-03-23 RX ADMIN — SODIUM CHLORIDE, PRESERVATIVE FREE 10 ML: 5 INJECTION INTRAVENOUS at 17:48

## 2022-03-23 RX ADMIN — RISPERIDONE 2 MG: 2 TABLET, FILM COATED ORAL at 08:54

## 2022-03-23 RX ADMIN — MULTIVITAMIN TABLET 1 TABLET: TABLET at 08:54

## 2022-03-23 RX ADMIN — SULFAMETHOXAZOLE AND TRIMETHOPRIM 187.04 MG: 80; 16 INJECTION, SOLUTION, CONCENTRATE INTRAVENOUS at 02:57

## 2022-03-23 RX ADMIN — DILTIAZEM HYDROCHLORIDE 30 MG: 30 TABLET, FILM COATED ORAL at 08:54

## 2022-03-23 RX ADMIN — METHYLPREDNISOLONE SODIUM SUCCINATE 40 MG: 40 INJECTION, POWDER, FOR SOLUTION INTRAMUSCULAR; INTRAVENOUS at 05:41

## 2022-03-23 RX ADMIN — IPRATROPIUM BROMIDE AND ALBUTEROL SULFATE 3 ML: .5; 2.5 SOLUTION RESPIRATORY (INHALATION) at 08:46

## 2022-03-23 RX ADMIN — FUROSEMIDE 40 MG: 10 INJECTION, SOLUTION INTRAMUSCULAR; INTRAVENOUS at 08:53

## 2022-03-23 RX ADMIN — DIVALPROEX SODIUM 250 MG: 125 CAPSULE, COATED PELLETS ORAL at 20:00

## 2022-03-23 RX ADMIN — IPRATROPIUM BROMIDE AND ALBUTEROL SULFATE 3 ML: .5; 2.5 SOLUTION RESPIRATORY (INHALATION) at 19:47

## 2022-03-23 NOTE — PROGRESS NOTES
Progress Note    Patient: Jerson Luna MRN: [id number]  SSN: [id number]    YOB: 1960  Age: 62 y.o. Sex: female      Admit Date: 3/17/2022    LOS: 5 days     Subjective:   Patient followed for SIRS/Sepsis with interstitial pneumonitis and history of HIV infection with CD4 count of only 91/mm3, which prompted the addition of Bactrim IV. She remains on high flow nasal O2 but is now out of the ICU. She remains afebrile. Procal and CRP decreasing. Currently on IV Zosyn, Levaquin and Bactrim. Patient is awake and responsive with no complaints, but still confused. Asked for a drink of water. Objective:     Vitals:    03/23/22 0500 03/23/22 0600 03/23/22 0700 03/23/22 0841   BP: 117/70 109/68     Pulse:       Resp: 21 18     Temp:   97.7 °F (36.5 °C)    SpO2: 95% 99%  99%   Weight:       Height:            Intake and Output:  Current Shift: No intake/output data recorded. Last three shifts: 03/21 1901 - 03/23 0700  In: -   Out: 3500 [Urine:3500]    Physical Exam:   Vitals and nursing note reviewed. Constitutional:       General: She is in acute distress. Appearance: She is ill-appearing. HENT:  High flow nasal cannula 40% FiO2     Mouth/Throat:      Mouth: Mucous membranes are dry. Eyes:      Pupils: Pupils are equal, round, and reactive to light. Cardiovascular:      Rate and Rhythm: Regular rhythm. .      Heart sounds: No murmur heard  Pulmonary:      Effort: Respiratory distress present. Breath sounds: Rhonchi present. Abdominal:      General: Bowel sounds are normal.      Palpations: Abdomen is soft. Tenderness: There is no abdominal tenderness. Genitourinary:     Comments:   External urinary device  Musculoskeletal:      Right lower leg: No edema. Left lower leg: No edema. Skin:     Findings: No rash. Neurological:      General: No focal deficit present. Mental Status: She is alert and oriented to person, place, and time.    Psychiatric: Comments: Patient with abnormal thought content, judgement      Lab/Data Review:     WBC 6,900    Procal 0.72 <1.06 <0.75 <1.11  CRP 2.20 <4.63 <5.31    CD4 count 91/mm3 (9.1%)  HIV-1 RNA viral load 579,000  HCV 26,600,000    Mycoplasma IgM Nonreactive  SARS CoV-2 Not detected  Urine Legionella antigen Negative    Blood cultures (3/17) No growth 4 days  Urine culture (3/21) No growth FINAL    CXR  (3/22)  Similar diffuse interstitial pulmonary edema. Central air bronchograms      Assessment:     Active Problems:    Pneumonia (2/24/2022)      Acute respiratory failure with hypoxia (HCC) (2/25/2022)      Aspiration pneumonia ([address] (3/18/2022)      Acute respiratory failure with hypoxemia ([address] (3/18/2022)      1. SIRS/Sepsis with fever, tachycardia, elevated CRP and procal  2. Interstitial pneumonitis, bilateral, etiology unclear, possible pneumocystis, Day #6 Zosyn, Day #2 Bactrim  3. Acute hypoxic respiratory failure, high flow nasal O2  4. HIV-1 infection with CD4 <100 and markedly elevated viral load  5. Hepatitis C infection with markedly elevated viral load  6. Renal failure     Comment:  Patient has untreated HIV-1 infection and HCV infection. Unable to contact [facility] yesterday. CRP and procal decreasing. Plan:      1. Continue IV Zosyn  2. Discontinue Levaquin for now with negative Mycoplasma IgM and negative Legionella antigen  3. Continue IV Bactrim and Solumedrol  4. Follow-up blood cultures  5. In am, repeat procal, CRP, HIV-1 genosure, HLA  (abacavir hypersensitivity), HCV genotype, Fibrosure, AFP, N5SA drug resistance profile  6. Follow-up CMV PCR, serum fungitell  7.  Contact caregiver at [facility] regarding status of HIV and HCV treatment    Signed By: Herb Davila MD     March 23, 2022

## 2022-03-23 NOTE — PROGRESS NOTES
Hospitalist Progress Note               Daily Progress Note: 3/23/2022      Subjective: The patient is seen for follow up. She is a 73-year-old female with a history of COPD, alcohol abuse, HIV, schizophrenia and hepatitis C, lives in a group home. She was admitted about 3 weeks ago with aspiration pneumonia treated with Zosyn and doxycycline. Sent to the ED last night due to altered mental status and cough. She was febrile, tachycardic and with gurgling breath sounds. She was hypoxic and started on a Ventimask. X-ray showed diffuse predominantly interstitial process in both lungs greater on the left. Admitted to ICU and started on IV Zosyn    ------    Patient seen for follow-up. Patient is awake and alert. Patient continues on Zosyn and levofloxacin. Rapid Covid test is negative.     Tmax overnight 98.0 F  Problem List:  Problem List as of 3/23/2022 Date Reviewed: 3/18/2022          Codes Class Noted - Resolved    Aspiration pneumonia ([facility] ICD-10-CM: J69.0  ICD-9-CM: 507.0  3/18/2022 - Present        Acute respiratory failure with hypoxemia ([facility] ICD-10-CM: J96.01  ICD-9-CM: 518.81  3/18/2022 - Present        Acute respiratory failure with hypoxia ([facility]) ICD-10-CM: J96.01  ICD-9-CM: 518.81  2/25/2022 - Present        Pneumonia ICD-10-CM: J18.9  ICD-9-CM: 486  2/24/2022 - Present        Sepsis ([facility] ICD-10-CM: A41.9  ICD-9-CM: 038.9, 995.91  2/24/2022 - Present        Chronic undifferentiated schizophrenia with acute exacerbations ([facility] ICD-10-CM: F20.9  ICD-9-CM: 295.64  5/29/2017 - Present        Non-compliance with treatment ICD-10-CM: Z91.19  ICD-9-CM: V15.81  5/29/2017 - Present        Essential hypertension ICD-10-CM: I10  ICD-9-CM: 401.9  5/29/2017 - Present        Alcohol abuse ICD-10-CM: F10.10  ICD-9-CM: 305.00  5/29/2017 - Present              Medications reviewed  Current Facility-Administered Medications   Medication Dose Route Frequency    dilTIAZem IR (CARDIZEM) tablet 30 mg 30 mg Oral TIDAC    acetaminophen (TYLENOL) suppository 650 mg  650 mg Rectal Q4H PRN    methylPREDNISolone (PF) (SOLU-MEDROL) injection 40 mg  40 mg IntraVENous Q6H    albuterol-ipratropium (DUO-NEB) 2.5 MG-0.5 MG/3 ML  3 mL Nebulization Q6HWA RT    trimethoprim-sulfamethoxazole (BACTRIM) 187.04 mg in dextrose 5% 500 mL  10 mg/kg/day IntraVENous Q8H    divalproex (DEPAKOTE SPRINKLE) capsule 250 mg  250 mg Oral Q12H    metoprolol (LOPRESSOR) injection 5 mg  5 mg IntraVENous Q6H PRN    benztropine (COGENTIN) tablet 0.5 mg  0.5 mg Oral BID    cloNIDine HCL (CATAPRES) tablet 0.1 mg  0.1 mg Oral DAILY    famotidine (PEPCID) tablet 20 mg  20 mg Oral QHS    lisinopriL (PRINIVIL, ZESTRIL) tablet 20 mg  20 mg Oral DAILY    risperiDONE (RisperDAL) tablet 2 mg  2 mg Oral BID    multivitamin with folic acid (ONE DAILY WITH FOLIC ACID) tablet 1 Tablet  1 Tablet Oral DAILY    piperacillin-tazobactam (ZOSYN) 3.375 g in 0.9% sodium chloride (MBP/ADV) 100 mL MBP  3.375 g IntraVENous Q8H    sodium chloride (NS) flush 5-40 mL  5-40 mL IntraVENous Q8H    sodium chloride (NS) flush 5-40 mL  5-40 mL IntraVENous PRN    acetaminophen (TYLENOL) tablet 650 mg  650 mg Oral Q4H PRN    enoxaparin (LOVENOX) injection 40 mg  40 mg SubCUTAneous Q24H    albuterol CONCENTRATE 2.5mg/0.5 mL neb soln  2.5 mg Nebulization Q4H PRN    levoFLOXacin (LEVAQUIN) 750 mg in D5W IVPB  750 mg IntraVENous Q24H    furosemide (LASIX) injection 40 mg  40 mg IntraVENous DAILY    hydrOXYzine pamoate (VISTARIL) capsule 25 mg  25 mg Oral Q6H PRN    LORazepam (ATIVAN) injection 0.5 mg  0.5 mg IntraVENous Q4H PRN       Review of Systems:   Review of systems not obtained due to patient factors.     Objective:   Physical Exam:     Visit Vitals  /68 (BP 1 Location: Left upper arm, BP Patient Position: At rest)   Pulse 82   Temp 97.7 °F (36.5 °C)   Resp 18   Ht 5' 4\" (1.626 m)   Wt 56.1 kg (123 lb 10.9 oz)   SpO2 99%   BMI 21.23 kg/m²    O2 Flow Rate (L/min): 40 l/min O2 Device: Heated,Hi flow nasal cannula    Temp (24hrs), Av.7 °F (36.5 °C), Min:97.3 °F (36.3 °C), Max:98.1 °F (36.7 °C)    No intake/output data recorded.  1901 -  0700  In: -   Out: 3500 [Urine:3500]    General:   Awake and alert   Lungs:    rhonchi bilateral   Chest wall:  No tenderness or deformity. Heart:  Regular rate and rhythm, S1, S2 normal, no murmur, click, rub or gallop. Abdomen:   Soft, non-tender. Bowel sounds normal. No masses,  No organomegaly. Extremities: Extremities normal, atraumatic, no cyanosis or edema. Pulses: 2+ and symmetric all extremities. Skin: Skin color, texture, turgor normal. No rashes or lesions   Neurologic: CNII-XII intact.   No gross focal deficits         Data Review:       Recent Days:  Recent Labs     22  0550 22  0455 22  0400   WBC 6.9 3.8 6.6   HGB 10.4* 10.3* 11.7   HCT 29.8* 30.5* 35.5    195 226     Recent Labs     22  0550 22  0455 22  0400    141 142   K 3.5 3.9 4.1    109* 110*   CO2 28 29 28   * 121* 72   BUN 49* 37* 30*   CREA 1.39* 1.03* 1.24*   CA 8.5 8.8 8.8   PHOS  --   --  3.0   ALB  --   --  1.5*     Recent Labs     22  0550 22  0515 22  0518   PH 7.43 7.40 7.43   PCO2 43 49* 46*   PO2 91 107* 73*   HCO3 28* 29* 29*   FIO2 40.0 100.0 100       24 Hour Results:  Recent Results (from the past 24 hour(s))   C REACTIVE PROTEIN, QT    Collection Time: 22  5:50 AM   Result Value Ref Range    C-Reactive protein 2.20 (H) 0.00 - 0.60 mg/dL   PROCALCITONIN    Collection Time: 22  5:50 AM   Result Value Ref Range    Procalcitonin 0.72 (H) 0 ng/mL   LD    Collection Time: 22  5:50 AM   Result Value Ref Range     (H) 81 - 246 U/L   CBC WITH AUTOMATED DIFF    Collection Time: 22  5:50 AM   Result Value Ref Range    WBC 6.9 3.6 - 11.0 K/uL    RBC 3.41 (L) 3.80 - 5.20 M/uL    HGB 10.4 (L) 11.5 - 16.0 g/dL    HCT 29.8 (L) 35.0 - 47.0 %    MCV 87.4 80.0 - 99.0 FL    MCH 30.5 26.0 - 34.0 PG    MCHC 34.9 30.0 - 36.5 g/dL    RDW 13.7 11.5 - 14.5 %    PLATELET 524 475 - 324 K/uL    MPV 11.5 8.9 - 12.9 FL    NRBC 0.0 0.0  WBC    ABSOLUTE NRBC 0.00 0.00 - 0.01 K/uL    NEUTROPHILS 88 (H) 32 - 75 %    LYMPHOCYTES 9 (L) 12 - 49 %    MONOCYTES 2 (L) 5 - 13 %    EOSINOPHILS 0 0 - 7 %    BASOPHILS 0 0 - 1 %    IMMATURE GRANULOCYTES 1 (H) 0 - 0.5 %    ABS. NEUTROPHILS 6.2 1.8 - 8.0 K/UL    ABS. LYMPHOCYTES 0.6 (L) 0.8 - 3.5 K/UL    ABS. MONOCYTES 0.1 0.0 - 1.0 K/UL    ABS. EOSINOPHILS 0.0 0.0 - 0.4 K/UL    ABS. BASOPHILS 0.0 0.0 - 0.1 K/UL    ABS. IMM. GRANS. 0.0 0.00 - 0.04 K/UL    DF AUTOMATED     METABOLIC PANEL, BASIC    Collection Time: 03/23/22  5:50 AM   Result Value Ref Range    Sodium 140 136 - 145 mmol/L    Potassium 3.5 3.5 - 5.1 mmol/L    Chloride 107 97 - 108 mmol/L    CO2 28 21 - 32 mmol/L    Anion gap 5 5 - 15 mmol/L    Glucose 152 (H) 65 - 100 mg/dL    BUN 49 (H) 6 - 20 mg/dL    Creatinine 1.39 (H) 0.55 - 1.02 mg/dL    BUN/Creatinine ratio 35 (H) 12 - 20      GFR est AA 47 (L) >60 ml/min/1.73m2    GFR est non-AA 39 (L) >60 ml/min/1.73m2    Calcium 8.5 8.5 - 10.1 mg/dL   BLOOD GAS, ARTERIAL    Collection Time: 03/23/22  5:50 AM   Result Value Ref Range    pH 7.43 7.35 - 7.45      PCO2 43 35 - 45 mmHg    PO2 91 75 - 100 mmHg    O2 SAT 97 >95 %    BICARBONATE 28 (H) 22 - 26 mmol/L    BASE EXCESS 3.5 (H) 0 - 2 mmol/L    O2 METHOD High Flow O2      O2 FLOW RATE 40 L/min    FIO2 40.0 %    SITE Right Radial      IVAN'S TEST PASS         XR CHEST PORT   Final Result      XR CHEST PORT   Final Result   Diffuse opacities in the lungs, increased. XR CHEST PORT   Final Result   FINDINGS/IMPRESSION:   Persistent diffuse airspace opacity throughout the lungs. Cardiac silhouette stable in size. Negative for pneumothorax.        CT CHEST WO CONT   Final Result   Extensive diffuse bilateral pneumonitis and patchy pneumonia      XR CHEST PORT   Final Result   Diffuse predominantly interstitial process in both lungs with some   greater involvement on the left. Differential considerations include pulmonary   edema and diffuse infection. Assessment:  Bilateral healthcare associated pneumonia, possible atypical pathogens    Acute respiratory failure with hypoxia. Continues to require a Ventimask/NRB    Sustained narrow complex tachycardia, likely SVT versus atrial flutter. Improving    Severe sepsis with fever, tachypnea, tachycardia and altered mental status    Metabolic/septic encephalopathy. Improved    Schizophrenia    HIV disease, details unknown. Not on HAART    History of alcohol abuse    COPD       Plan:  Continue Zosyn and levofloxacin  Continue IV fluids and supportive care  Await Legionella and mycoplasma serologies  Wean oxygen as tolerated  Transfer to [facility] telemetry    Care Plan discussed with: Nurse    Disposition: Consider transfer to telemetry floor in am    Total time spent with patient: 30 minutes.     Mary Kate Russell MD

## 2022-03-23 NOTE — PROGRESS NOTES
Patient settled in bed. Pleasantly confused and wanting to go home. Frequent reorientation required due to trying to get out of bed and removing high flow oxygen. Will continue to monitor. Problem: Pressure Injury - Risk of  Goal: *Prevention of pressure injury  Description: Document Forest Scale and appropriate interventions in the flowsheet. Outcome: Progressing Towards Goal  Note: Pressure Injury Interventions:  Sensory Interventions: Assess changes in LOC    Moisture Interventions: Absorbent underpads    Activity Interventions: PT/OT evaluation    Mobility Interventions: Float heels    Nutrition Interventions: Offer support with meals,snacks and hydration    Friction and Shear Interventions: Minimize layers                Problem: Patient Education: Go to Patient Education Activity  Goal: Patient/Family Education  Outcome: Progressing Towards Goal     Problem: Falls - Risk of  Goal: *Absence of Falls  Description: Document Janine Fall Risk and appropriate interventions in the flowsheet.   Outcome: Progressing Towards Goal  Note: Fall Risk Interventions:  Mobility Interventions: Bed/chair exit alarm    Mentation Interventions: Bed/chair exit alarm    Medication Interventions: Bed/chair exit alarm    Elimination Interventions: Bed/chair exit alarm    History of Falls Interventions: Bed/chair exit alarm         Problem: Patient Education: Go to Patient Education Activity  Goal: Patient/Family Education  Outcome: Progressing Towards Goal     Problem: Non-Violent Restraints  Goal: Removal from restraints as soon as assessed to be safe  Outcome: Progressing Towards Goal  Goal: No harm/injury to patient while restraints in use  Outcome: Progressing Towards Goal  Goal: Patient's dignity will be maintained  Outcome: Progressing Towards Goal  Goal: Patient Interventions  Outcome: Progressing Towards Goal     Problem: Patient Education: Go to Patient Education Activity  Goal: Patient/Family Education  Outcome: Progressing Towards Goal

## 2022-03-23 NOTE — PROGRESS NOTES
Pulmonary Progress Note      NAME: Denzel Ramirez   :  1960  MRM:  [id number]    Date/Time: 3/23/2022  4:22 PM         Subjective:     Patient seen and examined. Discussed with the nursing staff. Yesterday she was transitioned to high flow oxygen. She is now on 40 L and 40%. Saturating fairly well. No distress. Previously she was on nonrebreather mask. She is on a warming blanket. She is asking to go home. Confusion at times is noted. Blood gases and chest x-ray discussed below. COVID-19 ruled out. ID input is noted. She is taking minced and moist diet without any problems. Diet changed to puréed with thin liquids per speech. Past Medical History reviewed and unchanged from Admission History and Physical       Objective:     Physical Exam     Vitals:      Last 24hrs VS reviewed since prior progress note. Most recent are:    Visit Vitals  /65 (BP 1 Location: Left upper arm, BP Patient Position: At rest)   Pulse 74   Temp 96.8 °F (36 °C)   Resp 22   Ht 5' 4\" (1.626 m)   Wt 56.1 kg (123 lb 10.9 oz)   SpO2 99%   BMI 21.23 kg/m²     SpO2 Readings from Last 6 Encounters:   22 99%   22 99%   22 98%   21 96%   21 97%   09/15/21 97%    O2 Flow Rate (L/min): 40 l/min       Intake/Output Summary (Last 24 hours) at 3/23/2022 1121  Last data filed at 3/23/2022 0544  Gross per 24 hour   Intake    Output 950 ml   Net -950 ml      General: Lying in bed in mild respiratory distress, on high flow oxygen. Eye: Pupils are equal and reactive to light. Throat and Neck: Oropharynx without thrush. Neck is supple and trachea central.  No obvious lymphadenopathy. Lung: Reduced air entry bilaterally with prolonged exhalation but no wheezing. Occasional crackles. Heart: S1+S2. No murmurs  Abdomen: soft, non-tender. Bowel sounds normal. No masses; obese  Extremities: No edema, no cyanosis or clubbing. Pulses are palpable.   : Not done  Skin: No cyanosis  Neurologic:  Alert and awake, pleasantly confused, grossly nonfocal  Psychiatric:  Unable to perform due to patient's condition    XR CHEST PORT   Final Result      XR CHEST PORT   Final Result   Diffuse opacities in the lungs, increased. XR CHEST PORT   Final Result   FINDINGS/IMPRESSION:   Persistent diffuse airspace opacity throughout the lungs. Cardiac silhouette stable in size. Negative for pneumothorax. CT CHEST WO CONT   Final Result   Extensive diffuse bilateral pneumonitis and patchy pneumonia      XR CHEST PORT   Final Result   Diffuse predominantly interstitial process in both lungs with some   greater involvement on the left. Differential considerations include pulmonary   edema and diffuse infection.                     Lab Data Reviewed: (see below)      Medications:  Current Facility-Administered Medications   Medication Dose Route Frequency    dilTIAZem IR (CARDIZEM) tablet 30 mg  30 mg Oral TIDAC    acetaminophen (TYLENOL) suppository 650 mg  650 mg Rectal Q4H PRN    methylPREDNISolone (PF) (SOLU-MEDROL) injection 40 mg  40 mg IntraVENous Q6H    albuterol-ipratropium (DUO-NEB) 2.5 MG-0.5 MG/3 ML  3 mL Nebulization Q6HWA RT    trimethoprim-sulfamethoxazole (BACTRIM) 187.04 mg in dextrose 5% 500 mL  10 mg/kg/day IntraVENous Q8H    divalproex (DEPAKOTE SPRINKLE) capsule 250 mg  250 mg Oral Q12H    metoprolol (LOPRESSOR) injection 5 mg  5 mg IntraVENous Q6H PRN    benztropine (COGENTIN) tablet 0.5 mg  0.5 mg Oral BID    cloNIDine HCL (CATAPRES) tablet 0.1 mg  0.1 mg Oral DAILY    famotidine (PEPCID) tablet 20 mg  20 mg Oral QHS    lisinopriL (PRINIVIL, ZESTRIL) tablet 20 mg  20 mg Oral DAILY    risperiDONE (RisperDAL) tablet 2 mg  2 mg Oral BID    multivitamin with folic acid (ONE DAILY WITH FOLIC ACID) tablet 1 Tablet  1 Tablet Oral DAILY    piperacillin-tazobactam (ZOSYN) 3.375 g in 0.9% sodium chloride (MBP/ADV) 100 mL MBP  3.375 g IntraVENous Q8H    sodium chloride (NS) flush 5-40 mL  5-40 mL IntraVENous Q8H    sodium chloride (NS) flush 5-40 mL  5-40 mL IntraVENous PRN    acetaminophen (TYLENOL) tablet 650 mg  650 mg Oral Q4H PRN    enoxaparin (LOVENOX) injection 40 mg  40 mg SubCUTAneous Q24H    albuterol CONCENTRATE 2.5mg/0.5 mL neb soln  2.5 mg Nebulization Q4H PRN    levoFLOXacin (LEVAQUIN) 750 mg in D5W IVPB  750 mg IntraVENous Q24H    furosemide (LASIX) injection 40 mg  40 mg IntraVENous DAILY    hydrOXYzine pamoate (VISTARIL) capsule 25 mg  25 mg Oral Q6H PRN    LORazepam (ATIVAN) injection 0.5 mg  0.5 mg IntraVENous Q4H PRN       ______________________________________________________________________      Lab Review:     Recent Labs     03/23/22  0550 03/22/22  0455 03/21/22  0400   WBC 6.9 3.8 6.6   HGB 10.4* 10.3* 11.7   HCT 29.8* 30.5* 35.5    195 226     Recent Labs     03/23/22  0550 03/22/22  0455 03/21/22  0400    141 142   K 3.5 3.9 4.1    109* 110*   CO2 28 29 28   * 121* 72   BUN 49* 37* 30*   CREA 1.39* 1.03* 1.24*   CA 8.5 8.8 8.8   PHOS  --   --  3.0   ALB  --   --  1.5*     No components found for: [place]  Recent Labs     03/23/22  0550 03/22/22  0515 03/21/22  0518   PH 7.43 7.40 7.43   PCO2 43 49* 46*   PO2 91 107* 73*   HCO3 28* 29* 29*   FIO2 40.0 100.0 100     No results for input(s): INR, INREXT, INREXT, INREXT in the last 72 hours. Other pertinent lab:          Assessment & Plan:      Assessment:      1. Acute respiratory failure with hypoxia due to #2 and 3  2. Bilateral healthcare associated pneumonia  3. Acute CHF and sinus tachycardia due to #4  4. Severe sepsis  5. Altered mental status/acute metabolic encephalopathy  6. COPD with exacerbation  7. HIV  8. Schizophrenia  9. History of alcohol abuse     Plan:      Patient admitted to the ICU  We will be watching her closely     Critically ill  Currently high flow oxygen, 40 L and 40%. Yesterday she was on nonrebreather mask.     Blood gases this morning showed 7. 43/43/91 on 40% high flow oxygen. Blood gases done 3/22 morning showed 7.4 0/49/107 on nonrebreather mask. Blood gases done 3/18/2022 showed 7.50/36/116 on nonrebreather mask. We wean down oxygen as tolerated. Discussed with RT. If she decompensates will initiate BiPAP but she is not a CO2 retainer. Chest x-ray shows bilateral infiltrates. Personally reviewed. No significant change. We will repeat chest x-ray and blood gas in the morning. Patient has COPD  Continue nebulizers  Added IV Solu-Medrol. Appreciate ID input. Treating as possible PCP. Bactrim added. She is aspiration risk. On moist and minced diet. Now changed to puréed with thin liquids as per speech. Appreciate input. Urinary retention. Has [place] catheter.     Patient has combination of CHF and healthcare associated pneumonia, speech is also involved. There is a possibility of aspiration pneumonia. Continue broad-spectrum antibiotics, currently on Levaquin and Zosyn. Appreciate ID input. COVID-19 ruled out. Not requiring vasopressors at present  We will use if needed  Patient developed sinus tachycardia. She was given magnesium and amiodarone. Now started on diltiazem. Appreciate input from cardiology.     Started on Lasix IV daily since blood pressure stable  Intake and output charting  Check electrolytes and replace as needed  Monitor renal function with fluid change     Monitor mental status      DVT and GI prophylaxis     Clinical status  Monitor in ICU overnight     Case discussed in detail with RN, RT, and care team  Thank you for involving me in the care of this patient  I will follow with you closely during hospitalization     Time spent more than 30 minutes in direct patient care with no overlap reviewing results and records, decision making, and answering questions.       Milla aLng MD  [facility]

## 2022-03-23 NOTE — PROGRESS NOTES
CM met with patient at bedside. She is able to tell me her name and  and where she came from. She wants to return to [facility] when discharged. Patient is pending PT and OT for Next LOC Recommendations.

## 2022-03-23 NOTE — PROGRESS NOTES
CARDIOLOGY PROGRESS NOTE    Patient seen and examined. PMH for HIV, Hep C, COPD, HTN and recently hospitalized for aspiration PNA. Patient is followed for sinus tachycardia in setting of an infectious process. Resting comfortably this am. No events noted overnight per nursing. Telemetry reviewed: Sinus rhythm; heart rate 70s. Physical examination:  Vital signs: Blood pressure 109/68,  Pulse 82  No apparent distress. Heart is tachycardic, rate and rhythm. Normal S1, S2, no murmurs are appreciated. Lungs crackles bilaterally  Abdomen is soft, nontender, normal bowel sounds. Extremities have no edema. Recent labs results and imaging reviewed. Case was discussed with Dr. Taz Fernando and our impression and recommendations are as follows:     1. Sinus tachycardia: resolved; NSR: HR 78  - Continue Diltiazem to 30 mg Q8hrs   - Lopressor IV for rate control as needed  - Afebrile this morning.  - Keep serum potassium between 4-5 and serum magnesium >2.     2. Hypertension:   - Blood pressure below goal.   - continue with current medications. Thank you for allowing us to care for this patient. Please do not hesitate to call if additional questions arise. Dr. Nina Lujan Cardiology  [address] [facility] [address], [address] [facility]  [phone]

## 2022-03-24 ENCOUNTER — APPOINTMENT (OUTPATIENT)
Dept: GENERAL RADIOLOGY | Age: 62
DRG: 720 | End: 2022-03-24
Attending: INTERNAL MEDICINE
Payer: MEDICAID

## 2022-03-24 PROBLEM — J69.0 ASPIRATION PNEUMONIA (HCC): Status: ACTIVE | Noted: 2022-03-18

## 2022-03-24 PROBLEM — J96.01 ACUTE RESPIRATORY FAILURE WITH HYPOXEMIA (HCC): Status: ACTIVE | Noted: 2022-03-18

## 2022-03-24 LAB
ANION GAP SERPL CALC-SCNC: 4 MMOL/L (ref 5–15)
ARTERIAL PATENCY WRIST A: ABNORMAL
BASE EXCESS BLDA CALC-SCNC: 3.5 MMOL/L (ref 0–2)
BASOPHILS # BLD: 0 K/UL (ref 0–0.1)
BASOPHILS NFR BLD: 0 % (ref 0–1)
BDY SITE: ABNORMAL
BUN SERPL-MCNC: 45 MG/DL (ref 6–20)
BUN/CREAT SERPL: 39 (ref 12–20)
CA-I BLD-MCNC: 8.5 MG/DL (ref 8.5–10.1)
CHLORIDE SERPL-SCNC: 108 MMOL/L (ref 97–108)
CO2 SERPL-SCNC: 29 MMOL/L (ref 21–32)
CREAT SERPL-MCNC: 1.15 MG/DL (ref 0.55–1.02)
CRP SERPL-MCNC: 1.13 MG/DL (ref 0–0.6)
DIFFERENTIAL METHOD BLD: ABNORMAL
EOSINOPHIL # BLD: 0 K/UL (ref 0–0.4)
EOSINOPHIL NFR BLD: 0 % (ref 0–7)
EPAP/CPAP/PEEP, PAPEEP: 0
ERYTHROCYTE [DISTWIDTH] IN BLOOD BY AUTOMATED COUNT: 13.3 % (ref 11.5–14.5)
FIO2 ON VENT: 40 %
GAS FLOW.O2 O2 DELIVERY SYS: 40 L/MIN
GLUCOSE SERPL-MCNC: 117 MG/DL (ref 65–100)
HCO3 BLDA-SCNC: 28 MMOL/L (ref 22–26)
HCT VFR BLD AUTO: 28 % (ref 35–47)
HGB BLD-MCNC: 9.8 G/DL (ref 11.5–16)
IMM GRANULOCYTES # BLD AUTO: 0 K/UL (ref 0–0.04)
IMM GRANULOCYTES NFR BLD AUTO: 0 % (ref 0–0.5)
LYMPHOCYTES # BLD: 0.5 K/UL (ref 0.8–3.5)
LYMPHOCYTES NFR BLD: 7 % (ref 12–49)
MCH RBC QN AUTO: 30.1 PG (ref 26–34)
MCHC RBC AUTO-ENTMCNC: 35 G/DL (ref 30–36.5)
MCV RBC AUTO: 85.9 FL (ref 80–99)
MONOCYTES # BLD: 0.2 K/UL (ref 0–1)
MONOCYTES NFR BLD: 3 % (ref 5–13)
NEUTS SEG # BLD: 6.2 K/UL (ref 1.8–8)
NEUTS SEG NFR BLD: 90 % (ref 32–75)
NRBC # BLD: 0 K/UL (ref 0–0.01)
NRBC BLD-RTO: 0 PER 100 WBC
PCO2 BLDA: 41 MMHG (ref 35–45)
PH BLDA: 7.44 [PH] (ref 7.35–7.45)
PLATELET # BLD AUTO: 186 K/UL (ref 150–400)
PMV BLD AUTO: 11.4 FL (ref 8.9–12.9)
PO2 BLDA: 98 MMHG (ref 75–100)
POTASSIUM SERPL-SCNC: 3.3 MMOL/L (ref 3.5–5.1)
PROCALCITONIN SERPL-MCNC: 0.3 NG/ML
RBC # BLD AUTO: 3.26 M/UL (ref 3.8–5.2)
SAO2 % BLD: 98 %
SAO2% DEVICE SAO2% SENSOR NAME: ABNORMAL
SODIUM SERPL-SCNC: 141 MMOL/L (ref 136–145)
WBC # BLD AUTO: 6.9 K/UL (ref 3.6–11)

## 2022-03-24 PROCEDURE — 82105 ALPHA-FETOPROTEIN SERUM: CPT

## 2022-03-24 PROCEDURE — 94640 AIRWAY INHALATION TREATMENT: CPT

## 2022-03-24 PROCEDURE — 74011250637 HC RX REV CODE- 250/637: Performed by: INTERNAL MEDICINE

## 2022-03-24 PROCEDURE — 74011250636 HC RX REV CODE- 250/636: Performed by: HOSPITALIST

## 2022-03-24 PROCEDURE — 85025 COMPLETE CBC W/AUTO DIFF WBC: CPT

## 2022-03-24 PROCEDURE — 74011250636 HC RX REV CODE- 250/636: Performed by: INTERNAL MEDICINE

## 2022-03-24 PROCEDURE — 82803 BLOOD GASES ANY COMBINATION: CPT

## 2022-03-24 PROCEDURE — 36415 COLL VENOUS BLD VENIPUNCTURE: CPT

## 2022-03-24 PROCEDURE — 74011000250 HC RX REV CODE- 250: Performed by: INTERNAL MEDICINE

## 2022-03-24 PROCEDURE — 97161 PT EVAL LOW COMPLEX 20 MIN: CPT

## 2022-03-24 PROCEDURE — 74011250637 HC RX REV CODE- 250/637: Performed by: HOSPITALIST

## 2022-03-24 PROCEDURE — 99232 SBSQ HOSP IP/OBS MODERATE 35: CPT | Performed by: INTERNAL MEDICINE

## 2022-03-24 PROCEDURE — 65270000029 HC RM PRIVATE

## 2022-03-24 PROCEDURE — 86140 C-REACTIVE PROTEIN: CPT

## 2022-03-24 PROCEDURE — 36600 WITHDRAWAL OF ARTERIAL BLOOD: CPT

## 2022-03-24 PROCEDURE — 87900 PHENOTYPE INFECT AGENT DRUG: CPT

## 2022-03-24 PROCEDURE — 74011000250 HC RX REV CODE- 250: Performed by: HOSPITALIST

## 2022-03-24 PROCEDURE — 94760 N-INVAS EAR/PLS OXIMETRY 1: CPT

## 2022-03-24 PROCEDURE — 81596 NFCT DS CHRNC HCV 6 ASSAYS: CPT

## 2022-03-24 PROCEDURE — 97530 THERAPEUTIC ACTIVITIES: CPT

## 2022-03-24 PROCEDURE — 84145 PROCALCITONIN (PCT): CPT

## 2022-03-24 PROCEDURE — 74011000258 HC RX REV CODE- 258: Performed by: HOSPITALIST

## 2022-03-24 PROCEDURE — 71045 X-RAY EXAM CHEST 1 VIEW: CPT

## 2022-03-24 PROCEDURE — 74011250637 HC RX REV CODE- 250/637: Performed by: NURSE PRACTITIONER

## 2022-03-24 PROCEDURE — 97165 OT EVAL LOW COMPLEX 30 MIN: CPT

## 2022-03-24 PROCEDURE — 77010033711 HC HIGH FLOW OXYGEN

## 2022-03-24 PROCEDURE — 80048 BASIC METABOLIC PNL TOTAL CA: CPT

## 2022-03-24 PROCEDURE — 87902 NFCT AGT GNTYP ALYS HEP C: CPT

## 2022-03-24 RX ORDER — POTASSIUM CHLORIDE 1.5 G/1.77G
40 POWDER, FOR SOLUTION ORAL
Status: COMPLETED | OUTPATIENT
Start: 2022-03-24 | End: 2022-03-24

## 2022-03-24 RX ORDER — DILTIAZEM HYDROCHLORIDE 120 MG/1
120 CAPSULE, COATED, EXTENDED RELEASE ORAL DAILY
Status: DISCONTINUED | OUTPATIENT
Start: 2022-03-25 | End: 2022-03-29 | Stop reason: HOSPADM

## 2022-03-24 RX ADMIN — SODIUM CHLORIDE, PRESERVATIVE FREE 10 ML: 5 INJECTION INTRAVENOUS at 23:48

## 2022-03-24 RX ADMIN — RISPERIDONE 2 MG: 2 TABLET, FILM COATED ORAL at 21:34

## 2022-03-24 RX ADMIN — BENZTROPINE MESYLATE 0.5 MG: 1 TABLET ORAL at 21:33

## 2022-03-24 RX ADMIN — BENZTROPINE MESYLATE 0.5 MG: 1 TABLET ORAL at 11:48

## 2022-03-24 RX ADMIN — IPRATROPIUM BROMIDE AND ALBUTEROL SULFATE 3 ML: .5; 2.5 SOLUTION RESPIRATORY (INHALATION) at 14:30

## 2022-03-24 RX ADMIN — LISINOPRIL 20 MG: 20 TABLET ORAL at 11:48

## 2022-03-24 RX ADMIN — ENOXAPARIN SODIUM 40 MG: 100 INJECTION SUBCUTANEOUS at 02:15

## 2022-03-24 RX ADMIN — METHYLPREDNISOLONE SODIUM SUCCINATE 40 MG: 40 INJECTION, POWDER, FOR SOLUTION INTRAMUSCULAR; INTRAVENOUS at 11:42

## 2022-03-24 RX ADMIN — DILTIAZEM HYDROCHLORIDE 30 MG: 30 TABLET, FILM COATED ORAL at 11:47

## 2022-03-24 RX ADMIN — SULFAMETHOXAZOLE AND TRIMETHOPRIM 187.04 MG: 80; 16 INJECTION, SOLUTION, CONCENTRATE INTRAVENOUS at 02:01

## 2022-03-24 RX ADMIN — SULFAMETHOXAZOLE AND TRIMETHOPRIM 187.04 MG: 80; 16 INJECTION, SOLUTION, CONCENTRATE INTRAVENOUS at 11:42

## 2022-03-24 RX ADMIN — MULTIVITAMIN TABLET 1 TABLET: TABLET at 11:48

## 2022-03-24 RX ADMIN — DIVALPROEX SODIUM 250 MG: 125 CAPSULE, COATED PELLETS ORAL at 21:33

## 2022-03-24 RX ADMIN — LEVOFLOXACIN 750 MG: 5 INJECTION, SOLUTION INTRAVENOUS at 11:41

## 2022-03-24 RX ADMIN — RISPERIDONE 2 MG: 2 TABLET, FILM COATED ORAL at 11:48

## 2022-03-24 RX ADMIN — SODIUM CHLORIDE, PRESERVATIVE FREE 10 ML: 5 INJECTION INTRAVENOUS at 14:00

## 2022-03-24 RX ADMIN — METHYLPREDNISOLONE SODIUM SUCCINATE 40 MG: 40 INJECTION, POWDER, FOR SOLUTION INTRAMUSCULAR; INTRAVENOUS at 05:08

## 2022-03-24 RX ADMIN — PIPERACILLIN AND TAZOBACTAM 3.38 G: 3; .375 INJECTION, POWDER, LYOPHILIZED, FOR SOLUTION INTRAVENOUS at 23:48

## 2022-03-24 RX ADMIN — CLONIDINE HYDROCHLORIDE 0.1 MG: 0.1 TABLET ORAL at 11:48

## 2022-03-24 RX ADMIN — POTASSIUM CHLORIDE 40 MEQ: 1.5 FOR SOLUTION ORAL at 13:00

## 2022-03-24 RX ADMIN — FAMOTIDINE 20 MG: 20 TABLET, FILM COATED ORAL at 21:34

## 2022-03-24 RX ADMIN — IPRATROPIUM BROMIDE AND ALBUTEROL SULFATE 3 ML: .5; 2.5 SOLUTION RESPIRATORY (INHALATION) at 09:56

## 2022-03-24 RX ADMIN — PIPERACILLIN AND TAZOBACTAM 3.38 G: 3; .375 INJECTION, POWDER, LYOPHILIZED, FOR SOLUTION INTRAVENOUS at 02:28

## 2022-03-24 RX ADMIN — PIPERACILLIN AND TAZOBACTAM 3.38 G: 3; .375 INJECTION, POWDER, LYOPHILIZED, FOR SOLUTION INTRAVENOUS at 11:43

## 2022-03-24 RX ADMIN — SODIUM CHLORIDE, PRESERVATIVE FREE 10 ML: 5 INJECTION INTRAVENOUS at 05:05

## 2022-03-24 RX ADMIN — FUROSEMIDE 40 MG: 10 INJECTION, SOLUTION INTRAMUSCULAR; INTRAVENOUS at 11:42

## 2022-03-24 RX ADMIN — IPRATROPIUM BROMIDE AND ALBUTEROL SULFATE 3 ML: .5; 2.5 SOLUTION RESPIRATORY (INHALATION) at 19:48

## 2022-03-24 NOTE — PROGRESS NOTES
OT eval order received and acknowledged. OT eval attempted at 8:50 AM, however, pt receiving x-ray in room at this time. OT will continue to follow and eval at a later time. Thank you.

## 2022-03-24 NOTE — PROGRESS NOTES
Problem: Pressure Injury - Risk of  Goal: *Prevention of pressure injury  Description: Document Forest Scale and appropriate interventions in the flowsheet. Outcome: Progressing Towards Goal  Note: Pressure Injury Interventions:  Sensory Interventions: Assess changes in LOC    Moisture Interventions: Absorbent underpads    Activity Interventions: Pressure redistribution bed/mattress(bed type),Increase time out of bed    Mobility Interventions: Float heels    Nutrition Interventions: Document food/fluid/supplement intake    Friction and Shear Interventions: Apply protective barrier, creams and emollients                Problem: Patient Education: Go to Patient Education Activity  Goal: Patient/Family Education  Outcome: Progressing Towards Goal     Problem: Falls - Risk of  Goal: *Absence of Falls  Description: Document Janine Fall Risk and appropriate interventions in the flowsheet.   Outcome: Progressing Towards Goal  Note: Fall Risk Interventions:  Mobility Interventions: Bed/chair exit alarm,Patient to call before getting OOB    Mentation Interventions: Bed/chair exit alarm    Medication Interventions: Bed/chair exit alarm    Elimination Interventions: Bed/chair exit alarm,Call light in reach    History of Falls Interventions: Bed/chair exit alarm         Problem: Patient Education: Go to Patient Education Activity  Goal: Patient/Family Education  Outcome: Progressing Towards Goal     Problem: Patient Education: Go to Patient Education Activity  Goal: Patient/Family Education  Outcome: Progressing Towards Goal

## 2022-03-24 NOTE — PROGRESS NOTES
CARDIOLOGY PROGRESS NOTE    Patient seen and examined. PMH for HIV, Hep C, COPD, HTN and recently hospitalized for aspiration PNA. Patient is followed for sinus tachycardia in setting of an infectious process. Awake and alert. Resting comfortably this am. No events noted overnight per nursing. Physical examination:  Vital signs: Blood pressure 135/80,  Pulse 90  No apparent distress. Heart is tachycardic, rate and rhythm. Normal S1, S2, no murmurs are appreciated. Lungs crackles bilaterally  Abdomen is soft, nontender, normal bowel sounds. Extremities have no edema. Recent labs results and imaging reviewed. Case was discussed with Dr. Jessie Rodriguez and our impression and recommendations are as follows:     1. Sinus tachycardia: resolved; NSR: HR 90  - Start Diltiazem  mg and discontinue Diltiazem to 30 mg Q8hrs   - Lopressor IV for rate control as needed  - Afebrile this morning.  - Keep serum potassium between 4-5 and serum magnesium >2.     2. Hypertension:   - Blood pressure below goal.   - continue with current medications. Thank you for allowing us to care for this patient. Please do not hesitate to call if additional questions arise. Rhonda Castrejon Cardiology  [facility] [place] [address].    [address] [facility] [address], [address] [facility]  [phone]

## 2022-03-24 NOTE — PROGRESS NOTES
Hospitalist Progress Note    Subjective:   Daily Progress Note: 3/24/2022 12:40 PM    Hospital Course:  62year old Female with hxof COPD, alcohol abuse, HIV, schizophrenia, and hepatitis C (comes from group home) presented to ED with altered mental status and cough. Patient was febrile, tachycardic and had gurgling sounds. Patient was hypoxic, placed on venti mask. Xray showed diffuse interstitial process in B/L lungs. Patient was admitted to ICU and started on iv zosyn. COVID-19 negative. Patient is currently on HFNC, transitioned from 100% non rebreather mask. Subjective:  Patient was confused this morning. Wants to go home.     Current Facility-Administered Medications   Medication Dose Route Frequency    dilTIAZem IR (CARDIZEM) tablet 30 mg  30 mg Oral TIDAC    acetaminophen (TYLENOL) suppository 650 mg  650 mg Rectal Q4H PRN    methylPREDNISolone (PF) (SOLU-MEDROL) injection 40 mg  40 mg IntraVENous Q6H    albuterol-ipratropium (DUO-NEB) 2.5 MG-0.5 MG/3 ML  3 mL Nebulization Q6HWA RT    trimethoprim-sulfamethoxazole (BACTRIM) 187.04 mg in dextrose 5% 500 mL  10 mg/kg/day IntraVENous Q8H    divalproex (DEPAKOTE SPRINKLE) capsule 250 mg  250 mg Oral Q12H    metoprolol (LOPRESSOR) injection 5 mg  5 mg IntraVENous Q6H PRN    benztropine (COGENTIN) tablet 0.5 mg  0.5 mg Oral BID    cloNIDine HCL (CATAPRES) tablet 0.1 mg  0.1 mg Oral DAILY    famotidine (PEPCID) tablet 20 mg  20 mg Oral QHS    lisinopriL (PRINIVIL, ZESTRIL) tablet 20 mg  20 mg Oral DAILY    risperiDONE (RisperDAL) tablet 2 mg  2 mg Oral BID    multivitamin with folic acid (ONE DAILY WITH FOLIC ACID) tablet 1 Tablet  1 Tablet Oral DAILY    piperacillin-tazobactam (ZOSYN) 3.375 g in 0.9% sodium chloride (MBP/ADV) 100 mL MBP  3.375 g IntraVENous Q8H    sodium chloride (NS) flush 5-40 mL  5-40 mL IntraVENous Q8H    sodium chloride (NS) flush 5-40 mL  5-40 mL IntraVENous PRN    acetaminophen (TYLENOL) tablet 650 mg  650 mg Oral Q4H PRN    enoxaparin (LOVENOX) injection 40 mg  40 mg SubCUTAneous Q24H    albuterol CONCENTRATE 2.5mg/0.5 mL neb soln  2.5 mg Nebulization Q4H PRN    levoFLOXacin (LEVAQUIN) 750 mg in D5W IVPB  750 mg IntraVENous Q24H    furosemide (LASIX) injection 40 mg  40 mg IntraVENous DAILY    hydrOXYzine pamoate (VISTARIL) capsule 25 mg  25 mg Oral Q6H PRN    LORazepam (ATIVAN) injection 0.5 mg  0.5 mg IntraVENous Q4H PRN        Review of Systems  Unable to obtain due to patient's condition. Objective:     Visit Vitals  /80 (BP 1 Location: Right lower arm, BP Patient Position: Lying)   Pulse 90   Temp 97.5 °F (36.4 °C)   Resp 18   Ht 5' 4.02\" (1.626 m)   Wt 56.1 kg (123 lb 10.9 oz)   SpO2 94%   BMI 21.22 kg/m²    O2 Flow Rate (L/min): 40 l/min O2 Device: Hi flow nasal cannula,Heated,Humidifier    Temp (24hrs), Av.8 °F (36.6 °C), Min:97.5 °F (36.4 °C), Max:98.2 °F (36.8 °C)      No intake/output data recorded.  1901 -  0700  In: 100 [I.V.:100]  Out: 1150 [Urine:1150]    PHYSICAL EXAM:  Constitutional: No acute distress  Skin: Extremities and face reveal no rashes. HEENT: Sclerae anicteric. Extra-occular muscles are intact. No oral ulcers. The neck is supple and no masses. Cardiovascular: Regular rate and rhythm. Respiratory:  B/L rhonchi  GI: Abdomen nondistended, soft, and nontender. Normal active bowel sounds. Musculoskeletal: No pitting edema of the lower legs. Able to move all ext  Neurological:  Patient is awake and alert, mildly confused.   Psychiatric: Mood appears appropriate       Data Review    Recent Results (from the past 24 hour(s))   BLOOD GAS, ARTERIAL    Collection Time: 22  2:40 AM   Result Value Ref Range    pH 7.44 7.35 - 7.45      PCO2 41 35 - 45 mmHg    PO2 98 75 - 100 mmHg    O2 SAT 98 >95 %    BICARBONATE 28 (H) 22 - 26 mmol/L    BASE EXCESS 3.5 (H) 0 - 2 mmol/L    O2 METHOD High Flow O2      O2 FLOW RATE 40 L/min    FIO2 40.0 %    EPAP/CPAP/PEEP 0      SITE Right Radial      IVAN'S TEST PASS     CBC WITH AUTOMATED DIFF    Collection Time: 03/24/22  8:14 AM   Result Value Ref Range    WBC 6.9 3.6 - 11.0 K/uL    RBC 3.26 (L) 3.80 - 5.20 M/uL    HGB 9.8 (L) 11.5 - 16.0 g/dL    HCT 28.0 (L) 35.0 - 47.0 %    MCV 85.9 80.0 - 99.0 FL    MCH 30.1 26.0 - 34.0 PG    MCHC 35.0 30.0 - 36.5 g/dL    RDW 13.3 11.5 - 14.5 %    PLATELET 474 704 - 859 K/uL    MPV 11.4 8.9 - 12.9 FL    NRBC 0.0 0.0  WBC    ABSOLUTE NRBC 0.00 0.00 - 0.01 K/uL    NEUTROPHILS 90 (H) 32 - 75 %    LYMPHOCYTES 7 (L) 12 - 49 %    MONOCYTES 3 (L) 5 - 13 %    EOSINOPHILS 0 0 - 7 %    BASOPHILS 0 0 - 1 %    IMMATURE GRANULOCYTES 0 0 - 0.5 %    ABS. NEUTROPHILS 6.2 1.8 - 8.0 K/UL    ABS. LYMPHOCYTES 0.5 (L) 0.8 - 3.5 K/UL    ABS. MONOCYTES 0.2 0.0 - 1.0 K/UL    ABS. EOSINOPHILS 0.0 0.0 - 0.4 K/UL    ABS. BASOPHILS 0.0 0.0 - 0.1 K/UL    ABS. IMM. GRANS. 0.0 0.00 - 0.04 K/UL    DF AUTOMATED     METABOLIC PANEL, BASIC    Collection Time: 03/24/22  8:14 AM   Result Value Ref Range    Sodium 141 136 - 145 mmol/L    Potassium 3.3 (L) 3.5 - 5.1 mmol/L    Chloride 108 97 - 108 mmol/L    CO2 29 21 - 32 mmol/L    Anion gap 4 (L) 5 - 15 mmol/L    Glucose 117 (H) 65 - 100 mg/dL    BUN 45 (H) 6 - 20 mg/dL    Creatinine 1.15 (H) 0.55 - 1.02 mg/dL    BUN/Creatinine ratio 39 (H) 12 - 20      GFR est AA 58 (L) >60 ml/min/1.73m2    GFR est non-AA 48 (L) >60 ml/min/1.73m2    Calcium 8.5 8.5 - 10.1 mg/dL   C REACTIVE PROTEIN, QT    Collection Time: 03/24/22  8:14 AM   Result Value Ref Range    C-Reactive protein 1.13 (H) 0.00 - 0.60 mg/dL   PROCALCITONIN    Collection Time: 03/24/22  8:14 AM   Result Value Ref Range    Procalcitonin 0.30 (H) 0 ng/mL         Assessment / Plan:  Acute hypoxic respiratory failure  S/s B/L pneumonia  Continue supplemental oxygen and titrate as needed  Currently on HFNC      Sepsis:  Continue zosyn   Started on iv bactrim given hxof HIV infection with CD4 count of 91.   Continue solumedrol  S/p levofloxacin (mycoplasma and legionella negative)    Acute hypokalemia:  Will replete potassium    Acute metabolic encephalopathy:  improving    B/L health care associated pneumonia  Continue antibiotics    WHITNEY:  improving      Sustained narrow complex tachycardia, likely SVT vs atrial flutter: improving    Schizophrenia    HIV with CD4<100 and markedly elevated viral load. Hepatitis C    Hx of alcohol abuse    COPD    18.5 - 24.9 Normal weight / Body mass index is 21.22 kg/m². Code status: Full  Prophylaxis: Lovenox  Recommended Disposition: SNF/LTC       Time spent 35 minutes involving direct patient care as well as reviewing patient's labs and coordination of care with nursing staff     Care Plan discussed with: Patient/Family/RN/Case Management        Total time spent with patient: 35 minutes.

## 2022-03-24 NOTE — PROGRESS NOTES
Pt was found sitting in the floor by nursing students. Writer noted that the patient IV was out but no other injuries noted. Pt stated she wanted her bedpan and fell through the side rail. Pt vital signs were obtained and noted to be WNL. Pt was assisted back into the bed. Bed alarm on, fall mats and a sitter are in place.

## 2022-03-24 NOTE — PROGRESS NOTES
CARDIOLOGY PROGRESS NOTE    Patient seen and examined. PMH for HIV, Hep C, COPD, HTN and recently hospitalized for aspiration PNA. Patient is followed for sinus tachycardia in setting of an infectious process. Awake and alert. Resting comfortably this am. No events noted overnight per nursing. Physical examination:  Vital signs: Blood pressure 135/80,  Pulse 90  No apparent distress. Heart is tachycardic, rate and rhythm. Normal S1, S2, no murmurs are appreciated. Lungs crackles bilaterally  Abdomen is soft, nontender, normal bowel sounds. Extremities have no edema. Recent labs results and imaging reviewed. Case was discussed with Dr. Jessie Rodriguez and our impression and recommendations are as follows:     1. Sinus tachycardia: resolved; NSR: HR 90  - Start Diltiazem  mg and discontinue Diltiazem to 30 mg Q8hrs   - Lopressor IV for rate control as needed  - Afebrile this morning.  - Keep serum potassium between 4-5 and serum magnesium >2.     2. Hypertension:   - Blood pressure below goal.   - continue with current medications. Will continue to follow peripherally. Thank you for allowing us to care for this patient. Please do not hesitate to call if additional questions arise. [facility]  [address]    [address] [place] [address] Baljinder Granados, [address] [facility]  [phone]

## 2022-03-24 NOTE — PROGRESS NOTES
Comprehensive Nutrition Assessment    Type and Reason for Visit: RD nutrition re-screen/LOS    Nutrition Recommendations/Plan:   Continue current diet per SLP rec's  Add ensure compact 2x/day    Monitor PO/supplement intakes and Bms and document in I/Os    Nutrition Assessment:  Admitted for acute respiratory failure with hypoxia. Intakes fair on admission. SLP following, diet downgraded from minced and moist to puree, rec'd MBS. Plan to add ONS to help meet EENs. Labs: Na 141, K 3.3, BUN 45, Cr 1.15, Gluc 117, Alb 1.5. Meds: famotidine, furosemide, methylprednisone, MVI with folic acid. Malnutrition Assessment:  Malnutrition Status:  Mild malnutrition    Context:  Acute illness     Findings of the 6 clinical characteristics of malnutrition:   Energy Intake:  7 - 50% or less of est energy requirements for 5 or more days  Weight Loss:  No significant weight loss     Body Fat Loss:  Unable to assess,     Muscle Mass Loss:  Unable to assess,    Fluid Accumulation:  No significant fluid accumulation,     Strength:  Not performed     Estimated Daily Nutrient Needs:  Energy (kcal): (P) 1403kcal (25kcal/kg); Weight Used for Energy Requirements: (P) Current  Protein (g): (P) 56g (1g/kg); Weight Used for Protein Requirements: (P) Current  Fluid (ml/day): (P) 1500mL (adult min); Method Used for Fluid Requirements: (P) Other (comment)    Nutrition Related Findings:  NFPE deferred. No n/v, d/c. SLP following, tolerating puree and thin liquids well. No edema. Last BM PTA. Wounds:    None       Current Nutrition Therapies:  ADULT DIET Dysphagia - Pureed    Anthropometric Measures:  Height:  5' 4.02\" (162.6 cm)  Current Body Wt:  56.1 kg (123 lb 10.9 oz)   Ideal Body Wt:  120 lbs:  103.1 %   BMI Category:  Normal weight (BMI 18.5-24. 9)       Nutrition Diagnosis:   (P) Mild malnutrition related to (P) inadequate protein-energy intake as evidenced by (P) intake 26-50%    Nutrition Interventions:   Food and/or Nutrient Delivery: (P) Continue current diet,Start oral nutrition supplement  Nutrition Education and Counseling: (P) Education not indicated  Coordination of Nutrition Care: (P) Continue to monitor while inpatient    Goals:  (P) Meet >75% of EENs in 5-7days, weight stability +/-1kg       Nutrition Monitoring and Evaluation:   Behavioral-Environmental Outcomes: (P) None identified  Food/Nutrient Intake Outcomes: (P) Food and nutrient intake,Supplement intake  Physical Signs/Symptoms Outcomes: (P) Chewing or swallowing,Meal time behavior,Weight    Discharge Planning:    (P) Too soon to determine     Electronically signed by Christian Manriquez RD on 3/24/2022 at 11:44 AM    Contact: [phone]

## 2022-03-24 NOTE — PROGRESS NOTES
Progress Note    Patient: Ofe Reid MRN: [id number]  SSN: [id number]    YOB: 1960  Age: 62 y.o. Sex: female      Admit Date: 3/17/2022    LOS: 6 days     Subjective:   Patient followed for SIRS/Sepsis with interstitial pneumonitis and history of HIV infection with CD4 count of only 91/mm3, which prompted the addition of Bactrim IV. She remains on high flow nasal O2. She remains afebrile. Procal and CRP decreasing. Currently on IV Zosyn and and Bactrim, along with steroids. Patient is awake and responsive with no complaints, but still confused. Objective:     Vitals:    03/23/22 1949 03/23/22 2333 03/24/22 0243 03/24/22 0721   BP:  129/83  135/80   Pulse:  83  90   Resp:  20  18   Temp:  98.2 °F (36.8 °C)  97.5 °F (36.4 °C)   SpO2: 98% 99% 97% 92%   Weight:       Height:            Intake and Output:  Current Shift: No intake/output data recorded. Last three shifts: 03/22 1901 - 03/24 0700  In: 100 [I.V.:100]  Out: 1150 [Urine:1150]    Physical Exam:   Vitals and nursing note reviewed. Constitutional:       General: She is in acute distress. Appearance: She is ill-appearing. HENT:  High flow nasal cannula 40% FiO2     Mouth/Throat:      Mouth: Mucous membranes are dry. Eyes:      Pupils: Pupils are equal, round, and reactive to light. Cardiovascular:      Rate and Rhythm: Regular rhythm. .      Heart sounds: No murmur heard  Pulmonary:      Effort: Respiratory distress present. Breath sounds: Rhonchi present. Abdominal:      General: Bowel sounds are normal.      Palpations: Abdomen is soft. Tenderness: There is no abdominal tenderness. Genitourinary:     Comments:   External urinary device  Musculoskeletal:      Right lower leg: No edema. Left lower leg: No edema. Skin:     Findings: No rash. Neurological:      General: No focal deficit present. Mental Status: She is alert and oriented to person, place, and time.    Psychiatric: Comments: Patient with abnormal thought content, judgement      Lab/Data Review:     WBC 6,900    Procal 0.30 < 0.72 <1.06 <0.75 <1.11  CRP 1.13 <2.20 <4.63 <5.31    CD4 count 91/mm3 (9.1%)  HIV-1 RNA viral load 579,000  HCV 26,600,000    Mycoplasma IgM Nonreactive  SARS CoV-2 Not detected  Urine Legionella antigen Negative    Blood cultures (3/17) No growth 4 days  Urine culture (3/21) No growth FINAL    CXR  (3/24)  Hazy reticular markings through lungs. Airspace component significantly reduced. Findings suggest clearing alveolar pulmonary edema. Assessment:     Active Problems:    Pneumonia (2/24/2022)      Acute respiratory failure with hypoxia (HCC) (2/25/2022)      Aspiration pneumonia ([address] (3/18/2022)      Acute respiratory failure with hypoxemia ([address] (3/18/2022)      1. SIRS/Sepsis with fever, tachycardia, elevated CRP and procal  2. Interstitial pneumonitis, bilateral, etiology unclear, possible pneumocystis, Day #7 Zosyn, Day #3 Bactrim  3. Acute hypoxic respiratory failure, high flow nasal O2  4. HIV-1 infection with CD4 <100 and markedly elevated viral load  5. Hepatitis C infection with markedly elevated viral load  6. Renal failure     Comment:  Patient has untreated HIV-1 infection and HCV infection. Spoke with [facility] and reportedly they were unaware of these diagnoses. Was given a physician contact, Dr. Jennifer Thompson,  but unable to reach him today. CRP and procal decreasing. Plan:      1. Continue IV Zosyn  2. Continue IV Bactrim and Solumedrol  4. Follow-up blood cultures  5. Follow-up HIV-1 genosure, HLA  (abacavir hypersensitivity), HCV genotype, Fibrosure, AFP, N5SA drug resistance profile  6. Follow-up CMV PCR, serum fungitell  7.  Awaiting return call from [facility] Physician POC  regarding status of HIV and HCV treatment    Signed By: Jo Vega MD     March 24, 2022

## 2022-03-24 NOTE — PROGRESS NOTES
OCCUPATIONAL THERAPY EVALUATION  Patient: Fay Hdz (4 y.o. female)  Date: 3/24/2022  Primary Diagnosis: Aspiration pneumonia ([facility] [J69.0]  Acute respiratory failure with hypoxemia ([facility] [J96.01]        Precautions: fall risk       ASSESSMENT  Pt is a 27[address] [facility] y/o F with PMH of HTN, schizophrenia, alcohol abuse, HIV, and hepatitis C presenting to [facility] with c/o fever and AMS, admitted 03/17/22  and being treated for metabolic encephalopathy with AMS secondary to hypoxia, acute respiratory failure with hypoxia, benign essential HTN, and anemia. Pt received semi-supine in bed upon arrival, AXO to person, and agreeable to OT/PT evaluation at this time. Per chart review, pt resides at [facility]. Pt is a poor historian at this time. Based on current observations, pt presents with deficits in generalized strength, balance, functional activity tolerance, and cognition impacting overall performance of ADLs and functional transfers/mobility. Pt currently requires CGA-min A for bed mobility. Pt completes sit>stand and stand>sit transfers to and from seated EOB with CGA-min A. Pt noted with impulsivity during functional mobility and tendency to lean posteriorly while standing. Pt requires mod A to transfer from seated EOB>BSC. Pt transferred back to bedside from seated on BSC with mod A. Pt then returned supine in bed with CGA-min A. Pt then set up with breakfast tray, with max-total A to open containers and was able to bring food to mouth with mod I. Overall, pt tolerates session fair. Pt would benefit from continued skilled OT services to address current impairments and improve IND and safety with self cares and functional transfers/mobility. Recommend discharge to return to group home once medically appropriate. Other factors to consider for discharge: home support, PLOF, severity of deficits     Patient will benefit from skilled therapy intervention to address the above noted impairments.        PLAN :  Recommendations and Planned Interventions: self care training, functional mobility training, therapeutic exercise, balance training, therapeutic activities, endurance activities and patient education    Frequency/Duration: Patient will be followed by occupational therapy:  2-3x/week to address goals. Recommendation for discharge: (in order for the patient to meet his/her long term goals)  Group Home    This discharge recommendation:  Has been made in collaboration with the attending provider and/or case management    IF patient discharges home will need the following DME: bedside commode and shower chair       SUBJECTIVE:   Patient stated I don't know what I want.     OBJECTIVE DATA SUMMARY:   HISTORY:   Past Medical History:   Diagnosis Date    Alcohol abuse 5/29/2017    Hepatitis C     HIV (human immunodeficiency virus infection) [facility]     Hypertension     Psychotic disorder ([facility]     Schizophrenia ([facility]    History reviewed. No pertinent surgical history. Expanded or extensive additional review of patient history:     Home Situation  Home Environment: [facility]  Living Alone: No  Support Systems: Adult Group Home  Patient Expects to be Discharged to[de-identified] Group home    Hand dominance: Right    EXAMINATION OF PERFORMANCE DEFICITS:  Cognitive/Behavioral Status:  Neurologic State: Alert;Confused  Orientation Level: Oriented to person      Range of Motion:  AROM: Generally decreased, functional                         Strength:  Strength: Generally decreased, functional       Tone & Sensation:  Tone: Normal                         Balance:  Sitting: Intact; With support  Standing: Impaired  Standing - Static: Poor;Constant support  Standing - Dynamic : Poor;Constant support    Functional Mobility and Transfers for ADLs:  Bed Mobility:  Rolling: Contact guard assistance;Minimum assistance (Impulsivity)  Supine to Sit: Contact guard assistance;Minimum assistance (Impulsivity)  Sit to Supine: Contact guard assistance;Minimum assistance (Impulsivity)  Scooting: Contact guard assistance;Minimum assistance (Impulsivity)    Transfers:  Sit to Stand: Contact guard assistance;Minimum assistance  Stand to Sit: Contact guard assistance;Minimum assistance  Bed to Chair: Moderate assistance  Toilet Transfer : Moderate assistance      ADL Intervention and task modifications:  Feeding  Container Management: Maximum assistance  Utensil Management: Modified independent  Food to Mouth: Modified independent        Therapeutic Exercise:  Pt would benefit from UE HEP. Functional Measure:    [address] [place] [address] AM-PACTM \"6 Clicks\"                                                       Daily Activity Inpatient Short Form  How much help from another person does the patient currently need. .. Total; A Lot A Little None   1. Putting on and taking off regular lower body clothing? []  1 [x]  2 []  3 []  4   2. Bathing (including washing, rinsing, drying)? []  1 [x]  2 []  3 []  4   3. Toileting, which includes using toilet, bedpan or urinal? [] 1 [x]  2 []  3 []  4   4. Putting on and taking off regular upper body clothing? []  1 []  2 [x]  3 []  4   5. Taking care of personal grooming such as brushing teeth? []  1 []  2 [x]  3 []  4   6. Eating meals? []  1 []  2 [x]  3 []  4   © 2007, Trustees of [address] [place] [address], under license to [employer]. All rights reserved     Score: 15/24     Interpretation of Tool:  Represents clinically-significant functional categories (i.e. Activities of daily living).   Percentage of Impairment CH    0%   CI    1-19% CJ    20-39% CK    40-59% CL    60-79% CM    80-99% CN     100%   [facility]  Score 6-24 24 23 20-22 15-19 10-14 7-9 6         Occupational Therapy Evaluation Charge Determination   History Examination Decision-Making   LOW Complexity : Brief history review  LOW Complexity : 1-3 performance deficits relating to physical, cognitive , or psychosocial skils that result in activity limitations and / or participation restrictions  LOW Complexity : No comorbidities that affect functional and no verbal or physical assistance needed to complete eval tasks       Based on the above components, the patient evaluation is determined to be of the following complexity level: LOW   Pain Ratin/10    Activity Tolerance:   Fair  Please refer to the flowsheet for vital signs taken during this treatment. After treatment patient left in no apparent distress:    Supine in bed, Call bell within reach, Side rails x 3 and eating breakfast tray with 2 nursing students present    COMMUNICATION/EDUCATION:   The patients plan of care was discussed with: Physical therapist.     Patient/family agree to work toward stated goals and plan of care. This patients plan of care is appropriate for delegation to [facility] [place]. PT/OT sessions occurred together for increased safety of pt and clinician. Thank you for this referral.  Maritza Crowell OT  Time Calculation: 27 mins    Problem: Self Care Deficits Care Plan (Adult)  Goal: *Acute Goals and Plan of Care (Insert Text)  Description: 1. Pt will be SBA sup <> sit in prep for EOB ADLs  2. Pt will be mod I grooming sitting EOB  3. Pt will be SBA LE dressing sitting EOB/long sit  4. Pt will be SBA sit <>  prep for toileting LRAD  5. Pt will be SBA toileting/toilet transfer/cloth mgmt LRAD  6.  Pt will be mod I following UE HEP in prep for self care tasks      Outcome: Not Met

## 2022-03-24 NOTE — ROUTINE PROCESS
Bedside shift change report given to [address] [place] [address] by Jenny Feldman RN. Report included the following information SBAR, Kardex, Intake/Output, MAR, Recent Results, Cardiac Rhythm Sinus and Quality Measures.

## 2022-03-24 NOTE — PROGRESS NOTES
PHYSICAL THERAPY EVALUATION  Patient: Angelika Powers (23 y.o. female)  Date: 3/24/2022  Primary Diagnosis: Aspiration pneumonia ([facility] [J69.0]  Acute respiratory failure with hypoxemia ([facility] [J96.01]        Precautions:fall    ASSESSMENT  Pt is a 61 y/o F with PMH of HTN, schizophrenia, alcohol abuse, HIV, and hepatitis C presenting to [facility] with c/o fever and AMS, admitted 03/17/22  and being treated for metabolic encephalopathy with AMS secondary to hypoxia, acute respiratory failure with hypoxia, benign essential HTN, and anemia. Pt received semi-supine in bed upon arrival, AXO to person, and agreeable to PT/OT evaluation at this time. Per chart review, pt resides at [facility]. Pt is a poor historian at this time. Based on current observations, pt presents with deficits in generalized strength, balance, functional activity tolerance, and cognition impacting overall performance of functional transfers/mobility. Pt currently requires CGA-min A for bed mobility. Pt completes sit>stand and stand>sit transfers to and from seated EOB with CGA-min A. Pt noted with impulsivity during functional mobility and tendency to lean posteriorly while standing. Pt requires mod A to transfer from seated EOB>BSC. Pt transferred back to bedside from seated on BSC with mod A. Pt then returned supine in bed with CGA-min A. Pt then set up with breakfast tray, with max-total A to open containers and was able to bring food to mouth with mod I. Overall, pt tolerates session fair. Pt would benefit from continued skilled PT services to address current impairments and improve IND and safety with self cares and functional transfers/mobility. Recommend discharge to return to group home once medically appropriate. Other factors to consider for discharge: home support, PLOF, severity of deficits     Patient will benefit from skilled therapy intervention to address the above noted impairments.        PLAN :  Recommendations and Planned Interventions: bed mobility training, transfer training, gait training, therapeutic exercises, patient and family training/education, and therapeutic activities      Frequency/Duration: Patient will be followed by physical therapy:  2-3x/week to address goals. Recommendation for discharge: (in order for the patient to meet his/her long term goals)  Group Home    This discharge recommendation:  Has been made in collaboration with the attending provider and/or case management    IF patient discharges home will need the following DME: wheelchair         SUBJECTIVE:   Patient stated I am fine. breakfast?    OBJECTIVE DATA SUMMARY:   HISTORY:    Past Medical History:   Diagnosis Date    Alcohol abuse 5/29/2017    Hepatitis C     HIV (human immunodeficiency virus infection) ([facility]     Hypertension     Psychotic disorder ([facility]     Schizophrenia ([facility]    History reviewed. No pertinent surgical history.     Personal factors and/or comorbidities impacting plan of care:   Home Situation  [facility] Environment: [facility] Name: [facility]  Living Alone: No  Support Systems: Adult Group Home  Patient Expects to be Discharged to[de-identified] Group home    EXAMINATION/PRESENTATION/DECISION MAKING:   Critical Behavior:  Neurologic State: Alert,Confused  Orientation Level: Oriented to person  Cognition: Follows commands  Safety/Judgement: Decreased awareness of environment,Decreased awareness of need for assistance,Decreased awareness of need for safety,Decreased insight into deficits  Range Of Motion:  AROM: Generally decreased, functional                       Strength:    Strength: Generally decreased, functional                    Tone & Sensation:   Tone: Normal                                 Functional Mobility:  Bed Mobility:  Rolling: Contact guard assistance;Minimum assistance (Impulsivity)  Supine to Sit: Contact guard assistance;Minimum assistance (Impulsivity)  Sit to Supine: Contact guard assistance;Minimum assistance (Impulsivity)  Scooting: Contact guard assistance;Minimum assistance (Impulsivity)  Transfers:  Sit to Stand: Contact guard assistance;Minimum assistance  Stand to Sit: Contact guard assistance;Minimum assistance        Bed to Chair: Moderate assistance              Balance:   Sitting: Intact; With support  Standing: Impaired  Standing - Static: Poor;Constant support  Standing - Dynamic : Poor;Constant support                                                    Functional Measure:  [address] [place] [address] AM-PAC 6 Clicks         Basic Mobility Inpatient Short Form  How much difficulty does the patient currently have. .. Unable A Lot A Little None   1. Turning over in bed (including adjusting bedclothes, sheets and blankets)? [] 1   [] 2   [x] 3   [] 4   2. Sitting down on and standing up from a chair with arms ( e.g., wheelchair, bedside commode, etc.)   [] 1   [] 2   [x] 3   [] 4   3. Moving from lying on back to sitting on the side of the bed? [] 1   [] 2   [x] 3   [] 4          How much help from another person does the patient currently need. .. Total A Lot A Little None   4. Moving to and from a bed to a chair (including a wheelchair)? [] 1   [x] 2   [] 3   [] 4   5. Need to walk in hospital room? [] 1   [x] 2   [] 3   [] 4   6. Climbing 3-5 steps with a railing? [] 1   [x] 2   [] 3   [] 4   © 2007, Trustees of [address] [place] [address], under license to [employer]. All rights reserved     Score:  Initial: 15/24 Most Recent: X (Date:03/24/2022 )   Interpretation of Tool:  Represents activities that are increasingly more difficult (i.e. Bed mobility, Transfers, Gait).   Score 24 23 22-20 19-15 14-10 9-7 6   Modifier CH CI CJ CK CL CM CN           Physical Therapy Evaluation Charge Determination   History Examination Presentation Decision-Making   LOW Complexity : Zero comorbidities / personal factors that will impact the outcome / POC LOW Complexity : 1-2 Standardized tests and measures addressing body structure, function, activity limitation and / or participation in recreation  LOW Complexity : Stable, uncomplicated  LOW Complexity : FOTO score of       Based on the above components, the patient evaluation is determined to be of the following complexity level: LOW     Pain Ratin/10    Activity Tolerance:   Good  Please refer to the flowsheet for vital signs taken during this treatment. After treatment patient left in no apparent distress:   Supine in bed, Side rails x 3, and nursing student assisting patient breakfast     COMMUNICATION/EDUCATION:   The patients plan of care was discussed with: Occupational therapy assistant, Registered nurse, and nursing student . Fall prevention education was provided and the patient/caregiver indicated understanding. and Patient understands intent and goals of therapy, but is neutral about his/her participation.     Thank you for this referral.  Adrian Lord PT   Time Calculation: 22 mins

## 2022-03-24 NOTE — PROGRESS NOTES
Pulmonary Progress Note      NAME: Paullette Hodgkins   :  1960  MRM:  [id number]    Date/Time: 3/24/2022  4:22 PM         Subjective:     Patient seen and examined. Discussed with the nursing staff. Yesterday she was transitioned to high flow oxygen. She is now on 40 L and 40%. Saturating fairly well. No distress. Previously she was on nonrebreather mask. She is more awake and alert. She is asking to go home again. Blood gases and chest x-ray discussed below. COVID-19 ruled out. ID input is noted. She is taking minced and moist diet without any problems. Diet changed to puréed with thin liquids per speech. Past Medical History reviewed and unchanged from Admission History and Physical       Objective:     Physical Exam     Vitals:      Last 24hrs VS reviewed since prior progress note. Most recent are:    Visit Vitals  /85 (BP 1 Location: Right lower arm, BP Patient Position: At rest)   Pulse 79   Temp 97.6 °F (36.4 °C)   Resp 18   Ht 5' 4.02\" (1.626 m)   Wt 56.1 kg (123 lb 10.9 oz)   SpO2 100%   BMI 21.22 kg/m²     SpO2 Readings from Last 6 Encounters:   22 100%   22 99%   22 98%   21 96%   21 97%   09/15/21 97%    O2 Flow Rate (L/min): 40 l/min       Intake/Output Summary (Last 24 hours) at 3/24/2022 1557  Last data filed at 3/23/2022 1957  Gross per 24 hour   Intake 100 ml   Output    Net 100 ml      General: Lying in bed in mild respiratory distress, on high flow oxygen. Eye: Pupils are equal and reactive to light. Throat and Neck: Oropharynx without thrush. Neck is supple and trachea central.  No obvious lymphadenopathy. Lung: Reduced air entry bilaterally with prolonged exhalation but no wheezing. Occasional crackles. No significant change. Heart: S1+S2. No murmurs  Abdomen: soft, non-tender. Bowel sounds normal. No masses; obese  Extremities: No edema, no cyanosis or clubbing. Pulses are palpable.   : Not done  Skin: No cyanosis  Neurologic:  Alert and awake, confused at times, grossly nonfocal  Psychiatric:  Unable to perform due to patient's condition    XR CHEST PORT   Final Result      XR CHEST PORT   Final Result      XR CHEST PORT   Final Result   Diffuse opacities in the lungs, increased. XR CHEST PORT   Final Result   FINDINGS/IMPRESSION:   Persistent diffuse airspace opacity throughout the lungs. Cardiac silhouette stable in size. Negative for pneumothorax. CT CHEST WO CONT   Final Result   Extensive diffuse bilateral pneumonitis and patchy pneumonia      XR CHEST PORT   Final Result   Diffuse predominantly interstitial process in both lungs with some   greater involvement on the left. Differential considerations include pulmonary   edema and diffuse infection.                     Lab Data Reviewed: (see below)      Medications:  Current Facility-Administered Medications   Medication Dose Route Frequency    potassium chloride (KLOR-CON) packet for solution 40 mEq  40 mEq Oral NOW    [START ON 3/25/2022] dilTIAZem ER (CARDIZEM CD) capsule 120 mg  120 mg Oral DAILY    acetaminophen (TYLENOL) suppository 650 mg  650 mg Rectal Q4H PRN    methylPREDNISolone (PF) (SOLU-MEDROL) injection 40 mg  40 mg IntraVENous Q6H    albuterol-ipratropium (DUO-NEB) 2.5 MG-0.5 MG/3 ML  3 mL Nebulization Q6HWA RT    trimethoprim-sulfamethoxazole (BACTRIM) 187.04 mg in dextrose 5% 500 mL  10 mg/kg/day IntraVENous Q8H    divalproex (DEPAKOTE SPRINKLE) capsule 250 mg  250 mg Oral Q12H    metoprolol (LOPRESSOR) injection 5 mg  5 mg IntraVENous Q6H PRN    benztropine (COGENTIN) tablet 0.5 mg  0.5 mg Oral BID    cloNIDine HCL (CATAPRES) tablet 0.1 mg  0.1 mg Oral DAILY    famotidine (PEPCID) tablet 20 mg  20 mg Oral QHS    lisinopriL (PRINIVIL, ZESTRIL) tablet 20 mg  20 mg Oral DAILY    risperiDONE (RisperDAL) tablet 2 mg  2 mg Oral BID    multivitamin with folic acid (ONE DAILY WITH FOLIC ACID) tablet 1 Tablet  1 Tablet Oral DAILY    piperacillin-tazobactam (ZOSYN) 3.375 g in 0.9% sodium chloride (MBP/ADV) 100 mL MBP  3.375 g IntraVENous Q8H    sodium chloride (NS) flush 5-40 mL  5-40 mL IntraVENous Q8H    sodium chloride (NS) flush 5-40 mL  5-40 mL IntraVENous PRN    acetaminophen (TYLENOL) tablet 650 mg  650 mg Oral Q4H PRN    enoxaparin (LOVENOX) injection 40 mg  40 mg SubCUTAneous Q24H    albuterol CONCENTRATE 2.5mg/0.5 mL neb soln  2.5 mg Nebulization Q4H PRN    furosemide (LASIX) injection 40 mg  40 mg IntraVENous DAILY    hydrOXYzine pamoate (VISTARIL) capsule 25 mg  25 mg Oral Q6H PRN    LORazepam (ATIVAN) injection 0.5 mg  0.5 mg IntraVENous Q4H PRN       ______________________________________________________________________      Lab Review:     Recent Labs     03/24/22  0814 03/23/22  0550 03/22/22  0455   WBC 6.9 6.9 3.8   HGB 9.8* 10.4* 10.3*   HCT 28.0* 29.8* 30.5*    194 195     Recent Labs     03/24/22  0814 03/23/22  0550 03/22/22  0455    140 141   K 3.3* 3.5 3.9    107 109*   CO2 29 28 29   * 152* 121*   BUN 45* 49* 37*   CREA 1.15* 1.39* 1.03*   CA 8.5 8.5 8.8     No components found for: Juan Lloyd  Recent Labs     03/24/22  0240 03/23/22  0550 03/22/22  0515   PH 7.44 7.43 7.40   PCO2 41 43 49*   PO2 98 91 107*   HCO3 28* 28* 29*   FIO2 40.0 40.0 100.0     No results for input(s): INR, INREXT, INREXT, INREXT in the last 72 hours. Other pertinent lab:          Assessment & Plan:      Assessment:      1. Acute respiratory failure with hypoxia due to #2 and 3  2. Bilateral healthcare associated pneumonia  3. Acute CHF and sinus tachycardia due to #4  4. Severe sepsis  5. Altered mental status/acute metabolic encephalopathy  6. COPD with exacerbation  7. HIV  8. Schizophrenia  9. History of alcohol abuse     Plan:      Patient admitted to the ICU, now transferred to [facility] telemetry. We will be watching her closely     Currently high flow oxygen, 40 L and 40%.   Previously she was on nonrebreather mask. Blood gases this morning showed 7.4 4/41/98 on 40% high flow oxygen. Blood gases done yesterday morning showed 7.43/43/91 on 40% high flow oxygen. Blood gases done 3/22 morning showed 7.4 0/49/107 on nonrebreather mask. Blood gases done 3/18/2022 showed 7.50/36/116 on nonrebreather mask. We wean down oxygen as tolerated. Discussed with RT. If she decompensates will initiate BiPAP but she is not a CO2 retainer. Chest x-ray shows bilateral infiltrates. Personally reviewed. There has been significant improvement overall. Patient has COPD  Continue nebulizers  Added IV Solu-Medrol, which I believe is helping her. Appreciate ID input. Treating as possible PCP. Bactrim added. She is aspiration risk. On puréed with thin liquids as per speech. Appreciate input. Urinary retention. Had a [place] catheter.     Patient has combination of CHF and healthcare associated pneumonia, speech is also involved. There is a possibility of aspiration pneumonia. Continue broad-spectrum antibiotics, currently on Zosyn and Bactrim. Of Levaquin. Appreciate ID input. COVID-19 ruled out. Patient developed sinus tachycardia. She was given magnesium and amiodarone. Now started on diltiazem. Appreciate input from cardiology.     Started on Lasix IV daily since blood pressure stable  Intake and output charting  Check electrolytes and replace as needed  Monitor renal function with fluid change     Monitor mental status      DVT and GI prophylaxis     Clinical status  Monitor in ICU overnight     Case discussed in detail with RN, RT, and care team  Thank you for involving me in the care of this patient  I will follow with you closely during hospitalization     Time spent more than 30 minutes in direct patient care with no overlap reviewing results and records, decision making, and answering questions.       Carmel Hernandez MD  [facility]

## 2022-03-25 LAB
AFP-TM SERPL-MCNC: 9.4 NG/ML (ref 0–9.2)
ANION GAP SERPL CALC-SCNC: 4 MMOL/L (ref 5–15)
BACTERIA SPEC CULT: NORMAL
BASOPHILS # BLD: 0 K/UL (ref 0–0.1)
BASOPHILS NFR BLD: 0 % (ref 0–1)
BUN SERPL-MCNC: 37 MG/DL (ref 6–20)
BUN/CREAT SERPL: 39 (ref 12–20)
CA-I BLD-MCNC: 8.6 MG/DL (ref 8.5–10.1)
CHLORIDE SERPL-SCNC: 109 MMOL/L (ref 97–108)
CO2 SERPL-SCNC: 29 MMOL/L (ref 21–32)
CREAT SERPL-MCNC: 0.96 MG/DL (ref 0.55–1.02)
CRP SERPL-MCNC: 0.59 MG/DL (ref 0–0.6)
DIFFERENTIAL METHOD BLD: ABNORMAL
EOSINOPHIL # BLD: 0 K/UL (ref 0–0.4)
EOSINOPHIL NFR BLD: 0 % (ref 0–7)
ERYTHROCYTE [DISTWIDTH] IN BLOOD BY AUTOMATED COUNT: 13.4 % (ref 11.5–14.5)
GLUCOSE SERPL-MCNC: 122 MG/DL (ref 65–100)
HCT VFR BLD AUTO: 27.6 % (ref 35–47)
HGB BLD-MCNC: 9.6 G/DL (ref 11.5–16)
IMM GRANULOCYTES # BLD AUTO: 0 K/UL (ref 0–0.04)
IMM GRANULOCYTES NFR BLD AUTO: 0 % (ref 0–0.5)
LYMPHOCYTES # BLD: 0.5 K/UL (ref 0.8–3.5)
LYMPHOCYTES NFR BLD: 9 % (ref 12–49)
MCH RBC QN AUTO: 30 PG (ref 26–34)
MCHC RBC AUTO-ENTMCNC: 34.8 G/DL (ref 30–36.5)
MCV RBC AUTO: 86.3 FL (ref 80–99)
MONOCYTES # BLD: 0.2 K/UL (ref 0–1)
MONOCYTES NFR BLD: 3 % (ref 5–13)
NEUTS SEG # BLD: 4.9 K/UL (ref 1.8–8)
NEUTS SEG NFR BLD: 88 % (ref 32–75)
NRBC # BLD: 0 K/UL (ref 0–0.01)
NRBC BLD-RTO: 0 PER 100 WBC
PLATELET # BLD AUTO: 189 K/UL (ref 150–400)
PMV BLD AUTO: 11.6 FL (ref 8.9–12.9)
POTASSIUM SERPL-SCNC: 3.3 MMOL/L (ref 3.5–5.1)
PROCALCITONIN SERPL-MCNC: 0.15 NG/ML
RBC # BLD AUTO: 3.2 M/UL (ref 3.8–5.2)
SODIUM SERPL-SCNC: 142 MMOL/L (ref 136–145)
SPECIAL REQUESTS,SREQ: NORMAL
WBC # BLD AUTO: 5.6 K/UL (ref 3.6–11)

## 2022-03-25 PROCEDURE — 97530 THERAPEUTIC ACTIVITIES: CPT

## 2022-03-25 PROCEDURE — 74011000250 HC RX REV CODE- 250: Performed by: HOSPITALIST

## 2022-03-25 PROCEDURE — 77010033711 HC HIGH FLOW OXYGEN

## 2022-03-25 PROCEDURE — 65270000029 HC RM PRIVATE

## 2022-03-25 PROCEDURE — 74011000250 HC RX REV CODE- 250: Performed by: INTERNAL MEDICINE

## 2022-03-25 PROCEDURE — 74011250636 HC RX REV CODE- 250/636: Performed by: INTERNAL MEDICINE

## 2022-03-25 PROCEDURE — 80048 BASIC METABOLIC PNL TOTAL CA: CPT

## 2022-03-25 PROCEDURE — 86778 TOXOPLASMA ANTIBODY IGM: CPT

## 2022-03-25 PROCEDURE — 74011250637 HC RX REV CODE- 250/637: Performed by: INTERNAL MEDICINE

## 2022-03-25 PROCEDURE — 81381 HLA I TYPING 1 ALLELE HR: CPT

## 2022-03-25 PROCEDURE — 36415 COLL VENOUS BLD VENIPUNCTURE: CPT

## 2022-03-25 PROCEDURE — 74011250636 HC RX REV CODE- 250/636: Performed by: HOSPITALIST

## 2022-03-25 PROCEDURE — 86777 TOXOPLASMA ANTIBODY: CPT

## 2022-03-25 PROCEDURE — 94640 AIRWAY INHALATION TREATMENT: CPT

## 2022-03-25 PROCEDURE — 74011000258 HC RX REV CODE- 258: Performed by: HOSPITALIST

## 2022-03-25 PROCEDURE — 86140 C-REACTIVE PROTEIN: CPT

## 2022-03-25 PROCEDURE — 94761 N-INVAS EAR/PLS OXIMETRY MLT: CPT

## 2022-03-25 PROCEDURE — 85025 COMPLETE CBC W/AUTO DIFF WBC: CPT

## 2022-03-25 PROCEDURE — 99232 SBSQ HOSP IP/OBS MODERATE 35: CPT | Performed by: INTERNAL MEDICINE

## 2022-03-25 PROCEDURE — 84145 PROCALCITONIN (PCT): CPT

## 2022-03-25 PROCEDURE — 74011250637 HC RX REV CODE- 250/637: Performed by: NURSE PRACTITIONER

## 2022-03-25 PROCEDURE — 74011250637 HC RX REV CODE- 250/637: Performed by: HOSPITALIST

## 2022-03-25 RX ORDER — NYSTATIN 100000 [USP'U]/ML
500000 SUSPENSION ORAL 4 TIMES DAILY
Status: DISCONTINUED | OUTPATIENT
Start: 2022-03-25 | End: 2022-03-29 | Stop reason: HOSPADM

## 2022-03-25 RX ADMIN — SODIUM CHLORIDE, PRESERVATIVE FREE 10 ML: 5 INJECTION INTRAVENOUS at 06:30

## 2022-03-25 RX ADMIN — IPRATROPIUM BROMIDE AND ALBUTEROL SULFATE 3 ML: .5; 2.5 SOLUTION RESPIRATORY (INHALATION) at 08:10

## 2022-03-25 RX ADMIN — DILTIAZEM HYDROCHLORIDE 120 MG: 120 CAPSULE, COATED, EXTENDED RELEASE ORAL at 09:12

## 2022-03-25 RX ADMIN — SULFAMETHOXAZOLE AND TRIMETHOPRIM 187.04 MG: 80; 16 INJECTION, SOLUTION, CONCENTRATE INTRAVENOUS at 01:56

## 2022-03-25 RX ADMIN — METHYLPREDNISOLONE SODIUM SUCCINATE 40 MG: 40 INJECTION, POWDER, FOR SOLUTION INTRAMUSCULAR; INTRAVENOUS at 00:01

## 2022-03-25 RX ADMIN — HYDROXYZINE PAMOATE 25 MG: 25 CAPSULE ORAL at 09:12

## 2022-03-25 RX ADMIN — METHYLPREDNISOLONE SODIUM SUCCINATE 40 MG: 40 INJECTION, POWDER, FOR SOLUTION INTRAMUSCULAR; INTRAVENOUS at 22:50

## 2022-03-25 RX ADMIN — NYSTATIN 500000 UNITS: 100000 SUSPENSION ORAL at 21:58

## 2022-03-25 RX ADMIN — FUROSEMIDE 40 MG: 10 INJECTION, SOLUTION INTRAMUSCULAR; INTRAVENOUS at 09:00

## 2022-03-25 RX ADMIN — CLONIDINE HYDROCHLORIDE 0.1 MG: 0.1 TABLET ORAL at 09:13

## 2022-03-25 RX ADMIN — IPRATROPIUM BROMIDE AND ALBUTEROL SULFATE 3 ML: .5; 2.5 SOLUTION RESPIRATORY (INHALATION) at 20:07

## 2022-03-25 RX ADMIN — SULFAMETHOXAZOLE AND TRIMETHOPRIM 187.04 MG: 80; 16 INJECTION, SOLUTION, CONCENTRATE INTRAVENOUS at 17:32

## 2022-03-25 RX ADMIN — DIVALPROEX SODIUM 250 MG: 125 CAPSULE, COATED PELLETS ORAL at 09:12

## 2022-03-25 RX ADMIN — IPRATROPIUM BROMIDE AND ALBUTEROL SULFATE 3 ML: .5; 2.5 SOLUTION RESPIRATORY (INHALATION) at 13:17

## 2022-03-25 RX ADMIN — NYSTATIN 500000 UNITS: 100000 SUSPENSION ORAL at 14:55

## 2022-03-25 RX ADMIN — SODIUM CHLORIDE, PRESERVATIVE FREE 10 ML: 5 INJECTION INTRAVENOUS at 14:00

## 2022-03-25 RX ADMIN — ENOXAPARIN SODIUM 40 MG: 100 INJECTION SUBCUTANEOUS at 03:53

## 2022-03-25 RX ADMIN — RISPERIDONE 2 MG: 2 TABLET, FILM COATED ORAL at 21:55

## 2022-03-25 RX ADMIN — PIPERACILLIN AND TAZOBACTAM 3.38 G: 3; .375 INJECTION, POWDER, LYOPHILIZED, FOR SOLUTION INTRAVENOUS at 17:33

## 2022-03-25 RX ADMIN — MULTIVITAMIN TABLET 1 TABLET: TABLET at 09:12

## 2022-03-25 RX ADMIN — LISINOPRIL 20 MG: 20 TABLET ORAL at 09:12

## 2022-03-25 RX ADMIN — SODIUM CHLORIDE, PRESERVATIVE FREE 10 ML: 5 INJECTION INTRAVENOUS at 22:53

## 2022-03-25 RX ADMIN — SULFAMETHOXAZOLE AND TRIMETHOPRIM 187.04 MG: 80; 16 INJECTION, SOLUTION, CONCENTRATE INTRAVENOUS at 09:11

## 2022-03-25 RX ADMIN — DIVALPROEX SODIUM 250 MG: 125 CAPSULE, COATED PELLETS ORAL at 21:56

## 2022-03-25 RX ADMIN — RISPERIDONE 2 MG: 2 TABLET, FILM COATED ORAL at 09:12

## 2022-03-25 RX ADMIN — NYSTATIN 500000 UNITS: 100000 SUSPENSION ORAL at 17:32

## 2022-03-25 RX ADMIN — BENZTROPINE MESYLATE 0.5 MG: 1 TABLET ORAL at 21:54

## 2022-03-25 RX ADMIN — PIPERACILLIN AND TAZOBACTAM 3.38 G: 3; .375 INJECTION, POWDER, LYOPHILIZED, FOR SOLUTION INTRAVENOUS at 09:12

## 2022-03-25 RX ADMIN — FAMOTIDINE 20 MG: 20 TABLET, FILM COATED ORAL at 21:57

## 2022-03-25 RX ADMIN — BENZTROPINE MESYLATE 0.5 MG: 1 TABLET ORAL at 09:12

## 2022-03-25 RX ADMIN — METHYLPREDNISOLONE SODIUM SUCCINATE 40 MG: 40 INJECTION, POWDER, FOR SOLUTION INTRAMUSCULAR; INTRAVENOUS at 06:30

## 2022-03-25 NOTE — PROGRESS NOTES
Problem: Pressure Injury - Risk of  Goal: *Prevention of pressure injury  Description: Document Forest Scale and appropriate interventions in the flowsheet. Outcome: Progressing Towards Goal  Note: Pressure Injury Interventions:  Sensory Interventions: Minimize linen layers,Keep linens dry and wrinkle-free    Moisture Interventions: Absorbent underpads,Minimize layers     Activity Interventions: Pressure redistribution bed/mattress(bed type)    Mobility Interventions: Pressure redistribution bed/mattress (bed type)    Nutrition Interventions: Offer support with meals,snacks and hydration    Friction and Shear Interventions: Minimize layers                Problem: Falls - Risk of  Goal: *Absence of Falls  Description: Document Janine Fall Risk and appropriate interventions in the flowsheet.   Outcome: Progressing Towards Goal  Note: Fall Risk Interventions:  Mobility Interventions: Bed/chair exit alarm    Mentation Interventions: Bed/chair exit alarm,Reorient patient,Update white board,Toileting rounds,Family/sitter at bedside    Medication Interventions: Bed/chair exit alarm    Elimination Interventions: Bed/chair exit alarm,Call light in reach,Toileting schedule/hourly rounds    History of Falls Interventions: Bed/chair exit alarm    One to one observation for safety

## 2022-03-25 NOTE — PROGRESS NOTES
Progress Note    Patient: Valeriano Burnette MRN: [id number]  SSN: [id number]    YOB: 1960  Age: 62 y.o. Sex: female      Admit Date: 3/17/2022    LOS: 7 days     Subjective:   Patient followed for SIRS/Sepsis with interstitial pneumonitis and history of HIV infection with CD4 count of only 91/mm3, which prompted the addition of Bactrim IV. She remains on high flow nasal O2. She remains afebrile. Procal and CRP decreasing. Currently on IV Zosyn and and Bactrim, along with steroids. Patient is awake and responsive with no complaints, but still confused. Objective:     Vitals:    03/24/22 2107 03/25/22 0005 03/25/22 0811 03/25/22 0852   BP: (!) 142/87 (!) 151/91  (!) 144/88   Pulse: 91 98  88   Resp: 16 18  18   Temp: 97.4 °F (36.3 °C) 97.4 °F (36.3 °C)  97.6 °F (36.4 °C)   SpO2:  97% 97% 98%   Weight:       Height:            Intake and Output:  Current Shift: No intake/output data recorded. Last three shifts: 03/23 1901 - 03/25 0700  In: 100 [I.V.:100]  Out: -     Physical Exam:   Vitals and nursing note reviewed. Constitutional:       General: She is in acute distress. Appearance: She is ill-appearing. HENT:  High flow nasal cannula 40% FiO2     Mouth/Throat:      Mouth: Mucous membranes are dry. Eyes:      Pupils: Pupils are equal, round, and reactive to light. Cardiovascular:      Rate and Rhythm: Regular rhythm. .      Heart sounds: No murmur heard  Pulmonary:      Effort: Respiratory distress present. Breath sounds: Rhonchi present. Abdominal:      General: Bowel sounds are normal.      Palpations: Abdomen is soft. Tenderness: There is no abdominal tenderness. Genitourinary:     Comments:   External urinary device  Musculoskeletal:      Right lower leg: No edema. Left lower leg: No edema. Skin:     Findings: No rash. Neurological:      General: No focal deficit present. Mental Status: She is alert and oriented to person, place, and time. Psychiatric:      Comments: Patient with abnormal thought content, judgement      Lab/Data Review:     WBC 5,600    Procal 0.30 < 0.72 <1.06 <0.75 <1.11  CRP 0.59 <1.13 <2.20 <4.63 <5.31    CD4 count 91/mm3 (9.1%)  HIV-1 RNA viral load 579,000  HCV 26,600,000    Mycoplasma IgM Nonreactive  SARS CoV-2 Not detected  Urine Legionella antigen Negative    Blood cultures (3/17) No growth 4 days  Urine culture (3/21) No growth FINAL    CXR  (3/24)  Hazy reticular markings through lungs. Airspace component significantly reduced. Findings suggest clearing alveolar pulmonary edema. Assessment:     Active Problems:    Pneumonia (2/24/2022)      Acute respiratory failure with hypoxia ([facility]) (2/25/2022)      Aspiration pneumonia ([address] (3/18/2022)      Acute respiratory failure with hypoxemia ([address] (3/18/2022)      1. SIRS/Sepsis with fever, tachycardia, elevated CRP and procal  2. Interstitial pneumonitis, bilateral, etiology unclear, possible pneumocystis, Day #8 Zosyn, Day #4/21 Bactrim  3. Acute hypoxic respiratory failure, high flow nasal O2  4. HIV-1 infection with CD4 <100 and markedly elevated viral load  5. Hepatitis C infection with markedly elevated viral load  6. Renal failure     Comment:  Patient has untreated HIV-1 infection and HCV infection. Spoke with [facility] and reportedly they were unaware of these diagnoses. Spoke to the [facility] agency responsible for her medical care and was told they were in the process of transferring care to another Agency. Spoke to [facility] again today and decision made to have her follow-up in our ID Clinic. Plan:      1. Continue IV Zosyn for 2 more days  2. Continue IV Bactrim and Solumedrol; plan would be to transition to oral Bactrim and Prednisone when she is discharged, assuming her respiratory failure resolves  4. Follow-up blood cultures  5.  Follow-up HIV-1 genosure, HLA  (abacavir hypersensitivity), HCV genotype, Fibrosure, AFP, N5SA drug resistance profile  6. Follow-up CMV PCR, serum fungitell  7.  Follow-up in ID Clinic to address HIV and HCV treatment    Signed By: Debo Rocha MD     March 25, 2022

## 2022-03-25 NOTE — ROUTINE PROCESS
Bedside and Verbal shift change report given to Conrad Ash LPN  (oncoming nurse) by Nadia Reyes RN (offgoing nurse). Report included the following information SBAR, Kardex, MAR, Accordion, and Recent Results.

## 2022-03-25 NOTE — PROGRESS NOTES
OCCUPATIONAL THERAPY TREATMENT  Patient: Suzanna Abda (17 y.o. female)  Date: 3/25/2022  Diagnosis: Aspiration pneumonia ([facility] [address] [J69.0]  Acute respiratory failure with hypoxemia ([facility] [address] [J96.01] <principal problem not specified>       Precautions:    Chart, occupational therapy assessment, plan of care, and goals were reviewed. ASSESSMENT  Patient continues with skilled OT services and is progressing towards goals. Pt. Received semi-supine in bed and agreeable to tx session. 1:1 in pts room. Pt. Performed bed mobility with SBA with additional time to perform with v/c's provided for technique. Pt. Demonstrates with anxiousness, slightly drowsy but alert and able to participate in tx session. Pt. Impulsively attempted to perform sit> stand but required education and redirection from therapist to return seated for safety until safety belt was donned. Pt. Performed sit> stand CGA and HHA to perform side steps to [facility]. CGA for [facility] transfer ; additional time to perform toileting. Pt. Required total A for rachana-care while standing up-right. Pt. On high flow N/C so limited to sink level ADLs or ambulating increased distance. Pt. Able to  tolerate static standing for increased time with CGA. Pt. Returned seated at EOB and returned semi-supine in bed with needs met and call bell within reach with 1:1 in pts room. Pt's SpO2 WFL throughout tx session. Current Level of Function Impacting Discharge (ADLs): assistance with OOB transfers, increased confusion    Other factors to consider for discharge: PLOF, time since on set         PLAN :  Patient continues to benefit from skilled intervention to address the above impairments. Continue treatment per established plan of care. to address goals.     Recommendation for discharge: (in order for the patient to meet his/her long term goals)  Return to group home    This discharge recommendation:  Has been made in collaboration with the attending provider and/or case management    IF patient discharges home will need the following DME: TBD       SUBJECTIVE:   Patient stated  I need to use the bathroom.     OBJECTIVE DATA SUMMARY:   Cognitive/Behavioral Status:  Neurologic State: Alert;Confused  Orientation Level: Oriented to person  Cognition: Follows commands; Impulsive     Functional Mobility and Transfers for ADLs:  Bed Mobility:  Rolling: Stand-by assistance  Supine to Sit: Stand-by assistance  Sit to Supine: Stand-by assistance  Scooting: Stand-by assistance    Transfers:  Sit to Stand: Contact guard assistance  Functional Transfers  Toilet Transfer : Contact guard assistance (BSC)       Balance:  Sitting: Intact; Without support  Standing: Impaired  Standing - Static: Constant support; Fair  Standing - Dynamic : Constant support; Fair    ADL Intervention:    Toileting  Bladder Hygiene: Total assistance (dependent)  Bowel Hygiene: Total assistance (dependent)  Clothing Management: Moderate assistance    Pain:  0/ 10 pain reported    Activity Tolerance:   Fair and requires rest breaks  Please refer to the flowsheet for vital signs taken during this treatment. After treatment patient left in no apparent distress:   Supine in bed, Call bell within reach, Bed / chair alarm activated and Caregiver / family present    COMMUNICATION/COLLABORATION:   The patients plan of care was discussed with: Registered nurse and Certified nursing assistant/patient care technician. Connor Blanton  Time Calculation: 24 mins    Problem: Self Care Deficits Care Plan (Adult)  Goal: *Acute Goals and Plan of Care (Insert Text)  Description: 1. Pt will be SBA sup <> sit in prep for EOB ADLs  2. Pt will be mod I grooming sitting EOB  3. Pt will be SBA LE dressing sitting EOB/long sit  4. Pt will be SBA sit <>  prep for toileting LRAD  5. Pt will be SBA toileting/toilet transfer/cloth mgmt LRAD  6.  Pt will be mod I following UE HEP in prep for self care tasks      Outcome: Progressing Towards Goal

## 2022-03-25 NOTE — PROGRESS NOTES
Hospitalist Progress Note    Subjective:   Daily Progress Note: 3/25/2022 12:40 PM    Hospital Course:  62year old Female with hxof COPD, alcohol abuse, HIV, schizophrenia, and hepatitis C (comes from group home) presented to ED with altered mental status and cough. Patient was febrile, tachycardic and had gurgling sounds. Patient was hypoxic, placed on venti mask. Xray showed diffuse interstitial process in B/L lungs. Patient was admitted to ICU and started on iv zosyn. COVID-19 negative. Patient is currently on HFNC, transitioned from 100% non rebreather mask. Subjective:  Patient was confused this morning. Code fall called yesterday. Patient was found sitting on the floor. Patient denied any headache or hip pain.      Current Facility-Administered Medications   Medication Dose Route Frequency    dilTIAZem ER (CARDIZEM CD) capsule 120 mg  120 mg Oral DAILY    acetaminophen (TYLENOL) suppository 650 mg  650 mg Rectal Q4H PRN    methylPREDNISolone (PF) (SOLU-MEDROL) injection 40 mg  40 mg IntraVENous Q6H    albuterol-ipratropium (DUO-NEB) 2.5 MG-0.5 MG/3 ML  3 mL Nebulization Q6HWA RT    trimethoprim-sulfamethoxazole (BACTRIM) 187.04 mg in dextrose 5% 500 mL  10 mg/kg/day IntraVENous Q8H    divalproex (DEPAKOTE SPRINKLE) capsule 250 mg  250 mg Oral Q12H    metoprolol (LOPRESSOR) injection 5 mg  5 mg IntraVENous Q6H PRN    benztropine (COGENTIN) tablet 0.5 mg  0.5 mg Oral BID    cloNIDine HCL (CATAPRES) tablet 0.1 mg  0.1 mg Oral DAILY    famotidine (PEPCID) tablet 20 mg  20 mg Oral QHS    lisinopriL (PRINIVIL, ZESTRIL) tablet 20 mg  20 mg Oral DAILY    risperiDONE (RisperDAL) tablet 2 mg  2 mg Oral BID    multivitamin with folic acid (ONE DAILY WITH FOLIC ACID) tablet 1 Tablet  1 Tablet Oral DAILY    piperacillin-tazobactam (ZOSYN) 3.375 g in 0.9% sodium chloride (MBP/ADV) 100 mL MBP  3.375 g IntraVENous Q8H    sodium chloride (NS) flush 5-40 mL  5-40 mL IntraVENous Q8H    sodium chloride (NS) flush 5-40 mL  5-40 mL IntraVENous PRN    acetaminophen (TYLENOL) tablet 650 mg  650 mg Oral Q4H PRN    enoxaparin (LOVENOX) injection 40 mg  40 mg SubCUTAneous Q24H    albuterol CONCENTRATE 2.5mg/0.5 mL neb soln  2.5 mg Nebulization Q4H PRN    furosemide (LASIX) injection 40 mg  40 mg IntraVENous DAILY    hydrOXYzine pamoate (VISTARIL) capsule 25 mg  25 mg Oral Q6H PRN    LORazepam (ATIVAN) injection 0.5 mg  0.5 mg IntraVENous Q4H PRN        Review of Systems  Unable to obtain due to patient's condition. Objective:     Visit Vitals  BP (!) 144/88 (BP 1 Location: Right upper arm, BP Patient Position: At rest;Supine)   Pulse 88   Temp 97.6 °F (36.4 °C)   Resp 18   Ht 5' 4.02\" (1.626 m)   Wt 56.1 kg (123 lb 10.9 oz)   SpO2 98%   BMI 21.22 kg/m²    O2 Flow Rate (L/min): 40 l/min O2 Device: Heated,Hi flow nasal cannula,Humidifier    Temp (24hrs), Av.5 °F (36.4 °C), Min:97.4 °F (36.3 °C), Max:97.6 °F (36.4 °C)      No intake/output data recorded.  1901 -  0700  In: 100 [I.V.:100]  Out: -     PHYSICAL EXAM:  Constitutional: No acute distress  Skin: Extremities and face reveal no rashes. HEENT: Sclerae anicteric. Extra-occular muscles are intact. No oral ulcers. The neck is supple and no masses. Cardiovascular: Regular rate and rhythm. Respiratory:  B/L rhonchi  GI: Abdomen nondistended, soft, and nontender. Normal active bowel sounds. Musculoskeletal: No pitting edema of the lower legs. Able to move all ext  Neurological:  Patient is awake and alert, mildly confused.   Psychiatric: Mood appears appropriate       Data Review    Recent Results (from the past 24 hour(s))   PROCALCITONIN    Collection Time: 22  7:46 AM   Result Value Ref Range    Procalcitonin 0.15 (H) 0 ng/mL   C REACTIVE PROTEIN, QT    Collection Time: 22  7:46 AM   Result Value Ref Range    C-Reactive protein 0.59 0.00 - 0.60 mg/dL   CBC WITH AUTOMATED DIFF    Collection Time: 22  7:46 AM   Result Value Ref Range    WBC 5.6 3.6 - 11.0 K/uL    RBC 3.20 (L) 3.80 - 5.20 M/uL    HGB 9.6 (L) 11.5 - 16.0 g/dL    HCT 27.6 (L) 35.0 - 47.0 %    MCV 86.3 80.0 - 99.0 FL    MCH 30.0 26.0 - 34.0 PG    MCHC 34.8 30.0 - 36.5 g/dL    RDW 13.4 11.5 - 14.5 %    PLATELET 237 955 - 142 K/uL    MPV 11.6 8.9 - 12.9 FL    NRBC 0.0 0.0  WBC    ABSOLUTE NRBC 0.00 0.00 - 0.01 K/uL    NEUTROPHILS 88 (H) 32 - 75 %    LYMPHOCYTES 9 (L) 12 - 49 %    MONOCYTES 3 (L) 5 - 13 %    EOSINOPHILS 0 0 - 7 %    BASOPHILS 0 0 - 1 %    IMMATURE GRANULOCYTES 0 0 - 0.5 %    ABS. NEUTROPHILS 4.9 1.8 - 8.0 K/UL    ABS. LYMPHOCYTES 0.5 (L) 0.8 - 3.5 K/UL    ABS. MONOCYTES 0.2 0.0 - 1.0 K/UL    ABS. EOSINOPHILS 0.0 0.0 - 0.4 K/UL    ABS. BASOPHILS 0.0 0.0 - 0.1 K/UL    ABS. IMM. GRANS. 0.0 0.00 - 0.04 K/UL    DF AUTOMATED     METABOLIC PANEL, BASIC    Collection Time: 03/25/22  7:46 AM   Result Value Ref Range    Sodium 142 136 - 145 mmol/L    Potassium 3.3 (L) 3.5 - 5.1 mmol/L    Chloride 109 (H) 97 - 108 mmol/L    CO2 29 21 - 32 mmol/L    Anion gap 4 (L) 5 - 15 mmol/L    Glucose 122 (H) 65 - 100 mg/dL    BUN 37 (H) 6 - 20 mg/dL    Creatinine 0.96 0.55 - 1.02 mg/dL    BUN/Creatinine ratio 39 (H) 12 - 20      GFR est AA >60 >60 ml/min/1.73m2    GFR est non-AA 59 (L) >60 ml/min/1.73m2    Calcium 8.6 8.5 - 10.1 mg/dL         Assessment / Plan:  Acute hypoxic respiratory failure  S/s B/L pneumonia  Continue supplemental oxygen and titrate as needed  Currently on HFNC      Sepsis:  Continue zosyn   Started on iv bactrim given hxof HIV infection with CD4 count of 91.   Continue solumedrol  S/p levofloxacin (mycoplasma and legionella negative)    Acute hypokalemia:  Will replete potassium    Acute metabolic encephalopathy:  improving    B/L health care associated pneumonia  Continue antibiotics    WHITNEY:  improving      Sustained narrow complex tachycardia, likely SVT vs atrial flutter: improving    Schizophrenia    HIV with CD4<100 and markedly elevated viral load.    Hepatitis C    Hx of alcohol abuse    COPD    18.5 - 24.9 Normal weight / Body mass index is 21.22 kg/m². Code status: Full  Prophylaxis: Lovenox  Recommended Disposition: SNF/LTC       Time spent 35 minutes involving direct patient care as well as reviewing patient's labs and coordination of care with nursing staff     Care Plan discussed with: Patient/Family/RN/Case Management        Total time spent with patient: 35 minutes.

## 2022-03-25 NOTE — PROGRESS NOTES
Pulmonary Progress Note      NAME: Suzanna Abad   :  1960  MRM:  [id number]    Date/Time: 3/25/2022  4:22 PM         Subjective:     Patient seen and examined. Discussed with the nursing staff. Yesterday she was transitioned to high flow oxygen. She is now on 40 L and 30%. Saturating fairly well. No distress. Oxygen requirements are slowly improving. Yesterday she was on 40%. Previously she was on nonrebreather mask. She is more awake and alert. She is asking to go home again. She was pulling her oxygen off and lines. Now has a sitter. Blood gases and chest x-ray discussed below. COVID-19 ruled out. ID input is noted. She is taking minced and moist diet without any problems. Diet changed to puréed with thin liquids per speech. Past Medical History reviewed and unchanged from Admission History and Physical       Objective:     Physical Exam     Vitals:      Last 24hrs VS reviewed since prior progress note. Most recent are:    Visit Vitals  BP (!) 144/88 (BP 1 Location: Right upper arm, BP Patient Position: At rest;Supine)   Pulse 88   Temp 97.6 °F (36.4 °C)   Resp 18   Ht 5' 4.02\" (1.626 m)   Wt 56.1 kg (123 lb 10.9 oz)   SpO2 98%   BMI 21.22 kg/m²     SpO2 Readings from Last 6 Encounters:   22 98%   22 99%   22 98%   21 96%   21 97%   09/15/21 97%    O2 Flow Rate (L/min): 40 l/min     No intake or output data in the 24 hours ending 22 1302   General: Lying in bed in mild respiratory distress, on high flow oxygen. Eye: Pupils are equal and reactive to light. Throat and Neck: Oropharynx without thrush. Neck is supple and trachea central.  No obvious lymphadenopathy. Lung: Reduced air entry bilaterally with prolonged exhalation but no wheezing. Occasional crackles. No significant change. Heart: S1+S2. No murmurs  Abdomen: soft, non-tender. Bowel sounds normal. No masses; obese  Extremities: No edema, no cyanosis or clubbing.   Pulses are palpable. : Not done  Skin: No cyanosis  Neurologic:  Alert and awake, confused at times, grossly nonfocal  Psychiatric:  Unable to perform due to patient's condition    XR CHEST PORT   Final Result      XR CHEST PORT   Final Result      XR CHEST PORT   Final Result   Diffuse opacities in the lungs, increased. XR CHEST PORT   Final Result   FINDINGS/IMPRESSION:   Persistent diffuse airspace opacity throughout the lungs. Cardiac silhouette stable in size. Negative for pneumothorax. CT CHEST WO CONT   Final Result   Extensive diffuse bilateral pneumonitis and patchy pneumonia      XR CHEST PORT   Final Result   Diffuse predominantly interstitial process in both lungs with some   greater involvement on the left. Differential considerations include pulmonary   edema and diffuse infection.                     Lab Data Reviewed: (see below)      Medications:  Current Facility-Administered Medications   Medication Dose Route Frequency    dilTIAZem ER (CARDIZEM CD) capsule 120 mg  120 mg Oral DAILY    acetaminophen (TYLENOL) suppository 650 mg  650 mg Rectal Q4H PRN    methylPREDNISolone (PF) (SOLU-MEDROL) injection 40 mg  40 mg IntraVENous Q6H    albuterol-ipratropium (DUO-NEB) 2.5 MG-0.5 MG/3 ML  3 mL Nebulization Q6HWA RT    trimethoprim-sulfamethoxazole (BACTRIM) 187.04 mg in dextrose 5% 500 mL  10 mg/kg/day IntraVENous Q8H    divalproex (DEPAKOTE SPRINKLE) capsule 250 mg  250 mg Oral Q12H    metoprolol (LOPRESSOR) injection 5 mg  5 mg IntraVENous Q6H PRN    benztropine (COGENTIN) tablet 0.5 mg  0.5 mg Oral BID    cloNIDine HCL (CATAPRES) tablet 0.1 mg  0.1 mg Oral DAILY    famotidine (PEPCID) tablet 20 mg  20 mg Oral QHS    lisinopriL (PRINIVIL, ZESTRIL) tablet 20 mg  20 mg Oral DAILY    risperiDONE (RisperDAL) tablet 2 mg  2 mg Oral BID    multivitamin with folic acid (ONE DAILY WITH FOLIC ACID) tablet 1 Tablet  1 Tablet Oral DAILY    piperacillin-tazobactam (ZOSYN) 3.375 g in 0.9% sodium chloride (MBP/ADV) 100 mL MBP  3.375 g IntraVENous Q8H    sodium chloride (NS) flush 5-40 mL  5-40 mL IntraVENous Q8H    sodium chloride (NS) flush 5-40 mL  5-40 mL IntraVENous PRN    acetaminophen (TYLENOL) tablet 650 mg  650 mg Oral Q4H PRN    enoxaparin (LOVENOX) injection 40 mg  40 mg SubCUTAneous Q24H    albuterol CONCENTRATE 2.5mg/0.5 mL neb soln  2.5 mg Nebulization Q4H PRN    furosemide (LASIX) injection 40 mg  40 mg IntraVENous DAILY    hydrOXYzine pamoate (VISTARIL) capsule 25 mg  25 mg Oral Q6H PRN    LORazepam (ATIVAN) injection 0.5 mg  0.5 mg IntraVENous Q4H PRN       ______________________________________________________________________      Lab Review:     Recent Labs     03/25/22  0746 03/24/22  0814 03/23/22  0550   WBC 5.6 6.9 6.9   HGB 9.6* 9.8* 10.4*   HCT 27.6* 28.0* 29.8*    186 194     Recent Labs     03/25/22  0746 03/24/22  0814 03/23/22  0550    141 140   K 3.3* 3.3* 3.5   * 108 107   CO2 29 29 28   * 117* 152*   BUN 37* 45* 49*   CREA 0.96 1.15* 1.39*   CA 8.6 8.5 8.5     No components found for: [place]  Recent Labs     03/24/22  0240 03/23/22  0550   PH 7.44 7.43   PCO2 41 43   PO2 98 91   HCO3 28* 28*   FIO2 40.0 40.0     No results for input(s): INR, INREXT, INREXT, INREXT in the last 72 hours. Other pertinent lab:          Assessment & Plan:      Assessment:      1. Acute respiratory failure with hypoxia due to #2 and 3  2. Bilateral healthcare associated pneumonia  3. Acute CHF and sinus tachycardia due to #4  4. Severe sepsis  5. Altered mental status/acute metabolic encephalopathy  6. COPD with exacerbation  7. HIV  8. Schizophrenia  9. History of alcohol abuse     Plan:      Patient admitted to the ICU, now transferred to [facility] telemetry. We will be watching her closely     Currently high flow oxygen, 40 L and 30%. Previously she was on nonrebreather mask.   Her oxygen requirements are slowly improving  Blood gases 3/24 morning showed 7.4 4/41/98 on 40% high flow oxygen. Blood gases done 3/23 morning showed 7.43/43/91 on 40% high flow oxygen. Blood gases done 3/22 morning showed 7.4 0/49/107 on nonrebreather mask. Blood gases done 3/18/2022 showed 7.50/36/116 on nonrebreather mask. We wean down oxygen as tolerated. Discussed with RT. If she decompensates will initiate BiPAP but she is not a CO2 retainer. Chest x-ray shows bilateral infiltrates. Personally reviewed. There has been significant improvement overall. Patient has COPD  Continue nebulizers  Added IV Solu-Medrol, which I believe is helping her. Appreciate ID input. Treating as possible PCP. Bactrim added. She is aspiration risk. On puréed with thin liquids as per speech. Appreciate input. Urinary retention. Had a [place] catheter. Nystatin swish and swallow. She has thrush.     Patient has combination of CHF and healthcare associated pneumonia, speech is also involved. There is a possibility of aspiration pneumonia. Continue broad-spectrum antibiotics, currently on Zosyn and Bactrim. Of Levaquin. Appreciate ID input. COVID-19 ruled out. Patient developed sinus tachycardia. Much improved. Now started on diltiazem. Appreciate input from cardiology.     Started on Lasix IV daily since blood pressure stable  Intake and output charting  Check electrolytes and replace as needed  Monitor renal function with fluid change     Monitor mental status      DVT and GI prophylaxis     Case discussed in detail with RN, RT, and care team  Thank you for involving me in the care of this patient  I will follow with you closely during hospitalization     Time spent more than 30 minutes in direct patient care with no overlap reviewing results and records, decision making, and answering questions.       Aristeo Hamilton MD  [facility]

## 2022-03-25 NOTE — PROGRESS NOTES
CARDIOLOGY PROGRESS NOTE    Patient seen and examined. PMH for HIV, Hep C, COPD, HTN and recently hospitalized for aspiration PNA. Patient is followed for sinus tachycardia in setting of an infectious process. Awake and alert. 1:1 this morning. No events noted overnight per nursing. Physical examination:  Vital signs: Blood pressure 151/91,  Pulse 98  No apparent distress. Heart is tachycardic, rate and rhythm. Normal S1, S2, no murmurs are appreciated. Lungs crackles bilaterally  Abdomen is soft, nontender, normal bowel sounds. Extremities have no edema. Recent labs results and imaging reviewed. Case was discussed with Dr. Kelsey Posadas and our impression and recommendations are as follows:     1. Sinus tachycardia: resolved; NSR: HR 90  - Start Diltiazem  mg  - Lopressor IV for rate control as needed  - Keep serum potassium between 4-5 and serum magnesium >2.     2. Hypertension:   - Blood pressure below goal.   - continue with current medications. Will continue to follow peripherally. Thank you for allowing us to care for this patient. Please do not hesitate to call if additional questions arise. [facility]  [address]    [address] [facility] [address] [place], [address] [facility]  [phone]

## 2022-03-26 LAB
1,3 BETA GLUCAN SER-MCNC: >500 PG/ML
A2 MACROGLOB SERPL-MCNC: 300 MG/DL (ref 110–276)
ALT (SGPT) P5P, 001547: 25 IU/L (ref 0–40)
ANION GAP SERPL CALC-SCNC: 5 MMOL/L (ref 5–15)
APO A-I SERPL-MCNC: 57 MG/DL (ref 116–209)
BASOPHILS # BLD: 0 K/UL (ref 0–0.1)
BASOPHILS NFR BLD: 0 % (ref 0–1)
BILIRUB SERPL-MCNC: 0.3 MG/DL (ref 0–1.2)
BUN SERPL-MCNC: 25 MG/DL (ref 6–20)
BUN/CREAT SERPL: 30 (ref 12–20)
CA-I BLD-MCNC: 8 MG/DL (ref 8.5–10.1)
CHLORIDE SERPL-SCNC: 107 MMOL/L (ref 97–108)
CMV DNA SPEC QL NAA+PROBE: NEGATIVE
CO2 SERPL-SCNC: 28 MMOL/L (ref 21–32)
COMMENT , 550094: ABNORMAL
COMMENT, 096657: NORMAL
CREAT SERPL-MCNC: 0.83 MG/DL (ref 0.55–1.02)
DIFFERENTIAL METHOD BLD: ABNORMAL
EOSINOPHIL # BLD: 0 K/UL (ref 0–0.4)
EOSINOPHIL NFR BLD: 0 % (ref 0–7)
ERYTHROCYTE [DISTWIDTH] IN BLOOD BY AUTOMATED COUNT: 13.5 % (ref 11.5–14.5)
FIBROSIS SCORE, 550102, HCVF1: 0.65 (ref 0–0.21)
FIBROSIS SCORING:, 550107: ABNORMAL
FIBROSIS STAGE, 550132: ABNORMAL
GGT SERPL-CCNC: 125 IU/L (ref 0–60)
GLUCOSE SERPL-MCNC: 157 MG/DL (ref 65–100)
HAPTOGLOB SERPL-MCNC: 193 MG/DL (ref 33–346)
HCT VFR BLD AUTO: 27.6 % (ref 35–47)
HGB BLD-MCNC: 9.5 G/DL (ref 11.5–16)
IMM GRANULOCYTES # BLD AUTO: 0 K/UL (ref 0–0.04)
IMM GRANULOCYTES NFR BLD AUTO: 1 % (ref 0–0.5)
INTERPRETATION, 550106: ABNORMAL
LIMITATIONS, 550105: ABNORMAL
LYMPHOCYTES # BLD: 0.4 K/UL (ref 0.8–3.5)
LYMPHOCYTES NFR BLD: 9 % (ref 12–49)
MCH RBC QN AUTO: 29.9 PG (ref 26–34)
MCHC RBC AUTO-ENTMCNC: 34.4 G/DL (ref 30–36.5)
MCV RBC AUTO: 86.8 FL (ref 80–99)
MONOCYTES # BLD: 0.1 K/UL (ref 0–1)
MONOCYTES NFR BLD: 2 % (ref 5–13)
NECROINFLAMM ACTIVITY SCORING:, 550121: ABNORMAL
NECROINFLAMMAT ACTIVITY GRADE, 550133: ABNORMAL
NECROINFLAMMAT ACTIVITY SCORE, 550103: 0.18 (ref 0–0.17)
NEUTS SEG # BLD: 3.8 K/UL (ref 1.8–8)
NEUTS SEG NFR BLD: 88 % (ref 32–75)
NRBC # BLD: 0 K/UL (ref 0–0.01)
NRBC BLD-RTO: 0 PER 100 WBC
PLATELET # BLD AUTO: 173 K/UL (ref 150–400)
PMV BLD AUTO: 11.8 FL (ref 8.9–12.9)
POTASSIUM SERPL-SCNC: 3.9 MMOL/L (ref 3.5–5.1)
RBC # BLD AUTO: 3.18 M/UL (ref 3.8–5.2)
SODIUM SERPL-SCNC: 140 MMOL/L (ref 136–145)
SPECIMEN SOURCE: NORMAL
T GONDII IGG SERPL IA-ACNC: 6.9 IU/ML (ref 0–7.1)
T GONDII IGM SER IA-ACNC: <3 AU/ML (ref 0–7.9)
WBC # BLD AUTO: 4.4 K/UL (ref 3.6–11)

## 2022-03-26 PROCEDURE — 74011250637 HC RX REV CODE- 250/637: Performed by: INTERNAL MEDICINE

## 2022-03-26 PROCEDURE — 74011250636 HC RX REV CODE- 250/636: Performed by: HOSPITALIST

## 2022-03-26 PROCEDURE — 74011000258 HC RX REV CODE- 258: Performed by: HOSPITALIST

## 2022-03-26 PROCEDURE — 65270000029 HC RM PRIVATE

## 2022-03-26 PROCEDURE — 74011250636 HC RX REV CODE- 250/636: Performed by: INTERNAL MEDICINE

## 2022-03-26 PROCEDURE — 77010033711 HC HIGH FLOW OXYGEN

## 2022-03-26 PROCEDURE — 80048 BASIC METABOLIC PNL TOTAL CA: CPT

## 2022-03-26 PROCEDURE — 74011250637 HC RX REV CODE- 250/637: Performed by: HOSPITALIST

## 2022-03-26 PROCEDURE — 94640 AIRWAY INHALATION TREATMENT: CPT

## 2022-03-26 PROCEDURE — 74011000250 HC RX REV CODE- 250: Performed by: INTERNAL MEDICINE

## 2022-03-26 PROCEDURE — 74011250637 HC RX REV CODE- 250/637: Performed by: NURSE PRACTITIONER

## 2022-03-26 PROCEDURE — 85025 COMPLETE CBC W/AUTO DIFF WBC: CPT

## 2022-03-26 PROCEDURE — 74011000250 HC RX REV CODE- 250: Performed by: HOSPITALIST

## 2022-03-26 PROCEDURE — 36415 COLL VENOUS BLD VENIPUNCTURE: CPT

## 2022-03-26 PROCEDURE — 94761 N-INVAS EAR/PLS OXIMETRY MLT: CPT

## 2022-03-26 RX ADMIN — RISPERIDONE 2 MG: 2 TABLET, FILM COATED ORAL at 09:48

## 2022-03-26 RX ADMIN — FAMOTIDINE 20 MG: 20 TABLET, FILM COATED ORAL at 21:20

## 2022-03-26 RX ADMIN — ACETAMINOPHEN 650 MG: 325 TABLET ORAL at 21:20

## 2022-03-26 RX ADMIN — BENZTROPINE MESYLATE 0.5 MG: 1 TABLET ORAL at 09:48

## 2022-03-26 RX ADMIN — LISINOPRIL 20 MG: 20 TABLET ORAL at 09:49

## 2022-03-26 RX ADMIN — SULFAMETHOXAZOLE AND TRIMETHOPRIM 187.04 MG: 80; 16 INJECTION, SOLUTION, CONCENTRATE INTRAVENOUS at 17:36

## 2022-03-26 RX ADMIN — PIPERACILLIN AND TAZOBACTAM 3.38 G: 3; .375 INJECTION, POWDER, LYOPHILIZED, FOR SOLUTION INTRAVENOUS at 16:25

## 2022-03-26 RX ADMIN — METHYLPREDNISOLONE SODIUM SUCCINATE 40 MG: 40 INJECTION, POWDER, FOR SOLUTION INTRAMUSCULAR; INTRAVENOUS at 21:19

## 2022-03-26 RX ADMIN — ENOXAPARIN SODIUM 40 MG: 100 INJECTION SUBCUTANEOUS at 03:10

## 2022-03-26 RX ADMIN — MULTIVITAMIN TABLET 1 TABLET: TABLET at 09:48

## 2022-03-26 RX ADMIN — DIVALPROEX SODIUM 250 MG: 125 CAPSULE, COATED PELLETS ORAL at 21:20

## 2022-03-26 RX ADMIN — NYSTATIN 500000 UNITS: 100000 SUSPENSION ORAL at 21:19

## 2022-03-26 RX ADMIN — CLONIDINE HYDROCHLORIDE 0.1 MG: 0.1 TABLET ORAL at 09:48

## 2022-03-26 RX ADMIN — DIVALPROEX SODIUM 250 MG: 125 CAPSULE, COATED PELLETS ORAL at 09:48

## 2022-03-26 RX ADMIN — SULFAMETHOXAZOLE AND TRIMETHOPRIM 187.04 MG: 80; 16 INJECTION, SOLUTION, CONCENTRATE INTRAVENOUS at 10:40

## 2022-03-26 RX ADMIN — SULFAMETHOXAZOLE AND TRIMETHOPRIM 187.04 MG: 80; 16 INJECTION, SOLUTION, CONCENTRATE INTRAVENOUS at 03:01

## 2022-03-26 RX ADMIN — FUROSEMIDE 40 MG: 10 INJECTION, SOLUTION INTRAMUSCULAR; INTRAVENOUS at 09:49

## 2022-03-26 RX ADMIN — METHYLPREDNISOLONE SODIUM SUCCINATE 40 MG: 40 INJECTION, POWDER, FOR SOLUTION INTRAMUSCULAR; INTRAVENOUS at 13:19

## 2022-03-26 RX ADMIN — SODIUM CHLORIDE, PRESERVATIVE FREE 10 ML: 5 INJECTION INTRAVENOUS at 17:36

## 2022-03-26 RX ADMIN — BENZTROPINE MESYLATE 0.5 MG: 1 TABLET ORAL at 21:20

## 2022-03-26 RX ADMIN — DILTIAZEM HYDROCHLORIDE 120 MG: 120 CAPSULE, COATED, EXTENDED RELEASE ORAL at 09:49

## 2022-03-26 RX ADMIN — IPRATROPIUM BROMIDE AND ALBUTEROL SULFATE 3 ML: .5; 2.5 SOLUTION RESPIRATORY (INHALATION) at 20:05

## 2022-03-26 RX ADMIN — SODIUM CHLORIDE, PRESERVATIVE FREE 10 ML: 5 INJECTION INTRAVENOUS at 05:50

## 2022-03-26 RX ADMIN — RISPERIDONE 2 MG: 2 TABLET, FILM COATED ORAL at 21:20

## 2022-03-26 RX ADMIN — METHYLPREDNISOLONE SODIUM SUCCINATE 40 MG: 40 INJECTION, POWDER, FOR SOLUTION INTRAMUSCULAR; INTRAVENOUS at 05:50

## 2022-03-26 RX ADMIN — PIPERACILLIN AND TAZOBACTAM 3.38 G: 3; .375 INJECTION, POWDER, LYOPHILIZED, FOR SOLUTION INTRAVENOUS at 00:18

## 2022-03-26 RX ADMIN — SODIUM CHLORIDE, PRESERVATIVE FREE 10 ML: 5 INJECTION INTRAVENOUS at 21:20

## 2022-03-26 RX ADMIN — PIPERACILLIN AND TAZOBACTAM 3.38 G: 3; .375 INJECTION, POWDER, LYOPHILIZED, FOR SOLUTION INTRAVENOUS at 09:49

## 2022-03-26 NOTE — PROGRESS NOTES
Problem: Falls - Risk of  Goal: *Absence of Falls  Description: Document Dylon Azael Fall Risk and appropriate interventions in the flowsheet.   Outcome: Progressing Towards Goal  Note: Fall Risk Interventions:  Mobility Interventions: Bed/chair exit alarm    Mentation Interventions: Bed/chair exit alarm    Medication Interventions: Bed/chair exit alarm    Elimination Interventions: Call light in reach    History of Falls Interventions: Bed/chair exit alarm

## 2022-03-26 NOTE — PROGRESS NOTES
Hospitalist Progress Note    Subjective:   Daily Progress Note: 3/26/2022 12:40 PM    Hospital Course:  62year old Female with hxof COPD, alcohol abuse, HIV, schizophrenia, and hepatitis C (comes from group home) presented to ED with altered mental status and cough. Patient was febrile, tachycardic and had gurgling sounds. Patient was hypoxic, placed on venti mask. Xray showed diffuse interstitial process in B/L lungs. Patient was admitted to ICU and started on iv zosyn. COVID-19 negative. Patient is currently on HFNC, transitioned from 100% non rebreather mask. Subjective:  Patient is awake and alert but still confused.        Current Facility-Administered Medications   Medication Dose Route Frequency    nystatin (MYCOSTATIN) 100,000 unit/mL oral suspension 500,000 Units  500,000 Units Oral QID    methylPREDNISolone (PF) (SOLU-MEDROL) injection 40 mg  40 mg IntraVENous Q8H    dilTIAZem ER (CARDIZEM CD) capsule 120 mg  120 mg Oral DAILY    acetaminophen (TYLENOL) suppository 650 mg  650 mg Rectal Q4H PRN    albuterol-ipratropium (DUO-NEB) 2.5 MG-0.5 MG/3 ML  3 mL Nebulization Q6HWA RT    trimethoprim-sulfamethoxazole (BACTRIM) 187.04 mg in dextrose 5% 500 mL  10 mg/kg/day IntraVENous Q8H    divalproex (DEPAKOTE SPRINKLE) capsule 250 mg  250 mg Oral Q12H    metoprolol (LOPRESSOR) injection 5 mg  5 mg IntraVENous Q6H PRN    benztropine (COGENTIN) tablet 0.5 mg  0.5 mg Oral BID    cloNIDine HCL (CATAPRES) tablet 0.1 mg  0.1 mg Oral DAILY    famotidine (PEPCID) tablet 20 mg  20 mg Oral QHS    lisinopriL (PRINIVIL, ZESTRIL) tablet 20 mg  20 mg Oral DAILY    risperiDONE (RisperDAL) tablet 2 mg  2 mg Oral BID    multivitamin with folic acid (ONE DAILY WITH FOLIC ACID) tablet 1 Tablet  1 Tablet Oral DAILY    piperacillin-tazobactam (ZOSYN) 3.375 g in 0.9% sodium chloride (MBP/ADV) 100 mL MBP  3.375 g IntraVENous Q8H    sodium chloride (NS) flush 5-40 mL  5-40 mL IntraVENous Q8H    sodium chloride (NS) flush 5-40 mL  5-40 mL IntraVENous PRN    acetaminophen (TYLENOL) tablet 650 mg  650 mg Oral Q4H PRN    enoxaparin (LOVENOX) injection 40 mg  40 mg SubCUTAneous Q24H    albuterol CONCENTRATE 2.5mg/0.5 mL neb soln  2.5 mg Nebulization Q4H PRN    furosemide (LASIX) injection 40 mg  40 mg IntraVENous DAILY    hydrOXYzine pamoate (VISTARIL) capsule 25 mg  25 mg Oral Q6H PRN    LORazepam (ATIVAN) injection 0.5 mg  0.5 mg IntraVENous Q4H PRN        Review of Systems  Unable to obtain due to patient's condition. Objective:     Visit Vitals  BP (!) 147/84 (BP 1 Location: Right upper arm, BP Patient Position: At rest)   Pulse 86   Temp 98.8 °F (37.1 °C)   Resp 18   Ht 5' 4.02\" (1.626 m)   Wt 56.1 kg (123 lb 10.9 oz)   SpO2 94%   BMI 21.22 kg/m²    O2 Flow Rate (L/min): 40 l/min O2 Device: Hi flow nasal cannula    Temp (24hrs), Av.7 °F (36.5 °C), Min:96.4 °F (35.8 °C), Max:98.8 °F (37.1 °C)      No intake/output data recorded. No intake/output data recorded. PHYSICAL EXAM:  Constitutional: No acute distress  Skin: Extremities and face reveal no rashes. HEENT: Sclerae anicteric. Extra-occular muscles are intact. No oral ulcers. The neck is supple and no masses. Cardiovascular: Regular rate and rhythm. Respiratory:  B/L rhonchi  GI: Abdomen nondistended, soft, and nontender. Normal active bowel sounds. Musculoskeletal: No pitting edema of the lower legs. Able to move all ext  Neurological:  Patient is awake and alert, still confused.   Psychiatric: Mood appears appropriate       Data Review    Recent Results (from the past 24 hour(s))   CBC WITH AUTOMATED DIFF    Collection Time: 22  7:20 AM   Result Value Ref Range    WBC 4.4 3.6 - 11.0 K/uL    RBC 3.18 (L) 3.80 - 5.20 M/uL    HGB 9.5 (L) 11.5 - 16.0 g/dL    HCT 27.6 (L) 35.0 - 47.0 %    MCV 86.8 80.0 - 99.0 FL    MCH 29.9 26.0 - 34.0 PG    MCHC 34.4 30.0 - 36.5 g/dL    RDW 13.5 11.5 - 14.5 %    PLATELET 172 342 - 633 K/uL    MPV 11.8 8.9 - 12.9 FL NRBC 0.0 0.0  WBC    ABSOLUTE NRBC 0.00 0.00 - 0.01 K/uL    NEUTROPHILS 88 (H) 32 - 75 %    LYMPHOCYTES 9 (L) 12 - 49 %    MONOCYTES 2 (L) 5 - 13 %    EOSINOPHILS 0 0 - 7 %    BASOPHILS 0 0 - 1 %    IMMATURE GRANULOCYTES 1 (H) 0 - 0.5 %    ABS. NEUTROPHILS 3.8 1.8 - 8.0 K/UL    ABS. LYMPHOCYTES 0.4 (L) 0.8 - 3.5 K/UL    ABS. MONOCYTES 0.1 0.0 - 1.0 K/UL    ABS. EOSINOPHILS 0.0 0.0 - 0.4 K/UL    ABS. BASOPHILS 0.0 0.0 - 0.1 K/UL    ABS. IMM. GRANS. 0.0 0.00 - 0.04 K/UL    DF AUTOMATED     METABOLIC PANEL, BASIC    Collection Time: 03/26/22  7:20 AM   Result Value Ref Range    Sodium 140 136 - 145 mmol/L    Potassium 3.9 3.5 - 5.1 mmol/L    Chloride 107 97 - 108 mmol/L    CO2 28 21 - 32 mmol/L    Anion gap 5 5 - 15 mmol/L    Glucose 157 (H) 65 - 100 mg/dL    BUN 25 (H) 6 - 20 mg/dL    Creatinine 0.83 0.55 - 1.02 mg/dL    BUN/Creatinine ratio 30 (H) 12 - 20      GFR est AA >60 >60 ml/min/1.73m2    GFR est non-AA >60 >60 ml/min/1.73m2    Calcium 8.0 (L) 8.5 - 10.1 mg/dL         Assessment / Plan:  Acute hypoxic respiratory failure  S/s B/L pneumonia  Continue supplemental oxygen and titrate as needed  Currently on HFNC      Sepsis:  Continue zosyn   Started on iv bactrim given hxof HIV infection with CD4 count of 91. Continue solumedrol  S/p levofloxacin (mycoplasma and legionella negative)    Acute hypokalemia:  resolved    Acute metabolic encephalopathy:  improving    B/L health care associated pneumonia  Continue antibiotics    WHITNEY:  Resolved      Sustained narrow complex tachycardia, likely SVT vs atrial flutter: improving    Schizophrenia    HIV with CD4<100 and markedly elevated viral load. Outpatient ID follow up to initiate treatment    Hepatitis C  Outpatient ID follow up to initiate treatment. Hx of alcohol abuse    COPD    18.5 - 24.9 Normal weight / Body mass index is 21.22 kg/m².     Code status: Full  Prophylaxis: Lovenox  Recommended Disposition: SNF/LTC       Time spent 35 minutes involving direct patient care as well as reviewing patient's labs and coordination of care with nursing staff     Care Plan discussed with: Patient/Family/RN/Case Management        Total time spent with patient: 35 minutes.

## 2022-03-26 NOTE — PROGRESS NOTES
Pulmonary Progress Note      NAME: Carol Brandon   :  1960  MRM:  [id number]    Date/Time: 3/26/2022  4:22 PM         Subjective:     Patient seen and examined. Overnight events noted    On high flow oxygen 40 L 30% FiO2  Gradual improvement  Awake and alert  No acute distress  Has a sitter    Blood gases and chest x-ray discussed below. COVID-19 ruled out. ID input is noted. She is taking minced and moist diet without any problems. Diet changed to puréed with thin liquids per speech. Past Medical History reviewed and unchanged from Admission History and Physical       Objective:     Physical Exam     Vitals:      Last 24hrs VS reviewed since prior progress note. Most recent are:    Visit Vitals  BP (!) 147/84 (BP 1 Location: Right upper arm, BP Patient Position: At rest)   Pulse 86   Temp 98.8 °F (37.1 °C)   Resp 18   Ht 5' 4.02\" (1.626 m)   Wt 56.1 kg (123 lb 10.9 oz)   SpO2 94%   BMI 21.22 kg/m²     SpO2 Readings from Last 6 Encounters:   22 94%   22 99%   22 98%   21 96%   21 97%   09/15/21 97%    O2 Flow Rate (L/min): 40 l/min     No intake or output data in the 24 hours ending 22 1133   General: Lying in bed in mild respiratory distress, on high flow oxygen. Eye: Pupils are equal and reactive to light. Throat and Neck: Oropharynx without thrush. Neck is supple and trachea central.  No obvious lymphadenopathy. Lung: Reduced air entry bilaterally with prolonged exhalation but no wheezing. Occasional crackles. No significant change. Heart: S1+S2. No murmurs  Abdomen: soft, non-tender. Bowel sounds normal. No masses; obese  Extremities: No edema, no cyanosis or clubbing. Pulses are palpable.   : Not done  Skin: No cyanosis  Neurologic:  Alert and awake, confused at times, grossly nonfocal  Psychiatric:  Unable to perform due to patient's condition    XR CHEST PORT   Final Result      XR CHEST PORT   Final Result      XR CHEST PORT   Final Result Diffuse opacities in the lungs, increased. XR CHEST PORT   Final Result   FINDINGS/IMPRESSION:   Persistent diffuse airspace opacity throughout the lungs. Cardiac silhouette stable in size. Negative for pneumothorax. CT CHEST WO CONT   Final Result   Extensive diffuse bilateral pneumonitis and patchy pneumonia      XR CHEST PORT   Final Result   Diffuse predominantly interstitial process in both lungs with some   greater involvement on the left. Differential considerations include pulmonary   edema and diffuse infection.                     Lab Data Reviewed: (see below)      Medications:  Current Facility-Administered Medications   Medication Dose Route Frequency    nystatin (MYCOSTATIN) 100,000 unit/mL oral suspension 500,000 Units  500,000 Units Oral QID    methylPREDNISolone (PF) (SOLU-MEDROL) injection 40 mg  40 mg IntraVENous Q8H    dilTIAZem ER (CARDIZEM CD) capsule 120 mg  120 mg Oral DAILY    acetaminophen (TYLENOL) suppository 650 mg  650 mg Rectal Q4H PRN    albuterol-ipratropium (DUO-NEB) 2.5 MG-0.5 MG/3 ML  3 mL Nebulization Q6HWA RT    trimethoprim-sulfamethoxazole (BACTRIM) 187.04 mg in dextrose 5% 500 mL  10 mg/kg/day IntraVENous Q8H    divalproex (DEPAKOTE SPRINKLE) capsule 250 mg  250 mg Oral Q12H    metoprolol (LOPRESSOR) injection 5 mg  5 mg IntraVENous Q6H PRN    benztropine (COGENTIN) tablet 0.5 mg  0.5 mg Oral BID    cloNIDine HCL (CATAPRES) tablet 0.1 mg  0.1 mg Oral DAILY    famotidine (PEPCID) tablet 20 mg  20 mg Oral QHS    lisinopriL (PRINIVIL, ZESTRIL) tablet 20 mg  20 mg Oral DAILY    risperiDONE (RisperDAL) tablet 2 mg  2 mg Oral BID    multivitamin with folic acid (ONE DAILY WITH FOLIC ACID) tablet 1 Tablet  1 Tablet Oral DAILY    piperacillin-tazobactam (ZOSYN) 3.375 g in 0.9% sodium chloride (MBP/ADV) 100 mL MBP  3.375 g IntraVENous Q8H    sodium chloride (NS) flush 5-40 mL  5-40 mL IntraVENous Q8H    sodium chloride (NS) flush 5-40 mL  5-40 mL IntraVENous PRN    acetaminophen (TYLENOL) tablet 650 mg  650 mg Oral Q4H PRN    enoxaparin (LOVENOX) injection 40 mg  40 mg SubCUTAneous Q24H    albuterol CONCENTRATE 2.5mg/0.5 mL neb soln  2.5 mg Nebulization Q4H PRN    furosemide (LASIX) injection 40 mg  40 mg IntraVENous DAILY    hydrOXYzine pamoate (VISTARIL) capsule 25 mg  25 mg Oral Q6H PRN    LORazepam (ATIVAN) injection 0.5 mg  0.5 mg IntraVENous Q4H PRN       ______________________________________________________________________      Lab Review:     Recent Labs     03/26/22  0720 03/25/22  0746 03/24/22  0814   WBC 4.4 5.6 6.9   HGB 9.5* 9.6* 9.8*   HCT 27.6* 27.6* 28.0*    189 186     Recent Labs     03/26/22  0720 03/25/22  0746 03/24/22  0814    142 141   K 3.9 3.3* 3.3*    109* 108   CO2 28 29 29   * 122* 117*   BUN 25* 37* 45*   CREA 0.83 0.96 1.15*   CA 8.0* 8.6 8.5   ALT  --   --  25     No components found for: GLPOC  Recent Labs     03/24/22  0240   PH 7.44   PCO2 41   PO2 98   HCO3 28*   FIO2 40.0     No results for input(s): INR, INREXT, INREXT, INREXT in the last 72 hours. Other pertinent lab:          Assessment & Plan:      Assessment:      1. Acute respiratory failure with hypoxia due to #2 and 3  2. Bilateral healthcare associated pneumonia  3. Acute CHF and sinus tachycardia due to #4  4. Severe sepsis  5. Altered mental status/acute metabolic encephalopathy  6. COPD with exacerbation  7. HIV  8. Schizophrenia  9. History of alcohol abuse     Plan:      [facility] We will be watching her closely     Currently high flow oxygen, 40 L and 30%. Previously she was on nonrebreather mask. Her oxygen requirements are slowly improving  Blood gases 3/24 morning showed 7.4 4/41/98 on 40% high flow oxygen. Blood gases done 3/23 morning showed 7.43/43/91 on 40% high flow oxygen.     Blood gases done 3/22 morning showed 7.4 0/49/107 on nonrebreather mask.    Blood gases done 3/18/2022 showed 7.50/36/116 on nonrebreather mask. We wean down oxygen as tolerated. Discussed with RT. If she decompensates will initiate BiPAP but she is not a CO2 retainer. Chest x-ray shows bilateral infiltrates. Personally reviewed. There has been significant improvement overall. Patient has COPD  Continue nebulizers  Added IV Solu-Medrol, which I believe is helping her. Appreciate ID input. Treating as possible PCP. Bactrim added. She is aspiration risk. On puréed with thin liquids as per speech. Appreciate input. Urinary retention. Had a [place] catheter. Nystatin swish and swallow. She has thrush.     Patient has combination of CHF and healthcare associated pneumonia, speech is also involved. There is a possibility of aspiration pneumonia. Continue broad-spectrum antibiotics, currently on Zosyn and Bactrim. Of Levaquin. Appreciate ID input. COVID-19 ruled out. Patient developed sinus tachycardia. Much improved. Now started on diltiazem. Appreciate input from cardiology. Started on Lasix IV daily since blood pressure stable  Intake and output charting  Check electrolytes and replace as needed  Monitor renal function with fluid change     Monitor mental status      DVT and GI prophylaxis     Case discussed in detail with RN, RT, and care team  Thank you for involving me in the care of this patient  I will follow with you closely during hospitalization     Time spent more than 30 minutes in direct patient care with no overlap reviewing results and records, decision making, and answering questions.       Anmol Price MD  [facility]

## 2022-03-27 LAB
ANION GAP SERPL CALC-SCNC: 3 MMOL/L (ref 5–15)
BASOPHILS # BLD: 0 K/UL (ref 0–0.1)
BASOPHILS NFR BLD: 0 % (ref 0–1)
BUN SERPL-MCNC: 25 MG/DL (ref 6–20)
BUN/CREAT SERPL: 29 (ref 12–20)
CA-I BLD-MCNC: 8.5 MG/DL (ref 8.5–10.1)
CHLORIDE SERPL-SCNC: 107 MMOL/L (ref 97–108)
CO2 SERPL-SCNC: 29 MMOL/L (ref 21–32)
CREAT SERPL-MCNC: 0.86 MG/DL (ref 0.55–1.02)
DIFFERENTIAL METHOD BLD: ABNORMAL
EOSINOPHIL # BLD: 0 K/UL (ref 0–0.4)
EOSINOPHIL NFR BLD: 0 % (ref 0–7)
ERYTHROCYTE [DISTWIDTH] IN BLOOD BY AUTOMATED COUNT: 13.8 % (ref 11.5–14.5)
GLUCOSE SERPL-MCNC: 201 MG/DL (ref 65–100)
HCT VFR BLD AUTO: 30.2 % (ref 35–47)
HCV GENTYP SERPL NAA+PROBE: NORMAL
HGB BLD-MCNC: 10.5 G/DL (ref 11.5–16)
IMM GRANULOCYTES # BLD AUTO: 0 K/UL (ref 0–0.04)
IMM GRANULOCYTES NFR BLD AUTO: 1 % (ref 0–0.5)
LYMPHOCYTES # BLD: 0.5 K/UL (ref 0.8–3.5)
LYMPHOCYTES NFR BLD: 8 % (ref 12–49)
MCH RBC QN AUTO: 30.3 PG (ref 26–34)
MCHC RBC AUTO-ENTMCNC: 34.8 G/DL (ref 30–36.5)
MCV RBC AUTO: 87 FL (ref 80–99)
MONOCYTES # BLD: 0.2 K/UL (ref 0–1)
MONOCYTES NFR BLD: 3 % (ref 5–13)
NEUTS SEG # BLD: 5.3 K/UL (ref 1.8–8)
NEUTS SEG NFR BLD: 88 % (ref 32–75)
NRBC # BLD: 0 K/UL (ref 0–0.01)
NRBC BLD-RTO: 0 PER 100 WBC
PLATELET # BLD AUTO: 193 K/UL (ref 150–400)
PLEASE NOTE, 550474: NORMAL
PMV BLD AUTO: 11.9 FL (ref 8.9–12.9)
POTASSIUM SERPL-SCNC: 4.1 MMOL/L (ref 3.5–5.1)
RBC # BLD AUTO: 3.47 M/UL (ref 3.8–5.2)
SODIUM SERPL-SCNC: 139 MMOL/L (ref 136–145)
WBC # BLD AUTO: 6 K/UL (ref 3.6–11)

## 2022-03-27 PROCEDURE — 36415 COLL VENOUS BLD VENIPUNCTURE: CPT

## 2022-03-27 PROCEDURE — 77010033678 HC OXYGEN DAILY

## 2022-03-27 PROCEDURE — 74011250636 HC RX REV CODE- 250/636: Performed by: INTERNAL MEDICINE

## 2022-03-27 PROCEDURE — 94761 N-INVAS EAR/PLS OXIMETRY MLT: CPT

## 2022-03-27 PROCEDURE — 74011000250 HC RX REV CODE- 250: Performed by: HOSPITALIST

## 2022-03-27 PROCEDURE — 74011250637 HC RX REV CODE- 250/637: Performed by: INTERNAL MEDICINE

## 2022-03-27 PROCEDURE — 85025 COMPLETE CBC W/AUTO DIFF WBC: CPT

## 2022-03-27 PROCEDURE — 80048 BASIC METABOLIC PNL TOTAL CA: CPT

## 2022-03-27 PROCEDURE — 65270000029 HC RM PRIVATE

## 2022-03-27 PROCEDURE — 94640 AIRWAY INHALATION TREATMENT: CPT

## 2022-03-27 PROCEDURE — 74011000250 HC RX REV CODE- 250: Performed by: INTERNAL MEDICINE

## 2022-03-27 PROCEDURE — 74011000258 HC RX REV CODE- 258: Performed by: HOSPITALIST

## 2022-03-27 PROCEDURE — 74011250637 HC RX REV CODE- 250/637: Performed by: HOSPITALIST

## 2022-03-27 PROCEDURE — 74011250637 HC RX REV CODE- 250/637: Performed by: NURSE PRACTITIONER

## 2022-03-27 PROCEDURE — 74011250636 HC RX REV CODE- 250/636: Performed by: HOSPITALIST

## 2022-03-27 PROCEDURE — 97530 THERAPEUTIC ACTIVITIES: CPT

## 2022-03-27 RX ADMIN — SODIUM CHLORIDE, PRESERVATIVE FREE 10 ML: 5 INJECTION INTRAVENOUS at 21:45

## 2022-03-27 RX ADMIN — FUROSEMIDE 40 MG: 10 INJECTION, SOLUTION INTRAMUSCULAR; INTRAVENOUS at 09:53

## 2022-03-27 RX ADMIN — ACETAMINOPHEN 650 MG: 325 TABLET ORAL at 21:45

## 2022-03-27 RX ADMIN — RISPERIDONE 2 MG: 2 TABLET, FILM COATED ORAL at 21:45

## 2022-03-27 RX ADMIN — DILTIAZEM HYDROCHLORIDE 120 MG: 120 CAPSULE, COATED, EXTENDED RELEASE ORAL at 09:53

## 2022-03-27 RX ADMIN — METHYLPREDNISOLONE SODIUM SUCCINATE 40 MG: 40 INJECTION, POWDER, FOR SOLUTION INTRAMUSCULAR; INTRAVENOUS at 21:44

## 2022-03-27 RX ADMIN — IPRATROPIUM BROMIDE AND ALBUTEROL SULFATE 3 ML: .5; 2.5 SOLUTION RESPIRATORY (INHALATION) at 07:57

## 2022-03-27 RX ADMIN — RISPERIDONE 2 MG: 2 TABLET, FILM COATED ORAL at 09:53

## 2022-03-27 RX ADMIN — MULTIVITAMIN TABLET 1 TABLET: TABLET at 09:53

## 2022-03-27 RX ADMIN — IPRATROPIUM BROMIDE AND ALBUTEROL SULFATE 3 ML: .5; 2.5 SOLUTION RESPIRATORY (INHALATION) at 19:28

## 2022-03-27 RX ADMIN — PIPERACILLIN AND TAZOBACTAM 3.38 G: 3; .375 INJECTION, POWDER, LYOPHILIZED, FOR SOLUTION INTRAVENOUS at 01:01

## 2022-03-27 RX ADMIN — SULFAMETHOXAZOLE AND TRIMETHOPRIM 187.04 MG: 80; 16 INJECTION, SOLUTION, CONCENTRATE INTRAVENOUS at 19:21

## 2022-03-27 RX ADMIN — BENZTROPINE MESYLATE 0.5 MG: 1 TABLET ORAL at 21:45

## 2022-03-27 RX ADMIN — SULFAMETHOXAZOLE AND TRIMETHOPRIM 187.04 MG: 80; 16 INJECTION, SOLUTION, CONCENTRATE INTRAVENOUS at 09:55

## 2022-03-27 RX ADMIN — PIPERACILLIN AND TAZOBACTAM 3.38 G: 3; .375 INJECTION, POWDER, LYOPHILIZED, FOR SOLUTION INTRAVENOUS at 18:02

## 2022-03-27 RX ADMIN — ENOXAPARIN SODIUM 40 MG: 100 INJECTION SUBCUTANEOUS at 02:46

## 2022-03-27 RX ADMIN — DIVALPROEX SODIUM 250 MG: 125 CAPSULE, COATED PELLETS ORAL at 09:53

## 2022-03-27 RX ADMIN — PIPERACILLIN AND TAZOBACTAM 3.38 G: 3; .375 INJECTION, POWDER, LYOPHILIZED, FOR SOLUTION INTRAVENOUS at 09:53

## 2022-03-27 RX ADMIN — BENZTROPINE MESYLATE 0.5 MG: 1 TABLET ORAL at 09:53

## 2022-03-27 RX ADMIN — SODIUM CHLORIDE, PRESERVATIVE FREE 10 ML: 5 INJECTION INTRAVENOUS at 05:27

## 2022-03-27 RX ADMIN — LISINOPRIL 20 MG: 20 TABLET ORAL at 09:53

## 2022-03-27 RX ADMIN — METHYLPREDNISOLONE SODIUM SUCCINATE 40 MG: 40 INJECTION, POWDER, FOR SOLUTION INTRAMUSCULAR; INTRAVENOUS at 13:22

## 2022-03-27 RX ADMIN — METHYLPREDNISOLONE SODIUM SUCCINATE 40 MG: 40 INJECTION, POWDER, FOR SOLUTION INTRAMUSCULAR; INTRAVENOUS at 05:27

## 2022-03-27 RX ADMIN — SULFAMETHOXAZOLE AND TRIMETHOPRIM 187.04 MG: 80; 16 INJECTION, SOLUTION, CONCENTRATE INTRAVENOUS at 01:01

## 2022-03-27 RX ADMIN — SODIUM CHLORIDE, PRESERVATIVE FREE 10 ML: 5 INJECTION INTRAVENOUS at 13:22

## 2022-03-27 RX ADMIN — CLONIDINE HYDROCHLORIDE 0.1 MG: 0.1 TABLET ORAL at 09:53

## 2022-03-27 RX ADMIN — NYSTATIN 500000 UNITS: 100000 SUSPENSION ORAL at 21:44

## 2022-03-27 RX ADMIN — NYSTATIN 500000 UNITS: 100000 SUSPENSION ORAL at 09:53

## 2022-03-27 RX ADMIN — IPRATROPIUM BROMIDE AND ALBUTEROL SULFATE 3 ML: .5; 2.5 SOLUTION RESPIRATORY (INHALATION) at 12:15

## 2022-03-27 RX ADMIN — FAMOTIDINE 20 MG: 20 TABLET, FILM COATED ORAL at 21:44

## 2022-03-27 RX ADMIN — DIVALPROEX SODIUM 250 MG: 125 CAPSULE, COATED PELLETS ORAL at 21:45

## 2022-03-27 NOTE — PROGRESS NOTES
Hospitalist Progress Note    Subjective:   Daily Progress Note: 3/27/2022 12:40 PM    Hospital Course:  62year old Female with hxof COPD, alcohol abuse, HIV, schizophrenia, and hepatitis C (comes from group home) presented to ED with altered mental status and cough. Patient was febrile, tachycardic and had gurgling sounds. Patient was hypoxic, placed on venti mask. Xray showed diffuse interstitial process in B/L lungs. Patient was admitted to ICU and started on iv zosyn. COVID-19 negative. Patient was on  HFNC, transitioned from 100% non rebreather mask. Currently on 6L of oxygen via nasal cannula     Subjective:  Patient is awake and alert but still confused.        Current Facility-Administered Medications   Medication Dose Route Frequency    nystatin (MYCOSTATIN) 100,000 unit/mL oral suspension 500,000 Units  500,000 Units Oral QID    methylPREDNISolone (PF) (SOLU-MEDROL) injection 40 mg  40 mg IntraVENous Q8H    dilTIAZem ER (CARDIZEM CD) capsule 120 mg  120 mg Oral DAILY    acetaminophen (TYLENOL) suppository 650 mg  650 mg Rectal Q4H PRN    albuterol-ipratropium (DUO-NEB) 2.5 MG-0.5 MG/3 ML  3 mL Nebulization Q6HWA RT    trimethoprim-sulfamethoxazole (BACTRIM) 187.04 mg in dextrose 5% 500 mL  10 mg/kg/day IntraVENous Q8H    divalproex (DEPAKOTE SPRINKLE) capsule 250 mg  250 mg Oral Q12H    metoprolol (LOPRESSOR) injection 5 mg  5 mg IntraVENous Q6H PRN    benztropine (COGENTIN) tablet 0.5 mg  0.5 mg Oral BID    cloNIDine HCL (CATAPRES) tablet 0.1 mg  0.1 mg Oral DAILY    famotidine (PEPCID) tablet 20 mg  20 mg Oral QHS    lisinopriL (PRINIVIL, ZESTRIL) tablet 20 mg  20 mg Oral DAILY    risperiDONE (RisperDAL) tablet 2 mg  2 mg Oral BID    multivitamin with folic acid (ONE DAILY WITH FOLIC ACID) tablet 1 Tablet  1 Tablet Oral DAILY    piperacillin-tazobactam (ZOSYN) 3.375 g in 0.9% sodium chloride (MBP/ADV) 100 mL MBP  3.375 g IntraVENous Q8H    sodium chloride (NS) flush 5-40 mL  5-40 mL IntraVENous Q8H    sodium chloride (NS) flush 5-40 mL  5-40 mL IntraVENous PRN    acetaminophen (TYLENOL) tablet 650 mg  650 mg Oral Q4H PRN    enoxaparin (LOVENOX) injection 40 mg  40 mg SubCUTAneous Q24H    albuterol CONCENTRATE 2.5mg/0.5 mL neb soln  2.5 mg Nebulization Q4H PRN    furosemide (LASIX) injection 40 mg  40 mg IntraVENous DAILY    hydrOXYzine pamoate (VISTARIL) capsule 25 mg  25 mg Oral Q6H PRN    LORazepam (ATIVAN) injection 0.5 mg  0.5 mg IntraVENous Q4H PRN        Review of Systems  Unable to obtain due to patient's condition. Objective:     Visit Vitals  BP (!) 141/77 (BP 1 Location: Right upper arm, BP Patient Position: Semi fowlers)   Pulse 72   Temp 97.2 °F (36.2 °C)   Resp 18   Ht 5' 4.02\" (1.626 m)   Wt 56.1 kg (123 lb 10.9 oz)   SpO2 98%   BMI 21.22 kg/m²    O2 Flow Rate (L/min): 6 l/min O2 Device: Nasal cannula (mid flow)    Temp (24hrs), Av °F (36.7 °C), Min:97.2 °F (36.2 °C), Max:98.6 °F (37 °C)       07 -  1900  In: 240 [P.O.:240]  Out: 1 [Urine:1]  No intake/output data recorded. PHYSICAL EXAM:  Constitutional: No acute distress  Skin: Extremities and face reveal no rashes. HEENT: Sclerae anicteric. Extra-occular muscles are intact. No oral ulcers. The neck is supple and no masses. Cardiovascular: Regular rate and rhythm. Respiratory:  B/L rhonchi  GI: Abdomen nondistended, soft, and nontender. Normal active bowel sounds. Musculoskeletal: No pitting edema of the lower legs. Able to move all ext  Neurological:  Patient is awake and alert, still confused.   Psychiatric: Mood appears appropriate       Data Review    Recent Results (from the past 24 hour(s))   CBC WITH AUTOMATED DIFF    Collection Time: 22  8:41 AM   Result Value Ref Range    WBC 6.0 3.6 - 11.0 K/uL    RBC 3.47 (L) 3.80 - 5.20 M/uL    HGB 10.5 (L) 11.5 - 16.0 g/dL    HCT 30.2 (L) 35.0 - 47.0 %    MCV 87.0 80.0 - 99.0 FL    MCH 30.3 26.0 - 34.0 PG    MCHC 34.8 30.0 - 36.5 g/dL    RDW 13.8 11.5 - 14.5 %    PLATELET 636 232 - 581 K/uL    MPV 11.9 8.9 - 12.9 FL    NRBC 0.0 0.0  WBC    ABSOLUTE NRBC 0.00 0.00 - 0.01 K/uL    NEUTROPHILS 88 (H) 32 - 75 %    LYMPHOCYTES 8 (L) 12 - 49 %    MONOCYTES 3 (L) 5 - 13 %    EOSINOPHILS 0 0 - 7 %    BASOPHILS 0 0 - 1 %    IMMATURE GRANULOCYTES 1 (H) 0 - 0.5 %    ABS. NEUTROPHILS 5.3 1.8 - 8.0 K/UL    ABS. LYMPHOCYTES 0.5 (L) 0.8 - 3.5 K/UL    ABS. MONOCYTES 0.2 0.0 - 1.0 K/UL    ABS. EOSINOPHILS 0.0 0.0 - 0.4 K/UL    ABS. BASOPHILS 0.0 0.0 - 0.1 K/UL    ABS. IMM. GRANS. 0.0 0.00 - 0.04 K/UL    DF AUTOMATED     METABOLIC PANEL, BASIC    Collection Time: 03/27/22  8:41 AM   Result Value Ref Range    Sodium 139 136 - 145 mmol/L    Potassium 4.1 3.5 - 5.1 mmol/L    Chloride 107 97 - 108 mmol/L    CO2 29 21 - 32 mmol/L    Anion gap 3 (L) 5 - 15 mmol/L    Glucose 201 (H) 65 - 100 mg/dL    BUN 25 (H) 6 - 20 mg/dL    Creatinine 0.86 0.55 - 1.02 mg/dL    BUN/Creatinine ratio 29 (H) 12 - 20      GFR est AA >60 >60 ml/min/1.73m2    GFR est non-AA >60 >60 ml/min/1.73m2    Calcium 8.5 8.5 - 10.1 mg/dL         Assessment / Plan:  Acute hypoxic respiratory failure  S/s B/L pneumonia  Continue supplemental oxygen and titrate as needed  Currently on 6L of oxygen via nasal cannula. Sepsis:  Continue zosyn   Started on iv bactrim given hxof HIV infection with CD4 count of 91. Continue solumedrol  S/p levofloxacin (mycoplasma and legionella negative)    Acute hypokalemia:  resolved    Acute metabolic encephalopathy:  improving    B/L health care associated pneumonia  Continue antibiotics    WHITNEY:  Resolved      Sustained narrow complex tachycardia, likely SVT vs atrial flutter: improving    Schizophrenia    HIV with CD4<100 and markedly elevated viral load. Outpatient ID follow up to initiate treatment    Hepatitis C  Outpatient ID follow up to initiate treatment. Hx of alcohol abuse    COPD    18.5 - 24.9 Normal weight / Body mass index is 21.22 kg/m².     Code status: Full  Prophylaxis: Lovenox  Recommended Disposition: group home, weaning oxygen, on iv antibiotics. Time spent 35 minutes involving direct patient care as well as reviewing patient's labs and coordination of care with nursing staff     Care Plan discussed with: Patient/Family/RN/Case Management        Total time spent with patient: 35 minutes.

## 2022-03-27 NOTE — PROGRESS NOTES
Pulmonary Progress Note      NAME: Vini Bai   :  1960  MRM:  [id number]    Date/Time: 3/27/2022  4:22 PM         Subjective:     Patient seen and examined. Overnight events noted    On 6 L nasal cannula oxygen  Gradual improvement  Awake and alert  No acute distress  Has a sitter    Blood gases and chest x-ray discussed below. COVID-19 ruled out. ID input is noted. She is taking minced and moist diet without any problems. Diet changed to puréed with thin liquids per speech. Past Medical History reviewed and unchanged from Admission History and Physical       Objective:     Physical Exam     Vitals:      Last 24hrs VS reviewed since prior progress note. Most recent are:    Visit Vitals  BP (!) 141/77 (BP 1 Location: Right upper arm, BP Patient Position: Semi fowlers)   Pulse 72   Temp 97.2 °F (36.2 °C)   Resp 18   Ht 5' 4.02\" (1.626 m)   Wt 56.1 kg (123 lb 10.9 oz)   SpO2 98%   BMI 21.22 kg/m²     SpO2 Readings from Last 6 Encounters:   22 98%   22 99%   22 98%   21 96%   21 97%   09/15/21 97%    O2 Flow Rate (L/min): 6 l/min       Intake/Output Summary (Last 24 hours) at 3/27/2022 1124  Last data filed at 3/27/2022 0843  Gross per 24 hour   Intake 240 ml   Output 1 ml   Net 239 ml      General: Lying in bed in mild respiratory distress, on nasal cannula oxygen  Eye: Pupils are equal and reactive to light. Throat and Neck: Oropharynx without thrush. Neck is supple and trachea central.  No obvious lymphadenopathy. Lung: Reduced air entry bilaterally with prolonged exhalation but no wheezing. Occasional crackles. No significant change. Heart: S1+S2. No murmurs  Abdomen: soft, non-tender. Bowel sounds normal. No masses; obese  Extremities: No edema, no cyanosis or clubbing. Pulses are palpable.   : Not done  Skin: No cyanosis  Neurologic:  Alert and awake, confused at times, grossly nonfocal  Psychiatric:  Unable to perform due to patient's condition    XR CHEST PORT   Final Result      XR CHEST PORT   Final Result      XR CHEST PORT   Final Result   Diffuse opacities in the lungs, increased. XR CHEST PORT   Final Result   FINDINGS/IMPRESSION:   Persistent diffuse airspace opacity throughout the lungs. Cardiac silhouette stable in size. Negative for pneumothorax. CT CHEST WO CONT   Final Result   Extensive diffuse bilateral pneumonitis and patchy pneumonia      XR CHEST PORT   Final Result   Diffuse predominantly interstitial process in both lungs with some   greater involvement on the left. Differential considerations include pulmonary   edema and diffuse infection.                     Lab Data Reviewed: (see below)      Medications:  Current Facility-Administered Medications   Medication Dose Route Frequency    nystatin (MYCOSTATIN) 100,000 unit/mL oral suspension 500,000 Units  500,000 Units Oral QID    methylPREDNISolone (PF) (SOLU-MEDROL) injection 40 mg  40 mg IntraVENous Q8H    dilTIAZem ER (CARDIZEM CD) capsule 120 mg  120 mg Oral DAILY    acetaminophen (TYLENOL) suppository 650 mg  650 mg Rectal Q4H PRN    albuterol-ipratropium (DUO-NEB) 2.5 MG-0.5 MG/3 ML  3 mL Nebulization Q6HWA RT    trimethoprim-sulfamethoxazole (BACTRIM) 187.04 mg in dextrose 5% 500 mL  10 mg/kg/day IntraVENous Q8H    divalproex (DEPAKOTE SPRINKLE) capsule 250 mg  250 mg Oral Q12H    metoprolol (LOPRESSOR) injection 5 mg  5 mg IntraVENous Q6H PRN    benztropine (COGENTIN) tablet 0.5 mg  0.5 mg Oral BID    cloNIDine HCL (CATAPRES) tablet 0.1 mg  0.1 mg Oral DAILY    famotidine (PEPCID) tablet 20 mg  20 mg Oral QHS    lisinopriL (PRINIVIL, ZESTRIL) tablet 20 mg  20 mg Oral DAILY    risperiDONE (RisperDAL) tablet 2 mg  2 mg Oral BID    multivitamin with folic acid (ONE DAILY WITH FOLIC ACID) tablet 1 Tablet  1 Tablet Oral DAILY    piperacillin-tazobactam (ZOSYN) 3.375 g in 0.9% sodium chloride (MBP/ADV) 100 mL MBP  3.375 g IntraVENous Q8H    sodium chloride (NS) flush 5-40 mL  5-40 mL IntraVENous Q8H    sodium chloride (NS) flush 5-40 mL  5-40 mL IntraVENous PRN    acetaminophen (TYLENOL) tablet 650 mg  650 mg Oral Q4H PRN    enoxaparin (LOVENOX) injection 40 mg  40 mg SubCUTAneous Q24H    albuterol CONCENTRATE 2.5mg/0.5 mL neb soln  2.5 mg Nebulization Q4H PRN    furosemide (LASIX) injection 40 mg  40 mg IntraVENous DAILY    hydrOXYzine pamoate (VISTARIL) capsule 25 mg  25 mg Oral Q6H PRN    LORazepam (ATIVAN) injection 0.5 mg  0.5 mg IntraVENous Q4H PRN       ______________________________________________________________________      Lab Review:     Recent Labs     03/27/22  0841 03/26/22  0720 03/25/22  0746   WBC 6.0 4.4 5.6   HGB 10.5* 9.5* 9.6*   HCT 30.2* 27.6* 27.6*    173 189     Recent Labs     03/27/22  0841 03/26/22  0720 03/25/22  0746    140 142   K 4.1 3.9 3.3*    107 109*   CO2 29 28 29   * 157* 122*   BUN 25* 25* 37*   CREA 0.86 0.83 0.96   CA 8.5 8.0* 8.6     No components found for: GLPOC  No results for input(s): PH, PCO2, PO2, HCO3, FIO2 in the last 72 hours. No results for input(s): INR, INREXT, INREXT, INREXT in the last 72 hours. Other pertinent lab:          Assessment & Plan:      Assessment:      1. Acute respiratory failure with hypoxia due to #2 and 3  2. Bilateral healthcare associated pneumonia  3. Acute CHF and sinus tachycardia due to #4  4. Severe sepsis  5. Altered mental status/acute metabolic encephalopathy  6. COPD with exacerbation  7. HIV  8. Schizophrenia  9. History of alcohol abuse     Plan:      Patient admitted to the ICU, now transferred to [facility]. We will be watching her closely     Currently high flow oxygen, 40 L and 30%. Previously she was on nonrebreather mask. Her oxygen requirements are slowly improving  Blood gases 3/24 morning showed 7.4 4/41/98 on 40% high flow oxygen. Blood gases done 3/23 morning showed 7.43/43/91 on 40% high flow oxygen.     Blood gases done 3/22 morning showed 7.4 0/49/107 on nonrebreather mask. Blood gases done 3/18/2022 showed 7.50/36/116 on nonrebreather mask. We wean down oxygen as tolerated. Discussed with RT. Now on 6 L  If she decompensates will initiate BiPAP but she is not a CO2 retainer. Chest x-ray shows bilateral infiltrates. Personally reviewed. There has been significant improvement overall. Patient has COPD  Continue nebulizers  Added IV Solu-Medrol, which I believe is helping her. Appreciate ID input. Treating as possible PCP. Bactrim added. She is aspiration risk. On puréed with thin liquids as per speech. Appreciate input. Urinary retention. Had a [place] catheter. Nystatin swish and swallow. She has thrush.     Patient has combination of CHF and healthcare associated pneumonia, speech is also involved. There is a possibility of aspiration pneumonia. Continue broad-spectrum antibiotics, currently on Zosyn and Bactrim. Of Levaquin. Appreciate ID input. COVID-19 ruled out. Patient developed sinus tachycardia. Much improved. Now started on diltiazem. Appreciate input from cardiology. Started on Lasix IV daily since blood pressure stable  Intake and output charting  Check electrolytes and replace as needed  Monitor renal function with fluid change     Monitor mental status      DVT and GI prophylaxis     Case discussed in detail with RN, RT, and care team  Thank you for involving me in the care of this patient  I will follow with you closely during hospitalization     Time spent more than 30 minutes in direct patient care with no overlap reviewing results and records, decision making, and answering questions.       Anmol Giraldo MD  [facility]

## 2022-03-27 NOTE — PROGRESS NOTES
PHYSICAL THERAPY TREATMENT  Patient: Vinita Urbina (43 y.o. female)  Date: 3/27/2022  Diagnosis: Aspiration pneumonia ([facility] [address] [J69.0]  Acute respiratory failure with hypoxemia ([facility] [address] [J96.01] <principal problem not specified>       Precautions:    Chart, physical therapy assessment, plan of care and goals were reviewed. ASSESSMENT  Patient continues with skilled PT services and is progressing towards goals. Pt received supine in bed, 1:1 at bedside. Pt sleepy at first, but opened eyes to voice. Pt rolled to L with min A, went from supine to sitting with mod A and scooted to EOB with min A. On EOB, pt completed seated ex needing much encouragement and visual, verbal and at times manual cues. Pt stood with min A/CGA and amb 25 ft with min A for stability and with cues to increase height and length, to widen ELIZA as pt scissoring, and for safe management of turns. Pt went back to EOB then to supine with SBA but additional time to complete task. Pt needed max A x 2 to scoot to [facility]. Pt was left semi supine in NAD, call bell within reach, all needs addressed, 1:1 present. Current Level of Function Impacting Discharge (mobility/balance): assist x 1, poor balance    Other factors to consider for discharge: severity of impairments, cognitive status, safety, group home environment and support available         PLAN :  Patient continues to benefit from skilled intervention to address the above impairments. Continue treatment per established plan of care. to address goals. Recommendation for discharge: (in order for the patient to meet his/her long term goals)  Group Home    This discharge recommendation:  Has been made in collaboration with the attending provider and/or case management    IF patient discharges home will need the following DME: to be determined (TBD)       SUBJECTIVE:   Patient stated I just want to lay down.     OBJECTIVE DATA SUMMARY:   Critical Behavior:  Neurologic State: Eyes open to voice  Orientation Level: Disoriented X4  Cognition: Poor safety awareness,Decreased command following,Impulsive  Safety/Judgement: Decreased awareness of environment,Decreased awareness of need for assistance,Decreased awareness of need for safety,Decreased insight into deficits    Functional Mobility Training:  Bed Mobility:  Rolling: Minimum assistance  Supine to Sit: Moderate assistance  Sit to Supine: Stand-by assistance; Additional time  Scooting: Minimum assistance     Transfers:  Sit to Stand: Minimum assistance;Contact guard assistance  Stand to Sit: Minimum assistance;Contact guard assistance     Balance:  Sitting: Intact; Without support  Standing: Impaired  Standing - Static: Fair  Standing - Dynamic : Poor    Ambulation/Gait Training:  Distance (ft): 25 Feet (ft)  Assistive Device: Gait belt  Ambulation - Level of Assistance: Minimal assistance  Gait Abnormalities: Path deviations;Scissoring;Shuffling gait;Trunk sway increased    Therapeutic Exercises:       EXERCISE   Sets   Reps   Active Active Assist   Passive Self ROM   Comments   Ankle Pumps 1 20 [x] [] [] []    Hip abd/add 1 5 [x] [] [] []    Long Arc Quads 1 10 [x] [] [] []    Marching 1 10 [x] [] [] []      Pain Ratin/10    Activity Tolerance:   Fair  Please refer to the flowsheet for vital signs taken during this treatment. After treatment patient left in no apparent distress:   Supine in bed, Call bell within reach, Bed / chair alarm activated, Caregiver / family present, and Side rails x 3    COMMUNICATION/COLLABORATION:   The patients plan of care was discussed with: Registered nurse and Certified nursing assistant/patient care technician.      Manuel Heredia PTA   Time Calculation: 28 mins

## 2022-03-28 ENCOUNTER — APPOINTMENT (OUTPATIENT)
Dept: ULTRASOUND IMAGING | Age: 62
DRG: 720 | End: 2022-03-28
Attending: INTERNAL MEDICINE
Payer: MEDICAID

## 2022-03-28 PROCEDURE — 74011250637 HC RX REV CODE- 250/637: Performed by: INTERNAL MEDICINE

## 2022-03-28 PROCEDURE — 74011250636 HC RX REV CODE- 250/636: Performed by: INTERNAL MEDICINE

## 2022-03-28 PROCEDURE — 76705 ECHO EXAM OF ABDOMEN: CPT

## 2022-03-28 PROCEDURE — 74011000250 HC RX REV CODE- 250: Performed by: HOSPITALIST

## 2022-03-28 PROCEDURE — 74011250636 HC RX REV CODE- 250/636: Performed by: HOSPITALIST

## 2022-03-28 PROCEDURE — 97530 THERAPEUTIC ACTIVITIES: CPT

## 2022-03-28 PROCEDURE — 74011250637 HC RX REV CODE- 250/637: Performed by: NURSE PRACTITIONER

## 2022-03-28 PROCEDURE — 74011250637 HC RX REV CODE- 250/637: Performed by: HOSPITALIST

## 2022-03-28 PROCEDURE — 74011000250 HC RX REV CODE- 250: Performed by: INTERNAL MEDICINE

## 2022-03-28 PROCEDURE — 99232 SBSQ HOSP IP/OBS MODERATE 35: CPT | Performed by: INTERNAL MEDICINE

## 2022-03-28 PROCEDURE — 94640 AIRWAY INHALATION TREATMENT: CPT

## 2022-03-28 PROCEDURE — 65270000029 HC RM PRIVATE

## 2022-03-28 PROCEDURE — 74011000258 HC RX REV CODE- 258: Performed by: HOSPITALIST

## 2022-03-28 RX ADMIN — IPRATROPIUM BROMIDE AND ALBUTEROL SULFATE 3 ML: .5; 2.5 SOLUTION RESPIRATORY (INHALATION) at 15:07

## 2022-03-28 RX ADMIN — PIPERACILLIN AND TAZOBACTAM 3.38 G: 3; .375 INJECTION, POWDER, LYOPHILIZED, FOR SOLUTION INTRAVENOUS at 01:20

## 2022-03-28 RX ADMIN — BENZTROPINE MESYLATE 0.5 MG: 1 TABLET ORAL at 22:11

## 2022-03-28 RX ADMIN — NYSTATIN 500000 UNITS: 100000 SUSPENSION ORAL at 13:42

## 2022-03-28 RX ADMIN — SODIUM CHLORIDE, PRESERVATIVE FREE 10 ML: 5 INJECTION INTRAVENOUS at 05:05

## 2022-03-28 RX ADMIN — IPRATROPIUM BROMIDE AND ALBUTEROL SULFATE 3 ML: .5; 2.5 SOLUTION RESPIRATORY (INHALATION) at 07:57

## 2022-03-28 RX ADMIN — SULFAMETHOXAZOLE AND TRIMETHOPRIM 187.04 MG: 80; 16 INJECTION, SOLUTION, CONCENTRATE INTRAVENOUS at 13:41

## 2022-03-28 RX ADMIN — SULFAMETHOXAZOLE AND TRIMETHOPRIM 187.04 MG: 80; 16 INJECTION, SOLUTION, CONCENTRATE INTRAVENOUS at 17:10

## 2022-03-28 RX ADMIN — DILTIAZEM HYDROCHLORIDE 120 MG: 120 CAPSULE, COATED, EXTENDED RELEASE ORAL at 09:53

## 2022-03-28 RX ADMIN — IPRATROPIUM BROMIDE AND ALBUTEROL SULFATE 3 ML: .5; 2.5 SOLUTION RESPIRATORY (INHALATION) at 19:36

## 2022-03-28 RX ADMIN — METHYLPREDNISOLONE SODIUM SUCCINATE 40 MG: 40 INJECTION, POWDER, FOR SOLUTION INTRAMUSCULAR; INTRAVENOUS at 05:04

## 2022-03-28 RX ADMIN — SODIUM CHLORIDE, PRESERVATIVE FREE 10 ML: 5 INJECTION INTRAVENOUS at 22:16

## 2022-03-28 RX ADMIN — NYSTATIN 500000 UNITS: 100000 SUSPENSION ORAL at 09:55

## 2022-03-28 RX ADMIN — NYSTATIN 500000 UNITS: 100000 SUSPENSION ORAL at 17:10

## 2022-03-28 RX ADMIN — RISPERIDONE 2 MG: 2 TABLET, FILM COATED ORAL at 09:55

## 2022-03-28 RX ADMIN — PIPERACILLIN AND TAZOBACTAM 3.38 G: 3; .375 INJECTION, POWDER, LYOPHILIZED, FOR SOLUTION INTRAVENOUS at 09:53

## 2022-03-28 RX ADMIN — DIVALPROEX SODIUM 250 MG: 125 CAPSULE, COATED PELLETS ORAL at 22:11

## 2022-03-28 RX ADMIN — DIVALPROEX SODIUM 250 MG: 125 CAPSULE, COATED PELLETS ORAL at 09:53

## 2022-03-28 RX ADMIN — FUROSEMIDE 40 MG: 10 INJECTION, SOLUTION INTRAMUSCULAR; INTRAVENOUS at 09:55

## 2022-03-28 RX ADMIN — SODIUM CHLORIDE, PRESERVATIVE FREE 10 ML: 5 INJECTION INTRAVENOUS at 13:43

## 2022-03-28 RX ADMIN — ENOXAPARIN SODIUM 40 MG: 100 INJECTION SUBCUTANEOUS at 02:12

## 2022-03-28 RX ADMIN — SULFAMETHOXAZOLE AND TRIMETHOPRIM 187.04 MG: 80; 16 INJECTION, SOLUTION, CONCENTRATE INTRAVENOUS at 02:12

## 2022-03-28 RX ADMIN — LISINOPRIL 20 MG: 20 TABLET ORAL at 09:53

## 2022-03-28 RX ADMIN — FAMOTIDINE 20 MG: 20 TABLET, FILM COATED ORAL at 22:11

## 2022-03-28 RX ADMIN — MULTIVITAMIN TABLET 1 TABLET: TABLET at 09:53

## 2022-03-28 RX ADMIN — BENZTROPINE MESYLATE 0.5 MG: 1 TABLET ORAL at 09:54

## 2022-03-28 RX ADMIN — RISPERIDONE 2 MG: 2 TABLET, FILM COATED ORAL at 22:11

## 2022-03-28 RX ADMIN — METHYLPREDNISOLONE SODIUM SUCCINATE 40 MG: 40 INJECTION, POWDER, FOR SOLUTION INTRAMUSCULAR; INTRAVENOUS at 22:11

## 2022-03-28 RX ADMIN — NYSTATIN 500000 UNITS: 100000 SUSPENSION ORAL at 22:11

## 2022-03-28 RX ADMIN — CLONIDINE HYDROCHLORIDE 0.1 MG: 0.1 TABLET ORAL at 09:53

## 2022-03-28 NOTE — PROGRESS NOTES
Progress Note    Patient: Lilo Chavarria MRN: [id number]  SSN: [id number]    YOB: 1960  Age: 62 y.o. Sex: female      Admit Date: 3/17/2022    LOS: 10 days     Subjective:   Patient followed for SIRS/Sepsis with interstitial pneumonitis and HIV infection, presumed secondary to Pneumocystis, supported by elevated serum fungitell. Patient also has chronic hepatitis C infection with fibrosis. Both infections are untreated. She is currently on Zosyn and Bactrim. She is now on nasal cannula. She remains afebrile. Procal and CRP decreasing. Patient resting comfortably. Objective:     Vitals:    03/28/22 0059 03/28/22 0710 03/28/22 0751 03/28/22 0830   BP: (!) 164/87 (!) 159/86     Pulse: 66 64     Resp: 18 16     Temp: 97.3 °F (36.3 °C) 98.8 °F (37.1 °C)     SpO2: 100% 100% 100% 100%   Weight:       Height:            Intake and Output:  Current Shift: No intake/output data recorded. Last three shifts: 03/26 1901 - 03/28 0700  In: 240 [P.O.:240]  Out: 1 [Urine:1]    Physical Exam:   Vitals and nursing note reviewed. Constitutional:       General: She is in acute distress. Appearance: She is ill-appearing. HENT:  Nasal cannula 4 L/min     Mouth/Throat:      Mouth: Mucous membranes are dry. Eyes:      Pupils: Pupils are equal, round, and reactive to light. Cardiovascular:      Rate and Rhythm: Regular rhythm. .      Heart sounds: No murmur heard  Pulmonary: clear bilaterally    Abdominal:      General: Bowel sounds are normal.      Palpations: Abdomen is soft. Tenderness: There is no abdominal tenderness. Genitourinary:     Comments:   External urinary device  Musculoskeletal:      Right lower leg: No edema. Left lower leg: No edema. Skin:     Findings: No rash. Neurological:      General: No focal deficit present. Mental Status: She is alert and oriented to person, place, and time.    Psychiatric:      Comments: Patient with abnormal thought content, judgement      Lab/Data Review:     WBC 6,000    Procal 0.15 < 0.30 < 0.72 <1.06 <0.75 <1.11  CRP 0.59 <1.13 <2.20 <4.63 <5.31    CD4 count 91/mm3 (9.1%)  HIV-1 RNA viral load 579,000  HCV 26,600,000  CMV PCR negative  Serum fungitell >500  AFP tumor marker 9.4 (H)    Mycoplasma IgM Nonreactive  SARS CoV-2 Not detected  Urine Legionella antigen Negative    Blood cultures (3/17) No growth FINAL  Urine culture (3/21) No growth FINAL      Assessment:     Active Problems:    Pneumonia (2/24/2022)      Acute respiratory failure with hypoxia (HCC) (2/25/2022)      Aspiration pneumonia ([address] (3/18/2022)      Acute respiratory failure with hypoxemia ([address] (3/18/2022)      1. SIRS/Sepsis with fever, tachycardia, elevated CRP and procal  2. Interstitial pneumonitis, bilateral, probable Pneumocystis jiroveci, with markedly elevated serum fungitell,  Day #11 Zosyn, Day #7/21 Bactrim  3. Acute hypoxic respiratory failure, high flow nasal O2  4. HIV-1 infection with CD4 <100 and markedly elevated viral load  5. Hepatitis C infection, type 1a, with markedly elevated viral load, bridging fibrosis, elevated AFP tumor marker  6. Renal failure     Comment:  Markedly elevated serum fungitell supports diagnosis of PCP (pneumocystis re-classified as a fungus). Will check serum cryptococcal antigen nonetheless. With elevated AFP tumor marker, will obtain Liver US. Plan:      1. Discontinue IV Zosyn  2. Continue IV Bactrim and Solumedrol; plan would be to transition to oral Bactrim and Prednisone when she is discharged, assuming her respiratory failure resolves  3. Follow-up HIV-1 genosure, HLA  (abacavir hypersensitivity),  N5SA drug resistance profile  4. Liver US  5.  Follow-up in ID Clinic to address HIV and HCV treatment    Signed By: Frankie Meeks MD     March 28, 2022

## 2022-03-28 NOTE — PROGRESS NOTES
Hospitalist Progress Note    Subjective:   Daily Progress Note: 3/28/2022 12:40 PM    Hospital Course:  62year old Female with hxof COPD, alcohol abuse, HIV, schizophrenia, and hepatitis C (comes from group home) presented to ED with altered mental status and cough. Patient was febrile, tachycardic and had gurgling sounds. Patient was hypoxic, placed on venti mask. Xray showed diffuse interstitial process in B/L lungs. Patient was admitted to ICU and started on iv zosyn. COVID-19 negative. Patient was on  HFNC, transitioned from 100% non rebreather mask. Currently on 3-4L of oxygen via nasal cannula     Subjective:  Patient is awake and alert but still confused.    Sitter by the bedside      Current Facility-Administered Medications   Medication Dose Route Frequency    methylPREDNISolone (PF) (SOLU-MEDROL) injection 40 mg  40 mg IntraVENous Q12H    nystatin (MYCOSTATIN) 100,000 unit/mL oral suspension 500,000 Units  500,000 Units Oral QID    dilTIAZem ER (CARDIZEM CD) capsule 120 mg  120 mg Oral DAILY    acetaminophen (TYLENOL) suppository 650 mg  650 mg Rectal Q4H PRN    albuterol-ipratropium (DUO-NEB) 2.5 MG-0.5 MG/3 ML  3 mL Nebulization Q6HWA RT    trimethoprim-sulfamethoxazole (BACTRIM) 187.04 mg in dextrose 5% 500 mL  10 mg/kg/day IntraVENous Q8H    divalproex (DEPAKOTE SPRINKLE) capsule 250 mg  250 mg Oral Q12H    metoprolol (LOPRESSOR) injection 5 mg  5 mg IntraVENous Q6H PRN    benztropine (COGENTIN) tablet 0.5 mg  0.5 mg Oral BID    cloNIDine HCL (CATAPRES) tablet 0.1 mg  0.1 mg Oral DAILY    famotidine (PEPCID) tablet 20 mg  20 mg Oral QHS    lisinopriL (PRINIVIL, ZESTRIL) tablet 20 mg  20 mg Oral DAILY    risperiDONE (RisperDAL) tablet 2 mg  2 mg Oral BID    multivitamin with folic acid (ONE DAILY WITH FOLIC ACID) tablet 1 Tablet  1 Tablet Oral DAILY    sodium chloride (NS) flush 5-40 mL  5-40 mL IntraVENous Q8H    sodium chloride (NS) flush 5-40 mL  5-40 mL IntraVENous PRN    acetaminophen (TYLENOL) tablet 650 mg  650 mg Oral Q4H PRN    enoxaparin (LOVENOX) injection 40 mg  40 mg SubCUTAneous Q24H    albuterol CONCENTRATE 2.5mg/0.5 mL neb soln  2.5 mg Nebulization Q4H PRN    furosemide (LASIX) injection 40 mg  40 mg IntraVENous DAILY    hydrOXYzine pamoate (VISTARIL) capsule 25 mg  25 mg Oral Q6H PRN    LORazepam (ATIVAN) injection 0.5 mg  0.5 mg IntraVENous Q4H PRN        Review of Systems  Unable to obtain due to patient's condition. Objective:     Visit Vitals  BP (!) 159/86   Pulse 64   Temp 98.8 °F (37.1 °C)   Resp 16   Ht 5' 4.02\" (1.626 m)   Wt 56.1 kg (123 lb 10.9 oz)   SpO2 100%   BMI 21.22 kg/m²    O2 Flow Rate (L/min): 4 l/min (decreased to 3 lpm nc) O2 Device: Nasal cannula    Temp (24hrs), Av.9 °F (36.6 °C), Min:97.3 °F (36.3 °C), Max:98.8 °F (37.1 °C)      No intake/output data recorded.  1901 -  0700  In: 240 [P.O.:240]  Out: 1 [Urine:1]    PHYSICAL EXAM:  Constitutional: No acute distress  Skin: Extremities and face reveal no rashes. HEENT: Sclerae anicteric. Extra-occular muscles are intact. No oral ulcers. The neck is supple and no masses. Cardiovascular: Regular rate and rhythm. Respiratory:  B/L rhonchi  GI: Abdomen nondistended, soft, and nontender. Normal active bowel sounds. Musculoskeletal: No pitting edema of the lower legs. Able to move all ext  Neurological:  Patient is awake and alert, still confused. Psychiatric: Mood appears appropriate       Data Review    No results found for this or any previous visit (from the past 24 hour(s)). Assessment / Plan:  Acute hypoxic respiratory failure  S/s B/L pneumonia  Continue supplemental oxygen and titrate as needed  Currently on 3L of oxygen via nasal cannula. Sepsis:  s/p zosyn   Started on iv bactrim given hxof HIV infection with CD4 count of 91.   Continue solumedrol, start tapering  S/p levofloxacin (mycoplasma and legionella negative)    Acute hypokalemia:  resolved    Acute metabolic encephalopathy:  improving    B/L health care associated pneumonia  Continue antibiotics    WHITNEY:  Resolved      Sustained narrow complex tachycardia, likely SVT vs atrial flutter: improving    Schizophrenia    HIV with CD4<100 and markedly elevated viral load. Outpatient ID follow up to initiate treatment    Hepatitis C  Outpatient ID follow up to initiate treatment. Hx of alcohol abuse    COPD    18.5 - 24.9 Normal weight / Body mass index is 21.22 kg/m². Code status: Full  Prophylaxis: Lovenox  Recommended Disposition: group home, weaning oxygen, on iv antibiotics. ID clearance        Time spent 35 minutes involving direct patient care as well as reviewing patient's labs and coordination of care with nursing staff     Care Plan discussed with: Patient/Family/RN/Case Management        Total time spent with patient: 35 minutes.

## 2022-03-28 NOTE — PROGRESS NOTES
Follow-up with pt. Pt now on NC 02. Reviewed with pt recs for MBS to further assess sw function and safety. Pt is agreeable to recs for MBS. Order obtained from MD.  Will plan for MBS in morning of 3-.

## 2022-03-28 NOTE — PROGRESS NOTES
DC Plan:  [facility] spoke with Gio Simpson from [facility] [phone] this morning. STEFANI asked Gio Simpson if pt can return to the group home with an oxygen concentrator if needed at discharge. Gio Simpson indicated pt cannot return to the group home with an oxygen concentrator. Pt has been weaned down to 3L continuous. Cm shared information with attending.

## 2022-03-28 NOTE — PROGRESS NOTES
PHYSICAL THERAPY TREATMENT  Patient: Sana Diaz (80 y.o. female)  Date: 3/28/2022  Diagnosis: Aspiration pneumonia ([facility] [J69.0]  Acute respiratory failure with hypoxemia ([facility] [J96.01] <principal problem not specified>       Precautions:    Chart, physical therapy assessment, plan of care and goals were reviewed. ASSESSMENT  Patient continues with skilled PT services and is progressing towards goals. Patient supine in bed upon approach and agreed to therapy session today. Patient AXO to self only,and very confused /lethargic. Patient had 1:1 in room at start of session. Patient performed all bed mobility and transferred from supine > sit EOB at SBA. Patient demonstrated intact sitting balance while sitting EOB. Patient performed impulsive STS without AD. Patient ambulated around room at min A for 15 feet. Patient very unstable during gait presenting with scissoring gait, increased trunk sway and reduced postural control. Had LOB and stumbled 2-3 times during gait, but had no knee buckling. Patient returned to EOB at SBA and had O2% taken at 88%. Patient instructed to perform pursed lip breathing and after 1-2 minutes patients O2% yinka to 92%. Patient stated that she was tried and wanted to go to bed. Patient performed sit> supine at SBA. Patient left supine in bed with call bell within reach and all needs meet, with sitter present. Current Level of Function Impacting Discharge (mobility/balance): general weakness, poor activity tolerance, unsteady/poor balance with gait, gait deviations. Other factors to consider for discharge: PLOF, assistance at home, level of deficits, PMH, acute medical state, AMS         PLAN :  Patient continues to benefit from skilled intervention to address the above impairments. Continue treatment per established plan of care. to address goals.     Recommendation for discharge: (in order for the patient to meet his/her long term goals)  Group Home    This discharge recommendation:  Has been made in collaboration with the attending provider and/or case management    IF patient discharges home will need the following DME: rolling walker       SUBJECTIVE:   Patient stated Suzette Terrell you discharge me .     OBJECTIVE DATA SUMMARY:   Critical Behavior:  Neurologic State: Alert,Lethargic  Orientation Level: Disoriented to place,Disoriented to situation,Disoriented to time,Oriented to place  Cognition: Decreased attention/concentration,Impulsive,Poor safety awareness  Safety/Judgement: Decreased awareness of environment,Decreased awareness of need for assistance,Decreased awareness of need for safety,Decreased insight into deficits  Functional Mobility Training:  Bed Mobility:  Rolling: Stand-by assistance  Supine to Sit: Stand-by assistance  Sit to Supine: Stand-by assistance  Scooting: Stand-by assistance  Transfers:  Sit to Stand: Stand-by assistance  Stand to Sit: Stand-by assistance  Balance:  Sitting: Intact; Without support  Standing: Impaired; Without support  Standing - Static: Poor; Unsupported  Standing - Dynamic : Poor; Unsupported  Ambulation/Gait Training:  Distance (ft): 15 Feet (ft)  Assistive Device: Gait belt  Ambulation - Level of Assistance: Minimal assistance  Gait Abnormalities: Foot drop;Path deviations;Scissoring;Shuffling gait;Trunk sway increased  Pain Rating:  No pain reported during session    Activity Tolerance:   Fair and requires rest breaks  Please refer to the flowsheet for vital signs taken during this treatment. After treatment patient left in no apparent distress:   Supine in bed, Call bell within reach, Bed / chair alarm activated, and Side rails x 3    COMMUNICATION/COLLABORATION:   The patients plan of care was discussed with: Registered nurse.        Problem: Mobility Impaired (Adult and Pediatric)  Goal: *Acute Goals and Plan of Care (Insert Text)  Description: Patient will move from supine to sit and sit to supine , scoot up and down, and roll side to side in bed with minimal assistance/contact guard assist within 7 day(s). Patient will transfer from bed to chair and chair to bed with minimal assistance/contact guard assist using the least restrictive device within 7 day(s). Patient will improve static standing balance to minimal assistance within 1 week(s). Patient will ambulate 30 feet with minimal assistance with least restrictive device within 1 weeks.        Outcome: Progressing Towards Goal       Ulus Sever, PTA   Time Calculation: 23 mins

## 2022-03-28 NOTE — PROGRESS NOTES
OCCUPATIONAL THERAPY TREATMENT  Patient: Roxanne Hooker (89 y.o. female)  Date: 3/28/2022  Diagnosis: Aspiration pneumonia ([facility] [J69.0]  Acute respiratory failure with hypoxemia ([facility] [J96.01] <principal problem not specified>       Precautions:    Chart, occupational therapy assessment, plan of care, and goals were reviewed. ASSESSMENT  Patient continues with skilled OT services and is progressing towards goals. Upon KNIGHT arrival, pt semi supine in bed sleeping with 1:1 in room, aroused by voice/touch, and agreeable to tx session. Pt noted to be soiled in urine upon arrival.  Pt completed bed mobility with ModA for rolling, Min/ModA supine>sit, SBA sit>supine, and Prosper scooting to EOB. Pt completed donning of socks with setup/SBA while seated at EOB. PCT completed cleaning of pt back due to being soiled. Pt completed sit>stand from EOB with CGA and noted with slight posterior lean. Pt ambulated with HHA and CGA to bathroom and completed toilet transfer with Prosper. Pt completed seated bowel hygiene with setup/SBA. Donning of clean gown completed while seated on toilet with Prosper due to line management and command following. Pt completed sit>stand from toilet with CGA using RW for balance upon standing. Pt required verbal/tactile cueing for hand placement on RW and for maneuvering RW. Pt ambulated to EOB with CGA and completed transfer impulsively before being safely at EOB. Pt returned to supine with SBA and rolled L and R with ModA for donning of brief, completed with total A. Pt left semi supine in bed with call bell within reach, bed alarm activated, and 1:1 in room. Will continue to follow pt throughout remainder of stay and progress towards OT goals. Recommending return to group home at discharge when medically appropriate.     Other factors to consider for discharge: home support, PLOF, severity of deficits         PLAN :  Patient continues to benefit from skilled intervention to address the above impairments. Continue treatment per established plan of care. to address goals. Recommendation for discharge: (in order for the patient to meet his/her long term goals)  Return to group home    This discharge recommendation:  Has been made in collaboration with the attending provider and/or case management    IF patient discharges home will need the following DME: TBD       SUBJECTIVE:   Patient stated I need to go to the bathroom.     OBJECTIVE DATA SUMMARY:   Cognitive/Behavioral Status:  Neurologic State: Alert  Orientation Level: Oriented to person (name only)  Cognition: Decreased attention/concentration;Decreased command following    Functional Mobility and Transfers for ADLs:  Bed Mobility:  Rolling: Moderate assistance  Supine to Sit: Minimum assistance; Moderate assistance  Sit to Supine: Stand-by assistance  Scooting: Minimum assistance    Transfers:  Sit to Stand: Contact guard assistance  Functional Transfers  Toilet Transfer : Contact guard assistance;Minimum assistance    Balance:  Sitting: Intact; Without support  Standing: Impaired; With support  Standing - Static: Constant support; Fair  Standing - Dynamic : Constant support;Poor    ADL Intervention:  Upper Body [address] Gown: Minimum  assistance    Lower Body Dressing Assistance  Protective Undergarmet: Total assistance (dependent)  Socks: Set-up; Stand-by assistance    Toileting  Bowel Hygiene: Set-up; Stand-by assistance    Pain:  0/10    Activity Tolerance:   Fair and requires rest breaks  Please refer to the flowsheet for vital signs taken during this treatment. After treatment patient left in no apparent distress:   Supine in bed, Call bell within reach, Bed / chair alarm activated, Side rails x 3, and 1:1 present    COMMUNICATION/COLLABORATION:   The patients plan of care was discussed with: Registered nurse and Certified nursing assistant/patient care technician.      ANTONIA Obando  Time Calculation: 30 mins    Problem: Self Care Deficits Care Plan (Adult)  Goal: *Acute Goals and Plan of Care (Insert Text)  Description: 1. Pt will be SBA sup <> sit in prep for EOB ADLs  2. Pt will be mod I grooming sitting EOB  3. Pt will be SBA LE dressing sitting EOB/long sit  4. Pt will be SBA sit <>  prep for toileting LRAD  5. Pt will be SBA toileting/toilet transfer/cloth mgmt LRAD  6.  Pt will be mod I following UE HEP in prep for self care tasks      Outcome: Progressing Towards Goal

## 2022-03-29 ENCOUNTER — APPOINTMENT (OUTPATIENT)
Dept: CT IMAGING | Age: 62
DRG: 720 | End: 2022-03-29
Attending: INTERNAL MEDICINE
Payer: MEDICAID

## 2022-03-29 ENCOUNTER — APPOINTMENT (OUTPATIENT)
Dept: GENERAL RADIOLOGY | Age: 62
DRG: 720 | End: 2022-03-29
Attending: INTERNAL MEDICINE
Payer: MEDICAID

## 2022-03-29 VITALS
HEART RATE: 84 BPM | SYSTOLIC BLOOD PRESSURE: 146 MMHG | TEMPERATURE: 97.5 F | WEIGHT: 123.68 LBS | RESPIRATION RATE: 16 BRPM | OXYGEN SATURATION: 96 % | BODY MASS INDEX: 21.11 KG/M2 | DIASTOLIC BLOOD PRESSURE: 76 MMHG | HEIGHT: 64 IN

## 2022-03-29 LAB
CRP SERPL-MCNC: <0.29 MG/DL (ref 0–0.6)
CRYPTOC AG SER QL IA: NEGATIVE
PROCALCITONIN SERPL-MCNC: <0.05 NG/ML

## 2022-03-29 PROCEDURE — 74011250637 HC RX REV CODE- 250/637: Performed by: INTERNAL MEDICINE

## 2022-03-29 PROCEDURE — 74011250636 HC RX REV CODE- 250/636: Performed by: HOSPITALIST

## 2022-03-29 PROCEDURE — 74011000250 HC RX REV CODE- 250: Performed by: INTERNAL MEDICINE

## 2022-03-29 PROCEDURE — 97530 THERAPEUTIC ACTIVITIES: CPT

## 2022-03-29 PROCEDURE — 36415 COLL VENOUS BLD VENIPUNCTURE: CPT

## 2022-03-29 PROCEDURE — 74011250636 HC RX REV CODE- 250/636: Performed by: INTERNAL MEDICINE

## 2022-03-29 PROCEDURE — 86140 C-REACTIVE PROTEIN: CPT

## 2022-03-29 PROCEDURE — 94640 AIRWAY INHALATION TREATMENT: CPT

## 2022-03-29 PROCEDURE — 74011000250 HC RX REV CODE- 250: Performed by: HOSPITALIST

## 2022-03-29 PROCEDURE — 84145 PROCALCITONIN (PCT): CPT

## 2022-03-29 PROCEDURE — 74011250637 HC RX REV CODE- 250/637: Performed by: NURSE PRACTITIONER

## 2022-03-29 PROCEDURE — 74011250637 HC RX REV CODE- 250/637: Performed by: HOSPITALIST

## 2022-03-29 PROCEDURE — 92611 MOTION FLUOROSCOPY/SWALLOW: CPT

## 2022-03-29 PROCEDURE — 87327 CRYPTOCOCCUS NEOFORM AG IA: CPT

## 2022-03-29 PROCEDURE — 74230 X-RAY XM SWLNG FUNCJ C+: CPT

## 2022-03-29 PROCEDURE — 99232 SBSQ HOSP IP/OBS MODERATE 35: CPT | Performed by: INTERNAL MEDICINE

## 2022-03-29 RX ORDER — SULFAMETHOXAZOLE AND TRIMETHOPRIM 800; 160 MG/1; MG/1
2 TABLET ORAL 3 TIMES DAILY
Status: DISCONTINUED | OUTPATIENT
Start: 2022-03-29 | End: 2022-03-29 | Stop reason: HOSPADM

## 2022-03-29 RX ORDER — SULFAMETHOXAZOLE AND TRIMETHOPRIM 800; 160 MG/1; MG/1
2 TABLET ORAL 3 TIMES DAILY
Qty: 78 TABLET | Refills: 0 | Status: SHIPPED | OUTPATIENT
Start: 2022-03-29 | End: 2022-04-11

## 2022-03-29 RX ORDER — PREDNISONE 20 MG/1
20 TABLET ORAL DAILY
Qty: 13 TABLET | Refills: 0 | Status: SHIPPED | OUTPATIENT
Start: 2022-03-29 | End: 2022-07-11 | Stop reason: ALTCHOICE

## 2022-03-29 RX ORDER — DILTIAZEM HYDROCHLORIDE 120 MG/1
120 CAPSULE, COATED, EXTENDED RELEASE ORAL DAILY
Qty: 30 CAPSULE | Refills: 0 | Status: SHIPPED | OUTPATIENT
Start: 2022-03-30 | End: 2022-04-29

## 2022-03-29 RX ADMIN — NYSTATIN 500000 UNITS: 100000 SUSPENSION ORAL at 08:44

## 2022-03-29 RX ADMIN — BARIUM SULFATE 5 ML: 400 SUSPENSION ORAL at 16:00

## 2022-03-29 RX ADMIN — MULTIVITAMIN TABLET 1 TABLET: TABLET at 08:47

## 2022-03-29 RX ADMIN — SODIUM CHLORIDE, PRESERVATIVE FREE 10 ML: 5 INJECTION INTRAVENOUS at 14:09

## 2022-03-29 RX ADMIN — SULFAMETHOXAZOLE AND TRIMETHOPRIM 2 TABLET: 800; 160 TABLET ORAL at 15:44

## 2022-03-29 RX ADMIN — SULFAMETHOXAZOLE AND TRIMETHOPRIM 2 TABLET: 800; 160 TABLET ORAL at 10:42

## 2022-03-29 RX ADMIN — DIVALPROEX SODIUM 250 MG: 125 CAPSULE, COATED PELLETS ORAL at 08:48

## 2022-03-29 RX ADMIN — IPRATROPIUM BROMIDE AND ALBUTEROL SULFATE 3 ML: .5; 2.5 SOLUTION RESPIRATORY (INHALATION) at 08:02

## 2022-03-29 RX ADMIN — NYSTATIN 500000 UNITS: 100000 SUSPENSION ORAL at 17:04

## 2022-03-29 RX ADMIN — BARIUM SULFATE 30 ML: 0.81 POWDER, FOR SUSPENSION ORAL at 16:00

## 2022-03-29 RX ADMIN — SODIUM CHLORIDE, PRESERVATIVE FREE 10 ML: 5 INJECTION INTRAVENOUS at 05:17

## 2022-03-29 RX ADMIN — FUROSEMIDE 40 MG: 10 INJECTION, SOLUTION INTRAMUSCULAR; INTRAVENOUS at 08:47

## 2022-03-29 RX ADMIN — RISPERIDONE 2 MG: 2 TABLET, FILM COATED ORAL at 08:47

## 2022-03-29 RX ADMIN — METHYLPREDNISOLONE SODIUM SUCCINATE 40 MG: 40 INJECTION, POWDER, FOR SOLUTION INTRAMUSCULAR; INTRAVENOUS at 08:47

## 2022-03-29 RX ADMIN — BARIUM SULFATE 10 ML: 400 SUSPENSION ORAL at 16:00

## 2022-03-29 RX ADMIN — NYSTATIN 500000 UNITS: 100000 SUSPENSION ORAL at 14:09

## 2022-03-29 RX ADMIN — BARIUM SULFATE 5 ML: 400 PASTE ORAL at 16:00

## 2022-03-29 RX ADMIN — CLONIDINE HYDROCHLORIDE 0.1 MG: 0.1 TABLET ORAL at 08:47

## 2022-03-29 RX ADMIN — IPRATROPIUM BROMIDE AND ALBUTEROL SULFATE 3 ML: .5; 2.5 SOLUTION RESPIRATORY (INHALATION) at 14:07

## 2022-03-29 RX ADMIN — DILTIAZEM HYDROCHLORIDE 120 MG: 120 CAPSULE, COATED, EXTENDED RELEASE ORAL at 08:48

## 2022-03-29 RX ADMIN — BENZTROPINE MESYLATE 0.5 MG: 1 TABLET ORAL at 08:47

## 2022-03-29 RX ADMIN — LISINOPRIL 20 MG: 20 TABLET ORAL at 08:47

## 2022-03-29 RX ADMIN — ENOXAPARIN SODIUM 40 MG: 100 INJECTION SUBCUTANEOUS at 02:26

## 2022-03-29 RX ADMIN — SULFAMETHOXAZOLE AND TRIMETHOPRIM 187.04 MG: 80; 16 INJECTION, SOLUTION, CONCENTRATE INTRAVENOUS at 02:26

## 2022-03-29 NOTE — DISCHARGE SUMMARY
Physician Discharge Summary     Patient ID:    Denzel Ramirez  [id number]  89 y.o.  1960    Admit date: 3/17/2022    Discharge date : 3/29/2022    Chronic Diagnoses:    Problem List as of 3/29/2022 Date Reviewed: 3/18/2022          Codes Class Noted - Resolved    Aspiration pneumonia ([facility] ICD-10-CM: J69.0  ICD-9-CM: 507.0  3/18/2022 - Present        Acute respiratory failure with hypoxemia [facility]) ICD-10-CM: J96.01  ICD-9-CM: 518.81  3/18/2022 - Present        Acute respiratory failure with hypoxia [facility]) ICD-10-CM: J96.01  ICD-9-CM: 518.81  2/25/2022 - Present        Pneumonia ICD-10-CM: J18.9  ICD-9-CM: 486  2/24/2022 - Present        Sepsis ([facility] ICD-10-CM: A41.9  ICD-9-CM: 038.9, 995.91  2/24/2022 - Present        Chronic undifferentiated schizophrenia with acute exacerbations ([facility] ICD-10-CM: F20.9  ICD-9-CM: 295.64  5/29/2017 - Present        Non-compliance with treatment ICD-10-CM: Z91.19  ICD-9-CM: V15.81  5/29/2017 - Present        Essential hypertension ICD-10-CM: I10  ICD-9-CM: 401.9  5/29/2017 - Present        Alcohol abuse ICD-10-CM: F10.10  ICD-9-CM: 305.00  5/29/2017 - Present          22    Final Diagnoses:   Aspiration pneumonia ([facility] [J69.0]  Acute respiratory failure with hypoxemia ([facility]) [J96.01]  Pneumocystis jiroveci pneumonia    Sepsis with fever, tachycardia and elevated procalcitonin     Acute respiratory failure with hypoxia, improved     HIV-1 infection with CD4 count less than 100 and markedly elevated viral load     Hepatitis C infection with markedly elevated viral load              -Elevated AFP tumor marker, ultrasound unremarkable     Anemia of chronic disease     Schizophrenia     History of alcohol abuse     Reason for Hospitalization:  72-year-old female with history of COPD, alcohol abuse, HIV, schizophrenia and hepatitis C admitted from a group home on 3/17 with altered mental status and cough. Patient was febrile and tachycardic with gurgling breath sounds. She was hypoxic. Chest x-ray showed diffuse interstitial disease in both lungs. Hospital Course:   Patient was admitted to the ICU initially and started on IV Zosyn and levofloxacin and Bactrim were later added. Wili Arcew She was initially on a nonrebreather, then transition to HFNC, then a low flow nasal cannula     Patient was seen by ID. For count was less than 100. ID was concerned she may have pneumocystis given her interstitial pneumonitis. ID recommending a 3-week course of Bactrim.      Prior to admission patient was not receiving any antiviral therapy for her HIV.     Patient had mild WHITNEY with a creatinine of 1.39 which resolved     Her CRP and procalcitonin both steadily decreased     She was noted to have hepatitis C type Ia with markedly elevated viral load. She had an elevated AFP tumor marker for which an abdominal ultrasound was ordered and showed only a diffuse hepatocellular process.        She had an elevated serum Fungitell which supported the diagnosis of PCP    Patient was felt stable for discharge back to the group home on 3/29       Discharge Medications:   Current Discharge Medication List      START taking these medications    Details   dilTIAZem ER (CARDIZEM CD) 120 mg capsule Take 1 Capsule by mouth daily for 30 days. Qty: 30 Capsule, Refills: 0  Start date: 3/30/2022, End date: 4/29/2022      trimethoprim-sulfamethoxazole (BACTRIM DS, SEPTRA DS) 160-800 mg per tablet Take 2 Tablets by mouth three (3) times daily for 13 days. Indications: pneumonia with a fungus called Pneumocystis jirovecii  Qty: 66 Tablet, Refills: 0  Start date: 3/29/2022, End date: 4/11/2022      predniSONE (DELTASONE) 20 mg tablet Take 20 mg by mouth daily. Qty: 13 Tablet, Refills: 0  Start date: 3/29/2022         CONTINUE these medications which have NOT CHANGED    Details   divalproex ER (DEPAKOTE ER) 250 mg ER tablet Take 250 mg by mouth two (2) times a day.       albuterol (ProAir HFA) 90 mcg/actuation inhaler Take 2 Puffs by inhalation every six (6) hours as needed for Wheezing or Shortness of Breath. Qty: 18 g, Refills: 0      benztropine (COGENTIN) 0.5 mg tablet Take 1 Tablet by mouth two (2) times a day. famotidine (PEPCID) 40 mg tablet Take 40 mg by mouth nightly. lisinopriL (PRINIVIL, ZESTRIL) 20 mg tablet Take 20 mg by mouth daily. risperiDONE (RisperDAL) 2 mg tablet Take 2 mg by mouth two (2) times a day. multivitamin (ONE A DAY) tablet Take 1 Tablet by mouth daily. STOP taking these medications       amLODIPine (NORVASC) 10 mg tablet Comments:   Reason for Stopping:         cloNIDine HCL (CATAPRES) 0.1 mg tablet Comments:   Reason for Stopping:         ferrous sulfate 325 mg (65 mg iron) tablet Comments:   Reason for Stopping: Follow up Care:    1. None in 1-2 weeks. Please call to set up an appointment shortly after discharge. Diet:  Cardiac Diet    Disposition:  Home. Advanced Directive:   FULL    DNR      Discharge Exam:  General:  Alert, cooperative, no distress, appears stated age. Lungs:   Clear to auscultation bilaterally. Chest wall:  No tenderness or deformity. Heart:  Regular rate and rhythm, S1, S2 normal, no murmur, click, rub or gallop. Abdomen:   Soft, non-tender. Bowel sounds normal. No masses,  No organomegaly. Extremities: Extremities normal, atraumatic, no cyanosis or edema. Pulses: 2+ and symmetric all extremities. Skin: Skin color, texture, turgor normal. No rashes or lesions   Neurologic: CNII-XII intact. No gross sensory or motor deficits        CONSULTATIONS: ID and pulmonology    Significant Diagnostic Studies:   3/17/2022: BUN 28 mg/dL (H; Ref range: 6 - 20 mg/dL); Calcium 8.3 mg/dL (L; Ref range: 8.5 - 10.1 mg/dL); CO2 26 mmol/L (Ref range: 21 - 32 mmol/L); Creatinine 1.19 mg/dL (H; Ref range: 0.55 - 1.02 mg/dL); Glucose 108 mg/dL (H; Ref range: 65 - 100 mg/dL); HCT 32.5 % (L; Ref range: 35.0 - 47.0 %);  HGB 10.9 g/dL (L; Ref range: 11.5 - 16.0 g/dL); Potassium 4.7 mmol/L (Ref range: 3.5 - 5.1 mmol/L); Sodium 138 mmol/L (Ref range: 136 - 145 mmol/L)  3/18/2022: BUN 25 mg/dL (H; Ref range: 6 - 20 mg/dL); Calcium 7.8 mg/dL (L; Ref range: 8.5 - 10.1 mg/dL); CO2 26 mmol/L (Ref range: 21 - 32 mmol/L); Creatinine 0.92 mg/dL (Ref range: 0.55 - 1.02 mg/dL); Glucose 78 mg/dL (Ref range: 65 - 100 mg/dL); HCT 31.5 % (L; Ref range: 35.0 - 47.0 %); HGB 10.5 g/dL (L; Ref range: 11.5 - 16.0 g/dL); Potassium Hemolyzed, recollect requested mmol/L (Ref range: 3.5 - 5.1 mmol/L); Sodium 138 mmol/L (Ref range: 136 - 145 mmol/L)  Recent Labs     03/27/22  0841   WBC 6.0   HGB 10.5*   HCT 30.2*        Recent Labs     03/27/22  0841      K 4.1      CO2 29   BUN 25*   CREA 0.86   *   CA 8.5     No results for input(s): ALT, AP, TBIL, TBILI, TP, ALB, GLOB, GGT, AML, LPSE in the last 72 hours. No lab exists for component: SGOT, GPT, AMYP, HLPSE  No results for input(s): INR, PTP, APTT, INREXT in the last 72 hours. No results for input(s): FE, TIBC, PSAT, FERR in the last 72 hours. No results for input(s): PH, PCO2, PO2 in the last 72 hours. No results for input(s): CPK, CKMB in the last 72 hours.     No lab exists for component: TROPONINI  Lab Results   Component Value Date/Time    Glucose (POC) 132 (H) 03/21/2022 11:27 AM    Glucose (POC) 108 03/20/2022 06:12 PM    Glucose (POC) 90 03/19/2022 08:20 AM    Glucose (POC) 97 06/08/2011 08:27 PM       Discharge time spent 35 minutes    Signed:  Eric Ferguson MD  3/29/2022  10:55 AM

## 2022-03-29 NOTE — PROGRESS NOTES
Hospitalist Progress Note               Daily Progress Note: 3/29/2022      Subjective:   Hospital course to date: 78-year-old female with history of COPD, alcohol abuse, HIV, schizophrenia and hepatitis C admitted from a group home on 3/17 with altered mental status and cough. Patient was febrile and tachycardic with gurgling breath sounds. She was hypoxic. Chest x-ray showed diffuse interstitial disease in both lungs. Patient was admitted to the ICU initially and started on IV Zosyn and levofloxacin and Bactrim were later added. Yancey Crape She was initially on a nonrebreather, then transition to HFNC, then a low flow nasal cannula    Patient was seen by ID. For count was less than 100. ID was concerned she may have pneumocystis given her interstitial pneumonitis. ID recommending a 3-week course of Bactrim. Prior to admission patient was not receiving any antiviral therapy for her HIV. Patient had mild WHITNEY with a creatinine of 1.39 which resolved    Her CRP and procalcitonin both steadily decreased    She was noted to have hepatitis C type Ia with markedly elevated viral load. She had an elevated AFP tumor marker for which an abdominal ultrasound was ordered and showed only a diffuse hepatocellular process. CT of the abdomen with IV contrast was then ordered and is still pending    She had an elevated serum Fungitell which supported the diagnosis of PCP    ------    Patient is seen today for follow-up. She has now been weaned to room air.   She is anxious to be discharged      Problem List:  Problem List as of 3/29/2022 Date Reviewed: 3/18/2022          Codes Class Noted - Resolved    Aspiration pneumonia [facility]) ICD-10-CM: J69.0  ICD-9-CM: 507.0  3/18/2022 - Present        Acute respiratory failure with hypoxemia ([facility] ICD-10-CM: J96.01  ICD-9-CM: 518.81  3/18/2022 - Present        Acute respiratory failure with hypoxia ([facility] ICD-10-CM: J96.01  ICD-9-CM: 518.81  2/25/2022 - Present        Pneumonia ICD-10-CM: J18.9  ICD-9-CM: 892  2/24/2022 - Present        Sepsis ([facility] ICD-10-CM: A41.9  ICD-9-CM: 038.9, 995.91  2/24/2022 - Present        Chronic undifferentiated schizophrenia with acute exacerbations ([facility] ICD-10-CM: F20.9  ICD-9-CM: 295.64  5/29/2017 - Present        Non-compliance with treatment ICD-10-CM: Z91.19  ICD-9-CM: V15.81  5/29/2017 - Present        Essential hypertension ICD-10-CM: I10  ICD-9-CM: 401.9  5/29/2017 - Present        Alcohol abuse ICD-10-CM: F10.10  ICD-9-CM: 305.00  5/29/2017 - Present              Medications reviewed  Current Facility-Administered Medications   Medication Dose Route Frequency    trimethoprim-sulfamethoxazole (BACTRIM DS, SEPTRA DS) 160-800 mg per tablet 2 Tablet  2 Tablet Oral TID    methylPREDNISolone (PF) (SOLU-MEDROL) injection 40 mg  40 mg IntraVENous Q12H    nystatin (MYCOSTATIN) 100,000 unit/mL oral suspension 500,000 Units  500,000 Units Oral QID    dilTIAZem ER (CARDIZEM CD) capsule 120 mg  120 mg Oral DAILY    acetaminophen (TYLENOL) suppository 650 mg  650 mg Rectal Q4H PRN    albuterol-ipratropium (DUO-NEB) 2.5 MG-0.5 MG/3 ML  3 mL Nebulization Q6HWA RT    divalproex (DEPAKOTE SPRINKLE) capsule 250 mg  250 mg Oral Q12H    metoprolol (LOPRESSOR) injection 5 mg  5 mg IntraVENous Q6H PRN    benztropine (COGENTIN) tablet 0.5 mg  0.5 mg Oral BID    cloNIDine HCL (CATAPRES) tablet 0.1 mg  0.1 mg Oral DAILY    famotidine (PEPCID) tablet 20 mg  20 mg Oral QHS    lisinopriL (PRINIVIL, ZESTRIL) tablet 20 mg  20 mg Oral DAILY    risperiDONE (RisperDAL) tablet 2 mg  2 mg Oral BID    multivitamin with folic acid (ONE DAILY WITH FOLIC ACID) tablet 1 Tablet  1 Tablet Oral DAILY    sodium chloride (NS) flush 5-40 mL  5-40 mL IntraVENous Q8H    sodium chloride (NS) flush 5-40 mL  5-40 mL IntraVENous PRN    acetaminophen (TYLENOL) tablet 650 mg  650 mg Oral Q4H PRN    enoxaparin (LOVENOX) injection 40 mg  40 mg SubCUTAneous Q24H    albuterol CONCENTRATE 2.5mg/0.5 mL neb soln  2.5 mg Nebulization Q4H PRN    furosemide (LASIX) injection 40 mg  40 mg IntraVENous DAILY    hydrOXYzine pamoate (VISTARIL) capsule 25 mg  25 mg Oral Q6H PRN    LORazepam (ATIVAN) injection 0.5 mg  0.5 mg IntraVENous Q4H PRN       Review of Systems:   A comprehensive review of systems was negative except for that written in the HPI. Objective:   Physical Exam:     Visit Vitals  BP (!) 146/76   Pulse 84   Temp 97.5 °F (36.4 °C)   Resp 16   Ht 5' 4.02\" (1.626 m)   Wt 56.1 kg (123 lb 10.9 oz)   SpO2 97%   BMI 21.22 kg/m²    O2 Flow Rate (L/min): 2 l/min O2 Device: None (Room air)    Temp (24hrs), Av.1 °F (36.7 °C), Min:97.5 °F (36.4 °C), Max:98.8 °F (37.1 °C)    No intake/output data recorded.  1901 -  0700  In: 1000 [I.V.:1000]  Out: -     General:   Awake and alert   Lungs:   Clear to auscultation bilaterally. Chest wall:  No tenderness or deformity. Heart:  Regular rate and rhythm, S1, S2 normal, no murmur, click, rub or gallop. Abdomen:   Soft, non-tender. Bowel sounds normal. No masses,  No organomegaly. Extremities: Extremities normal, atraumatic, no cyanosis or edema. Pulses: 2+ and symmetric all extremities. Skin: Skin color, texture, turgor normal. No rashes or lesions   Neurologic: CNII-XII intact. No gross focal deficits         Data Review:       Recent Days:  Recent Labs     22  0841   WBC 6.0   HGB 10.5*   HCT 30.2*        Recent Labs     22  0841      K 4.1      CO2 29   *   BUN 25*   CREA 0.86   CA 8.5     No results for input(s): PH, PCO2, PO2, HCO3, FIO2 in the last 72 hours.     24 Hour Results:  Recent Results (from the past 24 hour(s))   C REACTIVE PROTEIN, QT    Collection Time: 22  7:31 AM   Result Value Ref Range    C-Reactive protein <0.29 0.00 - 0.60 mg/dL   PROCALCITONIN    Collection Time: 22  7:31 AM   Result Value Ref Range    Procalcitonin <0.05 (H) 0 ng/mL        ABD LTD   Final Result   1. Visualized portions of the liver demonstrate coarsened echotexture from   diffuse hepatocellular process. CT or MRI, preferably with IV contrast is more   sensitive to evaluate for hepatic lesions. XR CHEST PORT   Final Result      XR CHEST PORT   Final Result      XR CHEST PORT   Final Result   Diffuse opacities in the lungs, increased. XR CHEST PORT   Final Result   FINDINGS/IMPRESSION:   Persistent diffuse airspace opacity throughout the lungs. Cardiac silhouette stable in size. Negative for pneumothorax. CT CHEST WO CONT   Final Result   Extensive diffuse bilateral pneumonitis and patchy pneumonia      XR CHEST PORT   Final Result   Diffuse predominantly interstitial process in both lungs with some   greater involvement on the left. Differential considerations include pulmonary   edema and diffuse infection. XR SWALLOW FUNC VIDEO    (Results Pending)   CT ABD W CONT    (Results Pending)        Assessment:  Interstitial pneumonitis bilateral, probable pneumocystis    Sepsis with fever, tachycardia and elevated procalcitonin    Acute respiratory failure with hypoxia, improved    HIV-1 infection with CD4 count less than 100 and markedly elevated viral load    Hepatitis C infection with markedly elevated viral load   -Elevated AFP tumor marker    Anemia of chronic disease    Schizophrenia    History of alcohol abuse      Plan:  Await CT abdomen  Likely discharge later today  Plan 3 weeks of Bactrim total -has 13 days left  Discharge on oral prednisone    Care Plan discussed with: Patient/Family    Disposition: Plan discharge later today after CT    Total time spent with patient: 30 minutes.     Vicenta Jarrett MD

## 2022-03-29 NOTE — PROGRESS NOTES
Pulmonary Progress Note      NAME: Marcelino Alston   :  1960  MRM:  [id number]    Date/Time: 3/29/2022  4:22 PM         Subjective:     Patient seen and examined. Overnight events noted    On 6 L nasal cannula oxygen  Gradual improvement  Awake and alert  No acute distress  Has a sitter    COVID-19 ruled out. ID input is noted. She is taking minced and moist diet without any problems. Diet changed to puréed with thin liquids per speech. Past Medical History reviewed and unchanged from Admission History and Physical       Objective:     Physical Exam     Vitals:      Last 24hrs VS reviewed since prior progress note. Most recent are:    Visit Vitals  BP (!) 146/76   Pulse 84   Temp 97.5 °F (36.4 °C)   Resp 16   Ht 5' 4.02\" (1.626 m)   Wt 56.1 kg (123 lb 10.9 oz)   SpO2 97%   BMI 21.22 kg/m²     SpO2 Readings from Last 6 Encounters:   22 97%   22 99%   22 98%   21 96%   21 97%   09/15/21 97%    O2 Flow Rate (L/min): 2 l/min       Intake/Output Summary (Last 24 hours) at 3/29/2022 1326  Last data filed at 3/28/2022 1752  Gross per 24 hour   Intake 1000 ml   Output --   Net 1000 ml      General: Lying in bed in mild respiratory distress, on nasal cannula oxygen  Eye: Pupils are equal and reactive to light. Throat and Neck: Oropharynx without thrush. Neck is supple and trachea central.  No obvious lymphadenopathy. Lung: Reduced air entry bilaterally with prolonged exhalation but no wheezing. Occasional crackles. No significant change. Heart: S1+S2. No murmurs  Abdomen: soft, non-tender. Bowel sounds normal. No masses; obese  Extremities: No edema, no cyanosis or clubbing. Pulses are palpable. : Not done  Skin: No cyanosis  Neurologic:  Alert and awake, confused at times, grossly nonfocal  Psychiatric:  Unable to perform due to patient's condition    US ABD LTD   Final Result   1.  Visualized portions of the liver demonstrate coarsened echotexture from   diffuse hepatocellular process. CT or MRI, preferably with IV contrast is more   sensitive to evaluate for hepatic lesions. XR CHEST PORT   Final Result      XR CHEST PORT   Final Result      XR CHEST PORT   Final Result   Diffuse opacities in the lungs, increased. XR CHEST PORT   Final Result   FINDINGS/IMPRESSION:   Persistent diffuse airspace opacity throughout the lungs. Cardiac silhouette stable in size. Negative for pneumothorax. CT CHEST WO CONT   Final Result   Extensive diffuse bilateral pneumonitis and patchy pneumonia      XR CHEST PORT   Final Result   Diffuse predominantly interstitial process in both lungs with some   greater involvement on the left. Differential considerations include pulmonary   edema and diffuse infection.                 XR SWALLOW FUNC VIDEO    (Results Pending)       Lab Data Reviewed: (see below)      Medications:  Current Facility-Administered Medications   Medication Dose Route Frequency    trimethoprim-sulfamethoxazole (BACTRIM DS, SEPTRA DS) 160-800 mg per tablet 2 Tablet  2 Tablet Oral TID    methylPREDNISolone (PF) (SOLU-MEDROL) injection 40 mg  40 mg IntraVENous Q12H    nystatin (MYCOSTATIN) 100,000 unit/mL oral suspension 500,000 Units  500,000 Units Oral QID    dilTIAZem ER (CARDIZEM CD) capsule 120 mg  120 mg Oral DAILY    acetaminophen (TYLENOL) suppository 650 mg  650 mg Rectal Q4H PRN    albuterol-ipratropium (DUO-NEB) 2.5 MG-0.5 MG/3 ML  3 mL Nebulization Q6HWA RT    divalproex (DEPAKOTE SPRINKLE) capsule 250 mg  250 mg Oral Q12H    metoprolol (LOPRESSOR) injection 5 mg  5 mg IntraVENous Q6H PRN    benztropine (COGENTIN) tablet 0.5 mg  0.5 mg Oral BID    cloNIDine HCL (CATAPRES) tablet 0.1 mg  0.1 mg Oral DAILY    famotidine (PEPCID) tablet 20 mg  20 mg Oral QHS    lisinopriL (PRINIVIL, ZESTRIL) tablet 20 mg  20 mg Oral DAILY    risperiDONE (RisperDAL) tablet 2 mg  2 mg Oral BID    multivitamin with folic acid (ONE DAILY WITH FOLIC ACID) tablet 1 Tablet  1 Tablet Oral DAILY    sodium chloride (NS) flush 5-40 mL  5-40 mL IntraVENous Q8H    sodium chloride (NS) flush 5-40 mL  5-40 mL IntraVENous PRN    acetaminophen (TYLENOL) tablet 650 mg  650 mg Oral Q4H PRN    enoxaparin (LOVENOX) injection 40 mg  40 mg SubCUTAneous Q24H    albuterol CONCENTRATE 2.5mg/0.5 mL neb soln  2.5 mg Nebulization Q4H PRN    furosemide (LASIX) injection 40 mg  40 mg IntraVENous DAILY    hydrOXYzine pamoate (VISTARIL) capsule 25 mg  25 mg Oral Q6H PRN    LORazepam (ATIVAN) injection 0.5 mg  0.5 mg IntraVENous Q4H PRN       ______________________________________________________________________      Lab Review:     Recent Labs     03/27/22  0841   WBC 6.0   HGB 10.5*   HCT 30.2*        Recent Labs     03/27/22  0841      K 4.1      CO2 29   *   BUN 25*   CREA 0.86   CA 8.5     No components found for: GLPOC  No results for input(s): PH, PCO2, PO2, HCO3, FIO2 in the last 72 hours. No results for input(s): INR, INREXT, INREXT, INREXT in the last 72 hours. Other pertinent lab:          Assessment & Plan:      Assessment:      1. Acute respiratory failure with hypoxia due to #2 and 3  2. Bilateral healthcare associated pneumonia  3. Acute CHF and sinus tachycardia due to #4  4. Severe sepsis  5. Altered mental status/acute metabolic encephalopathy  6. COPD with exacerbation  7. HIV  8. Schizophrenia  9. History of alcohol abuse     Plan:        Currently patient is on room air     Patient has COPD  Continue nebulizers  Added IV Solu-Medrol, which I believe is helping her. Appreciate ID input. Treating as possible PCP. Bactrim added. She is aspiration risk. On puréed with thin liquids as per speech. Appreciate input. Urinary retention. Had a [place] catheter. Nystatin swish and swallow. She has thrush.     Patient has combination of CHF and healthcare associated pneumonia, speech is also involved.   There is a possibility of aspiration pneumonia. Continue broad-spectrum antibiotics, currently on Zosyn and Bactrim. Of Levaquin. Appreciate ID input. COVID-19 ruled out. Patient developed sinus tachycardia. Much improved. Now started on diltiazem. Appreciate input from cardiology.     Started on Lasix IV daily since blood pressure stable  Intake and output charting  Check electrolytes and replace as needed  Monitor renal function with fluid change     Monitor mental status      DVT and GI prophylaxis     Case discussed in detail with RN, RT, and care team  Thank you for involving me in the care of this patient  Patient will be able to go home today   Ilene Blanco MD  [facility]

## 2022-03-29 NOTE — PROGRESS NOTES
Called [employer], gave GETA report.  She will be working until [employer] and would like to be called when Ms. Jane Pinto to [facility].

## 2022-03-29 NOTE — PROGRESS NOTES
OCCUPATIONAL THERAPY TREATMENT  Patient: Foster Jolly (9 y.o. female)  Date: 3/29/2022  Diagnosis: Aspiration pneumonia ([facility] [J69.0]  Acute respiratory failure with hypoxemia ([facility] [J96.01] <principal problem not specified>       Precautions:    Chart, occupational therapy assessment, plan of care, and goals were reviewed. ASSESSMENT  Patient continues with skilled OT services and is progressing towards goals. Upon KNIGHT/PT arrival, pt semi supine in bed with 1:1 in room completing getting cleaned up and agreeable to tx session. Pt completed bed mobility with SBA for rolling, supine>sit with Prosper and additional time, sit>supine with SBA, and scooting to EOB with SBA. Pt noted with fair static sitting balance and poor dynamic sitting balance at EOB. Pt completed donning of socks at EOB with CGA, pt noted to lose balance and fall over to L side while donning. Pt completed sit>stand from EOB with CGA using RW for balance upon standing. Pt ambulated to bathroom with CGA x2/Prosper and completed standing face washing at sink with CGA and Prosper for balance. Pt ambulated in hallway with Prosper using RW with PT with one standing rest break during ambulation. Pt required constant verbal/tactile cueing for maneuvering RW. Pt returned to room and completed transfer impulsively to EOB. Attempted completion of therex at EOB but pt unable to complete due to lethargy. Pt returned to supine with SBA and scooted to [facility]. Attempted semi supine therex but pt unable to complete/declinging completion. Pt left semi supine in bed with call bell within reach and 1:1 in room. Will continue to follow pt throughout remainder of stay and progress towards OT goals. Recommending return to group home at discharge when medically appropriate.     Current Level of Function Impacting Discharge (ADLs): SBA/Prosper bed mobility, CGA sit>stand, CGA LB dressing    Other factors to consider for discharge: home support, PLOF, severity of deficits PLAN :  Patient continues to benefit from skilled intervention to address the above impairments. Continue treatment per established plan of care. to address goals. Recommendation for discharge: (in order for the patient to meet his/her long term goals)  Return to group home    This discharge recommendation:  Has been made in collaboration with the attending provider and/or case management    IF patient discharges home will need the following DME: TBD       SUBJECTIVE:   Patient stated I am going to rest.    OBJECTIVE DATA SUMMARY:   Cognitive/Behavioral Status:  Neurologic State: Alert  Orientation Level: Disoriented to place; Disoriented to situation;Disoriented to time;Oriented to person  Cognition: Decreased attention/concentration;Decreased command following; Impaired decision making; Impulsive    Functional Mobility and Transfers for ADLs:  Bed Mobility:  Rolling: Stand-by assistance  Supine to Sit: Minimum assistance; Additional time  Sit to Supine: Stand-by assistance  Scooting: Stand-by assistance    Transfers:  Sit to Stand: Contact guard assistance    Balance:  Sitting: Impaired; With support  Sitting - Static: Fair (occasional)  Sitting - Dynamic: Poor (constant support)  Standing: Impaired; With support  Standing - Static: Constant support;Poor  Standing - Dynamic : Constant support;Poor    ADL Intervention:  Grooming  Position Performed: Standing  Washing Face: Contact guard assistance    Lower Body Dressing Assistance  Socks: Contact guard assistance    Pain:  0/10    Activity Tolerance:   Fair and requires rest breaks  Please refer to the flowsheet for vital signs taken during this treatment. After treatment patient left in no apparent distress:   Supine in bed, Call bell within reach, and 1:1 present    COMMUNICATION/COLLABORATION:   The patients plan of care was discussed with: Physical therapist and Certified nursing assistant/patient care technician.      [place] with PT for increased safety for pt and clinician. ANTONIA Reyes  Time Calculation: 23 mins    Problem: Self Care Deficits Care Plan (Adult)  Goal: *Acute Goals and Plan of Care (Insert Text)  Description: 1. Pt will be SBA sup <> sit in prep for EOB ADLs  2. Pt will be mod I grooming sitting EOB  3. Pt will be SBA LE dressing sitting EOB/long sit  4. Pt will be SBA sit <>  prep for toileting LRAD  5. Pt will be SBA toileting/toilet transfer/cloth mgmt LRAD  6.  Pt will be mod I following UE HEP in prep for self care tasks      Outcome: Progressing Towards Goal

## 2022-03-29 NOTE — PROGRESS NOTES
Problem: Mobility Impaired (Adult and Pediatric)  Goal: *Acute Goals and Plan of Care (Insert Text)  Description: Patient will move from supine to sit and sit to supine , scoot up and down, and roll side to side in bed with contact guard assist within 7 day(s). Patient will transfer from bed to chair and chair to bed with contact guard assist using the least restrictive device within 7 day(s). Patient will improve static standing balance to CGA within 1 week(s). Patient will ambulate 300 feet with CGA with least restrictive device within 1 weeks. Outcome: Progressing Towards Goal   PHYSICAL THERAPY TREATMENT  Patient: Denzel Ramirez (49 y.o. female)  Date: 3/29/2022  Diagnosis: Aspiration pneumonia ([facility] [J69.0]  Acute respiratory failure with hypoxemia ([facility] [J96.01] <principal problem not specified>       Precautions:    Chart, physical therapy assessment, plan of care and goals were reviewed. ASSESSMENT  Patient continues with skilled PT services and is progressing towards goals. Upon PT/KNIGHT arrival, pt semi supine in bed with 1:1 in room completing getting cleaned up and agreeable to tx session. Pt completed bed mobility with SBA for rolling, supine>sit with Prosper and additional cueing, sit>supine with SBA, and scooting to EOB with SBA. Pt noted with fair static sitting balance and poor dynamic sitting balance at EOB. Pt lost her balance and fell over to L side while donning socks. Pt completed sit>stand from EOB with CGA using RW for balance upon standing. Pt progressed ambulation distance today to 175 ft with RW and CGA, first ambulating to the bathroom to wash her face at the sink and then ambulating in the hallway. Pt amb with narrow ELIZA and scissoring gait with decreased foot clearance bilaterally. Pt requires verbal and tactile cues for walker negotiation as pt tends to run into door thresholds and obstacles during gait. Pt returned to room and requesting to sit down due to fatigue. After rest break, pt encouraged to perform LE exercises and performed sitting marches before saying she was too tired to continue. Pt was again encouraged and still declining further therapeutic ex. Pt assisted back to bed for exercises, but pt again declining saying she was ready for a nap. Recommending return to group home at discharge when medically appropriate. .       Other factors to consider for discharge: impaired mobility, level of assist         PLAN :  Patient continues to benefit from skilled intervention to address the above impairments. Continue treatment per established plan of care. to address goals. Recommendation for discharge: (in order for the patient to meet his/her long term goals)  Group Home    This discharge recommendation:  Has been made in collaboration with the attending provider and/or case management    IF patient discharges home will need the following DME: to be determined (TBD)       SUBJECTIVE:   Patient stated yes, to working with PT.    OBJECTIVE DATA SUMMARY:   Critical Behavior:  Neurologic State: Alert  Orientation Level: Disoriented to place,Disoriented to situation,Disoriented to time,Oriented to person  Cognition: Decreased attention/concentration,Decreased command following,Impaired decision making,Impulsive  Safety/Judgement: Decreased awareness of environment,Decreased awareness of need for assistance,Decreased awareness of need for safety,Decreased insight into deficits  Functional Mobility Training:  Bed Mobility:  Rolling: Stand-by assistance  Supine to Sit: Minimum assistance; Additional time  Sit to Supine: Stand-by assistance  Scooting: Stand-by assistance        Transfers:  Sit to Stand: Contact guard assistance  Stand to Sit: Contact guard assistance                             Balance:  Sitting: Impaired; With support  Sitting - Static: Fair (occasional)  Sitting - Dynamic: Poor (constant support)  Standing: Impaired; With support  Standing - Static: Constant support;Poor  Standing - Dynamic : Constant support;Poor  Ambulation/Gait Training:  Distance (ft): 175 Feet (ft)  Assistive Device: Walker, rolling;Gait belt  Ambulation - Level of Assistance: Contact guard assistance        Gait Abnormalities: Shuffling gait;Scissoring;Path deviations; Foot drop                                                Therapeutic Exercises:   Seated marches x 10  Pain Ratin/10    Activity Tolerance:   Fair and requires rest breaks  Please refer to the flowsheet for vital signs taken during this treatment. After treatment patient left in no apparent distress:   Supine in bed, Call bell within reach, and Caregiver / family present    COMMUNICATION/COLLABORATION:   The patients plan of care was discussed with: Occupational therapy assistant and Registered nurse. PT/OT sessions occurred together for increased safety of pt and clinician.      Sumaya West   Time Calculation: 23 mins

## 2022-03-29 NOTE — PROGRESS NOTES
Progress Note    Patient: Karolyn Duran MRN: [id number]  SSN: [id number]    YOB: 1960  Age: 62 y.o. Sex: female      Admit Date: 3/17/2022    LOS: 11 days     Subjective:   Patient followed for SIRS/Sepsis with interstitial pneumonitis and HIV infection, presumed secondary to Pneumocystis, supported by elevated serum fungitell. Patient also has chronic hepatitis C infection with fibrosis. Both infections are untreated. She is currently on oral Bactrim. She is now on room air and may be discharged today. Ordered CT Abdomen with contrast but unable to do because informed consent was needed. She remains afebrile. Procal and CRP both normal.   Patient resting comfortably with no complaints. Objective:     Vitals:    03/28/22 2258 03/29/22 0801 03/29/22 0824 03/29/22 0842   BP: (!) 146/79  (!) 146/76    Pulse: 76  84    Resp: 18  16    Temp: 98 °F (36.7 °C)  97.5 °F (36.4 °C)    SpO2: 97% 97% 98% 97%   Weight:       Height:            Intake and Output:  Current Shift: No intake/output data recorded. Last three shifts: 03/27 1901 - 03/29 0700  In: 1000 [I.V.:1000]  Out: -     Physical Exam:   Vitals and nursing note reviewed. Constitutional:       General: She is no acute distressin acute distress. Appearance: She is ill-appearing. HENT:  Room air     Mouth/Throat:      Mouth: Mucous membranes are dry. Cardiovascular:      Rate and Rhythm: Regular rhythm. .      Heart sounds: No murmur heard  Pulmonary: clear bilaterally    Abdominal:      General: Bowel sounds are normal.      Palpations: Abdomen is soft. Tenderness: There is no abdominal tenderness. Genitourinary:     Comments:   External urinary device  Musculoskeletal:      Right lower leg: No edema. Left lower leg: No edema. Skin:     Findings: No rash. Neurological:      General: No focal deficit present. Mental Status: She is alert and oriented to person, place, and time.    Psychiatric: Comments: Patient with abnormal thought content, judgement      Lab/Data Review:     WBC 6,000    Procal <0.05 <0.15 < 0.30 < 0.72 <1.06 <0.75 <1.11  CRP <0.29 <0.59 <1.13 <2.20 <4.63 <5.31    CD4 count 91/mm3 (9.1%)  HIV-1 RNA viral load 579,000  HCV 26,600,000  CMV PCR negative  Serum fungitell >500  AFP tumor marker 9.4 (H)    Mycoplasma IgM Nonreactive  SARS CoV-2 Not detected  Urine Legionella antigen Negative  Toxoplasma IgG/IgM Negative    Blood cultures (3/17) No growth FINAL  Urine culture (3/21) No growth FINAL    US Liver (3/28)  Visualized portions of the liver demonstrate coarsened echotexture from diffuse hepatocellular process. CT or MRI, preferably with IV contrast is more  sensitive to evaluate for hepatic lesions. Assessment:     Active Problems:    Pneumonia (2/24/2022)      Acute respiratory failure with hypoxia (HCC) (2/25/2022)      Aspiration pneumonia ([address] (3/18/2022)      Acute respiratory failure with hypoxemia ([address] (3/18/2022)      1. SIRS/Sepsis with fever, tachycardia, elevated CRP and procal  2. Interstitial pneumonitis, bilateral, probable Pneumocystis jiroveci, with markedly elevated serum fungitell,  Day #11 Zosyn, Day #8/21 Bactrim  3. Acute hypoxic respiratory failure, high flow nasal O2  4. HIV-1 infection with CD4 <100 and markedly elevated viral load  5. Hepatitis C infection, type 1a, with markedly elevated viral load, bridging fibrosis, elevated AFP tumor marker  6. Renal failure     Comment:  CRP now normal.  Reasonable to transition to oral Bactrim at this point and also prednisone. CT Liver with contrast recommended to rule out hepatic lesions. Plan:      1. Modify IV Bactrim to 2 DS tablets TID for 13 more days  2. Consider transitioning from Solumedrol to Prednisone  3. Follow-up HIV-1 genosure, HLA  (abacavir hypersensitivity),  N5SA drug resistance profile  4. CT Liver with contrast cancelled because unable to obtain informed consent  5.  Follow-up in ID Clinic to address HIV and HCV treatment in 2 weeks  6.  Cleared for discharge from ID standpoint    Signed By: Elizabeth Vazquez MD     March 29, 2022

## 2022-03-29 NOTE — PROGRESS NOTES
DC Plan: [facility]    Cm called [facility] [phone]. Cm spoke with Chace Davila. Chace Davila indicated [facility] has left for the day. Cm informed Chace Davila pt is cleared for discharge. Chace Davila indicated pt can return to the group home today. Chace Davila is requesting the nurse call [facility] back tomorrow for report. Cm verified home address pt will be returning to. Pt is on room air. Unit secretary to set up wheel chair van back to group home once dc orders is in. Discharge plan of care/case management plan validated with provider's discharge order.

## 2022-03-29 NOTE — PROGRESS NOTES
Problem: Dysphagia (Adult)  Goal: *Acute Goals and Plan of Care (Insert Text)  Description: Speech Therapy Swallow Goals  Initiated 3/18/2022  -Patient will tolerate minced and moist diet with thin liquids without clinical indicators of aspiration given minimal cues within 7 day(s). REVISED    -Patient will tolerate puree diet with thin liquids without clinical indicators of aspiration given minimal cues within 7 day(s). MET        -Patient will tolerate PO trials without clinical indicators of aspiration given minimal cues within 7 day(s). -Patient will participate in modified barium swallow study within 7 day(s). -Patient will demonstrate understanding of swallow safety precautions and aspiration precautions, diet recs with minimal cues within 7 day(s). Outcome: Progressing Towards Goal   SPEECH PATHOLOGY MODIFIED BARIUM SWALLOW STUDY  Patient: Ferdinand Simms (13 y.o. female)  1960   Date: 3/29/2022  Primary Diagnosis: Aspiration pneumonia ([facility] [J69.0]  Acute respiratory failure with hypoxemia ([facility]) [J96.01]        Precautions: aspiration       ASSESSMENT :  Based on the objective data described below, the patient presents with mild oropharyngeal dysphagia, negatively impacted by AMS, without observed pen/asp. Pt frequently moved during assessment, at times pharyngeal phase of swallow not captured on fluoro due to movement. However, most trials visualized and duplicate trials provided if initial trial was not observed. Oral phase c/b intermittent oral bolus holding and delayed a-p propulsion. There is mild oral bolus fragmentation with pudding and solids. Pt spontaneously clears oral cavity after delay. Reduced efficacy and efficiency of mastication. Pharyngeal phase c/b WNL once initiated. Intermittent min delay is present. Increased delay with solids c/b vallecular pooling, which clears with force of swallow. Pen/asp is not observed with any trials.      Boluses clear the UES without difficulty. Patient will benefit from skilled intervention to address the above impairments. Patients rehabilitation potential is considered to be Good     PLAN :  Recommendations and Planned Interventions: At this time, recommend cont puree/thin diet with aspiration and GERD precautions, meds crushed if able in puree. Recommend PO trials of diet advancement with SLP only as indicated and tolerated by pt. Frequency/Duration: Patient will be followed by speech-language pathology 4 times a week to address goals. Discharge Recommendations: cont SLP tx after d/c from acute. SUBJECTIVE:   Patient seen for MBS, alert, agreeable. Pt currently on RA. Pt was previously for CT of abdomen with contrast this date; however, this was cancelled per report. MBS unable to be completed previously this date. Clinician spoke with [facility] prior to [facility] completion to ensure no interference with other testing. MBS purpose and protocol discussed with pt prior to initiation of study. OBJECTIVE:     Past Medical History:   Diagnosis Date    Alcohol abuse 5/29/2017    Hepatitis C     HIV (human immunodeficiency virus infection) ([facility] [address]     Hypertension     Psychotic disorder ([facility]     Schizophrenia ([facility]    History reviewed. No pertinent surgical history. Current Diet: puree/thin     Radiologist: Dr. Aleksandra Mckinney Procedures  [x] Lateral View   [] A-P View [] Scanned to level of Sternum    [] Seated at 90 deg.   [] Other:    Presentation:   [x] Spoon   [x] Cup   [x] Straw   [] Syringe   [] Consecutive Swallows  [] Other:    Consistencies:   [x] Ba+ liquid   [x] Ba+ liquid (nectar)   [x] Ba+ liquid (honey)     [x] Ba+ pudding   [] Ba+ crunched cookie   [x] Ba+ cracker   [] Other:     Testing Discontinued: [] Due to:    Treatment Techniques Attempted     [] Head Turn: [] Right [] Left     [] Head Tilt: [] Right [] Left     [] Chin Down:  [] Thermal Sensitization:  [] Supraglottic Swallow:  [] Mendelson's Maneuver:  [] Other:    Results  Dysphagia Present:    [x] Yes  [] No    Ratings of Dysphagia:    [x] Mild  [] Moderate [] Severe    Stages of Breakdown:   [x] Oral [] Pharyngeal   [] Esophageal    Aspiration: [] Yes    [x] No  [] At Risk  [] Trace (<10%) [] Significant (>10%):     %  [] Penetration:  Level of:   Cough: [] Yes      [] No    Consistency Aspirated:  [] Thin Liquid   [] Nectar   [] Honey   [] Pureed     [] Mech-soft  [] Solid    Motility Problems with:  [] Lip Closure:   [] Sucking:   [] Mastication:   [] Bolus Formation:   [] Bolus Control:  [x] A-P Transport:  [] Posterior Tongue Elevation:  [] Swallow Response (delayed):  [] Velopharyngeal Closure:  [] Epiglottic inversion  [] Laryngeal Elevation:  [] Laryngeal Adduction:  [] Cricopharyngeal Relaxation:  [] Esophageal Peristalsis:  [] Reflux:  [] Other:    Timing of Aspiration:  [] Before Swallow:  [] During Swallow:  [] After Swallow:    Transit Time Delay:  [x] >1 Second  Oral  [x] >1 Second Pharyngeal  [] >20 Second Esophageal     Residuals:  [] Buccal Cavity   [] Velum/posterior pharyngeal wall  [] Valleculae  [] Pyriforms      COMMUNICATION/EDUCATION:   Patient receptive of education regarding MBS results, diet recs, swallow safety precautions, POC. Patient demonstrated [address] understanding as evidenced by verbal responsiveness, needs reinforcement. Patient/family have participated as able in goal setting and plan of care. Patient/family agree to work toward stated goals and plan of care.     Thank you for this referral.  Emory Devi M.S., CCC-SLP  Time Calculation: 17 mins

## 2022-03-30 ENCOUNTER — HOSPITAL ENCOUNTER (EMERGENCY)
Age: 62
Discharge: HOME | End: 2022-03-30
Attending: STUDENT IN AN ORGANIZED HEALTH CARE EDUCATION/TRAINING PROGRAM
Payer: MEDICAID

## 2022-03-30 ENCOUNTER — APPOINTMENT (OUTPATIENT)
Dept: CT IMAGING | Age: 62
End: 2022-03-30
Attending: STUDENT IN AN ORGANIZED HEALTH CARE EDUCATION/TRAINING PROGRAM
Payer: MEDICAID

## 2022-03-30 VITALS
HEART RATE: 85 BPM | HEIGHT: 63 IN | DIASTOLIC BLOOD PRESSURE: 86 MMHG | OXYGEN SATURATION: 95 % | RESPIRATION RATE: 16 BRPM | BODY MASS INDEX: 22.15 KG/M2 | WEIGHT: 125 LBS | TEMPERATURE: 98.4 F | SYSTOLIC BLOOD PRESSURE: 140 MMHG
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
## 2022-03-30 DIAGNOSIS — R11.2 NON-INTRACTABLE VOMITING WITH NAUSEA, UNSPECIFIED VOMITING TYPE: Primary | ICD-10-CM

## 2022-03-30 LAB
ALBUMIN SERPL-MCNC: 2.1 G/DL (ref 3.5–5)
ALBUMIN/GLOB SERPL: 0.4 {RATIO} (ref 1.1–2.2)
ALP SERPL-CCNC: 119 U/L (ref 45–117)
ALT SERPL-CCNC: 82 U/L (ref 12–78)
ANION GAP SERPL CALC-SCNC: 5 MMOL/L (ref 5–15)
APPEARANCE UR: CLEAR
AST SERPL W P-5'-P-CCNC: 107 U/L (ref 15–37)
ATRIAL RATE: 82 BPM
BACTERIA URNS QL MICRO: NEGATIVE /HPF
BASOPHILS # BLD: 0 K/UL (ref 0–0.1)
BASOPHILS NFR BLD: 0 % (ref 0–1)
BILIRUB SERPL-MCNC: 0.4 MG/DL (ref 0.2–1)
BILIRUB UR QL: NEGATIVE
BUN SERPL-MCNC: 26 MG/DL (ref 6–20)
BUN/CREAT SERPL: 26 (ref 12–20)
CA-I BLD-MCNC: 9.2 MG/DL (ref 8.5–10.1)
CALCULATED P AXIS, ECG09: 68 DEGREES
CALCULATED R AXIS, ECG10: 69 DEGREES
CALCULATED T AXIS, ECG11: 75 DEGREES
CHLORIDE SERPL-SCNC: 106 MMOL/L (ref 97–108)
CO2 SERPL-SCNC: 32 MMOL/L (ref 21–32)
COLOR UR: ABNORMAL
CREAT SERPL-MCNC: 0.99 MG/DL (ref 0.55–1.02)
DIAGNOSIS, 93000: NORMAL
DIFFERENTIAL METHOD BLD: ABNORMAL
EOSINOPHIL # BLD: 0 K/UL (ref 0–0.4)
EOSINOPHIL NFR BLD: 0 % (ref 0–7)
ERYTHROCYTE [DISTWIDTH] IN BLOOD BY AUTOMATED COUNT: 14.4 % (ref 11.5–14.5)
GLOBULIN SER CALC-MCNC: 4.8 G/DL (ref 2–4)
GLUCOSE SERPL-MCNC: 121 MG/DL (ref 65–100)
GLUCOSE UR STRIP.AUTO-MCNC: NEGATIVE MG/DL
HCT VFR BLD AUTO: 32.9 % (ref 35–47)
HGB BLD-MCNC: 11.1 G/DL (ref 11.5–16)
HGB UR QL STRIP: NEGATIVE
HLA-B*57:01 SPEC QL: NEGATIVE
IMM GRANULOCYTES # BLD AUTO: 0.1 K/UL (ref 0–0.04)
IMM GRANULOCYTES NFR BLD AUTO: 1 % (ref 0–0.5)
KETONES UR QL STRIP.AUTO: NEGATIVE MG/DL
LEUKOCYTE ESTERASE UR QL STRIP.AUTO: ABNORMAL
LYMPHOCYTES # BLD: 0.6 K/UL (ref 0.8–3.5)
LYMPHOCYTES NFR BLD: 9 % (ref 12–49)
MCH RBC QN AUTO: 30 PG (ref 26–34)
MCHC RBC AUTO-ENTMCNC: 33.7 G/DL (ref 30–36.5)
MCV RBC AUTO: 88.9 FL (ref 80–99)
MONOCYTES # BLD: 0.5 K/UL (ref 0–1)
MONOCYTES NFR BLD: 8 % (ref 5–13)
MUCOUS THREADS URNS QL MICRO: ABNORMAL /LPF
NEUTS SEG # BLD: 5.7 K/UL (ref 1.8–8)
NEUTS SEG NFR BLD: 82 % (ref 32–75)
NITRITE UR QL STRIP.AUTO: NEGATIVE
NRBC # BLD: 0 K/UL (ref 0–0.01)
NRBC BLD-RTO: 0 PER 100 WBC
P-R INTERVAL, ECG05: 130 MS
PH UR STRIP: 7 [PH] (ref 5–8)
PLATELET # BLD AUTO: 222 K/UL (ref 150–400)
PMV BLD AUTO: 11.7 FL (ref 8.9–12.9)
POTASSIUM SERPL-SCNC: 4.2 MMOL/L (ref 3.5–5.1)
PROT SERPL-MCNC: 6.9 G/DL (ref 6.4–8.2)
PROT UR STRIP-MCNC: 30 MG/DL
Q-T INTERVAL, ECG07: 364 MS
QRS DURATION, ECG06: 90 MS
QTC CALCULATION (BEZET), ECG08: 425 MS
RBC # BLD AUTO: 3.7 M/UL (ref 3.8–5.2)
RBC #/AREA URNS HPF: ABNORMAL /HPF (ref 0–5)
SODIUM SERPL-SCNC: 143 MMOL/L (ref 136–145)
SP GR UR REFRACTOMETRY: 1.01 (ref 1–1.03)
UA: UC IF INDICATED,UAUC: ABNORMAL
UROBILINOGEN UR QL STRIP.AUTO: 4 EU/DL (ref 0.1–1)
VENTRICULAR RATE, ECG03: 82 BPM
WBC # BLD AUTO: 6.9 K/UL (ref 3.6–11)
WBC URNS QL MICRO: ABNORMAL /HPF (ref 0–4)

## 2022-03-30 PROCEDURE — 93005 ELECTROCARDIOGRAM TRACING: CPT

## 2022-03-30 PROCEDURE — 99284 EMERGENCY DEPT VISIT MOD MDM: CPT

## 2022-03-30 PROCEDURE — 85025 COMPLETE CBC W/AUTO DIFF WBC: CPT

## 2022-03-30 PROCEDURE — 70450 CT HEAD/BRAIN W/O DYE: CPT

## 2022-03-30 PROCEDURE — 36415 COLL VENOUS BLD VENIPUNCTURE: CPT

## 2022-03-30 PROCEDURE — 87077 CULTURE AEROBIC IDENTIFY: CPT

## 2022-03-30 PROCEDURE — 87186 SC STD MICRODIL/AGAR DIL: CPT

## 2022-03-30 PROCEDURE — 81001 URINALYSIS AUTO W/SCOPE: CPT

## 2022-03-30 PROCEDURE — 87086 URINE CULTURE/COLONY COUNT: CPT

## 2022-03-30 PROCEDURE — 80053 COMPREHEN METABOLIC PANEL: CPT

## 2022-03-30 NOTE — DISCHARGE INSTRUCTIONS
Thank you! Thank you for allowing me to care for you in the emergency department. I sincerely hope that you are satisfied with your visit today. It is my goal to provide you with excellent care. Below you will find a list of your labs and imaging from your visit today. Should you have any questions regarding these results please do not hesitate to call the emergency department. Labs -     Recent Results (from the past 12 hour(s))   CBC WITH AUTOMATED DIFF    Collection Time: 03/30/22  1:50 PM   Result Value Ref Range    WBC 6.9 3.6 - 11.0 K/uL    RBC 3.70 (L) 3.80 - 5.20 M/uL    HGB 11.1 (L) 11.5 - 16.0 g/dL    HCT 32.9 (L) 35.0 - 47.0 %    MCV 88.9 80.0 - 99.0 FL    MCH 30.0 26.0 - 34.0 PG    MCHC 33.7 30.0 - 36.5 g/dL    RDW 14.4 11.5 - 14.5 %    PLATELET 054 271 - 549 K/uL    MPV 11.7 8.9 - 12.9 FL    NRBC 0.0 0.0  WBC    ABSOLUTE NRBC 0.00 0.00 - 0.01 K/uL    NEUTROPHILS 82 (H) 32 - 75 %    LYMPHOCYTES 9 (L) 12 - 49 %    MONOCYTES 8 5 - 13 %    EOSINOPHILS 0 0 - 7 %    BASOPHILS 0 0 - 1 %    IMMATURE GRANULOCYTES 1 (H) 0 - 0.5 %    ABS. NEUTROPHILS 5.7 1.8 - 8.0 K/UL    ABS. LYMPHOCYTES 0.6 (L) 0.8 - 3.5 K/UL    ABS. MONOCYTES 0.5 0.0 - 1.0 K/UL    ABS. EOSINOPHILS 0.0 0.0 - 0.4 K/UL    ABS. BASOPHILS 0.0 0.0 - 0.1 K/UL    ABS. IMM. GRANS. 0.1 (H) 0.00 - 0.04 K/UL    DF AUTOMATED     METABOLIC PANEL, COMPREHENSIVE    Collection Time: 03/30/22  1:50 PM   Result Value Ref Range    Sodium 143 136 - 145 mmol/L    Potassium 4.2 3.5 - 5.1 mmol/L    Chloride 106 97 - 108 mmol/L    CO2 32 21 - 32 mmol/L    Anion gap 5 5 - 15 mmol/L    Glucose 121 (H) 65 - 100 mg/dL    BUN 26 (H) 6 - 20 mg/dL    Creatinine 0.99 0.55 - 1.02 mg/dL    BUN/Creatinine ratio 26 (H) 12 - 20      GFR est AA >60 >60 ml/min/1.73m2    GFR est non-AA 57 (L) >60 ml/min/1.73m2    Calcium 9.2 8.5 - 10.1 mg/dL    Bilirubin, total 0.4 0.2 - 1.0 mg/dL    AST (SGOT) 107 (H) 15 - 37 U/L    ALT (SGPT) 82 (H) 12 - 78 U/L    Alk. phosphatase 119 (H) 45 - 117 U/L    Protein, total 6.9 6.4 - 8.2 g/dL    Albumin 2.1 (L) 3.5 - 5.0 g/dL    Globulin 4.8 (H) 2.0 - 4.0 g/dL    A-G Ratio 0.4 (L) 1.1 - 2.2     URINALYSIS W/ REFLEX CULTURE    Collection Time: 03/30/22  3:45 PM    Specimen: Urine   Result Value Ref Range    Color Yellow/Straw      Appearance Clear Clear      Specific gravity 1.015 1.003 - 1.030      pH (UA) 7.0 5.0 - 8.0      Protein 30 (A) Negative mg/dL    Glucose Negative Negative mg/dL    Ketone Negative Negative mg/dL    Bilirubin Negative Negative      Blood Negative Negative      Urobilinogen 4.0 (H) 0.1 - 1.0 EU/dL    Nitrites Negative Negative      Leukocyte Esterase Trace (A) Negative      UA:UC IF INDICATED PENDING     WBC 10-20 0 - 4 /hpf    RBC 0-5 0 - 5 /hpf    Bacteria Negative Negative /hpf    Mucus Trace /lpf       Radiologic Studies -   CT HEAD WO CONT   Final Result   No intracranial hemorrhage. CT Results  (Last 48 hours)                 03/30/22 1441  CT HEAD WO CONT Final result    Impression:  No intracranial hemorrhage. Narrative:  CT head. Bilateral images are reviewed along with reformatted sagittal/coronal images. Dose reduction: All CT scans at this facility are performed using dose reduction   optimization techniques as appropriate to a performed exam including the   following-   automated exposure control, adjustments of mA and/or Kv according to patient   size, or use of iterative reconstructive technique. Bone windows demonstrate normal aeration image sinuses and mastoid air cells. No superficial radiopaque foreign material; clinical inspection could follow. Review of intracranial content reveals cerebral atrophy. Mild periventricular   white matter gliosis; evidence for nonacute end vessel occlusive change. Central   cerebral arteriosclerosis. No hydrocephalus. No intracranial hemorrhage.                  CXR Results  (Last 48 hours)      None               If you feel that you have not received excellent quality care or timely care, please ask to speak to the nurse manager. Please choose us in the future for your continued health care needs. ------------------------------------------------------------------------------------------------------------  The exam and treatment you received in the Emergency Department were for an urgent problem and are not intended as complete care. It is important that you follow-up with a doctor, nurse practitioner, or physician assistant to:  (1) confirm your diagnosis,  (2) re-evaluation of changes in your illness and treatment, and  (3) for ongoing care. If your symptoms become worse or you do not improve as expected and you are unable to reach your usual health care provider, you should return to the Emergency Department. We are available 24 hours a day. Please take your discharge instructions with you when you go to your follow-up appointment. If a prescription has been provided, please have it filled as soon as possible to prevent a delay in treatment. Read the entire medication instruction sheet provided to you by the pharmacy. If you have any questions or reservations about taking the medication due to side effects or interactions with other medications, please call your primary care physician or contact the ER to speak with the charge nurse. Make an appointment with your family doctor or the physician you were referred to for follow-up of this visit as instructed on your discharge paperwork, as this is a mandatory follow-up. Return to the ER if you are unable to be seen or if you are unable to be seen in a timely manner. If you have any problem arranging the follow-up visit, contact the Emergency Department immediately.

## 2022-03-30 NOTE — ED NOTES
I called report back to the facility [facility] and talked to caretaker Freddie Schofield. I informed her of the lab work, vital signs, and MD discharge instructions. I am going to get a cab set up for the patient now.

## 2022-03-30 NOTE — ED TRIAGE NOTES
Patient presenting from adult  stating she fell today and vomited. EMS did not report a fall.  reported to EMS that she vomited after lunch and was not acting herself.  Hx of schizophrenia

## 2022-03-30 NOTE — ED PROVIDER NOTES
[id number]  EMERGENCY DEPARTMENT ENCOUNTER NOTE    Date: 3/30/2022  Patient Name: Elissa Cortés    History of Presenting Illness     Chief Complaint   Patient presents with    Vomiting     HPI: Elissa Cortés, 62 y.o. female with a past medical history and outpatient medications as listed and reviewed below  presents for vomiting and a fall. The patient is at baseline mentation however she reports that she fell down today. She reported possible head trauma. No loss conscious. Denies any headache. She was also noted to have an episode of vomiting after she ate at her facility. She, denies any nausea or vomiting. She denies any chest pain or shortness breath. No abdominal pain. No diarrhea. Medical History   I reviewed the medical, surgical, family, and social history, as well as allergies:    PCP: None    Past Medical History:  Past Medical History:   Diagnosis Date    Alcohol abuse 5/29/2017    Hepatitis C     HIV (human immunodeficiency virus infection) ([facility]     Hypertension     [facility]     Schizophrenia ([facility]      Past Surgical History:  No past surgical history on file.   Current Outpatient Medications:  Current Outpatient Medications   Medication Instructions    albuterol (ProAir HFA) 90 mcg/actuation inhaler 2 Puffs, Inhalation, EVERY 6 HOURS AS NEEDED    benztropine (COGENTIN) 0.5 mg tablet 1 Tablet, Oral, 2 TIMES DAILY    dilTIAZem ER (CARDIZEM CD) 120 mg, Oral, DAILY    divalproex ER (DEPAKOTE ER) 250 mg, Oral, 2 TIMES DAILY    famotidine (PEPCID) 40 mg, Oral, EVERY BEDTIME    lisinopriL (PRINIVIL, ZESTRIL) 20 mg, Oral, DAILY    multivitamin (ONE A DAY) tablet 1 Tablet, Oral, DAILY    predniSONE (DELTASONE) 20 mg, Oral, DAILY    risperiDONE (RISPERDAL) 2 mg, Oral, 2 TIMES DAILY    trimethoprim-sulfamethoxazole (BACTRIM DS, SEPTRA DS) 160-800 mg per tablet 2 Tablets, Oral, 3 TIMES DAILY      Family History:  Family History Family history unknown: Yes     Social History:  Social History     Tobacco Use    Smoking status: Current Every Day Smoker     Packs/day: 0.50    Smokeless tobacco: Never Used   Substance Use Topics    Alcohol use: Yes     Alcohol/week: 0.8 standard drinks     Types: 1 Cans of beer per week    Drug use: Yes     Types: Cocaine     Comment: Pt reports a history of crack cocaine use     Allergies:  No Known Allergies    Review of Systems     Review of Systems  Negative: All other systems negative. Physical Exam and Vital Signs   Vital Signs - Reviewed the patient's vital signs. Patient Vitals for the past 12 hrs:   Temp Pulse Resp BP SpO2   03/30/22 1347 98.4 °F (36.9 °C) 93 15 (!) 157/95 94 %     Physical Exam:    GENERAL: awake, alert, cooperative, not in distress  HEENT:  * Pupils equal, EOMI  * Head atraumatic  CV:  * audible heart sounds  * warm and perfused extremities bilaterally  PULMONARY: Good air movement, no wheezes, no crackles  ABDOMEN/: soft, no distension, no guarding, no suprapubic tenderness, no abdominal tenderness  EXTREMITIES/BACK: warm and perfused, no tenderness, no edema  SKIN: no rashes or signs of trauma  NEURO:  * Speech clear  * Moves U&LE to command    Medical Decision Making   - I am the first and primary provider for this patient and am the primary provider of record. - I reviewed the vital signs, available nursing notes, past medical history, past surgical history, family history and social history. - Initial assessment performed. The patients presenting problems have been discussed, and the staff are in agreement with the care plan formulated and outlined with them. I have encouraged them to ask questions as they arise throughout their visit. - Available medical records, nursing notes, old EKGs, and EMS run sheets (if patient was EMS transported) were reviewed    MDM:   Patient is a 62 y.o. female presenting for fall and vomiting - two separate unrelated complaints. Vomiting prior to fall which was mechanical. Vitals reveal no significant abnormalities and physical exam reveals no significant abnormalities. EKG showed no significant abnormalities. Based on the history, physical exam, risk factors, and vitals signs, differential includes: ICH, WHITNEY, dehydration, gastroenteritis, hepatitis. The patient does not have any tenderness to suggest intra-abdominal active process such as appendicitis, cholecystitis, or obstruction. Will initiate workup and symptomatic treatment. See ED Course and Reassessment for results and interpretations. Results     Labs:  Recent Results (from the past 12 hour(s))   CBC WITH AUTOMATED DIFF    Collection Time: 03/30/22  1:50 PM   Result Value Ref Range    WBC 6.9 3.6 - 11.0 K/uL    RBC 3.70 (L) 3.80 - 5.20 M/uL    HGB 11.1 (L) 11.5 - 16.0 g/dL    HCT 32.9 (L) 35.0 - 47.0 %    MCV 88.9 80.0 - 99.0 FL    MCH 30.0 26.0 - 34.0 PG    MCHC 33.7 30.0 - 36.5 g/dL    RDW 14.4 11.5 - 14.5 %    PLATELET 128 819 - 289 K/uL    MPV 11.7 8.9 - 12.9 FL    NRBC 0.0 0.0  WBC    ABSOLUTE NRBC 0.00 0.00 - 0.01 K/uL    NEUTROPHILS 82 (H) 32 - 75 %    LYMPHOCYTES 9 (L) 12 - 49 %    MONOCYTES 8 5 - 13 %    EOSINOPHILS 0 0 - 7 %    BASOPHILS 0 0 - 1 %    IMMATURE GRANULOCYTES 1 (H) 0 - 0.5 %    ABS. NEUTROPHILS 5.7 1.8 - 8.0 K/UL    ABS. LYMPHOCYTES 0.6 (L) 0.8 - 3.5 K/UL    ABS. MONOCYTES 0.5 0.0 - 1.0 K/UL    ABS. EOSINOPHILS 0.0 0.0 - 0.4 K/UL    ABS. BASOPHILS 0.0 0.0 - 0.1 K/UL    ABS. IMM.  GRANS. 0.1 (H) 0.00 - 0.04 K/UL    DF AUTOMATED     METABOLIC PANEL, COMPREHENSIVE    Collection Time: 03/30/22  1:50 PM   Result Value Ref Range    Sodium 143 136 - 145 mmol/L    Potassium 4.2 3.5 - 5.1 mmol/L    Chloride 106 97 - 108 mmol/L    CO2 32 21 - 32 mmol/L    Anion gap 5 5 - 15 mmol/L    Glucose 121 (H) 65 - 100 mg/dL    BUN 26 (H) 6 - 20 mg/dL    Creatinine 0.99 0.55 - 1.02 mg/dL    BUN/Creatinine ratio 26 (H) 12 - 20      GFR est AA >60 >60 ml/min/1.73m2 GFR est non-AA 57 (L) >60 ml/min/1.73m2    Calcium 9.2 8.5 - 10.1 mg/dL    Bilirubin, total 0.4 0.2 - 1.0 mg/dL    AST (SGOT) 107 (H) 15 - 37 U/L    ALT (SGPT) 82 (H) 12 - 78 U/L    Alk. phosphatase 119 (H) 45 - 117 U/L    Protein, total 6.9 6.4 - 8.2 g/dL    Albumin 2.1 (L) 3.5 - 5.0 g/dL    Globulin 4.8 (H) 2.0 - 4.0 g/dL    A-G Ratio 0.4 (L) 1.1 - 2.2     URINALYSIS W/ REFLEX CULTURE    Collection Time: 03/30/22  3:45 PM    Specimen: Urine   Result Value Ref Range    Color Yellow/Straw      Appearance Clear Clear      Specific gravity 1.015 1.003 - 1.030      pH (UA) 7.0 5.0 - 8.0      Protein 30 (A) Negative mg/dL    Glucose Negative Negative mg/dL    Ketone Negative Negative mg/dL    Bilirubin Negative Negative      Blood Negative Negative      Urobilinogen 4.0 (H) 0.1 - 1.0 EU/dL    Nitrites Negative Negative      Leukocyte Esterase Trace (A) Negative      UA:UC IF INDICATED PENDING     WBC 10-20 0 - 4 /hpf    RBC 0-5 0 - 5 /hpf    Bacteria Negative Negative /hpf    Mucus Trace /lpf     Radiologic Studies:  CT Results  (Last 48 hours)               03/30/22 1441  CT HEAD WO CONT Final result    Impression:  No intracranial hemorrhage. Narrative:  CT head. Bilateral images are reviewed along with reformatted sagittal/coronal images. Dose reduction: All CT scans at this facility are performed using dose reduction   optimization techniques as appropriate to a performed exam including the   following-   automated exposure control, adjustments of mA and/or Kv according to patient   size, or use of iterative reconstructive technique. Bone windows demonstrate normal aeration image sinuses and mastoid air cells. No superficial radiopaque foreign material; clinical inspection could follow. Review of intracranial content reveals cerebral atrophy. Mild periventricular   white matter gliosis; evidence for nonacute end vessel occlusive change. Central   cerebral arteriosclerosis.  No hydrocephalus. No intracranial hemorrhage. CXR Results  (Last 48 hours)    None        Medications ordered:  Medications - No data to display  ED Course and Reassessment     ED Course:     ED Course as of 03/30/22 1635   Wed Mar 30, 2022   1433 CBC does not show any evidence of acute process. Leukocytosis not present to suggest infection. Hemoglobin not suggestive of acute anemia. Platelet count is normal.   [SS]   1501 No significant electrolyte derangements. Creatinine is not elevated more than baseline range making WHITNEY unlikely. No significant transaminitis noted. Normal bilirubin. [SS]   1510 The non-contrasted head CT was negative for acute process making acute intracranial bleed unlikely. No evidence of large subacute stroke, ventriculomegaly, or masses. [SS]   6602 Urinalysis is within normal limits without any evidence of UTI: no bacteria, nitrites, or leukocyte esterase. No ketonemia to suggest dehydration.   [SS]      ED Course User Index  [SS] Дмитрий George MD       Reassessment:    Stable for DC. No vomiting during ED stay. Final Disposition     Discharge: DISCHARGED FROM EMERGENCY DEPARTMENT    Patient will be discharged from the Emergency Department in stable condition. All of the diagnostic tests were reviewed and any questions were answered. Diagnosis, results, follow up if applicable, and return precautions were discussed. I have also put together printed discharge instructions for them that include: 1) educational information regarding their diagnosis, 2) how to care for their diagnosis at home, as well a 3) list of reasons why they would want to return to the ED prior to their follow-up appointment, should their condition change. Any labs or imaging done in the ED will be either printed with the discharge paperwork or available through [address] DISCHARGE PLAN:  1.    Current Discharge Medication List      CONTINUE these medications which have NOT CHANGED    Details dilTIAZem ER (CARDIZEM CD) 120 mg capsule Take 1 Capsule by mouth daily for 30 days. Qty: 30 Capsule, Refills: 0      trimethoprim-sulfamethoxazole (BACTRIM DS, SEPTRA DS) 160-800 mg per tablet Take 2 Tablets by mouth three (3) times daily for 13 days. 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 Qty: 13 Tablet, Refills: 0      divalproex ER (DEPAKOTE ER) 250 mg ER tablet Take 250 mg by mouth two (2) times a day. albuterol (ProAir HFA) 90 mcg/actuation inhaler Take 2 Puffs by inhalation every six (6) hours as needed for Wheezing or Shortness of Breath. Qty: 18 g, Refills: 0      benztropine (COGENTIN) 0.5 mg tablet Take 1 Tablet by mouth two (2) times a day. famotidine (PEPCID) 40 mg tablet Take 40 mg by mouth nightly. lisinopriL (PRINIVIL, ZESTRIL) 20 mg tablet Take 20 mg by mouth daily. risperiDONE (RisperDAL) 2 mg tablet Take 2 mg by mouth two (2) times a day. multivitamin (ONE A DAY) tablet Take 1 Tablet by mouth daily. 2.   Follow-up Information     Follow up With Specialties Details Why [address] EMERGENCY DEPT Emergency Medicine Go to  If symptoms worsen [address] [id number] [phone]    Your doctor  Schedule an appointment as soon as possible for a visit in 3 days          3. Return to ED if worse    4. Current Discharge Medication List          ED Procedures   Performed by: Pedro Roman MD  Procedures     EKG interpretation (Preliminary):  Rhythm: normal sinus rhythm; and regular . Rate (approx.): 82. Axis: normal;  DC interval: normal;  QRS interval: normal ;  ST/T wave: normal;  Other findings: normal.    Diagnosis     Clinical Impression:   1. Non-intractable vomiting with nausea, unspecified vomiting type    2.  Fall, initial encounter      Attestations:    Pedro Roman MD    Please note that this dictation was completed with [email or web address], the [employer] voice recognition software. Quite often unanticipated grammatical, syntax, homophones, and other interpretive errors are inadvertently transcribed by the computer software. Please disregard these errors. Please excuse any errors that have escaped final proofreading. Thank you.

## 2022-03-31 ENCOUNTER — HOSPITAL ENCOUNTER (EMERGENCY)
Age: 62
Discharge: HOME | End: 2022-03-31
Attending: EMERGENCY MEDICINE
Payer: MEDICAID

## 2022-03-31 ENCOUNTER — APPOINTMENT (OUTPATIENT)
Dept: GENERAL RADIOLOGY | Age: 62
End: 2022-03-31
Attending: EMERGENCY MEDICINE
Payer: MEDICAID

## 2022-03-31 VITALS
BODY MASS INDEX: 22.15 KG/M2 | HEART RATE: 70 BPM | DIASTOLIC BLOOD PRESSURE: 86 MMHG | TEMPERATURE: 98.1 F | OXYGEN SATURATION: 100 % | HEIGHT: 63 IN | RESPIRATION RATE: 18 BRPM | WEIGHT: 125 LBS | SYSTOLIC BLOOD PRESSURE: 144 MMHG

## 2022-03-31 DIAGNOSIS — Z00.00 PHYSICAL EXAM: Primary | ICD-10-CM

## 2022-03-31 LAB
ALBUMIN SERPL-MCNC: 2.1 G/DL (ref 3.5–5)
ALBUMIN/GLOB SERPL: 0.4 {RATIO} (ref 1.1–2.2)
ALP SERPL-CCNC: 91 U/L (ref 45–117)
ALT SERPL-CCNC: 74 U/L (ref 12–78)
ANION GAP SERPL CALC-SCNC: 4 MMOL/L (ref 5–15)
APPEARANCE UR: CLEAR
AST SERPL W P-5'-P-CCNC: 75 U/L (ref 15–37)
BACTERIA URNS QL MICRO: NEGATIVE /HPF
BACTERIA URNS QL MICRO: NEGATIVE /HPF
BASOPHILS # BLD: 0 K/UL (ref 0–0.1)
BASOPHILS NFR BLD: 0 % (ref 0–1)
BILIRUB SERPL-MCNC: 0.5 MG/DL (ref 0.2–1)
BILIRUB UR QL: NEGATIVE
BUN SERPL-MCNC: 23 MG/DL (ref 6–20)
BUN/CREAT SERPL: 23 (ref 12–20)
CA-I BLD-MCNC: 8.8 MG/DL (ref 8.5–10.1)
CHLORIDE SERPL-SCNC: 108 MMOL/L (ref 97–108)
CO2 SERPL-SCNC: 29 MMOL/L (ref 21–32)
COLOR UR: ABNORMAL
CREAT SERPL-MCNC: 1.02 MG/DL (ref 0.55–1.02)
DIFFERENTIAL METHOD BLD: ABNORMAL
EOSINOPHIL # BLD: 0 K/UL (ref 0–0.4)
EOSINOPHIL NFR BLD: 0 % (ref 0–7)
ERYTHROCYTE [DISTWIDTH] IN BLOOD BY AUTOMATED COUNT: 14.4 % (ref 11.5–14.5)
GLOBULIN SER CALC-MCNC: 4.8 G/DL (ref 2–4)
GLUCOSE SERPL-MCNC: 100 MG/DL (ref 65–100)
GLUCOSE UR STRIP.AUTO-MCNC: NEGATIVE MG/DL
HCT VFR BLD AUTO: 30.3 % (ref 35–47)
HGB BLD-MCNC: 10.2 G/DL (ref 11.5–16)
HGB UR QL STRIP: NEGATIVE
IMM GRANULOCYTES # BLD AUTO: 0.1 K/UL (ref 0–0.04)
IMM GRANULOCYTES NFR BLD AUTO: 1 % (ref 0–0.5)
KETONES UR QL STRIP.AUTO: NEGATIVE MG/DL
LEUKOCYTE ESTERASE UR QL STRIP.AUTO: NEGATIVE
LYMPHOCYTES # BLD: 0.6 K/UL (ref 0.8–3.5)
LYMPHOCYTES NFR BLD: 10 % (ref 12–49)
MCH RBC QN AUTO: 29.7 PG (ref 26–34)
MCHC RBC AUTO-ENTMCNC: 33.7 G/DL (ref 30–36.5)
MCV RBC AUTO: 88.3 FL (ref 80–99)
MONOCYTES # BLD: 0.4 K/UL (ref 0–1)
MONOCYTES NFR BLD: 6 % (ref 5–13)
NEUTS SEG # BLD: 5.1 K/UL (ref 1.8–8)
NEUTS SEG NFR BLD: 83 % (ref 32–75)
NITRITE UR QL STRIP.AUTO: NEGATIVE
NRBC # BLD: 0 K/UL (ref 0–0.01)
NRBC BLD-RTO: 0 PER 100 WBC
PH UR STRIP: 8 [PH] (ref 5–8)
PLATELET # BLD AUTO: 210 K/UL (ref 150–400)
PMV BLD AUTO: 12 FL (ref 8.9–12.9)
POTASSIUM SERPL-SCNC: 4.6 MMOL/L (ref 3.5–5.1)
PROT SERPL-MCNC: 6.9 G/DL (ref 6.4–8.2)
PROT UR STRIP-MCNC: 30 MG/DL
RBC # BLD AUTO: 3.43 M/UL (ref 3.8–5.2)
RBC #/AREA URNS HPF: ABNORMAL /HPF (ref 0–5)
RBC #/AREA URNS HPF: NORMAL /HPF (ref 0–5)
SODIUM SERPL-SCNC: 141 MMOL/L (ref 136–145)
SP GR UR REFRACTOMETRY: 1.01 (ref 1–1.03)
UROBILINOGEN UR QL STRIP.AUTO: 4 EU/DL (ref 0.1–1)
WBC # BLD AUTO: 6.1 K/UL (ref 3.6–11)
WBC URNS QL MICRO: ABNORMAL /HPF (ref 0–4)
WBC URNS QL MICRO: NORMAL /HPF (ref 0–4)

## 2022-03-31 PROCEDURE — 85025 COMPLETE CBC W/AUTO DIFF WBC: CPT

## 2022-03-31 PROCEDURE — 81001 URINALYSIS AUTO W/SCOPE: CPT

## 2022-03-31 PROCEDURE — 80053 COMPREHEN METABOLIC PANEL: CPT

## 2022-03-31 PROCEDURE — 99284 EMERGENCY DEPT VISIT MOD MDM: CPT

## 2022-03-31 PROCEDURE — 36415 COLL VENOUS BLD VENIPUNCTURE: CPT

## 2022-03-31 PROCEDURE — 71045 X-RAY EXAM CHEST 1 VIEW: CPT

## 2022-03-31 NOTE — ED TRIAGE NOTES
Patient from [facility] stated she is not acting herself, usually walks around. Pt met by ems walking around. Pt not oriented but normal at baseline. Pt is alert. Has no specific complaints.

## 2022-03-31 NOTE — ED NOTES
Melissa at [facility] informed about patient's discharge and transport home via [employer] cab.

## 2022-03-31 NOTE — DISCHARGE INSTRUCTIONS
Thank you! Thank you for allowing me to care for you in the emergency department. I sincerely hope that you are satisfied with your visit today. It is my goal to provide you with excellent care. Below you will find a list of your labs and imaging from your visit today. Should you have any questions regarding these results please do not hesitate to call the emergency department. Labs -     Recent Results (from the past 12 hour(s))   CBC WITH AUTOMATED DIFF    Collection Time: 03/31/22  5:00 PM   Result Value Ref Range    WBC 6.1 3.6 - 11.0 K/uL    RBC 3.43 (L) 3.80 - 5.20 M/uL    HGB 10.2 (L) 11.5 - 16.0 g/dL    HCT 30.3 (L) 35.0 - 47.0 %    MCV 88.3 80.0 - 99.0 FL    MCH 29.7 26.0 - 34.0 PG    MCHC 33.7 30.0 - 36.5 g/dL    RDW 14.4 11.5 - 14.5 %    PLATELET 789 999 - 815 K/uL    MPV 12.0 8.9 - 12.9 FL    NRBC 0.0 0.0  WBC    ABSOLUTE NRBC 0.00 0.00 - 0.01 K/uL    NEUTROPHILS 83 (H) 32 - 75 %    LYMPHOCYTES 10 (L) 12 - 49 %    MONOCYTES 6 5 - 13 %    EOSINOPHILS 0 0 - 7 %    BASOPHILS 0 0 - 1 %    IMMATURE GRANULOCYTES 1 (H) 0 - 0.5 %    ABS. NEUTROPHILS 5.1 1.8 - 8.0 K/UL    ABS. LYMPHOCYTES 0.6 (L) 0.8 - 3.5 K/UL    ABS. MONOCYTES 0.4 0.0 - 1.0 K/UL    ABS. EOSINOPHILS 0.0 0.0 - 0.4 K/UL    ABS. BASOPHILS 0.0 0.0 - 0.1 K/UL    ABS. IMM. GRANS. 0.1 (H) 0.00 - 0.04 K/UL    DF AUTOMATED     METABOLIC PANEL, COMPREHENSIVE    Collection Time: 03/31/22  5:00 PM   Result Value Ref Range    Sodium 141 136 - 145 mmol/L    Potassium 4.6 3.5 - 5.1 mmol/L    Chloride 108 97 - 108 mmol/L    CO2 29 21 - 32 mmol/L    Anion gap 4 (L) 5 - 15 mmol/L    Glucose 100 65 - 100 mg/dL    BUN 23 (H) 6 - 20 mg/dL    Creatinine 1.02 0.55 - 1.02 mg/dL    BUN/Creatinine ratio 23 (H) 12 - 20      GFR est AA >60 >60 ml/min/1.73m2    GFR est non-AA 55 (L) >60 ml/min/1.73m2    Calcium 8.8 8.5 - 10.1 mg/dL    Bilirubin, total 0.5 0.2 - 1.0 mg/dL    AST (SGOT) 75 (H) 15 - 37 U/L    ALT (SGPT) 74 12 - 78 U/L    Alk.  phosphatase 91 45 - 117 U/L    Protein, total 6.9 6.4 - 8.2 g/dL    Albumin 2.1 (L) 3.5 - 5.0 g/dL    Globulin 4.8 (H) 2.0 - 4.0 g/dL    A-G Ratio 0.4 (L) 1.1 - 2.2     URINALYSIS W/ RFLX MICROSCOPIC    Collection Time: 03/31/22  6:13 PM   Result Value Ref Range    Color Yellow/Straw      Appearance Clear Clear      Specific gravity 1.014 1.003 - 1.030      pH (UA) 8.0 5.0 - 8.0      Protein 30 (A) Negative mg/dL    Glucose Negative Negative mg/dL    Ketone Negative Negative mg/dL    Bilirubin Negative Negative      Blood Negative Negative      Urobilinogen 4.0 (H) 0.1 - 1.0 EU/dL    Nitrites Negative Negative      Leukocyte Esterase Negative Negative      WBC 0-4 0 - 4 /hpf    RBC 0-5 0 - 5 /hpf    Bacteria Negative Negative /hpf   URINE MICROSCOPIC    Collection Time: 03/31/22  6:13 PM   Result Value Ref Range    WBC 0-4 0 - 4 /hpf    RBC 0-5 0 - 5 /hpf    Bacteria Negative Negative /hpf       Radiologic Studies -   XR CHEST PORT   Final Result        CT Results  (Last 48 hours)                 03/30/22 1441  CT HEAD WO CONT Final result    Impression:  No intracranial hemorrhage. Narrative:  CT head. Bilateral images are reviewed along with reformatted sagittal/coronal images. Dose reduction: All CT scans at this facility are performed using dose reduction   optimization techniques as appropriate to a performed exam including the   following-   automated exposure control, adjustments of mA and/or Kv according to patient   size, or use of iterative reconstructive technique. Bone windows demonstrate normal aeration image sinuses and mastoid air cells. No superficial radiopaque foreign material; clinical inspection could follow. Review of intracranial content reveals cerebral atrophy. Mild periventricular   white matter gliosis; evidence for nonacute end vessel occlusive change. Central   cerebral arteriosclerosis. No hydrocephalus. No intracranial hemorrhage.                  CXR Results  (Last 50 hours)                 03/31/22 1727  XR CHEST PORT Final result    Narrative: Indication: Fall. AP upright portable chest radiograph 1720 hours 3/31/2022. Comparison 24 March 2022. Lungs are underexpanded with prominent increased bilateral interstitial pattern,   likely pulmonary edema. Less likely atypical infection. Mild cardiomegaly. No pneumothorax. No apparent pleural effusion. If you feel that you have not received excellent quality care or timely care, please ask to speak to the nurse manager. Please choose us in the future for your continued health care needs. ------------------------------------------------------------------------------------------------------------  The exam and treatment you received in the Emergency Department were for an urgent problem and are not intended as complete care. It is important that you follow-up with a doctor, nurse practitioner, or physician assistant to:  (1) confirm your diagnosis,  (2) re-evaluation of changes in your illness and treatment, and  (3) for ongoing care. If your symptoms become worse or you do not improve as expected and you are unable to reach your usual health care provider, you should return to the Emergency Department. We are available 24 hours a day. Please take your discharge instructions with you when you go to your follow-up appointment. If you have any problem arranging a follow-up appointment, contact the Emergency Department immediately. If a prescription has been provided, please have it filled as soon as possible to prevent a delay in treatment. Read the entire medication instruction sheet provided to you by the pharmacy. If you have any questions or reservations about taking the medication due to side effects or interactions with other medications, please call your primary care physician or contact the ER to speak with the charge nurse.      Make an appointment with your family doctor or the physician you were referred to for follow-up of this visit as instructed on your discharge paperwork, as this is a mandatory follow-up. Return to the ER if you are unable to be seen or if you are unable to be seen in a timely manner. If you have any problem arranging the follow-up visit, contact the Emergency Department immediately.

## 2022-03-31 NOTE — BSMART NOTE
Comprehensive Assessment Form Part 1      Section I - Disposition    Axis I - Schizophrenia   Axis II -   Axis III -   Axis IV -   Axis V -       The Medical Doctor to Psychiatrist conference was not completed. The Medical Doctor is in agreement with Psychiatrist disposition because of (reason) pt does not meet criteria for IP BH. The plan is medically clear pt. The on-call Psychiatrist consulted was Dr. Safia Hernández. The admitting Psychiatrist will be Dr. Safia Hernández. The admitting Diagnosis is NA. The Payor source is Medicaid. Section II - Integrated Summary  Summary:  Per triage  Pt from [facility], she is not acting herself and walking around. Pt assessed face to face in ED 18. Pt laying on the str fully dressed. Pt does n ot know why she is hers. States she feels fine. Pt is poor historian. Writer called [facility], [phone]. Spoke with [employer] who refured called to [facility], [phone]. She states pt was d/c from hosp on 3-29 and she is not walking fand has been falling for 2 days and peeing on herself. She states she had pneumonia and was found to have HIV and Hep C and has a f/u appt with Dr. Sherrie Bobby on April 11, 2022. Pt is not meeting criteria for IP BH. Dr. Iliana Moeller, ED, notified    The patienthas not demonstrated mental capacity to provide informed consent. The information is given by the caregiver. The Chief Complaint is not walking incont. The Precipitant Factors are medical illness. Previous Hospitalizations: last week medically  The patient has not previously been in restraints. Current Psychiatrist and/or  is na. Lethality Assessment:    The potential for suicide noted by the following: denies SI. The potential for homicide is not noted. The patient has not been a perpetrator of sexual or physical abuse. There are not pending charges. The patient is not felt to be at risk for self harm or harm to others. The attending nurse was advised NA.     Section III - Psychosocial  The patient's overall mood and attitude is \"fine\". Feelings of helplessness and hopelessness are not observed. Generalized anxiety is not observed. Panic is not observed. Phobias are not observed. Obsessive compulsive tendencies are not observed. Section IV - Mental Status Exam  The patient's appearance is unkempt. The patient's behavior shows no evidence of impairment. The patient is only aware of  person. The patient's speech shows no evidence of impairment. The patient's mood wnl. The range of affect shows no evidence of impairment. The patient's thought content demonstrates no evidence of impairment. The thought process is circumstantial.  The patient's perception shows no evidence of impairment. The patient's memory is impaired. The patient's appetite shows no evidence of impairment. The patient's sleep shows no evidence of impairment. The patient's insight shows no evidence of impairment. The patient's judgement shows no evidence of impairment. Section V - Substance Abuse  The patient is not using substances. The patient is using NA The patient has experienced the following withdrawal symptoms: N/A. Section VI - Living Arrangements  The patient is single. The patient lives [facility]. The patient has no children. The patient does plan to return home upon discharge. The patient does not have legal issues pending. The patient's source of income comes from social security. Anabaptist and cultural practices have not been voiced at this time. The patient's greatest support comes from staff and this person will be involved with the treatment. The patient has not been in an event described as horrible or outside the realm of ordinary life experience either currently or in the past.  The patient has not been a victim of sexual/physical abuse.     Section VII - Other Areas of Clinical Concern  The highest grade achieved is unknown with the overall quality of school experience being described as unknown. The patient is currently unemployed and speaks [place] as a primary language. The patient has unknown communication impairments affecting communication. The patient's preference for learning can be described as: unknown.   The patient's hearing is normal.  The patient's vision is unknown vision      Armen Chapman RN

## 2022-04-01 NOTE — ED PROVIDER NOTES
EMERGENCY DEPARTMENT HISTORY AND PHYSICAL EXAM      Date: 3/31/2022  Patient Name: Lor London    History of Presenting Illness     Chief Complaint   Patient presents with    Physical       History Provided By: Patient and EMS    HPI: Lor London, 62 y.o. female with a past medical history significant hypertension and schizophrenia, HIV presents to the ED with reported altered mental status per patient's living facility. On arrival to the emergency department patient has no complaints. She is resting comfortably in bed. Patient with a history of schizophrenia and unable to provide further details. There are no other complaints, changes, or physical findings at this time. PCP: None    No current facility-administered medications on file prior to encounter. Current Outpatient Medications on File Prior to Encounter   Medication Sig Dispense Refill    dilTIAZem ER (CARDIZEM CD) 120 mg capsule Take 1 Capsule by mouth daily for 30 days. 30 Capsule 0    trimethoprim-sulfamethoxazole (BACTRIM DS, SEPTRA DS) 160-800 mg per tablet Take 2 Tablets by mouth three (3) times daily for 13 days. Indications: pneumonia with a fungus called Pneumocystis jirovecii 78 Tablet 0    predniSONE (DELTASONE) 20 mg tablet Take 20 mg by mouth daily. 13 Tablet 0    divalproex ER (DEPAKOTE ER) 250 mg ER tablet Take 250 mg by mouth two (2) times a day.  albuterol (ProAir HFA) 90 mcg/actuation inhaler Take 2 Puffs by inhalation every six (6) hours as needed for Wheezing or Shortness of Breath. 18 g 0    benztropine (COGENTIN) 0.5 mg tablet Take 1 Tablet by mouth two (2) times a day.  famotidine (PEPCID) 40 mg tablet Take 40 mg by mouth nightly.  lisinopriL (PRINIVIL, ZESTRIL) 20 mg tablet Take 20 mg by mouth daily.  risperiDONE (RisperDAL) 2 mg tablet Take 2 mg by mouth two (2) times a day.  multivitamin (ONE A DAY) tablet Take 1 Tablet by mouth daily.          Past History     Past Medical History:  Past Medical History:   Diagnosis Date    Alcohol abuse 5/29/2017    Hepatitis C     HIV (human immunodeficiency virus infection) ([facility]     Hypertension     Psychotic disorder ([facility]     Schizophrenia ([facility]        Past Surgical History:  History reviewed. No pertinent surgical history. Family History:  Family History   Family history unknown: Yes       Social History:  Social History     Tobacco Use    Smoking status: Current Every Day Smoker     Packs/day: 0.50    Smokeless tobacco: Never Used   Substance Use Topics    Alcohol use: Yes     Alcohol/week: 0.8 standard drinks     Types: 1 Cans of beer per week    Drug use: Yes     Types: Cocaine     Comment: Pt reports a history of crack cocaine use       Allergies:  No Known Allergies      Review of Systems     Review of Systems   Unable to perform ROS: Psychiatric disorder   Constitutional: Negative for chills and fever. HENT: Negative for congestion and sore throat. Eyes: Negative for pain and visual disturbance. Respiratory: Negative for cough and shortness of breath. Cardiovascular: Negative for chest pain and palpitations. Gastrointestinal: Negative for constipation, diarrhea, nausea and vomiting. Genitourinary: Negative for dysuria and frequency. Musculoskeletal: Negative for arthralgias and myalgias. Skin: Negative for color change and rash. Neurological: Negative for dizziness, weakness, light-headedness and headaches. Psychiatric/Behavioral: Negative for dysphoric mood and sleep disturbance. Physical Exam     Physical Exam  Constitutional:       Appearance: Normal appearance. HENT:      Head: Normocephalic and atraumatic. Right Ear: External ear normal.      Left Ear: External ear normal.      Nose: Nose normal.      Mouth/Throat:      Mouth: Mucous membranes are moist.   Eyes:      Extraocular Movements: Extraocular movements intact.       Conjunctiva/sclera: Conjunctivae normal.   Cardiovascular:      Rate and Rhythm: Normal rate and regular rhythm. Pulses: Normal pulses. Heart sounds: Normal heart sounds. Pulmonary:      Effort: Pulmonary effort is normal.      Breath sounds: Normal breath sounds. Abdominal:      General: Abdomen is flat. There is no distension. Palpations: Abdomen is soft. Tenderness: There is no abdominal tenderness. Musculoskeletal:         General: Normal range of motion. Cervical back: Normal range of motion. Skin:     General: Skin is warm and dry. Capillary Refill: Capillary refill takes less than 2 seconds. Neurological:      General: No focal deficit present. Mental Status: She is alert and oriented to person, place, and time. Mental status is at baseline. Psychiatric:         Mood and Affect: Mood normal.         Speech: Speech is tangential.         Behavior: Behavior normal.         Cognition and Memory: Cognition is impaired. Lab and Diagnostic Study Results     Labs -   No results found for this or any previous visit (from the past 12 hour(s)). Radiologic Studies -   @lastxrresult@  CT Results  (Last 48 hours)               03/30/22 1441  CT HEAD WO CONT Final result    Impression:  No intracranial hemorrhage. Narrative:  CT head. Bilateral images are reviewed along with reformatted sagittal/coronal images. Dose reduction: All CT scans at this facility are performed using dose reduction   optimization techniques as appropriate to a performed exam including the   following-   automated exposure control, adjustments of mA and/or Kv according to patient   size, or use of iterative reconstructive technique. Bone windows demonstrate normal aeration image sinuses and mastoid air cells. No superficial radiopaque foreign material; clinical inspection could follow. Review of intracranial content reveals cerebral atrophy.  Mild periventricular   white matter gliosis; evidence for nonacute end vessel occlusive change. Central   cerebral arteriosclerosis. No hydrocephalus. No intracranial hemorrhage. CXR Results  (Last 48 hours)               03/31/22 1727  XR CHEST PORT Final result    Narrative: Indication: Fall. AP upright portable chest radiograph 1720 hours 3/31/2022. Comparison 24 March 2022. Lungs are underexpanded with prominent increased bilateral interstitial pattern,   likely pulmonary edema. Less likely atypical infection. Mild cardiomegaly. No pneumothorax. No apparent pleural effusion. Medical Decision Making   - I am the first provider for this patient. - I reviewed the vital signs, available nursing notes, past medical history, past surgical history, family history and social history. - Initial assessment performed. The patients presenting problems have been discussed, and they are in agreement with the care plan formulated and outlined with them. I have encouraged them to ask questions as they arise throughout their visit. Vital Signs-Reviewed the patient's vital signs. No data found. Records Reviewed: Nursing Notes    The patient presents with altered mental status with a differential diagnosis of  chronic dementia, encephalopathy, hypernatremia, hyponatremia, hypoxia, volume depletion and psychiatric decompensation      ED Course:          Provider Notes (Medical Decision Making):   43-year-old female with past medical history significant for hypertension, schizophrenia, HIV presenting to the ED for reported altered mental status. Patient has no complaints upon arrival to the emergency department. On physical examination, patient is resting comfortably in bed. Vital signs are within normal limits. History difficult to obtain secondary to patient's history of schizophrenia. Patient evaluated by behavioral health and deemed not eligible for inpatient treatment. CBC, CMP, urinalysis, chest x-ray without significant abnormality. Patient received CT head on 3/30/2022, therefore do not feel the need to reimage as there has been no reported trauma. Patient stable for discharge back to her living facility at this time. MDM       Procedures   Medical Decision Makingedical Decision Making  Performed by: Marty Prince DO  PROCEDURES:  Procedures       Disposition   Disposition: Condition stable  DC- Adult Discharges: All of the diagnostic tests were reviewed and questions answered. Diagnosis, care plan and treatment options were discussed. The patient understands the instructions and will follow up as directed. The patients results have been reviewed with them. They have been counseled regarding their diagnosis. The patient verbally convey understanding and agreement of the signs, symptoms, diagnosis, treatment and prognosis and additionally agrees to follow up as recommended with their PCP in 24 - 48 hours. They also agree with the care-plan and convey that all of their questions have been answered. I have also put together some discharge instructions for them that include: 1) educational information regarding their diagnosis, 2) how to care for their diagnosis at home, as well a 3) list of reasons why they would want to return to the ED prior to their follow-up appointment, should their condition change. DC-The patient was given verbal follow-up instructions    Discharged    DISCHARGE PLAN:  1. Cannot display discharge medications since this patient is not currently admitted. 2.   Follow-up Information     Follow up With Specialties Details Why [address] [facility] EMERGENCY DEPT Emergency Medicine  As needed, If symptoms worsen [address] [facility] [id number] [phone]        3. Return to ED if worse   4. Discharge Medication List as of 3/31/2022  7:17 PM            Diagnosis     Clinical Impression:   1.  Physical exam        Attestations:    Marty Prince DO    Please note that this dictation was completed with Dragon, the computer voice recognition software. Quite often unanticipated grammatical, syntax, homophones, and other interpretive errors are inadvertently transcribed by the computer software. Please disregard these errors. Please excuse any errors that have escaped final proofreading. Thank you.

## 2022-04-03 LAB
BACTERIA SPEC CULT: ABNORMAL
BACTERIA SPEC CULT: ABNORMAL
COLONY COUNT,CNT: ABNORMAL
COLONY COUNT,CNT: ABNORMAL
SPECIAL REQUESTS,SREQ: ABNORMAL

## 2022-04-27 NOTE — PROGRESS NOTES
Results only say TNPDNR but no numeric value seen. Tam Boothe, can you see if you can find out what that results means. Thanks.

## 2022-04-30 LAB
HCV NS5 MUT DET ISLT GENOTYP: NORMAL
REF LAB TEST METHOD: NORMAL

## 2022-07-11 ENCOUNTER — OFFICE VISIT (OUTPATIENT)
Dept: INFECTIOUS DISEASES | Age: 62
End: 2022-07-11
Payer: MEDICAID

## 2022-07-11 VITALS
RESPIRATION RATE: 16 BRPM | SYSTOLIC BLOOD PRESSURE: 134 MMHG | OXYGEN SATURATION: 95 % | DIASTOLIC BLOOD PRESSURE: 78 MMHG | HEART RATE: 74 BPM | BODY MASS INDEX: 23.38 KG/M2 | TEMPERATURE: 97.5 F | WEIGHT: 132 LBS

## 2022-07-11 DIAGNOSIS — B18.2 CHRONIC HEPATITIS C WITHOUT HEPATIC COMA (HCC): ICD-10-CM

## 2022-07-11 DIAGNOSIS — B20 SYMPTOMATIC HIV INFECTION (HCC): Primary | ICD-10-CM

## 2022-07-11 PROCEDURE — 99214 OFFICE O/P EST MOD 30 MIN: CPT | Performed by: INTERNAL MEDICINE

## 2022-07-11 RX ORDER — SULFAMETHOXAZOLE AND TRIMETHOPRIM 800; 160 MG/1; MG/1
1 TABLET ORAL
Qty: 90 TABLET | Refills: 1 | Status: SHIPPED | OUTPATIENT
Start: 2022-07-11

## 2022-07-11 NOTE — PROGRESS NOTES
Royce Ramirez is a 62 y.o. female. HPI  Patient with untreated HIV-1 infection and Hepatitis C infection, followed at [address] [place] [address] in March 2022 for presumptive pneumocystis pneumonia, treated with IV then oral Bactrim. Her CD4 count was 91/mm3 (9.1%) and her HIV viral load was 579,000 copies. Unable to located [facility]. HCV viral load was 26,000,000 with evidence of fibrosis. Patient was supposed to follow-up in ID Clinic in April 2022, but Aide states that they did not received any discharge paperwork. She is staying at an assisted living facility. It appears that her pneumonia resolved but Aide states that she still frequently coughs. Patient in good spirits and offers no complaints. Review of Systems   Unable to perform ROS: Psychiatric disorder     Past Medical History:   Diagnosis Date    Alcohol abuse 5/29/2017    [facility]immune [facility]     Hepatitis C     HIV (human immunodeficiency virus infection) [facility]     Hypertension     Psychotic disorder ([facility]     Schizophrenia ([facility]    History reviewed. No pertinent surgical history. Objective  Physical Exam  Vitals and nursing note reviewed. Exam conducted with a chaperone present (Aide). Constitutional:       Appearance: She is not ill-appearing. HENT:      Head: Normocephalic and atraumatic. Nose: Nose normal.      Mouth/Throat:      Pharynx: Oropharynx is clear. Comments: Multiple missing front teeth  Eyes:      Pupils: Pupils are equal, round, and reactive to light. Cardiovascular:      Rate and Rhythm: Normal rate and regular rhythm. Heart sounds: No murmur heard. Pulmonary:      Effort: Pulmonary effort is normal.      Breath sounds: Normal breath sounds. Abdominal:      General: Bowel sounds are normal.      Palpations: Abdomen is soft. Tenderness: There is no abdominal tenderness. Musculoskeletal:      Cervical back: Neck supple. Right lower leg: No edema.       Left lower leg: No edema. Skin:     General: Skin is dry. Findings: No rash. Neurological:      General: No focal deficit present. Mental Status: She is alert. Comments: Mild confusion   Psychiatric:      Comments: Poor judgement, no agitation or hallucinations       Assessment & Plan  1. Interstitial pneumonitis, bilateral, presumptive Pneumocystis jiroveci, with markedly elevated serum fungitell, status post 21 days Bactrim, clinically resolved  2. Acute hypoxic respiratory failure, resolved (PO2 95% on room air)  3.  HIV-1 infection with CD4 <100 and markedly elevated viral load (579,000 copies/ml)4. Hepatitis C infection, type 1a, with markedly elevated viral load (26,000,000), bridging fibrosis, elevated AFP tumor marker  4. Schizophrenia     Comment:  Patient appears to be doing well but requires treatment for HIV and HCV. Will focus on HIV for now, because of her increased risk of opportunistic infections.       Plan:      1. Start Biktarvy 1 po daily for HIV infection  2. Start Bactrim DS 1 po MWF for pneumocystis prophylaxis  3. Once HIV well controlled, with turn attention to HCV infection  4.  Follow-up in ID Clinic in 2 months    Ivonne Burciaga MD

## 2022-07-22 ENCOUNTER — APPOINTMENT (OUTPATIENT)
Dept: CT IMAGING | Age: 62
End: 2022-07-22
Attending: EMERGENCY MEDICINE
Payer: MEDICAID

## 2022-07-22 ENCOUNTER — HOSPITAL ENCOUNTER (EMERGENCY)
Age: 62
Discharge: HOME | End: 2022-07-22
Attending: EMERGENCY MEDICINE
Payer: MEDICAID

## 2022-07-22 VITALS
WEIGHT: 145 LBS | HEART RATE: 77 BPM | OXYGEN SATURATION: 98 % | TEMPERATURE: 98.1 F | DIASTOLIC BLOOD PRESSURE: 80 MMHG | BODY MASS INDEX: 24.75 KG/M2 | SYSTOLIC BLOOD PRESSURE: 156 MMHG | RESPIRATION RATE: 18 BRPM | HEIGHT: 64 IN

## 2022-07-22 DIAGNOSIS — E87.1 HYPONATREMIA: ICD-10-CM
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
## 2022-07-22 LAB
ALBUMIN SERPL-MCNC: 2.2 G/DL (ref 3.5–5)
ALBUMIN/GLOB SERPL: 0.4 {RATIO} (ref 1.1–2.2)
ALP SERPL-CCNC: 111 U/L (ref 45–117)
ALT SERPL-CCNC: 22 U/L (ref 12–78)
ANION GAP SERPL CALC-SCNC: 5 MMOL/L (ref 5–15)
ANION GAP SERPL CALC-SCNC: 6 MMOL/L (ref 5–15)
AST SERPL W P-5'-P-CCNC: 56 U/L (ref 15–37)
BASOPHILS # BLD: 0 K/UL (ref 0–0.1)
BASOPHILS NFR BLD: 0 % (ref 0–1)
BILIRUB SERPL-MCNC: 0.6 MG/DL (ref 0.2–1)
BUN SERPL-MCNC: 11 MG/DL (ref 6–20)
BUN SERPL-MCNC: 12 MG/DL (ref 6–20)
BUN/CREAT SERPL: 10 (ref 12–20)
BUN/CREAT SERPL: 9 (ref 12–20)
CA-I BLD-MCNC: 8.1 MG/DL (ref 8.5–10.1)
CA-I BLD-MCNC: 8.6 MG/DL (ref 8.5–10.1)
CHLORIDE SERPL-SCNC: 92 MMOL/L (ref 97–108)
CHLORIDE SERPL-SCNC: 95 MMOL/L (ref 97–108)
CO2 SERPL-SCNC: 29 MMOL/L (ref 21–32)
CO2 SERPL-SCNC: 30 MMOL/L (ref 21–32)
CREAT SERPL-MCNC: 1.05 MG/DL (ref 0.55–1.02)
CREAT SERPL-MCNC: 1.41 MG/DL (ref 0.55–1.02)
DIFFERENTIAL METHOD BLD: ABNORMAL
EOSINOPHIL # BLD: 0 K/UL (ref 0–0.4)
EOSINOPHIL NFR BLD: 0 % (ref 0–7)
ERYTHROCYTE [DISTWIDTH] IN BLOOD BY AUTOMATED COUNT: 14.2 % (ref 11.5–14.5)
GLOBULIN SER CALC-MCNC: 5.8 G/DL (ref 2–4)
GLUCOSE SERPL-MCNC: 104 MG/DL (ref 65–100)
GLUCOSE SERPL-MCNC: 92 MG/DL (ref 65–100)
HCT VFR BLD AUTO: 28.3 % (ref 35–47)
HGB BLD-MCNC: 10.1 G/DL (ref 11.5–16)
IMM GRANULOCYTES # BLD AUTO: 0 K/UL (ref 0–0.04)
IMM GRANULOCYTES NFR BLD AUTO: 0 % (ref 0–0.5)
LYMPHOCYTES # BLD: 0.9 K/UL (ref 0.8–3.5)
LYMPHOCYTES NFR BLD: 29 % (ref 12–49)
MCH RBC QN AUTO: 31.3 PG (ref 26–34)
MCHC RBC AUTO-ENTMCNC: 35.7 G/DL (ref 30–36.5)
MCV RBC AUTO: 87.6 FL (ref 80–99)
MONOCYTES # BLD: 0.3 K/UL (ref 0–1)
MONOCYTES NFR BLD: 10 % (ref 5–13)
NEUTS SEG # BLD: 1.7 K/UL (ref 1.8–8)
NEUTS SEG NFR BLD: 61 % (ref 32–75)
NRBC # BLD: 0 K/UL (ref 0–0.01)
NRBC BLD-RTO: 0 PER 100 WBC
PLATELET # BLD AUTO: 251 K/UL (ref 150–400)
PMV BLD AUTO: 11.7 FL (ref 8.9–12.9)
POTASSIUM SERPL-SCNC: 3.5 MMOL/L (ref 3.5–5.1)
POTASSIUM SERPL-SCNC: 3.7 MMOL/L (ref 3.5–5.1)
PROT SERPL-MCNC: 8 G/DL (ref 6.4–8.2)
RBC # BLD AUTO: 3.23 M/UL (ref 3.8–5.2)
SODIUM SERPL-SCNC: 127 MMOL/L (ref 136–145)
SODIUM SERPL-SCNC: 130 MMOL/L (ref 136–145)
TROPONIN-HIGH SENSITIVITY: 13 NG/L (ref 0–51)
WBC # BLD AUTO: 2.9 K/UL (ref 3.6–11)

## 2022-07-22 PROCEDURE — 93005 ELECTROCARDIOGRAM TRACING: CPT

## 2022-07-22 PROCEDURE — 80053 COMPREHEN METABOLIC PANEL: CPT

## 2022-07-22 PROCEDURE — 99284 EMERGENCY DEPT VISIT MOD MDM: CPT

## 2022-07-22 PROCEDURE — 74011250636 HC RX REV CODE- 250/636: Performed by: EMERGENCY MEDICINE

## 2022-07-22 PROCEDURE — 80048 BASIC METABOLIC PNL TOTAL CA: CPT

## 2022-07-22 PROCEDURE — 72125 CT NECK SPINE W/O DYE: CPT

## 2022-07-22 PROCEDURE — 70450 CT HEAD/BRAIN W/O DYE: CPT

## 2022-07-22 PROCEDURE — 36415 COLL VENOUS BLD VENIPUNCTURE: CPT

## 2022-07-22 PROCEDURE — 85025 COMPLETE CBC W/AUTO DIFF WBC: CPT

## 2022-07-22 PROCEDURE — 84484 ASSAY OF TROPONIN QUANT: CPT

## 2022-07-22 PROCEDURE — 96360 HYDRATION IV INFUSION INIT: CPT

## 2022-07-22 PROCEDURE — 96361 HYDRATE IV INFUSION ADD-ON: CPT

## 2022-07-22 RX ADMIN — SODIUM CHLORIDE 1000 ML: 9 INJECTION, SOLUTION INTRAVENOUS at 14:15

## 2022-07-22 NOTE — ED PROVIDER NOTES
EMERGENCY DEPARTMENT HISTORY AND PHYSICAL EXAM      Date: 7/22/2022  Patient Name: Rosanna Anderson      History of Presenting Illness     Chief Complaint   Patient presents with    Fall       History Provided By: Patient and Caregiver  Location/Duration/Severity/Modifying factors   Patient is a 62-year-old female who presents to the emergency department with a chief complaint of ground-level fall. The details of the fall are not completely elucidated. The patient reports she was sitting in a chair at at her adult  center when she reports that she seemed to have fallen asleep and then fell out of the chair. She does not believe she hit her head. Some of the other reports from staff members indicates she did hit her head. She does not take any blood thinners. Patient reports that otherwise she has been in her usual state of health recently she has no present complaints denies any neck or back pain no pain of her extremities. Reports that she usually ambulates unassisted and reports that she has been doing that since the fall. Patient denies any chest pain, shortness of breath, syncope, anticoagulant use. There are no other complaints, changes, or physical findings at this time. PCP: None    Current Outpatient Medications   Medication Sig Dispense Refill    kjdpeqwlc-ygkqbmne-gefrzhr ala (BIKTARVY) tab tablet Take 1 Tablet by mouth daily for 90 days. Indications: HIV 90 Tablet 0    trimethoprim-sulfamethoxazole (BACTRIM DS, SEPTRA DS) 160-800 mg per tablet Take 1 Tablet by mouth every Monday, Wednesday, Friday. Indications: Pneumocystis jiroveci pneumonia prevention 90 Tablet 1    divalproex ER (DEPAKOTE ER) 250 mg ER tablet Take 250 mg by mouth two (2) times a day. albuterol (ProAir HFA) 90 mcg/actuation inhaler Take 2 Puffs by inhalation every six (6) hours as needed for Wheezing or Shortness of Breath. 18 g 0    benztropine (COGENTIN) 0.5 mg tablet Take 1 Tablet by mouth two (2) times a day. famotidine (PEPCID) 40 mg tablet Take 40 mg by mouth nightly. lisinopriL (PRINIVIL, ZESTRIL) 20 mg tablet Take 20 mg by mouth daily. risperiDONE (RisperDAL) 2 mg tablet Take 2 mg by mouth two (2) times a day. multivitamin (ONE A DAY) tablet Take 1 Tablet by mouth daily. Past History     Past Medical History:  Past Medical History:   Diagnosis Date    Alcohol abuse 5/29/2017    Autoimmune disease ([facility]     Hepatitis C     HIV (human immunodeficiency virus infection) ([facility]     Hypertension     Psychotic disorder ([facility]     Schizophrenia ([facility]        Past Surgical History:  No past surgical history on file. Family History:  Family History   Family history unknown: Yes       Social History:  Social History     Tobacco Use    Smoking status: Every Day     Packs/day: 0.50     Types: Cigarettes    Smokeless tobacco: Never   Vaping Use    Vaping Use: Never used   Substance Use Topics    Alcohol use: Yes     Alcohol/week: 0.8 standard drinks     Types: 1 Cans of beer per week    Drug use: Yes     Types: Cocaine     Comment: Pt reports a history of crack cocaine use       Allergies:  No Known Allergies      Review of Systems     Review of Systems   Constitutional:  Negative for chills and fever. HENT:  Negative for congestion, rhinorrhea, sinus pressure and sneezing. Eyes:  Negative for visual disturbance. Respiratory:  Negative for cough and shortness of breath. Cardiovascular:  Negative for chest pain. Gastrointestinal:  Negative for abdominal pain, diarrhea, nausea and vomiting. Genitourinary:  Negative for dysuria, frequency and urgency. Musculoskeletal:  Negative for back pain and neck pain. Skin:  Negative for rash. Neurological:  Negative for syncope, numbness and headaches. Physical Exam     Physical Exam  Vitals and nursing note reviewed. Constitutional:       General: She is not in acute distress. Appearance: Normal appearance.       Comments: Alert pleasant and conversant   HENT:      Head: Normocephalic and atraumatic. Right Ear: External ear normal.      Left Ear: External ear normal.      Nose: Nose normal. No congestion or rhinorrhea. Mouth/Throat:      Mouth: Mucous membranes are moist.      Pharynx: Oropharynx is clear. Eyes:      Conjunctiva/sclera: Conjunctivae normal.   Cardiovascular:      Rate and Rhythm: Normal rate and regular rhythm. Pulses: Normal pulses. Heart sounds: Normal heart sounds. No murmur heard. Pulmonary:      Effort: Pulmonary effort is normal.      Breath sounds: Normal breath sounds. No wheezing, rhonchi or rales. Abdominal:      General: Abdomen is flat. Tenderness: There is no abdominal tenderness. There is no guarding or rebound. Musculoskeletal:         General: No swelling or tenderness. Normal range of motion. Cervical back: Normal range of motion and neck supple. No tenderness. Right lower leg: No edema. Left lower leg: No edema. Comments: Nontender from the cervical through the sacral spine   Skin:     General: Skin is warm and dry. Capillary Refill: Capillary refill takes less than 2 seconds. Findings: No rash. Neurological:      General: No focal deficit present. Mental Status: She is alert. Cranial Nerves: No cranial nerve deficit. Sensory: No sensory deficit. Motor: No weakness.        Lab and Diagnostic Study Results     Labs -  Recent Results (from the past 24 hour(s))   CBC WITH AUTOMATED DIFF    Collection Time: 07/22/22 12:08 PM   Result Value Ref Range    WBC 2.9 (L) 3.6 - 11.0 K/uL    RBC 3.23 (L) 3.80 - 5.20 M/uL    HGB 10.1 (L) 11.5 - 16.0 g/dL    HCT 28.3 (L) 35.0 - 47.0 %    MCV 87.6 80.0 - 99.0 FL    MCH 31.3 26.0 - 34.0 PG    MCHC 35.7 30.0 - 36.5 g/dL    RDW 14.2 11.5 - 14.5 %    PLATELET 176 413 - 628 K/uL    MPV 11.7 8.9 - 12.9 FL    NRBC 0.0 0.0  WBC    ABSOLUTE NRBC 0.00 0.00 - 0.01 K/uL    NEUTROPHILS 61 32 - 75 % LYMPHOCYTES 29 12 - 49 %    MONOCYTES 10 5 - 13 %    EOSINOPHILS 0 0 - 7 %    BASOPHILS 0 0 - 1 %    IMMATURE GRANULOCYTES 0 0 - 0.5 %    ABS. NEUTROPHILS 1.7 (L) 1.8 - 8.0 K/UL    ABS. LYMPHOCYTES 0.9 0.8 - 3.5 K/UL    ABS. MONOCYTES 0.3 0.0 - 1.0 K/UL    ABS. EOSINOPHILS 0.0 0.0 - 0.4 K/UL    ABS. BASOPHILS 0.0 0.0 - 0.1 K/UL    ABS. IMM. GRANS. 0.0 0.00 - 0.04 K/UL    DF AUTOMATED     METABOLIC PANEL, COMPREHENSIVE    Collection Time: 07/22/22 12:08 PM   Result Value Ref Range    Sodium 127 (L) 136 - 145 mmol/L    Potassium 3.7 3.5 - 5.1 mmol/L    Chloride 92 (L) 97 - 108 mmol/L    CO2 30 21 - 32 mmol/L    Anion gap 5 5 - 15 mmol/L    Glucose 104 (H) 65 - 100 mg/dL    BUN 12 6 - 20 mg/dL    Creatinine 1.41 (H) 0.55 - 1.02 mg/dL    BUN/Creatinine ratio 9 (L) 12 - 20      GFR est AA 46 (L) >60 ml/min/1.73m2    GFR est non-AA 38 (L) >60 ml/min/1.73m2    Calcium 8.6 8.5 - 10.1 mg/dL    Bilirubin, total 0.6 0.2 - 1.0 mg/dL    AST (SGOT) 56 (H) 15 - 37 U/L    ALT (SGPT) 22 12 - 78 U/L    Alk. phosphatase 111 45 - 117 U/L    Protein, total 8.0 6.4 - 8.2 g/dL    Albumin 2.2 (L) 3.5 - 5.0 g/dL    Globulin 5.8 (H) 2.0 - 4.0 g/dL    A-G Ratio 0.4 (L) 1.1 - 2.2     TROPONIN-HIGH SENSITIVITY    Collection Time: 07/22/22 12:08 PM   Result Value Ref Range    Troponin-High Sensitivity 13 0 - 51 ng/L   METABOLIC PANEL, BASIC    Collection Time: 07/22/22  4:37 PM   Result Value Ref Range    Sodium 130 (L) 136 - 145 mmol/L    Potassium 3.5 3.5 - 5.1 mmol/L    Chloride 95 (L) 97 - 108 mmol/L    CO2 29 21 - 32 mmol/L    Anion gap 6 5 - 15 mmol/L    Glucose 92 65 - 100 mg/dL    BUN 11 6 - 20 mg/dL    Creatinine 1.05 (H) 0.55 - 1.02 mg/dL    BUN/Creatinine ratio 10 (L) 12 - 20      GFR est AA >60 >60 ml/min/1.73m2    GFR est non-AA 53 (L) >60 ml/min/1.73m2    Calcium 8.1 (L) 8.5 - 10.1 mg/dL         Radiologic Studies -   CT HEAD WO CONT   Final Result   No acute intracranial abnormality. No significant interval change in appearance. CT SPINE CERV WO CONT   Final Result      1. No acute fracture or subluxation of the cervical spine. 2. There is multilevel, mild degenerative disc disease of the cervical spine. There is mild, multilevel left-sided bony foraminal narrowing. Medical Decision Making and ED Course   - I am the first and primary provider for this patient AND AM THE PRIMARY PROVIDER OF RECORD. - I reviewed the vital signs, available nursing notes, past medical history, past surgical history, family history and social history. - Initial assessment performed. The patients presenting problems have been discussed, and the staff are in agreement with the care plan formulated and outlined with them. I have encouraged them to ask questions as they arise throughout their visit. Vital Signs-Reviewed the patient's vital signs. Patient Vitals for the past 24 hrs:   Temp Pulse Resp BP SpO2   07/22/22 1628 -- 77 18 (!) 156/80 98 %   07/22/22 1151 98.1 °F (36.7 °C) 67 18 105/65 98 %         Nursing notes and pertinent past medical history including imaging and labs have been reviewed (if available)    ED Course:     ED Course as of 07/23/22 0846   Fri Jul 22, 2022   1540 Labs have been reviewed. Sodium is slightly low. We will hydrate with saline and recheck. This looks to be to some degree chronic and an issue in the past.  Likewise her CBC reflects a modest anemia, this also looks to be chronic likewise white blood cell count is slightly low which has been an issue in the past. [ELIO]      ED Course User Index  [ELIO] Tammy Diaz DO       Provider Notes (Medical Decision Making):     MDM  Number of Diagnoses or Management Options  Fall from ground level  Hyponatremia  Diagnosis management comments: Patient is a 80-year-old female who presents to the emergency department with a chief complaint of ground-level fall. Patient reportedly fell from a chair.   She denies any areas of pain or injury no loss of consciousness no head trauma. Differential includes contusion, fracture, dislocation, head injury such as ICH, subarachnoid hemorrhage, subdural hematoma, epidural hematoma, cerebral contusion, subarachnoid hemorrhage etc.    Will additionally check some basic blood work. 1:37 PM  Incidental finding that sodium is slightly low at 127 looks like she has had a history of this in the past.  Will order a liter of saline. She seems to be asymptomatic in terms of mental status with no additional complaints. Reportedly at baseline. On recheck of her sodium it is improved to 130. I recommended to recheck to her and the staff in several days from now. Recommended primary care follow-up. Advised to return if worsening in any way in the meantime. _________________________________________________________________    At this time, patient is stable and appropriate for discharge home. Patient demonstrates understanding of current diagnoses and is in agreement with the treatment plan. They are advised that while the likelihood of serious underlying condition is low at this point given the evaluation performed today, we cannot fully rule it out. They are advised to immediately return with any new symptoms or worsening of current condition. Any Incidental findings were noted on the patient's discharge paperwork as well as verbally directly to the patient, and the appropriate follow-up was given to the patient as far as instructions on testing needed as well as the timeframe. All questions have been answered. Patient is given educational material regarding their diagnoses, including danger symptoms and when to return to the ED. This note was dictated utilizing Dragon voice recognition software. Unfortunately this leads to occasional typographical errors. I apologize in advance if the situation occurs. If questions occur please do not hesitate to contact me directly.     Lazaro Estrada, DO        Procedures and Critical Care   Performed by: Av Kaiser DO  Procedures         Av Kaiser DO      Diagnosis:   1. Fall from ground level    2. Hyponatremia          Disposition: Discharge    Follow-up Information       Follow up With Specialties Details Why [facility] EMERGENCY DEPT Emergency Medicine  As needed, If symptoms worsen; or [facility] Emergency Department [address] [facility] [address] [phone]    Your Primary Care Physician  In 3 days      [place]  Call today Low Cost/flexible PCP follow up [address] [facility]  [phone]            Discharge Medication List as of 7/22/2022  7:41 PM        CONTINUE these medications which have NOT CHANGED    Details   rpnmbuqae-twiqoeol-opievcc ala (BIKTARVY) tab tablet Take 1 Tablet by mouth daily for 90 days. Indications: HIV, Normal, Disp-90 Tablet, R-0      trimethoprim-sulfamethoxazole (BACTRIM DS, SEPTRA DS) 160-800 mg per tablet Take 1 Tablet by mouth every Monday, Wednesday, Friday. Indications: Pneumocystis jiroveci pneumonia prevention, Normal, Disp-90 Tablet, R-1      divalproex ER (DEPAKOTE ER) 250 mg ER tablet Take 250 mg by mouth two (2) times a day., Historical Med      albuterol (ProAir HFA) 90 mcg/actuation inhaler Take 2 Puffs by inhalation every six (6) hours as needed for Wheezing or Shortness of Breath., Normal, Disp-18 g, R-0      benztropine (COGENTIN) 0.5 mg tablet Take 1 Tablet by mouth two (2) times a day., Historical Med      famotidine (PEPCID) 40 mg tablet Take 40 mg by mouth nightly., Historical Med      lisinopriL (PRINIVIL, ZESTRIL) 20 mg tablet Take 20 mg by mouth daily. , Historical Med      risperiDONE (RisperDAL) 2 mg tablet Take 2 mg by mouth two (2) times a day., Historical Med      multivitamin (ONE A DAY) tablet Take 1 Tablet by mouth daily. , Historical Med             Patient seen in the context of the Novel Coronavirus (COVID19) pandemic, utilizing contemporary protocols and evidence based on the most up to date available evidence, understanding that the current evidence has the potential to change as additional information becomes available. This note is dictated utilizing Dragon voice recognition software. Unfortunately this leads to occasional typographical errors using the voice recognition. I apologize in advance if the situation occurs. If questions occur please do not hesitate to contact me directly.     Josh Leon, DO

## 2022-07-22 NOTE — ED TRIAGE NOTES
Pt arrives by EMS from adult  facility. Pt had witnessed GLF. Possible LOC. Pt at baseline per staff. Pt has hx of HIV, hep C, hypertension and schizophrenia. Pt follows commands and A&Ox3 at baseline.  Trauma Shawn called per ED MD.

## 2022-07-22 NOTE — DISCHARGE INSTRUCTIONS
1.  Your sodium was low today. You should have this rechecked in 2 to 3 days. Increase your intake of salty foods. 2.  Follow-up with your primary care doctor to have the sodium/basic metabolic panel rechecked. 3.  Return if you experience any worsening in your condition in the meantime.

## 2022-07-23 NOTE — ED TRIAGE NOTES
EMS called for fall, pt complaining of back pain, no head injury no loc. During triage pt noted to be febrile, tachycardic, and lethargic. Group Topic: BH Recovery Skills    Date: 7/23/2022  Start Time: 1100  End Time: 1145  Facilitators: Catherine S Fahres, OT    Focus: Letter to future self  Number in attendance: 6    Method: Group  Attendance: Present  Participation: Active  Patient Response: Attentive, Impaired concentration and Interested in topic  Mood: Anxious  Affect: Type: Anxious   Range: Restricted   Congruency: Congruent   Stability: Stable  Behavior/Socialization: Appropriate to group and Cooperative  Thought Process: Tracking  Task Performance: Follows directions  Patient Evaluation: Independent - full participation

## 2022-07-26 LAB
ATRIAL RATE: 68 BPM
CALCULATED P AXIS, ECG09: 39 DEGREES
CALCULATED R AXIS, ECG10: 45 DEGREES
CALCULATED T AXIS, ECG11: 70 DEGREES
DIAGNOSIS, 93000: NORMAL
P-R INTERVAL, ECG05: 172 MS
Q-T INTERVAL, ECG07: 420 MS
QRS DURATION, ECG06: 90 MS
QTC CALCULATION (BEZET), ECG08: 446 MS
VENTRICULAR RATE, ECG03: 68 BPM

## 2022-09-08 ENCOUNTER — OFFICE VISIT (OUTPATIENT)
Dept: INFECTIOUS DISEASES | Age: 62
End: 2022-09-08
Payer: MEDICAID

## 2022-09-08 VITALS
SYSTOLIC BLOOD PRESSURE: 170 MMHG | DIASTOLIC BLOOD PRESSURE: 98 MMHG | WEIGHT: 142 LBS | OXYGEN SATURATION: 99 % | HEIGHT: 64 IN | BODY MASS INDEX: 24.24 KG/M2 | TEMPERATURE: 97.8 F | HEART RATE: 70 BPM

## 2022-09-08 DIAGNOSIS — B20 CURRENTLY ASYMPTOMATIC HIV INFECTION, WITH HISTORY OF HIV-RELATED ILLNESS (HCC): Primary | ICD-10-CM

## 2022-09-08 DIAGNOSIS — B18.2 CHRONIC HEPATITIS C WITHOUT HEPATIC COMA (HCC): ICD-10-CM

## 2022-09-08 PROCEDURE — 99214 OFFICE O/P EST MOD 30 MIN: CPT | Performed by: INTERNAL MEDICINE

## 2022-09-08 RX ORDER — MULTIVITAMIN WITH FOLIC ACID 400 MCG
TABLET ORAL
COMMUNITY
Start: 2022-08-10

## 2022-09-08 RX ORDER — BETAMETHASONE VALERATE 1.2 MG/G
CREAM TOPICAL
COMMUNITY
Start: 2022-06-22

## 2022-09-08 RX ORDER — DILTIAZEM HYDROCHLORIDE 120 MG/1
CAPSULE, COATED, EXTENDED RELEASE ORAL
COMMUNITY
Start: 2022-08-10 | End: 2022-09-10

## 2022-09-08 RX ORDER — BENZONATATE 100 MG/1
CAPSULE ORAL
COMMUNITY
Start: 2022-08-10

## 2022-09-08 NOTE — PROGRESS NOTES
Royce Fuller is a 65 y.o. female. HPI  Patient with untreated HIV-1 infection and Hepatitis C infection, followed at [address] [place] [address] in March 2022 for presumptive pneumocystis pneumonia, treated with IV then oral Bactrim. Her CD4 count was 91/mm3 (9.1%) and her HIV viral load was 579,000 copies. HCV viral load was 26,000,000 with evidence of fibrosis. She was seen in ID Clinic in July 2022 at which time she was started on Biktarvy. Hep C treatment was deferred. She is here today for follow-up. Review of Systems   Unable to perform ROS: Mental acuity   Constitutional:  Negative for fever. Past Medical History:   Diagnosis Date    Alcohol abuse 5/29/2017    Autoimmune disease ([facility]     Hepatitis C     HIV (human immunodeficiency virus infection) ([facility]     Hypertension     Psychotic disorder ([facility]     Schizophrenia ([facility]      History reviewed. No pertinent surgical history. Family History   Family history unknown: Yes     Social History     Socioeconomic History    Marital status: SINGLE     Spouse name: Not on file    Number of children: Not on file    Years of education: Not on file    Highest education level: Not on file   Occupational History    Not on file   Tobacco Use    Smoking status: Every Day     Packs/day: 0.50     Types: Cigarettes    Smokeless tobacco: Never   Vaping Use    Vaping Use: Never used   Substance and Sexual Activity    Alcohol use: Yes     Alcohol/week: 0.8 standard drinks     Types: 1 Cans of beer per week    Drug use: Yes     Types: Cocaine     Comment: Pt reports a history of crack cocaine use    Sexual activity: Not on file   Other Topics Concern    Not on file   Social History Narrative    The patient is single. The patient lives alone. Unknown if patient has children. The patient does not plan to return home upon discharge. The patient does not have legal issues pending. The patient's source of income comes from disability.     Taoist and cultural practices have not been voiced at this time. The patient has not been in an event described as horrible or outside the realm of ordinary life experience either currently or in the past.The patient has not been a victim of sexual/physical abuse. Social Determinants of Health     Financial Resource Strain: Not on file   Food Insecurity: Not on file   Transportation Needs: Not on file   Physical Activity: Not on file   Stress: Not on file   Social Connections: Not on file   Intimate Partner Violence: Not on file   Housing Stability: Not on file       Objective  Physical Exam  Vitals and nursing note reviewed. Exam conducted with a chaperone present (Aide). HENT:      Head: Normocephalic and atraumatic. Nose: Nose normal.      Mouth/Throat:      Pharynx: Oropharynx is clear. Comments: Poor dentition  Eyes:      Pupils: Pupils are equal, round, and reactive to light. Cardiovascular:      Rate and Rhythm: Normal rate and regular rhythm. Heart sounds: No murmur heard. Pulmonary:      Effort: Pulmonary effort is normal.      Breath sounds: Normal breath sounds. Abdominal:      General: Bowel sounds are normal. There is no distension. Palpations: Abdomen is soft. Tenderness: There is no abdominal tenderness. Musculoskeletal:      Cervical back: Neck supple. Right lower leg: No edema. Left lower leg: No edema. Skin:     Findings: No rash. Neurological:      General: No focal deficit present. Mental Status: She is disoriented. Psychiatric:         Behavior: Behavior normal.      Comments: Poor judgement        Assessment & Plan  1. HIV-1 infection with CD4 <100 and ,000 copies/ml, on Biktarvy for 3 months, clinically stable  2. Hepatitis C infection, chronic, type 1a, VL 26,000,000 iu/ml, bridging fibrosis, elevated AFP tumor marker  3. History of presumptive Pneumocystis pneumonia, resolved  4.  Schizophrenia     Comment:  Patient appears to be doing well on Biktarvy, will need to assess efficacy  of Biktarvy. Plan:      1. Continue  Biktarvy 1 po daily for HIV infection  2. Continue Bactrim DS 1 po MWF for pneumocystis prophylaxis  3. Check CBC, CMP, CD4 count, HIV-1 viral load and genosure  4. If  HIV well controlled, with turn attention to HCV infection  5.  Follow-up to be deteremined      Jamie Johnson MD

## 2022-09-10 ENCOUNTER — HOSPITAL ENCOUNTER (OUTPATIENT)
Age: 62
Setting detail: OBSERVATION
Discharge: HOME | End: 2022-09-10
Attending: EMERGENCY MEDICINE | Admitting: HOSPITALIST
Payer: MEDICAID

## 2022-09-10 ENCOUNTER — APPOINTMENT (OUTPATIENT)
Dept: CT IMAGING | Age: 62
End: 2022-09-10
Attending: EMERGENCY MEDICINE
Payer: MEDICAID

## 2022-09-10 ENCOUNTER — APPOINTMENT (OUTPATIENT)
Dept: GENERAL RADIOLOGY | Age: 62
End: 2022-09-10
Attending: EMERGENCY MEDICINE
Payer: MEDICAID

## 2022-09-10 VITALS
BODY MASS INDEX: 24.33 KG/M2 | WEIGHT: 155 LBS | DIASTOLIC BLOOD PRESSURE: 70 MMHG | HEIGHT: 67 IN | RESPIRATION RATE: 15 BRPM | OXYGEN SATURATION: 100 % | HEART RATE: 60 BPM | SYSTOLIC BLOOD PRESSURE: 166 MMHG | TEMPERATURE: 97.8 F

## 2022-09-10 DIAGNOSIS — R55 SYNCOPE AND COLLAPSE: Primary | ICD-10-CM

## 2022-09-10 LAB
ALBUMIN SERPL-MCNC: 2.5 G/DL (ref 3.5–5)
ALBUMIN/GLOB SERPL: 0.4 {RATIO} (ref 1.1–2.2)
ALP SERPL-CCNC: 125 U/L (ref 45–117)
ALT SERPL-CCNC: 46 U/L (ref 12–78)
ANION GAP SERPL CALC-SCNC: 6 MMOL/L (ref 5–15)
APPEARANCE UR: ABNORMAL
AST SERPL W P-5'-P-CCNC: 106 U/L (ref 15–37)
BACTERIA URNS QL MICRO: NEGATIVE /HPF
BASOPHILS # BLD: 0 K/UL (ref 0–0.1)
BASOPHILS NFR BLD: 1 % (ref 0–1)
BILIRUB SERPL-MCNC: 0.6 MG/DL (ref 0.2–1)
BILIRUB UR QL: NEGATIVE
BNP SERPL-MCNC: 1603 PG/ML
BUN SERPL-MCNC: 17 MG/DL (ref 6–20)
BUN/CREAT SERPL: 13 (ref 12–20)
CA-I BLD-MCNC: 8.8 MG/DL (ref 8.5–10.1)
CHLORIDE SERPL-SCNC: 94 MMOL/L (ref 97–108)
CO2 SERPL-SCNC: 28 MMOL/L (ref 21–32)
COLOR UR: ABNORMAL
CREAT SERPL-MCNC: 1.29 MG/DL (ref 0.55–1.02)
D DIMER PPP FEU-MCNC: 0.38 UG/ML(FEU)
DIFFERENTIAL METHOD BLD: ABNORMAL
EOSINOPHIL # BLD: 0 K/UL (ref 0–0.4)
EOSINOPHIL NFR BLD: 1 % (ref 0–7)
ERYTHROCYTE [DISTWIDTH] IN BLOOD BY AUTOMATED COUNT: 13.8 % (ref 11.5–14.5)
GLOBULIN SER CALC-MCNC: 5.9 G/DL (ref 2–4)
GLUCOSE SERPL-MCNC: 76 MG/DL (ref 65–100)
GLUCOSE UR STRIP.AUTO-MCNC: NEGATIVE MG/DL
HCT VFR BLD AUTO: 31.1 % (ref 35–47)
HGB BLD-MCNC: 11.3 G/DL (ref 11.5–16)
HGB UR QL STRIP: 50
IMM GRANULOCYTES # BLD AUTO: 0 K/UL (ref 0–0.04)
IMM GRANULOCYTES NFR BLD AUTO: 0 % (ref 0–0.5)
KETONES UR QL STRIP.AUTO: NEGATIVE MG/DL
LEUKOCYTE ESTERASE UR QL STRIP.AUTO: NEGATIVE
LYMPHOCYTES # BLD: 1.3 K/UL (ref 0.8–3.5)
LYMPHOCYTES NFR BLD: 47 % (ref 12–49)
MCH RBC QN AUTO: 32.3 PG (ref 26–34)
MCHC RBC AUTO-ENTMCNC: 36.3 G/DL (ref 30–36.5)
MCV RBC AUTO: 88.9 FL (ref 80–99)
MONOCYTES # BLD: 0.4 K/UL (ref 0–1)
MONOCYTES NFR BLD: 15 % (ref 5–13)
MUCOUS THREADS URNS QL MICRO: ABNORMAL /LPF
NEUTS SEG # BLD: 1 K/UL (ref 1.8–8)
NEUTS SEG NFR BLD: 36 % (ref 32–75)
NITRITE UR QL STRIP.AUTO: NEGATIVE
NRBC # BLD: 0 K/UL (ref 0–0.01)
NRBC BLD-RTO: 0 PER 100 WBC
PH UR STRIP: 7 [PH] (ref 5–8)
PLATELET # BLD AUTO: 193 K/UL (ref 150–400)
PMV BLD AUTO: 11.3 FL (ref 8.9–12.9)
POTASSIUM SERPL-SCNC: 3.8 MMOL/L (ref 3.5–5.1)
PROT SERPL-MCNC: 8.4 G/DL (ref 6.4–8.2)
PROT UR STRIP-MCNC: NEGATIVE MG/DL
RBC # BLD AUTO: 3.5 M/UL (ref 3.8–5.2)
RBC #/AREA URNS HPF: ABNORMAL /HPF (ref 0–5)
SODIUM SERPL-SCNC: 128 MMOL/L (ref 136–145)
TROPONIN-HIGH SENSITIVITY: 10 NG/L (ref 0–51)
TROPONIN-HIGH SENSITIVITY: 11 NG/L (ref 0–51)
UROBILINOGEN UR QL STRIP.AUTO: 0.1 EU/DL (ref 0.1–1)
WBC # BLD AUTO: 2.9 K/UL (ref 3.6–11)
WBC URNS QL MICRO: ABNORMAL /HPF (ref 0–4)

## 2022-09-10 PROCEDURE — 83880 ASSAY OF NATRIURETIC PEPTIDE: CPT

## 2022-09-10 PROCEDURE — 81003 URINALYSIS AUTO W/O SCOPE: CPT

## 2022-09-10 PROCEDURE — 84484 ASSAY OF TROPONIN QUANT: CPT

## 2022-09-10 PROCEDURE — 99285 EMERGENCY DEPT VISIT HI MDM: CPT

## 2022-09-10 PROCEDURE — G0378 HOSPITAL OBSERVATION PER HR: HCPCS

## 2022-09-10 PROCEDURE — 80053 COMPREHEN METABOLIC PANEL: CPT

## 2022-09-10 PROCEDURE — 65270000029 HC RM PRIVATE

## 2022-09-10 PROCEDURE — 71045 X-RAY EXAM CHEST 1 VIEW: CPT

## 2022-09-10 PROCEDURE — 74011250636 HC RX REV CODE- 250/636: Performed by: HOSPITALIST

## 2022-09-10 PROCEDURE — 93005 ELECTROCARDIOGRAM TRACING: CPT

## 2022-09-10 PROCEDURE — 85379 FIBRIN DEGRADATION QUANT: CPT

## 2022-09-10 PROCEDURE — 36415 COLL VENOUS BLD VENIPUNCTURE: CPT

## 2022-09-10 PROCEDURE — 85025 COMPLETE CBC W/AUTO DIFF WBC: CPT

## 2022-09-10 PROCEDURE — 70450 CT HEAD/BRAIN W/O DYE: CPT

## 2022-09-10 RX ADMIN — SODIUM CHLORIDE 500 ML: 9 INJECTION, SOLUTION INTRAVENOUS at 14:01

## 2022-09-10 NOTE — CONSULTS
Progress Note    Patient: Julieta Leblanc MRN: [id number]  SSN: [id number]    YOB: 1960  Age: 65 y.o. Sex: female      Admit Date: 9/10/2022    LOS: 0 days     Subjective:       62F, H/o HIV1 on prophylactic bactrim and HAART, HTN, schizophrenia and recently started on cardizem for tachycardia with syncope    The patient maxim in AM after she took her meds and was at breakfast table sitting felt dizzy, took 2 cups of coffee and passed out. Denies tonic clonic activity, no post ictal drowsiness    ED: amanda, with WHITNEY. Likely vagal and dehydration and coffee made it worse. Troponin 11, CXR unremarkable, CT head negative. EKG amanda, sinus bradycardia          Objective:     Vitals:    09/10/22 1009 09/10/22 1154 09/10/22 1200   BP: 135/88  (!) 154/95   Pulse: (!) 47 64 60   Resp: 19 18 15   Temp: 97.8 °F (36.6 °C)     SpO2: 98% 100% 100%   Weight: 70.3 kg (155 lb)     Height: 5' 7\" (1.702 m)          Intake and Output:  Current Shift: No intake/output data recorded. Last three shifts: No intake/output data recorded. PReview of Systems   Constitutional:  Negative for activity change and fever. HENT:  Negative for rhinorrhea and sore throat. Eyes:  Negative for visual disturbance. Respiratory:  Negative for cough. Cardiovascular:  Negative for chest pain. Gastrointestinal:  Negative for abdominal pain, diarrhea, nausea and vomiting. Genitourinary:  Negative for dysuria. Musculoskeletal:  Negative for arthralgias and myalgias. Skin:  Negative for rash and wound. Neurological:  Positive for syncope. Negative for headaches. Psychiatric/Behavioral:  Negative for confusion. All other systems reviewed and are negative. Physical Exam  Vitals and nursing note reviewed. Constitutional:       Appearance: Normal appearance. She is normal weight. HENT:      Head: Normocephalic and atraumatic.       Nose: Nose normal.      Mouth/Throat:      Mouth: Mucous membranes are moist.   Eyes:      Conjunctiva/sclera: Conjunctivae normal.   Cardiovascular:      Rate and Rhythm: Normal rate. Pulses: Normal pulses. Pulmonary:      Effort: Pulmonary effort is normal. No respiratory distress. Musculoskeletal:         General: No swelling or deformity. Normal range of motion. Skin:     General: Skin is warm and dry. Findings: No rash. Neurological:      General: No focal deficit present. Mental Status: She is alert and oriented to person, place, and time. Cranial Nerves: No cranial nerve deficit. Sensory: No sensory deficit. Motor: No weakness. Coordination: Coordination normal.   Psychiatric:         Mood and Affect: Mood normal.         Behavior: Behavior normal.     Lab/Data Review:  Recent Results (from the past 24 hour(s))   CBC WITH AUTOMATED DIFF    Collection Time: 09/10/22 10:27 AM   Result Value Ref Range    WBC 2.9 (L) 3.6 - 11.0 K/uL    RBC 3.50 (L) 3.80 - 5.20 M/uL    HGB 11.3 (L) 11.5 - 16.0 g/dL    HCT 31.1 (L) 35.0 - 47.0 %    MCV 88.9 80.0 - 99.0 FL    MCH 32.3 26.0 - 34.0 PG    MCHC 36.3 30.0 - 36.5 g/dL    RDW 13.8 11.5 - 14.5 %    PLATELET 719 276 - 136 K/uL    MPV 11.3 8.9 - 12.9 FL    NRBC 0.0 0.0  WBC    ABSOLUTE NRBC 0.00 0.00 - 0.01 K/uL    NEUTROPHILS 36 32 - 75 %    LYMPHOCYTES 47 12 - 49 %    MONOCYTES 15 (H) 5 - 13 %    EOSINOPHILS 1 0 - 7 %    BASOPHILS 1 0 - 1 %    IMMATURE GRANULOCYTES 0 0 - 0.5 %    ABS. NEUTROPHILS 1.0 (L) 1.8 - 8.0 K/UL    ABS. LYMPHOCYTES 1.3 0.8 - 3.5 K/UL    ABS. MONOCYTES 0.4 0.0 - 1.0 K/UL    ABS. EOSINOPHILS 0.0 0.0 - 0.4 K/UL    ABS. BASOPHILS 0.0 0.0 - 0.1 K/UL    ABS. IMM.  GRANS. 0.0 0.00 - 0.04 K/UL    DF AUTOMATED     METABOLIC PANEL, COMPREHENSIVE    Collection Time: 09/10/22 10:27 AM   Result Value Ref Range    Sodium 128 (L) 136 - 145 mmol/L    Potassium 3.8 3.5 - 5.1 mmol/L    Chloride 94 (L) 97 - 108 mmol/L    CO2 28 21 - 32 mmol/L    Anion gap 6 5 - 15 mmol/L    Glucose 76 65 - 100 mg/dL    BUN 17 6 - 20 mg/dL    Creatinine 1.29 (H) 0.55 - 1.02 mg/dL    BUN/Creatinine ratio 13 12 - 20      GFR est AA 51 (L) >60 ml/min/1.73m2    GFR est non-AA 42 (L) >60 ml/min/1.73m2    Calcium 8.8 8.5 - 10.1 mg/dL    Bilirubin, total 0.6 0.2 - 1.0 mg/dL    AST (SGOT) 106 (H) 15 - 37 U/L    ALT (SGPT) 46 12 - 78 U/L    Alk.  phosphatase 125 (H) 45 - 117 U/L    Protein, total 8.4 (H) 6.4 - 8.2 g/dL    Albumin 2.5 (L) 3.5 - 5.0 g/dL    Globulin 5.9 (H) 2.0 - 4.0 g/dL    A-G Ratio 0.4 (L) 1.1 - 2.2     TROPONIN-HIGH SENSITIVITY    Collection Time: 09/10/22 10:27 AM   Result Value Ref Range    Troponin-High Sensitivity 11 0 - 51 ng/L   NT-PRO BNP    Collection Time: 09/10/22 10:27 AM   Result Value Ref Range    NT pro-BNP 1,603 (H) <125 pg/mL   URINALYSIS W/ RFLX MICROSCOPIC    Collection Time: 09/10/22 11:50 AM   Result Value Ref Range    Color Yellow/Straw      Appearance Turbid (A) Clear      pH (UA) 7.0 5.0 - 8.0      Protein Negative Negative mg/dL    Glucose Negative Negative mg/dL    Ketone Negative Negative mg/dL    Bilirubin Negative Negative      Blood 50 (A) Negative      Urobilinogen 0.1 0.1 - 1.0 EU/dL    Nitrites Negative Negative      Leukocyte Esterase Negative Negative      WBC 0-4 0 - 4 /hpf    RBC 5-10 0 - 5 /hpf    Bacteria Negative Negative /hpf    Mucus Trace /lpf   D DIMER    Collection Time: 09/10/22 11:50 AM   Result Value Ref Range    D DIMER 0.38 <0.50 ug/ml(FEU)         Assessment and plan:      Syncope: likely vagal and orthostatic  -500ml fluids  -dc cardizem  Follow-up with cardiology and PCP in 1 week  -     Signed By: Heath Dewitt MD     September 10, 2022

## 2022-09-10 NOTE — ED PROVIDER NOTES
EMERGENCY DEPARTMENT HISTORY AND PHYSICAL EXAM      Date: 9/10/2022  Patient Name: Marianna Colmenares      History of Presenting Illness     Chief Complaint   Patient presents with    Syncope       History Provided By: Patient    HPI: Marianna Colmenares, 65 y.o. female with a past medical history significant hypertension and had a Hep C, schizophrenia  presents to the ED with cc of tPA. Patient was at her adult day center. She did not eat breakfast and states that she drank 2 cups of coffee. She did have a witnessed syncopal episode. Woke up on the ground. Patient recalls feeling dizzy prior to passing out. She denies being on blood thinners. States she feels back to baseline now. Only complaint now is feeling hungry. There are no other complaints, changes, or physical findings at this time. PCP: Tadeo Avalos NP    Current Outpatient Medications   Medication Sig Dispense Refill    Tab-A-[place] 400 mcg tab tablet       dilTIAZem ER (CARDIZEM CD) 120 mg capsule  (Patient not taking: Reported on 9/8/2022)      betamethasone valerate (VALISONE) 0.1 % topical cream  (Patient not taking: Reported on 9/8/2022)      benzonatate (TESSALON) 100 mg capsule  (Patient not taking: Reported on 9/8/2022)      ugsvyukft-ryxoabck-sljtdea ala (BIKTARVY) tab tablet Take 1 Tablet by mouth daily for 90 days. Indications: HIV 90 Tablet 0    trimethoprim-sulfamethoxazole (BACTRIM DS, SEPTRA DS) 160-800 mg per tablet Take 1 Tablet by mouth every Monday, Wednesday, Friday. Indications: Pneumocystis jiroveci pneumonia prevention 90 Tablet 1    divalproex ER (DEPAKOTE ER) 250 mg ER tablet Take 250 mg by mouth two (2) times a day. albuterol (ProAir HFA) 90 mcg/actuation inhaler Take 2 Puffs by inhalation every six (6) hours as needed for Wheezing or Shortness of Breath. 18 g 0    benztropine (COGENTIN) 0.5 mg tablet Take 1 Tablet by mouth two (2) times a day. famotidine (PEPCID) 40 mg tablet Take 40 mg by mouth nightly. lisinopriL (PRINIVIL, ZESTRIL) 20 mg tablet Take 20 mg by mouth daily. risperiDONE (RisperDAL) 2 mg tablet Take 2 mg by mouth two (2) times a day. multivitamin (ONE A DAY) tablet Take 1 Tablet by mouth daily. Past History     Past Medical History:  Past Medical History:   Diagnosis Date    Alcohol abuse 5/29/2017    Autoimmune disease ([facility]     Hepatitis C     HIV (human immunodeficiency virus infection) ([facility]     Hypertension     Psychotic disorder ([facility]     Schizophrenia ([facility]        Past Surgical History:  No past surgical history on file. Family History:  Family History   Family history unknown: Yes       Social History:  Social History     Tobacco Use    Smoking status: Every Day     Packs/day: 0.50     Types: Cigarettes    Smokeless tobacco: Never   Vaping Use    Vaping Use: Never used   Substance Use Topics    Alcohol use: Yes     Alcohol/week: 0.8 standard drinks     Types: 1 Cans of beer per week    Drug use: Yes     Types: Cocaine     Comment: Pt reports a history of crack cocaine use       Allergies:  No Known Allergies      Review of Systems     Review of Systems   Constitutional:  Negative for activity change and fever. HENT:  Negative for rhinorrhea and sore throat. Eyes:  Negative for visual disturbance. Respiratory:  Negative for cough. Cardiovascular:  Negative for chest pain. Gastrointestinal:  Negative for abdominal pain, diarrhea, nausea and vomiting. Genitourinary:  Negative for dysuria. Musculoskeletal:  Negative for arthralgias and myalgias. Skin:  Negative for rash and wound. Neurological:  Positive for syncope. Negative for headaches. Psychiatric/Behavioral:  Negative for confusion. All other systems reviewed and are negative. Physical Exam     Physical Exam  Vitals and nursing note reviewed. Constitutional:       Appearance: Normal appearance. She is normal weight. HENT:      Head: Normocephalic and atraumatic.       Nose: Nose normal. Mouth/Throat:      Mouth: Mucous membranes are moist.   Eyes:      Conjunctiva/sclera: Conjunctivae normal.   Cardiovascular:      Rate and Rhythm: Normal rate. Pulses: Normal pulses. Pulmonary:      Effort: Pulmonary effort is normal. No respiratory distress. Musculoskeletal:         General: No swelling or deformity. Normal range of motion. Skin:     General: Skin is warm and dry. Findings: No rash. Neurological:      General: No focal deficit present. Mental Status: She is alert and oriented to person, place, and time. Cranial Nerves: No cranial nerve deficit. Sensory: No sensory deficit. Motor: No weakness. Coordination: Coordination normal.   Psychiatric:         Mood and Affect: Mood normal.         Behavior: Behavior normal.       Lab and Diagnostic Study Results     Labs -     Recent Results (from the past 12 hour(s))   CBC WITH AUTOMATED DIFF    Collection Time: 09/10/22 10:27 AM   Result Value Ref Range    WBC 2.9 (L) 3.6 - 11.0 K/uL    RBC 3.50 (L) 3.80 - 5.20 M/uL    HGB 11.3 (L) 11.5 - 16.0 g/dL    HCT 31.1 (L) 35.0 - 47.0 %    MCV 88.9 80.0 - 99.0 FL    MCH 32.3 26.0 - 34.0 PG    MCHC 36.3 30.0 - 36.5 g/dL    RDW 13.8 11.5 - 14.5 %    PLATELET 634 250 - 444 K/uL    MPV 11.3 8.9 - 12.9 FL    NRBC 0.0 0.0  WBC    ABSOLUTE NRBC 0.00 0.00 - 0.01 K/uL    NEUTROPHILS 36 32 - 75 %    LYMPHOCYTES 47 12 - 49 %    MONOCYTES 15 (H) 5 - 13 %    EOSINOPHILS 1 0 - 7 %    BASOPHILS 1 0 - 1 %    IMMATURE GRANULOCYTES 0 0 - 0.5 %    ABS. NEUTROPHILS 1.0 (L) 1.8 - 8.0 K/UL    ABS. LYMPHOCYTES 1.3 0.8 - 3.5 K/UL    ABS. MONOCYTES 0.4 0.0 - 1.0 K/UL    ABS. EOSINOPHILS 0.0 0.0 - 0.4 K/UL    ABS. BASOPHILS 0.0 0.0 - 0.1 K/UL    ABS. IMM.  GRANS. 0.0 0.00 - 0.04 K/UL    DF AUTOMATED     METABOLIC PANEL, COMPREHENSIVE    Collection Time: 09/10/22 10:27 AM   Result Value Ref Range    Sodium 128 (L) 136 - 145 mmol/L    Potassium 3.8 3.5 - 5.1 mmol/L    Chloride 94 (L) 97 - 108 mmol/L    CO2 28 21 - 32 mmol/L    Anion gap 6 5 - 15 mmol/L    Glucose 76 65 - 100 mg/dL    BUN 17 6 - 20 mg/dL    Creatinine 1.29 (H) 0.55 - 1.02 mg/dL    BUN/Creatinine ratio 13 12 - 20      GFR est AA 51 (L) >60 ml/min/1.73m2    GFR est non-AA 42 (L) >60 ml/min/1.73m2    Calcium 8.8 8.5 - 10.1 mg/dL    Bilirubin, total 0.6 0.2 - 1.0 mg/dL    AST (SGOT) 106 (H) 15 - 37 U/L    ALT (SGPT) 46 12 - 78 U/L    Alk. phosphatase 125 (H) 45 - 117 U/L    Protein, total 8.4 (H) 6.4 - 8.2 g/dL    Albumin 2.5 (L) 3.5 - 5.0 g/dL    Globulin 5.9 (H) 2.0 - 4.0 g/dL    A-G Ratio 0.4 (L) 1.1 - 2.2     TROPONIN-HIGH SENSITIVITY    Collection Time: 09/10/22 10:27 AM   Result Value Ref Range    Troponin-High Sensitivity 11 0 - 51 ng/L   NT-PRO BNP    Collection Time: 09/10/22 10:27 AM   Result Value Ref Range    NT pro-BNP 1,603 (H) <125 pg/mL   URINALYSIS W/ RFLX MICROSCOPIC    Collection Time: 09/10/22 11:50 AM   Result Value Ref Range    Color Yellow/Straw      Appearance Turbid (A) Clear      pH (UA) 7.0 5.0 - 8.0      Protein Negative Negative mg/dL    Glucose Negative Negative mg/dL    Ketone Negative Negative mg/dL    Bilirubin Negative Negative      Blood 50 (A) Negative      Urobilinogen 0.1 0.1 - 1.0 EU/dL    Nitrites Negative Negative      Leukocyte Esterase Negative Negative      WBC 0-4 0 - 4 /hpf    RBC 5-10 0 - 5 /hpf    Bacteria Negative Negative /hpf    Mucus Trace /lpf   D DIMER    Collection Time: 09/10/22 11:50 AM   Result Value Ref Range    D DIMER 0.38 <0.50 ug/ml(FEU)       Radiologic Studies -   [unfilled]  CT Results  (Last 48 hours)                 09/10/22 1056  CT HEAD WO CONT Final result    Impression:  No acute intracranial abnormality. Narrative:  EXAM: CT HEAD WO CONT       INDICATION: syncope       COMPARISON: CT July 22, 2022. CONTRAST: None. TECHNIQUE: Unenhanced CT of the head was performed using 5 mm images. Brain and   bone windows were generated.  Coronal and sagittal reformats. CT dose reduction   was achieved through use of a standardized protocol tailored for this   examination and automatic exposure control for dose modulation. FINDINGS:   The ventricles and sulci are normal in size, shape and configuration. . There is   no significant white matter disease. There is no intracranial hemorrhage,   extra-axial collection, or mass effect. The basilar cisterns are open. No CT   evidence of acute infarct. The bone windows demonstrate no abnormalities. The visualized portions of the   paranasal sinuses and mastoid air cells are clear. CXR Results  (Last 48 hours)                 09/10/22 1021  XR CHEST PORT Final result    Impression:  No evidence of acute cardiopulmonary process. Narrative:  INDICATION:  syncope        COMPARISON: March 2022       FINDINGS: Single AP portable view of the chest obtained at 1019 demonstrates a   stable cardiomediastinal silhouette. The lungs are clear bilaterally. No osseous   abnormalities are seen. Medical Decision Making and ED Course   - I am the first and primary provider for this patient AND AM THE PRIMARY PROVIDER OF RECORD. - I reviewed the vital signs, available nursing notes, past medical history, past surgical history, family history and social history. - Initial assessment performed. The patients presenting problems have been discussed, and the staff are in agreement with the care plan formulated and outlined with them. I have encouraged them to ask questions as they arise throughout their visit. Vital Signs-Reviewed the patient's vital signs.     Patient Vitals for the past 12 hrs:   Temp Pulse Resp BP SpO2   09/10/22 1200 -- 60 15 (!) 154/95 100 %   09/10/22 1154 -- 64 18 -- 100 %   09/10/22 1009 97.8 °F (36.6 °C) (!) 47 19 135/88 98 %       Records Reviewed: Nursing Notes    ED Course:       ED Course as of 09/10/22 1324   Sat Sep 10, 2022   [age]year-old female presents for evaluation of an episode of syncope. Was sitting down at her adult  when she lost consciousness. Woke up on the ground. Does not recall the event. Did feel dizzy preceding the event. Patient states she did not eat breakfast but did drink 2 cups of coffee and likely reviewed the street related to caffeine. She has normal neurologic exam here. Differential diagnosis includes dysrhythmia versus orthostatic hypotension versus vasovagal syncope. Screening labs including a CBC, CMP, troponin, BNP, UA as well as a CT head and chest x-ray. Disposition as per clinical course. [LW]   8173 CT and chest x-ray are unremarkable. [LW]   1112 EKG performed at 1013. Sinus bradycardia with rate of 50. Normal WI, normal QRS, normal QT C. T wave inversions in III, V1 and V2. [LW]   1156 BNP elevated. D dimer added [LW]   1300 D dimer neg. Will admit for syncope. [LW]      ED Course User Index  [LW] Boone Salazar MD     Admitted to Dr. Parth Graff for syncope. Disposition     Disposition: Admitted to Floor Medical Floor the case was discussed with the admitting physician Parth Graff. Admitted    Diagnosis     Clinical Impression:   1. Syncope and collapse        Attestations:    Te Espino MD    Please note that this dictation was completed with [employer], the computer voice recognition software. Quite often unanticipated grammatical, syntax, homophones, and other interpretive errors are inadvertently transcribed by the computer software. Please disregard these errors. Please excuse any errors that have escaped final proofreading. Thank you. Where Do You Want The Question To Include Opioid Counseling Located?: Case Summary Tab

## 2022-09-10 NOTE — ED NOTES
Pt verbalized understanding of discharge instructions and to follow up with cardiologist and PCP. IV removed.      Pt ambulatory to waiting room to wait for cab

## 2022-09-11 LAB
ATRIAL RATE: 50 BPM
CALCULATED P AXIS, ECG09: -10 DEGREES
CALCULATED T AXIS, ECG11: -26 DEGREES
DIAGNOSIS, 93000: NORMAL
P-R INTERVAL, ECG05: 180 MS
Q-T INTERVAL, ECG07: 490 MS
QRS DURATION, ECG06: 94 MS
QTC CALCULATION (BEZET), ECG08: 446 MS
VENTRICULAR RATE, ECG03: 50 BPM

## 2022-12-12 ENCOUNTER — OFFICE VISIT (OUTPATIENT)
Dept: INFECTIOUS DISEASES | Age: 62
End: 2022-12-12
Payer: MEDICAID

## 2022-12-12 VITALS
TEMPERATURE: 97.6 F | WEIGHT: 156.6 LBS | SYSTOLIC BLOOD PRESSURE: 161 MMHG | HEART RATE: 57 BPM | BODY MASS INDEX: 24.58 KG/M2 | DIASTOLIC BLOOD PRESSURE: 83 MMHG | RESPIRATION RATE: 16 BRPM | OXYGEN SATURATION: 98 % | HEIGHT: 67 IN

## 2022-12-12 DIAGNOSIS — Z21 ASYMPTOMATIC HIV INFECTION, WITH NO HISTORY OF HIV-RELATED ILLNESS (HCC): Primary | ICD-10-CM

## 2022-12-12 DIAGNOSIS — F20.9 SCHIZOPHRENIA, UNSPECIFIED TYPE (HCC): ICD-10-CM

## 2022-12-12 DIAGNOSIS — B18.2 CHRONIC HEPATITIS C WITHOUT HEPATIC COMA (HCC): ICD-10-CM

## 2022-12-12 PROCEDURE — 99213 OFFICE O/P EST LOW 20 MIN: CPT | Performed by: INTERNAL MEDICINE

## 2022-12-12 RX ORDER — DILTIAZEM HYDROCHLORIDE 120 MG/1
CAPSULE, COATED, EXTENDED RELEASE ORAL
COMMUNITY
Start: 2022-11-11

## 2022-12-12 NOTE — PROGRESS NOTES
1. Have you been to the ER, urgent care clinic since your last visit? Hospitalized since your last visit? No    2. Have you seen or consulted any other health care providers outside of the [address] [place] [address] since your last visit? Include any pap smears or colon screening.  No  Chief Complaint   Patient presents with    Follow-up    HIV     Visit Vitals  BP (!) 145/88 (BP 1 Location: Left upper arm, BP Patient Position: Sitting, BP Cuff Size: Adult)   Pulse (!) 57   Temp 97.6 °F (36.4 °C) (Oral)   Resp 16   Ht 5' 7\" (1.702 m)   Wt 156 lb 9.6 oz (71 kg)   SpO2 98%   BMI 24.53 kg/m²     Visit Vitals  BP (!) 161/83 (BP 1 Location: Right upper arm, BP Patient Position: Sitting, BP Cuff Size: Adult)   Pulse (!) 57   Temp 97.6 °F (36.4 °C) (Oral)   Resp 16   Ht 5' 7\" (1.702 m)   Wt 156 lb 9.6 oz (71 kg)   SpO2 98%   BMI 24.53 kg/m²

## 2022-12-12 NOTE — PROGRESS NOTES
Royce Mukherjee is a 65 y.o. female. HPI Patient followed for HIV-1 infection with history of presumptive pneumocystis pneumonia and Hepatitis C infection. Initial CD4 count was 91/mm3 with HIV viral load of 579,000 copies. She was started on Biktarvy in July 2022. Repeat CD4 count last week was 363/mm3 with VL of 230 copies/ml. Patient also with chronic hepatitis C with HCV viral load was 26,000,000 with evidence of fibrosis. She was seen in ID Clinic in July 2022 at which time she was started on Biktarvy. Hep C treatment was deferred. She is here today for follow-up. Review of Systems   Constitutional:  Negative for diaphoresis, fever, malaise/fatigue and weight loss. Eyes:  Negative for blurred vision. Respiratory:  Negative for cough and shortness of breath. Cardiovascular:  Negative for chest pain. Gastrointestinal:  Negative for diarrhea. Skin:  Negative for rash. Neurological:  Negative for headaches. Past Medical History:   Diagnosis Date    Alcohol abuse 5/29/2017    Autoimmune disease ([facility]     Hepatitis C     HIV (human immunodeficiency virus infection) ([facility]     Hypertension     Psychotic disorder ([facility]     Schizophrenia ([facility]      History reviewed. No pertinent surgical history. Objective  Physical Exam  Vitals and nursing note reviewed. Exam conducted with a chaperone present (Aide). Constitutional:       Appearance: She is not ill-appearing. HENT:      Head: Normocephalic and atraumatic. Nose: Nose normal.      Mouth/Throat:      Pharynx: Oropharynx is clear. Comments: Multiple missing teeth, no thrush  Cardiovascular:      Rate and Rhythm: Normal rate and regular rhythm. Heart sounds: No murmur heard. Pulmonary:      Effort: Pulmonary effort is normal.      Breath sounds: Normal breath sounds. Abdominal:      General: Bowel sounds are normal. There is no distension. Palpations: Abdomen is soft. Tenderness:  There is no abdominal tenderness. Musculoskeletal:      Left lower leg: No edema. Skin:     Findings: No lesion or rash. Neurological:      General: No focal deficit present. Mental Status: She is alert. She is disoriented. Psychiatric:         Behavior: Behavior normal.        Laboratory (12/5/2022)  CMP remarkable for  T.bili 1.3, Alkaline phosphatase 188, , ALT 50;  CD4 count 363/mm3 (19.1%), HIV-1 RNA viral load 260 copies/ml  Assessment & Plan  1. HIV-1 infection with CD4 >300 and  copies/ml, on Biktarvy for 6 months, significantly improved  2. Hepatitis C infection, chronic, type 1a, VL 26,000,000 iu/ml, bridging fibrosis, elevated AFP tumor marker, elevated LFTs  3. History of presumptive Pneumocystis pneumonia, resolved  4. Schizophrenia     Comment:  Patient has responded well to [facility] with increased CD4 and decreased viral load, though goal is <20 copies/ml. With CD4 count >200, Bactrim no longer needed for pneumocystis prophylaxis. Will now focus on Hepatitis C infection. Plan:      1. Continue  Biktarvy 1 po daily for HIV infection  2. Discontinue Bactrim DS  3. Will apply for Hepatitis C antiviral treatment with [facility]  4.  Follow-up in 6 months    Venice Woodward MD

## 2023-02-21 ENCOUNTER — HOSPITAL ENCOUNTER (EMERGENCY)
Age: 63
Discharge: HOME | End: 2023-02-21
Attending: EMERGENCY MEDICINE
Payer: MEDICAID

## 2023-02-21 VITALS
WEIGHT: 160 LBS | RESPIRATION RATE: 19 BRPM | DIASTOLIC BLOOD PRESSURE: 69 MMHG | HEIGHT: 67 IN | OXYGEN SATURATION: 100 % | HEART RATE: 71 BPM | SYSTOLIC BLOOD PRESSURE: 133 MMHG | TEMPERATURE: 98.3 F | BODY MASS INDEX: 25.11 KG/M2
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
## 2023-02-21 DIAGNOSIS — E86.0 DEHYDRATION: Primary | ICD-10-CM

## 2023-02-21 LAB
ALBUMIN SERPL-MCNC: 2.5 G/DL (ref 3.5–5)
ALBUMIN/GLOB SERPL: 0.4 (ref 1.1–2.2)
ALP SERPL-CCNC: 212 U/L (ref 45–117)
ALT SERPL-CCNC: 46 U/L (ref 12–78)
ANION GAP SERPL CALC-SCNC: 6 MMOL/L (ref 5–15)
AST SERPL W P-5'-P-CCNC: 85 U/L (ref 15–37)
BASOPHILS # BLD: 0 K/UL (ref 0–0.1)
BASOPHILS NFR BLD: 0 % (ref 0–1)
BILIRUB SERPL-MCNC: 0.6 MG/DL (ref 0.2–1)
BUN SERPL-MCNC: 17 MG/DL (ref 6–20)
BUN/CREAT SERPL: 12 (ref 12–20)
CA-I BLD-MCNC: 8.7 MG/DL (ref 8.5–10.1)
CHLORIDE SERPL-SCNC: 97 MMOL/L (ref 97–108)
CO2 SERPL-SCNC: 28 MMOL/L (ref 21–32)
CREAT SERPL-MCNC: 1.37 MG/DL (ref 0.55–1.02)
DIFFERENTIAL METHOD BLD: ABNORMAL
EOSINOPHIL # BLD: 0 K/UL (ref 0–0.4)
EOSINOPHIL NFR BLD: 1 % (ref 0–7)
ERYTHROCYTE [DISTWIDTH] IN BLOOD BY AUTOMATED COUNT: 13.2 % (ref 11.5–14.5)
GLOBULIN SER CALC-MCNC: 5.7 G/DL (ref 2–4)
GLUCOSE BLD STRIP.AUTO-MCNC: 128 MG/DL (ref 65–100)
GLUCOSE BLD STRIP.AUTO-MCNC: 142 MG/DL (ref 65–100)
GLUCOSE SERPL-MCNC: 51 MG/DL (ref 65–100)
HCT VFR BLD AUTO: 33.9 % (ref 35–47)
HGB BLD-MCNC: 12.2 G/DL (ref 11.5–16)
IMM GRANULOCYTES # BLD AUTO: 0 K/UL (ref 0–0.04)
IMM GRANULOCYTES NFR BLD AUTO: 0 % (ref 0–0.5)
LYMPHOCYTES # BLD: 2 K/UL (ref 0.8–3.5)
LYMPHOCYTES NFR BLD: 41 % (ref 12–49)
MCH RBC QN AUTO: 31.9 PG (ref 26–34)
MCHC RBC AUTO-ENTMCNC: 36 G/DL (ref 30–36.5)
MCV RBC AUTO: 88.7 FL (ref 80–99)
MONOCYTES # BLD: 0.5 K/UL (ref 0–1)
MONOCYTES NFR BLD: 10 % (ref 5–13)
NEUTS SEG # BLD: 2.4 K/UL (ref 1.8–8)
NEUTS SEG NFR BLD: 48 % (ref 32–75)
NRBC # BLD: 0 K/UL (ref 0–0.01)
NRBC BLD-RTO: 0 PER 100 WBC
PERFORMED BY, TECHID: ABNORMAL
PERFORMED BY, TECHID: ABNORMAL
PLATELET # BLD AUTO: 185 K/UL (ref 150–400)
PMV BLD AUTO: 11.1 FL (ref 8.9–12.9)
POTASSIUM SERPL-SCNC: 3.6 MMOL/L (ref 3.5–5.1)
PROT SERPL-MCNC: 8.2 G/DL (ref 6.4–8.2)
RBC # BLD AUTO: 3.82 M/UL (ref 3.8–5.2)
SODIUM SERPL-SCNC: 131 MMOL/L (ref 136–145)
TROPONIN I SERPL HS-MCNC: 11 NG/L (ref 0–51)
WBC # BLD AUTO: 5 K/UL (ref 3.6–11)

## 2023-02-21 PROCEDURE — 74011250636 HC RX REV CODE- 250/636: Performed by: EMERGENCY MEDICINE

## 2023-02-21 PROCEDURE — 36415 COLL VENOUS BLD VENIPUNCTURE: CPT

## 2023-02-21 PROCEDURE — 93005 ELECTROCARDIOGRAM TRACING: CPT

## 2023-02-21 PROCEDURE — 85025 COMPLETE CBC W/AUTO DIFF WBC: CPT

## 2023-02-21 PROCEDURE — 80053 COMPREHEN METABOLIC PANEL: CPT

## 2023-02-21 PROCEDURE — 99284 EMERGENCY DEPT VISIT MOD MDM: CPT

## 2023-02-21 PROCEDURE — 82962 GLUCOSE BLOOD TEST: CPT

## 2023-02-21 PROCEDURE — 84484 ASSAY OF TROPONIN QUANT: CPT

## 2023-02-21 RX ADMIN — SODIUM CHLORIDE 1000 ML: 9 INJECTION, SOLUTION INTRAVENOUS at 12:00

## 2023-02-21 NOTE — ED PROVIDER NOTES
[facility] EMERGENCY DEPT  EMERGENCY DEPARTMENT HISTORY AND PHYSICAL EXAM      Date: 2/21/2023  Patient Name: Barron Lambert  MRN: [id number]  [place]: 1960  Date of evaluation: 2/21/2023  Provider: Adriana Moreno DO   Note Started: 2:12 PM 2/21/23    HISTORY OF PRESENT ILLNESS     Chief Complaint   Patient presents with    Dizziness       History Provided By: Patient    HPI: Barron Lambert, 65 y.o. female presenting to the emergency department for evaluation of reported dizziness/syncopal episode. Patient states that she did not pass out but rather fell from the chair while eating breakfast.  She has no physical complaints on arrival to the emergency department. Patient alert and oriented on arrival to the emergency department. States that she is only here because her group home wanted her evaluated. PAST MEDICAL HISTORY   Past Medical History:  Past Medical History:   Diagnosis Date    Alcohol abuse 5/29/2017    Autoimmune disease ([facility] [address]     Hepatitis C     HIV (human immunodeficiency virus infection) ([facility] [address]     Hypertension     Psychotic disorder ([facility] [address]     Schizophrenia ([facility] [address]        Past Surgical History:  No past surgical history on file. Family History:  Family History   Family history unknown: Yes       Social History:  Social History     Tobacco Use    Smoking status: Every Day     Packs/day: 0.50     Types: Cigarettes    Smokeless tobacco: Never    Tobacco comments:     Smokes about 5 cigarettes per day   Vaping Use    Vaping Use: Never used   Substance Use Topics    Alcohol use:  Yes     Alcohol/week: 0.8 standard drinks     Types: 1 Cans of beer per week    Drug use: Yes     Types: Cocaine     Comment: Pt reports a history of crack cocaine use       Allergies:  No Known Allergies    PCP: Rene Councilman, NP    Current Meds:   Discharge Medication List as of 2/21/2023  1:56 PM        CONTINUE these medications which have NOT CHANGED    Details   dilTIAZem ER (CARDIZEM CD) 120 mg capsule Historical Med Tab-A-[place] 400 mcg tab tablet Historical Med, WILLIAM      betamethasone valerate (VALISONE) 0.1 % topical cream Historical Med      benzonatate (TESSALON) 100 mg capsule Historical Med      udaajnunc-cvecydon-ksqfrhg ala (BIKTARVY) tab tablet Take 1 Tablet by mouth daily for 90 days. Indications: HIV, Normal, Disp-90 Tablet, R-0      trimethoprim-sulfamethoxazole (BACTRIM DS, SEPTRA DS) 160-800 mg per tablet Take 1 Tablet by mouth every Monday, Wednesday, Friday. Indications: Pneumocystis jiroveci pneumonia prevention, Normal, Disp-90 Tablet, R-1      divalproex ER (DEPAKOTE ER) 250 mg ER tablet Take 250 mg by mouth two (2) times a day., Historical Med      albuterol (ProAir HFA) 90 mcg/actuation inhaler Take 2 Puffs by inhalation every six (6) hours as needed for Wheezing or Shortness of Breath., Normal, Disp-18 g, R-0      benztropine (COGENTIN) 0.5 mg tablet Take 1 Tablet by mouth two (2) times a day., Historical Med      famotidine (PEPCID) 40 mg tablet Take 40 mg by mouth nightly., Historical Med      lisinopriL (PRINIVIL, ZESTRIL) 20 mg tablet Take 20 mg by mouth daily. , Historical Med      risperiDONE (RisperDAL) 2 mg tablet Take 2 mg by mouth two (2) times a day., Historical Med      multivitamin (ONE A DAY) tablet Take 1 Tablet by mouth daily. , Historical Med             REVIEW OF SYSTEMS   Review of Systems   Constitutional:  Negative for chills and fever. HENT:  Negative for congestion and sore throat. Eyes:  Negative for pain and visual disturbance. Respiratory:  Negative for cough and shortness of breath. Cardiovascular:  Negative for chest pain and palpitations. Gastrointestinal:  Negative for constipation, diarrhea, nausea and vomiting. Genitourinary:  Negative for dysuria and frequency. Musculoskeletal:  Negative for arthralgias and myalgias. Skin:  Negative for color change and rash. Neurological:  Negative for dizziness, weakness, light-headedness and headaches. Psychiatric/Behavioral:  Negative for dysphoric mood and sleep disturbance. Positives and Pertinent negatives as per HPI. PHYSICAL EXAM     ED Triage Vitals [02/21/23 1036]   ED Encounter Vitals Group      BP (!) 156/103      Pulse (Heart Rate) 72      Resp Rate 17      Temp 98.3 °F (36.8 °C)      Temp src       O2 Sat (%) 100 %      Weight 160 lb      Height 5' 7\"      Physical Exam  Constitutional:       Appearance: Normal appearance. HENT:      Head: Normocephalic and atraumatic. Right Ear: External ear normal.      Left Ear: External ear normal.      Nose: Nose normal.      Mouth/Throat:      Mouth: Mucous membranes are moist.   Eyes:      Extraocular Movements: Extraocular movements intact. Conjunctiva/sclera: Conjunctivae normal.   Cardiovascular:      Rate and Rhythm: Normal rate and regular rhythm. Pulses: Normal pulses. Heart sounds: Normal heart sounds. Pulmonary:      Effort: Pulmonary effort is normal.      Breath sounds: Normal breath sounds. Abdominal:      General: Abdomen is flat. There is no distension. Palpations: Abdomen is soft. Tenderness: There is no abdominal tenderness. Musculoskeletal:         General: Normal range of motion. Cervical back: Normal range of motion. Skin:     General: Skin is warm and dry. Capillary Refill: Capillary refill takes less than 2 seconds. Neurological:      General: No focal deficit present. Mental Status: She is alert and oriented to person, place, and time. Mental status is at baseline.    Psychiatric:         Mood and Affect: Mood normal.         Behavior: Behavior normal.       SCREENINGS               No data recorded      LAB, EKG AND DIAGNOSTIC RESULTS   Labs:  Recent Results (from the past 12 hour(s))   CBC WITH AUTOMATED DIFF    Collection Time: 02/21/23 10:51 AM   Result Value Ref Range    WBC 5.0 3.6 - 11.0 K/uL    RBC 3.82 3.80 - 5.20 M/uL    HGB 12.2 11.5 - 16.0 g/dL    HCT 33.9 (L) 35.0 - 47.0 %    MCV 88.7 80.0 - 99.0 FL    MCH 31.9 26.0 - 34.0 PG    MCHC 36.0 30.0 - 36.5 g/dL    RDW 13.2 11.5 - 14.5 %    PLATELET 110 426 - 352 K/uL    MPV 11.1 8.9 - 12.9 FL    NRBC 0.0 0.0  WBC    ABSOLUTE NRBC 0.00 0.00 - 0.01 K/uL    NEUTROPHILS 48 32 - 75 %    LYMPHOCYTES 41 12 - 49 %    MONOCYTES 10 5 - 13 %    EOSINOPHILS 1 0 - 7 %    BASOPHILS 0 0 - 1 %    IMMATURE GRANULOCYTES 0 0 - 0.5 %    ABS. NEUTROPHILS 2.4 1.8 - 8.0 K/UL    ABS. LYMPHOCYTES 2.0 0.8 - 3.5 K/UL    ABS. MONOCYTES 0.5 0.0 - 1.0 K/UL    ABS. EOSINOPHILS 0.0 0.0 - 0.4 K/UL    ABS. BASOPHILS 0.0 0.0 - 0.1 K/UL    ABS. IMM. GRANS. 0.0 0.00 - 0.04 K/UL    DF AUTOMATED     METABOLIC PANEL, COMPREHENSIVE    Collection Time: 02/21/23 10:51 AM   Result Value Ref Range    Sodium 131 (L) 136 - 145 mmol/L    Potassium 3.6 3.5 - 5.1 mmol/L    Chloride 97 97 - 108 mmol/L    CO2 28 21 - 32 mmol/L    Anion gap 6 5 - 15 mmol/L    Glucose 51 (LL) 65 - 100 mg/dL    BUN 17 6 - 20 mg/dL    Creatinine 1.37 (H) 0.55 - 1.02 mg/dL    BUN/Creatinine ratio 12 12 - 20      eGFR 44 (L) >60 ml/min/1.73m2    Calcium 8.7 8.5 - 10.1 mg/dL    Bilirubin, total 0.6 0.2 - 1.0 mg/dL    AST (SGOT) 85 (H) 15 - 37 U/L    ALT (SGPT) 46 12 - 78 U/L    Alk. phosphatase 212 (H) 45 - 117 U/L    Protein, total 8.2 6.4 - 8.2 g/dL    Albumin 2.5 (L) 3.5 - 5.0 g/dL    Globulin 5.7 (H) 2.0 - 4.0 g/dL    A-G Ratio 0.4 (L) 1.1 - 2.2     TROPONIN-HIGH SENSITIVITY    Collection Time: 02/21/23 10:51 AM   Result Value Ref Range    Troponin-High Sensitivity 11 0 - 51 ng/L   GLUCOSE, POC    Collection Time: 02/21/23 11:31 AM   Result Value Ref Range    Glucose (POC) 128 (H) 65 - 100 mg/dL    Performed by Roxanna Trammell, POC    Collection Time: 02/21/23 12:43 PM   Result Value Ref Range    Glucose (POC) 142 (H) 65 - 100 mg/dL    Performed by Blessing Reynolds        EKG: Initial EKG interpreted by me.  Shows sinus rhythm with a ventricular rate of 67, , QRS 88, QTc 445 without ST depression or elevation. Radiologic Studies:  Non-plain film images such as CT, Ultrasound and MRI are read by the radiologist. Plain radiographic images are visualized and preliminarily interpreted by the ED Provider with the below findings:    *    Interpretation per the Radiologist below, if available at the time of this note:  No results found. PROCEDURES   Unless otherwise noted below, none. Performed by: Jacklyn Wooten, DO   Procedures      CRITICAL CARE TIME       ED COURSE and DIFFERENTIAL DIAGNOSIS/MDM   Vitals:    Vitals:    02/21/23 1036 02/21/23 1136   BP: (!) 156/103 133/69   Pulse: 72 71   Resp: 17 19   Temp: 98.3 °F (36.8 °C)    SpO2: 100%    Weight: 72.6 kg (160 lb)    Height: 5' 7\" (1.702 m)         Patient was given the following medications:  Medications   sodium chloride 0.9 % bolus infusion 1,000 mL (1,000 mL IntraVENous New Bag 2/21/23 1200)       CONSULTS: (Who and What was discussed)  None     Chronic Conditions: Hypertension, schizophrenia, HIV  Social Determinants affecting Dx or Tx: None  Counseling:      Records Reviewed (source and summary of external notes): Nursing notes    CC/HPI Summary, DDx, ED Course, and Reassessment: 58-year-old female presenting to the emergency department for evaluation after fall from chair while eating breakfast this morning. Reported that patient had a syncopal event, however patient reports that she lost her balance and fell. Patient alert and oriented on arrival to the emergency department. She has no focal neurologic deficits. She has no physical complaints. Laboratory evaluation demonstrating slight WHITNEY as well as hyperglycemia. Patient given p.o. in the emergency department as well as 1 L normal saline IV bolus. No evidence of leukocytosis. Patient requesting to be discharged. Does not wish to wait for additional treatment/evaluation.          Disposition Considerations (Tests not done, Shared Decision Making, Pt Expectation of Test or Treatment.):  Considered CVA, seizure, cardiac arrhythmia, however history not consistent with any of these. EKG without arrhythmia. Patient has no history of seizure. She has no weakness or focality indicative of CVA/TIA. FINAL IMPRESSION     1. Dehydration    2. Accident due to mechanical fall without injury, initial encounter          DISPOSITION/PLAN   Discharged    Discharge Note: The patient is stable for discharge home. The signs, symptoms, diagnosis, and discharge instructions have been discussed, understanding conveyed, and agreed upon. The patient is to follow up as recommended or return to ER should their symptoms worsen. PATIENT REFERRED TO:  Follow-up Information       Follow up With Specialties Details Why Contact Info    Rene Councilman, NP Nurse Practitioner, Nurse Practitioner Schedule an appointment as soon as possible for a visit       [address] [facility] EMERGENCY DEPT Emergency Medicine  As needed, If symptoms worsen [id number] [id number] [phone]              DISCHARGE MEDICATIONS:  Discharge Medication List as of 2/21/2023  1:56 PM            DISCONTINUED MEDICATIONS:  Discharge Medication List as of 2/21/2023  1:56 PM          I am the Primary Clinician of Record: Adriana Moreno DO (electronically signed)    (Please note that parts of this dictation were completed with voice recognition software. Quite often unanticipated grammatical, syntax, homophones, and other interpretive errors are inadvertently transcribed by the computer software. Please disregards these errors.  Please excuse any errors that have escaped final proofreading.)

## 2023-02-21 NOTE — DISCHARGE INSTRUCTIONS
Thank you! Thank you for allowing me to care for you in the emergency department. It is my goal to provide you with excellent care. If you have not received excellent quality care, please ask to speak to the nurse manager. Please fill out the survey that will come to you by mail or email since we listen to your feedback! Below you will find a list of your tests from today's visit. Should you have any questions, please do not hesitate to call the emergency department. Labs  Recent Results (from the past 12 hour(s))   CBC WITH AUTOMATED DIFF    Collection Time: 02/21/23 10:51 AM   Result Value Ref Range    WBC 5.0 3.6 - 11.0 K/uL    RBC 3.82 3.80 - 5.20 M/uL    HGB 12.2 11.5 - 16.0 g/dL    HCT 33.9 (L) 35.0 - 47.0 %    MCV 88.7 80.0 - 99.0 FL    MCH 31.9 26.0 - 34.0 PG    MCHC 36.0 30.0 - 36.5 g/dL    RDW 13.2 11.5 - 14.5 %    PLATELET 799 404 - 557 K/uL    MPV 11.1 8.9 - 12.9 FL    NRBC 0.0 0.0  WBC    ABSOLUTE NRBC 0.00 0.00 - 0.01 K/uL    NEUTROPHILS 48 32 - 75 %    LYMPHOCYTES 41 12 - 49 %    MONOCYTES 10 5 - 13 %    EOSINOPHILS 1 0 - 7 %    BASOPHILS 0 0 - 1 %    IMMATURE GRANULOCYTES 0 0 - 0.5 %    ABS. NEUTROPHILS 2.4 1.8 - 8.0 K/UL    ABS. LYMPHOCYTES 2.0 0.8 - 3.5 K/UL    ABS. MONOCYTES 0.5 0.0 - 1.0 K/UL    ABS. EOSINOPHILS 0.0 0.0 - 0.4 K/UL    ABS. BASOPHILS 0.0 0.0 - 0.1 K/UL    ABS. IMM. GRANS. 0.0 0.00 - 0.04 K/UL    DF AUTOMATED     METABOLIC PANEL, COMPREHENSIVE    Collection Time: 02/21/23 10:51 AM   Result Value Ref Range    Sodium 131 (L) 136 - 145 mmol/L    Potassium 3.6 3.5 - 5.1 mmol/L    Chloride 97 97 - 108 mmol/L    CO2 28 21 - 32 mmol/L    Anion gap 6 5 - 15 mmol/L    Glucose 51 (LL) 65 - 100 mg/dL    BUN 17 6 - 20 mg/dL    Creatinine 1.37 (H) 0.55 - 1.02 mg/dL    BUN/Creatinine ratio 12 12 - 20      eGFR 44 (L) >60 ml/min/1.73m2    Calcium 8.7 8.5 - 10.1 mg/dL    Bilirubin, total 0.6 0.2 - 1.0 mg/dL    AST (SGOT) 85 (H) 15 - 37 U/L    ALT (SGPT) 46 12 - 78 U/L    Alk. phosphatase 212 (H) 45 - 117 U/L    Protein, total 8.2 6.4 - 8.2 g/dL    Albumin 2.5 (L) 3.5 - 5.0 g/dL    Globulin 5.7 (H) 2.0 - 4.0 g/dL    A-G Ratio 0.4 (L) 1.1 - 2.2     TROPONIN-HIGH SENSITIVITY    Collection Time: 02/21/23 10:51 AM   Result Value Ref Range    Troponin-High Sensitivity 11 0 - 51 ng/L   GLUCOSE, POC    Collection Time: 02/21/23 11:31 AM   Result Value Ref Range    Glucose (POC) 128 (H) 65 - 100 mg/dL    Performed by Jose R Chun, POC    Collection Time: 02/21/23 12:43 PM   Result Value Ref Range    Glucose (POC) 142 (H) 65 - 100 mg/dL    Performed by Maribeth Ortez        Radiologic Studies  No orders to display     CT Results  (Last 48 hours)      None          CXR Results  (Last 48 hours)      None          ------------------------------------------------------------------------------------------------------------  The exam and treatment you received in the Emergency Department were for an urgent problem and are not intended as complete care. It is important that you follow-up with a doctor, nurse practitioner, or physician assistant to:  (1) confirm your diagnosis,  (2) re-evaluation of changes in your illness and treatment, and  (3) for ongoing care. Please take your discharge instructions with you when you go to your follow-up appointment. If you have any problem arranging a follow-up appointment, contact the Emergency Department. If your symptoms become worse or you do not improve as expected and you are unable to reach your health care provider, please return to the Emergency Department. We are available 24 hours a day. If a prescription has been provided, please have it filled as soon as possible to prevent a delay in treatment. If you have any questions or reservations about taking the medication due to side effects or interactions with other medications, please call your primary care provider or contact the ER.

## 2023-02-21 NOTE — ED TRIAGE NOTES
Presents for dizziness per ems, was reported that pt had a syncopal episode while eating breakfast, staff report facial droop after syncopal event, ems reported no facial droop on aeeival to pt and -[facility]. Hx HIV.  Denies any pain, ambulatory on arrival.

## 2023-02-22 LAB
ATRIAL RATE: 67 BPM
CALCULATED P AXIS, ECG09: 33 DEGREES
CALCULATED R AXIS, ECG10: 27 DEGREES
CALCULATED T AXIS, ECG11: 63 DEGREES
DIAGNOSIS, 93000: NORMAL
P-R INTERVAL, ECG05: 168 MS
Q-T INTERVAL, ECG07: 422 MS
QRS DURATION, ECG06: 88 MS
QTC CALCULATION (BEZET), ECG08: 445 MS
VENTRICULAR RATE, ECG03: 67 BPM

## 2023-03-04 ENCOUNTER — APPOINTMENT (OUTPATIENT)
Dept: CT IMAGING | Age: 63
End: 2023-03-04
Attending: EMERGENCY MEDICINE
Payer: MEDICAID

## 2023-03-04 ENCOUNTER — APPOINTMENT (OUTPATIENT)
Dept: GENERAL RADIOLOGY | Age: 63
End: 2023-03-04
Attending: EMERGENCY MEDICINE
Payer: MEDICAID

## 2023-03-04 ENCOUNTER — HOSPITAL ENCOUNTER (EMERGENCY)
Age: 63
Discharge: HOME | End: 2023-03-04
Attending: EMERGENCY MEDICINE
Payer: MEDICAID

## 2023-03-04 VITALS
SYSTOLIC BLOOD PRESSURE: 116 MMHG | WEIGHT: 130 LBS | RESPIRATION RATE: 17 BRPM | OXYGEN SATURATION: 98 % | HEART RATE: 64 BPM | DIASTOLIC BLOOD PRESSURE: 67 MMHG | BODY MASS INDEX: 20.4 KG/M2 | HEIGHT: 67 IN | TEMPERATURE: 98 F

## 2023-03-04 DIAGNOSIS — G40.909 SEIZURE DISORDER (HCC): Primary | ICD-10-CM

## 2023-03-04 LAB
ALBUMIN SERPL-MCNC: 2.5 G/DL (ref 3.5–5)
ALBUMIN/GLOB SERPL: 0.5 (ref 1.1–2.2)
ALP SERPL-CCNC: 171 U/L (ref 45–117)
ALT SERPL-CCNC: 54 U/L (ref 12–78)
ANION GAP SERPL CALC-SCNC: 3 MMOL/L (ref 5–15)
AST SERPL W P-5'-P-CCNC: 97 U/L (ref 15–37)
BASOPHILS # BLD: 0 K/UL (ref 0–0.1)
BASOPHILS NFR BLD: 0 % (ref 0–1)
BILIRUB SERPL-MCNC: 0.6 MG/DL (ref 0.2–1)
BUN SERPL-MCNC: 16 MG/DL (ref 6–20)
BUN/CREAT SERPL: 11 (ref 12–20)
CA-I BLD-MCNC: 9 MG/DL (ref 8.5–10.1)
CHLORIDE SERPL-SCNC: 104 MMOL/L (ref 97–108)
CO2 SERPL-SCNC: 29 MMOL/L (ref 21–32)
CREAT SERPL-MCNC: 1.49 MG/DL (ref 0.55–1.02)
DIFFERENTIAL METHOD BLD: ABNORMAL
EOSINOPHIL # BLD: 0 K/UL (ref 0–0.4)
EOSINOPHIL NFR BLD: 1 % (ref 0–7)
ERYTHROCYTE [DISTWIDTH] IN BLOOD BY AUTOMATED COUNT: 13.6 % (ref 11.5–14.5)
GLOBULIN SER CALC-MCNC: 5.4 G/DL (ref 2–4)
GLUCOSE SERPL-MCNC: 92 MG/DL (ref 65–100)
HCT VFR BLD AUTO: 34.7 % (ref 35–47)
HGB BLD-MCNC: 12.1 G/DL (ref 11.5–16)
IMM GRANULOCYTES # BLD AUTO: 0 K/UL (ref 0–0.04)
IMM GRANULOCYTES NFR BLD AUTO: 1 % (ref 0–0.5)
LYMPHOCYTES # BLD: 1.6 K/UL (ref 0.8–3.5)
LYMPHOCYTES NFR BLD: 45 % (ref 12–49)
MCH RBC QN AUTO: 31.1 PG (ref 26–34)
MCHC RBC AUTO-ENTMCNC: 34.9 G/DL (ref 30–36.5)
MCV RBC AUTO: 89.2 FL (ref 80–99)
MONOCYTES # BLD: 0.4 K/UL (ref 0–1)
MONOCYTES NFR BLD: 13 % (ref 5–13)
NEUTS SEG # BLD: 1.3 K/UL (ref 1.8–8)
NEUTS SEG NFR BLD: 40 % (ref 32–75)
NRBC # BLD: 0 K/UL (ref 0–0.01)
NRBC BLD-RTO: 0 PER 100 WBC
PLATELET # BLD AUTO: 165 K/UL (ref 150–400)
PMV BLD AUTO: 12.2 FL (ref 8.9–12.9)
POTASSIUM SERPL-SCNC: 3.6 MMOL/L (ref 3.5–5.1)
PROT SERPL-MCNC: 7.9 G/DL (ref 6.4–8.2)
RBC # BLD AUTO: 3.89 M/UL (ref 3.8–5.2)
SARS-COV-2 RDRP RESP QL NAA+PROBE: NOT DETECTED
SODIUM SERPL-SCNC: 136 MMOL/L (ref 136–145)
TROPONIN I SERPL HS-MCNC: 11 NG/L (ref 0–51)
VALPROATE SERPL-MCNC: 73 UG/ML (ref 50–100)
WBC # BLD AUTO: 3.4 K/UL (ref 3.6–11)

## 2023-03-04 PROCEDURE — 93005 ELECTROCARDIOGRAM TRACING: CPT

## 2023-03-04 PROCEDURE — 99285 EMERGENCY DEPT VISIT HI MDM: CPT

## 2023-03-04 PROCEDURE — 36415 COLL VENOUS BLD VENIPUNCTURE: CPT

## 2023-03-04 PROCEDURE — 87635 SARS-COV-2 COVID-19 AMP PRB: CPT

## 2023-03-04 PROCEDURE — 71045 X-RAY EXAM CHEST 1 VIEW: CPT

## 2023-03-04 PROCEDURE — 80053 COMPREHEN METABOLIC PANEL: CPT

## 2023-03-04 PROCEDURE — 70450 CT HEAD/BRAIN W/O DYE: CPT

## 2023-03-04 PROCEDURE — 84484 ASSAY OF TROPONIN QUANT: CPT

## 2023-03-04 PROCEDURE — 85025 COMPLETE CBC W/AUTO DIFF WBC: CPT

## 2023-03-04 PROCEDURE — 80164 ASSAY DIPROPYLACETIC ACD TOT: CPT

## 2023-03-04 NOTE — ED PROVIDER NOTES
EMERGENCY DEPARTMENT HISTORY AND PHYSICAL EXAM      Date: 3/4/2023  Patient Name: Saleem Noriega    History of Presenting Illness     Chief Complaint   Patient presents with    Syncope       History Provided By: Patient and EMS    HPI: Saleem Noriega, 65 y.o. female with a past medical history significant seizure presents to the ED with chief complaint of Syncope  . 4year-old female with a history of psych and seizures. Patient notes she is compliant on her medications reported to have a seizure versus syncopal event presents with no complaints wants sugar packets. There are no other complaints, changes, or physical findings at this time. PCP: Asia Rosales NP    Current Outpatient Medications   Medication Sig Dispense Refill    dilTIAZem ER (CARDIZEM CD) 120 mg capsule       Tab-A-[place] 400 mcg tab tablet       betamethasone valerate (VALISONE) 0.1 % topical cream  (Patient not taking: No sig reported)      benzonatate (TESSALON) 100 mg capsule  (Patient not taking: No sig reported)      pdbcopwaz-xesmxaxp-ncwkari ala (BIKTARVY) tab tablet Take 1 Tablet by mouth daily for 90 days. Indications: HIV 90 Tablet 0    trimethoprim-sulfamethoxazole (BACTRIM DS, SEPTRA DS) 160-800 mg per tablet Take 1 Tablet by mouth every Monday, Wednesday, Friday. Indications: Pneumocystis jiroveci pneumonia prevention 90 Tablet 1    divalproex ER (DEPAKOTE ER) 250 mg ER tablet Take 250 mg by mouth two (2) times a day. albuterol (ProAir HFA) 90 mcg/actuation inhaler Take 2 Puffs by inhalation every six (6) hours as needed for Wheezing or Shortness of Breath. 18 g 0    benztropine (COGENTIN) 0.5 mg tablet Take 1 Tablet by mouth two (2) times a day. famotidine (PEPCID) 40 mg tablet Take 40 mg by mouth nightly. lisinopriL (PRINIVIL, ZESTRIL) 20 mg tablet Take 20 mg by mouth daily. risperiDONE (RisperDAL) 2 mg tablet Take 2 mg by mouth two (2) times a day.       multivitamin (ONE A DAY) tablet Take 1 Tablet by mouth daily. Past History     Past Medical History:  Past Medical History:   Diagnosis Date    Alcohol abuse 5/29/2017    Autoimmune disease ([facility]     Hepatitis C     HIV (human immunodeficiency virus infection) ([facility]     Hypertension     Psychotic disorder ([facility].)     Schizophrenia ([place] [id number].)        Past Surgical History:  History reviewed. No pertinent surgical history. Family History:  Family History   Family history unknown: Yes       Social History:  Social History     Tobacco Use    Smoking status: Every Day     Packs/day: 0.50     Types: Cigarettes    Smokeless tobacco: Never    Tobacco comments:     Smokes about 5 cigarettes per day   Vaping Use    Vaping Use: Never used   Substance Use Topics    Alcohol use: Yes     Alcohol/week: 0.8 standard drinks     Types: 1 Cans of beer per week    Drug use: Yes     Types: Cocaine     Comment: Pt reports a history of crack cocaine use       Allergies:  No Known Allergies      Review of Systems   Review of Systems   Constitutional: Negative. Negative for chills, fatigue and fever. HENT: Negative. Negative for congestion, nosebleeds and sore throat. Eyes: Negative. Negative for pain, discharge and visual disturbance. Respiratory: Negative. Negative for cough, chest tightness and shortness of breath. Cardiovascular:  Negative for chest pain, palpitations and leg swelling. Gastrointestinal:  Negative for abdominal pain, blood in stool, constipation, diarrhea, nausea and vomiting. Endocrine: Negative. Genitourinary: Negative. Negative for difficulty urinating, dysuria, pelvic pain and vaginal bleeding. Musculoskeletal: Negative. Negative for arthralgias, back pain and myalgias. Skin: Negative. Negative for rash and wound. Allergic/Immunologic: Negative. Neurological:  Positive for seizures and syncope. Negative for dizziness, weakness, numbness and headaches. Hematological: Negative.     Psychiatric/Behavioral: Negative. Negative for agitation, confusion and suicidal ideas. All other systems reviewed and are negative. Positives and Pertinent negatives as per HPI. Physical Exam   Patient Vitals for the past 24 hrs:   Temp Pulse Resp BP SpO2   03/04/23 1103 -- -- -- -- 98 %   03/04/23 1055 98 °F (36.7 °C) 64 17 116/67 98 %         Physical Exam  Vitals and nursing note reviewed. Exam conducted with a chaperone present. Constitutional:       Appearance: Normal appearance. She is normal weight. HENT:      Head: Normocephalic and atraumatic. Nose: Nose normal.      Mouth/Throat:      Mouth: Mucous membranes are moist.      Pharynx: Oropharynx is clear. Eyes:      Extraocular Movements: Extraocular movements intact. Conjunctiva/sclera: Conjunctivae normal.      Pupils: Pupils are equal, round, and reactive to light. Cardiovascular:      Rate and Rhythm: Normal rate and regular rhythm. Pulses: Normal pulses. Heart sounds: Normal heart sounds. Pulmonary:      Effort: Pulmonary effort is normal. No respiratory distress. Breath sounds: Normal breath sounds. Abdominal:      General: Abdomen is flat. Bowel sounds are normal. There is no distension. Palpations: Abdomen is soft. Tenderness: There is no abdominal tenderness. There is no guarding. Musculoskeletal:         General: No swelling, tenderness, deformity or signs of injury. Normal range of motion. Cervical back: Normal range of motion and neck supple. Right lower leg: No edema. Left lower leg: No edema. Skin:     General: Skin is warm and dry. Capillary Refill: Capillary refill takes less than 2 seconds. Findings: No lesion or rash. Neurological:      General: No focal deficit present. Mental Status: She is alert and oriented to person, place, and time. Mental status is at baseline. Cranial Nerves: No cranial nerve deficit.    Psychiatric:         Mood and Affect: Mood normal. Behavior: Behavior normal.         Thought Content: Thought content normal.         Judgment: Judgment normal.       Diagnostic Study Results     LABS:   Recent Results (from the past 12 hour(s))   CBC WITH AUTOMATED DIFF    Collection Time: 03/04/23 11:01 AM   Result Value Ref Range    WBC 3.4 (L) 3.6 - 11.0 K/uL    RBC 3.89 3.80 - 5.20 M/uL    HGB 12.1 11.5 - 16.0 g/dL    HCT 34.7 (L) 35.0 - 47.0 %    MCV 89.2 80.0 - 99.0 FL    MCH 31.1 26.0 - 34.0 PG    MCHC 34.9 30.0 - 36.5 g/dL    RDW 13.6 11.5 - 14.5 %    PLATELET 745 386 - 792 K/uL    MPV 12.2 8.9 - 12.9 FL    NRBC 0.0 0.0  WBC    ABSOLUTE NRBC 0.00 0.00 - 0.01 K/uL    NEUTROPHILS 40 32 - 75 %    LYMPHOCYTES 45 12 - 49 %    MONOCYTES 13 5 - 13 %    EOSINOPHILS 1 0 - 7 %    BASOPHILS 0 0 - 1 %    IMMATURE GRANULOCYTES 1 (H) 0 - 0.5 %    ABS. NEUTROPHILS 1.3 (L) 1.8 - 8.0 K/UL    ABS. LYMPHOCYTES 1.6 0.8 - 3.5 K/UL    ABS. MONOCYTES 0.4 0.0 - 1.0 K/UL    ABS. EOSINOPHILS 0.0 0.0 - 0.4 K/UL    ABS. BASOPHILS 0.0 0.0 - 0.1 K/UL    ABS. IMM. GRANS. 0.0 0.00 - 0.04 K/UL    DF AUTOMATED     METABOLIC PANEL, COMPREHENSIVE    Collection Time: 03/04/23 11:01 AM   Result Value Ref Range    Sodium 136 136 - 145 mmol/L    Potassium 3.6 3.5 - 5.1 mmol/L    Chloride 104 97 - 108 mmol/L    CO2 29 21 - 32 mmol/L    Anion gap 3 (L) 5 - 15 mmol/L    Glucose 92 65 - 100 mg/dL    BUN 16 6 - 20 mg/dL    Creatinine 1.49 (H) 0.55 - 1.02 mg/dL    BUN/Creatinine ratio 11 (L) 12 - 20      eGFR 40 (L) >60 ml/min/1.73m2    Calcium 9.0 8.5 - 10.1 mg/dL    Bilirubin, total 0.6 0.2 - 1.0 mg/dL    AST (SGOT) 97 (H) 15 - 37 U/L    ALT (SGPT) 54 12 - 78 U/L    Alk.  phosphatase 171 (H) 45 - 117 U/L    Protein, total 7.9 6.4 - 8.2 g/dL    Albumin 2.5 (L) 3.5 - 5.0 g/dL    Globulin 5.4 (H) 2.0 - 4.0 g/dL    A-G Ratio 0.5 (L) 1.1 - 2.2     TROPONIN-HIGH SENSITIVITY    Collection Time: 03/04/23 11:01 AM   Result Value Ref Range    Troponin-High Sensitivity 11 0 - 51 ng/L   VALPROIC ACID Collection Time: 03/04/23 11:01 AM   Result Value Ref Range    Valproic acid 73 50 - 100 ug/mL        EKG: EKG interpreted independently by ER physician. RADIOLOGY:  Non-plain film images such as CT, Ultrasound and MRI are read by the radiologist. Plain radiographic images are visualized and preliminarily interpreted by the ED Provider with the below findings:          Interpretation per the Radiologist below, if available at the time of this note:     XR CHEST PORT    Result Date: 3/4/2023  INDICATION: syncope  EXAM:  AP CHEST RADIOGRAPH COMPARISON: September 10, 2022 FINDINGS: AP portable view of the chest demonstrates a normal cardiomediastinal silhouette. There is no edema, effusion, consolidation, or pneumothorax. The osseous structures are unremarkable. No acute process. CT Results  (Last 48 hours)      None          CXR Results  (Last 48 hours)                 03/04/23 1119  XR CHEST PORT Final result    Impression:  No acute process. Narrative:  INDICATION: syncope         EXAM:  AP CHEST RADIOGRAPH       COMPARISON: September 10, 2022       FINDINGS:       AP portable view of the chest demonstrates a normal cardiomediastinal   silhouette. There is no edema, effusion, consolidation, or pneumothorax. The   osseous structures are unremarkable. Medical Decision Making and ED Course       CC/HPI Summary, DDx, ED Course, and Reassessment: 46-year-old presents with syncope versus seizure who is on Depakote. Plan to evaluate for seizure versus syncope versus dysrhythmia versus anemia versus WHITNEY. Lab work including CBC CMP Depakote level troponin chest x-ray CT head and reevaluation patient remains asymptomatic    Tolerate po    These orders were placed during the ER evaluation:  Orders Placed This Encounter    COVID-19 RAPID TEST     Standing Status:   Standing     Number of Occurrences:   1     Order Specific Question:   Is this test for diagnosis or screening?      Answer: Diagnosis of ill patient     Order Specific Question:   Symptomatic for COVID-19 as defined by CDC? Answer:   Yes     Order Specific Question:   Date of Symptom Onset     Answer:   3/4/2023     Order Specific Question:   Hospitalized for COVID-19? Answer:   No     Order Specific Question:   Admitted to ICU for COVID-19? Answer:   No     Order Specific Question:   Employed in healthcare setting? Answer:   No     Order Specific Question:   Resident in a congregate (group) care setting? Answer:   No     Order Specific Question:   Pregnant? Answer:   No     Order Specific Question:   Previously tested for COVID-19?      Answer:   Yes    CT HEAD WO CONT     Standing Status:   Standing     Number of Occurrences:   1     Order Specific Question:   Transport     Answer:   Stretcher [5]     Order Specific Question:   Reason for Exam     Answer:   seizure     Order Specific Question:   Decision Support Exception     Answer:   Emergency Medical Condition (MA) [1]    XR CHEST PORT     Standing Status:   Standing     Number of Occurrences:   1     Order Specific Question:   Reason for Exam     Answer:   syncope    CBC WITH AUTOMATED DIFF     Standing Status:   Standing     Number of Occurrences:   1    METABOLIC PANEL, COMPREHENSIVE     Standing Status:   Standing     Number of Occurrences:   1    TROPONIN-HIGH SENSITIVITY     Standing Status:   Standing     Number of Occurrences:   1    URINALYSIS W/ REFLEX CULTURE     Standing Status:   Standing     Number of Occurrences:   1    DRUG SCREEN, URINE     Standing Status:   Standing     Number of Occurrences:   1    VALPROIC ACID     Standing Status:   Standing     Number of Occurrences:   1    EKG 12 LEAD INITIAL     Standing Status:   Standing     Number of Occurrences:   1     Order Specific Question:   Reason for Exam:     Answer:   syncope vs seizure        Patient was given the following medications:  Medications - No data to display        ED Course: ED Course as of 03/04/23 1356   Sat Mar 04, 2023   1058 EKG interpretation:  Normal sinus rhythm. Rate 64. No ST segment changes. No T wave Inversions. Normal Intervals. Interpreted by ED physician. Reason rule out dysarrythmia     [HP]   1148 EKG at 1054 normal sinus rhythm rate of 64. LVH. J-point elevation V2 V3. No reciprocal changes. Normal intervals otherwise no T wave inversions. Interpreted by ER physician. [HP]   1241 Creatinine(!): 1.49  Mild janiya otherwise nl. Interpreted independently by ER physician as normal   [HP]   1331 EKG at 1329 irregular irregular intervals tachycardia A-fib with RVR rate of 106. No ST changes reason out ACS. Interpreted by ER physician. [HP]   1356 Valproic acid: 73  Interpreted independently by ER physician as normal  ' [HP]      ED Course User Index  [HP] Cyndee Rico MD         Vital Signs-Reviewed the patient's vital signs. Patient Vitals for the past 24 hrs:   Temp Pulse Resp BP SpO2   03/04/23 1103 -- -- -- -- 98 %   03/04/23 1055 98 °F (36.7 °C) 64 17 116/67 98 %     Vitals interpreted independently by ER physician. stable    Medical decision making tools:       [place] SYNCOPE RULE:   Patient presents to the ER with a complaint of syncope. Patient was evaluated according to the [facility] syncope rules. C - Congestive Heart Failure History  H - Hematrocrit <30%  E - ECG abnormal  S - Shortness of Breath  S - Systolic Blood Pressure <80 at Triage    - evidence of hypotension (SBP<90) ,   - anemia (hct<30%),   - shortness of breath,   - CHF HX    -Ekg or cardiac monitor changes. Patient does not have any of these criteria. Therefore patient is stable for outpatient work-up of the syncopal event and no further ER interventions are needed. Also no admission is indicated according to [facility] syncope rules.               No data recorded          Disposition Considerations (Tests not done, Shared Decision Making, Pt Expectation of Test or Tx.): Patient remains alert and oriented. Unclear if it syncope versus seizure however patient's seizure medications are within therapeutic levels. No medications indicated here in the ER. Patient also has no concerning events causing the syncope. According to [facility] syncope rules. Patient wants to be discharged paper stable to be discharged no cardiac events noted. CONSULTS: (Who and What was discussed)  None    Chronic Conditions: sz    Social Determinants affecting Dx or Tx: None    Records Reviewed (source and summary of external notes): Prior medical records  Records Reviewed: Previous Hospital chart. EMS run report. I reviewed the vital signs, available nursing notes, past medical history, past surgical history, family history and social history. Initial assessment performed. The patients presenting problems have been discussed, and they are in agreement with the care plan formulated and outlined with them. I have encouraged them to ask questions as they arise throughout their visit. Discharged      Procedures         CRITICAL CARE NOTE :  1:58 PM  Amount of Critical Care Time: 30 minutes    IMPENDING DETERIORATION -Airway, Respiratory, Cardiovascular, and CNS  ASSOCIATED RISK FACTORS - Hypotension  MANAGEMENT- Bedside Assessment  INTERPRETATION -  Xrays  INTERVENTIONS - hemodynamic mngmt  CASE REVIEW - Medical Sub-Specialist  TREATMENT RESPONSE -Stable  PERFORMED BY - Self    NOTES   :  I have spent critical care time involved in lab review, consultations with specialist, family decision- making, bedside attention and documentation. This time excludes time spent in any separate billed procedures. During this entire length of time I was immediately available to the patient . Dayanara Umanzor MD          Disposition       Emergency Department Disposition:  Discharged      Diagnosis     Clinical Impression:   1.  Seizure disorder [facility])        Attestations:    Dayanara Umanzor MD    Please note that this dictation was completed with [employer], the computer voice recognition software. Quite often unanticipated grammatical, syntax, homophones, and other interpretive errors are inadvertently transcribed by the computer software. Please disregard these errors. Please excuse any errors that have escaped final proofreading. Thank you.

## 2023-03-04 NOTE — ED TRIAGE NOTES
Per ems was called for syncope vs seizure.  States patient passed out and was shaking, patient alert and oriented, GCS 15 on arrival.

## 2023-03-04 NOTE — ED NOTES
Writer informed MS. Nicki Davis at  that patient will be discharged from the hospital.  Ms. Nicki Davis informed writer to get patient a medicaid cab back to [facility]. Informed Ms. Nicki Davis the wait time is 30 minutes to 3 hours states someone will be there to let her in.

## 2023-03-04 NOTE — DISCHARGE INSTRUCTIONS
Thank you! Thank you for allowing me to care for you in the emergency department. It is my goal to provide you with excellent care. If you have not received excellent quality care, please ask to speak to the nurse manager. Please fill out the survey that will come to you by mail or email since we listen to your feedback! Below you will find a list of your tests from today's visit. Should you have any questions, please do not hesitate to call the emergency department. Labs  Recent Results (from the past 12 hour(s))   CBC WITH AUTOMATED DIFF    Collection Time: 03/04/23 11:01 AM   Result Value Ref Range    WBC 3.4 (L) 3.6 - 11.0 K/uL    RBC 3.89 3.80 - 5.20 M/uL    HGB 12.1 11.5 - 16.0 g/dL    HCT 34.7 (L) 35.0 - 47.0 %    MCV 89.2 80.0 - 99.0 FL    MCH 31.1 26.0 - 34.0 PG    MCHC 34.9 30.0 - 36.5 g/dL    RDW 13.6 11.5 - 14.5 %    PLATELET 444 802 - 173 K/uL    MPV 12.2 8.9 - 12.9 FL    NRBC 0.0 0.0  WBC    ABSOLUTE NRBC 0.00 0.00 - 0.01 K/uL    NEUTROPHILS 40 32 - 75 %    LYMPHOCYTES 45 12 - 49 %    MONOCYTES 13 5 - 13 %    EOSINOPHILS 1 0 - 7 %    BASOPHILS 0 0 - 1 %    IMMATURE GRANULOCYTES 1 (H) 0 - 0.5 %    ABS. NEUTROPHILS 1.3 (L) 1.8 - 8.0 K/UL    ABS. LYMPHOCYTES 1.6 0.8 - 3.5 K/UL    ABS. MONOCYTES 0.4 0.0 - 1.0 K/UL    ABS. EOSINOPHILS 0.0 0.0 - 0.4 K/UL    ABS. BASOPHILS 0.0 0.0 - 0.1 K/UL    ABS. IMM.  GRANS. 0.0 0.00 - 0.04 K/UL    DF AUTOMATED     METABOLIC PANEL, COMPREHENSIVE    Collection Time: 03/04/23 11:01 AM   Result Value Ref Range    Sodium 136 136 - 145 mmol/L    Potassium 3.6 3.5 - 5.1 mmol/L    Chloride 104 97 - 108 mmol/L    CO2 29 21 - 32 mmol/L    Anion gap 3 (L) 5 - 15 mmol/L    Glucose 92 65 - 100 mg/dL    BUN 16 6 - 20 mg/dL    Creatinine 1.49 (H) 0.55 - 1.02 mg/dL    BUN/Creatinine ratio 11 (L) 12 - 20      eGFR 40 (L) >60 ml/min/1.73m2    Calcium 9.0 8.5 - 10.1 mg/dL    Bilirubin, total 0.6 0.2 - 1.0 mg/dL    AST (SGOT) 97 (H) 15 - 37 U/L    ALT (SGPT) 54 12 - 78 U/L Alk. phosphatase 171 (H) 45 - 117 U/L    Protein, total 7.9 6.4 - 8.2 g/dL    Albumin 2.5 (L) 3.5 - 5.0 g/dL    Globulin 5.4 (H) 2.0 - 4.0 g/dL    A-G Ratio 0.5 (L) 1.1 - 2.2     TROPONIN-HIGH SENSITIVITY    Collection Time: 03/04/23 11:01 AM   Result Value Ref Range    Troponin-High Sensitivity 11 0 - 51 ng/L   VALPROIC ACID    Collection Time: 03/04/23 11:01 AM   Result Value Ref Range    Valproic acid 73 50 - 100 ug/mL       Radiologic Studies  XR CHEST PORT   Final Result   No acute process. CT HEAD WO CONT    (Results Pending)     CT Results  (Last 48 hours)      None          CXR Results  (Last 48 hours)                 03/04/23 1119  XR CHEST PORT Final result    Impression:  No acute process. Narrative:  INDICATION: syncope         EXAM:  AP CHEST RADIOGRAPH       COMPARISON: September 10, 2022       FINDINGS:       AP portable view of the chest demonstrates a normal cardiomediastinal   silhouette. There is no edema, effusion, consolidation, or pneumothorax. The   osseous structures are unremarkable.                 ------------------------------------------------------------------------------------------------------------  The exam and treatment you received in the Emergency Department were for an urgent problem and are not intended as complete care. It is important that you follow-up with a doctor, nurse practitioner, or physician assistant to:  (1) confirm your diagnosis,  (2) re-evaluation of changes in your illness and treatment, and  (3) for ongoing care. Please take your discharge instructions with you when you go to your follow-up appointment. If you have any problem arranging a follow-up appointment, contact the Emergency Department. If your symptoms become worse or you do not improve as expected and you are unable to reach your health care provider, please return to the Emergency Department. We are available 24 hours a day.      If a prescription has been provided, please have it filled as soon as possible to prevent a delay in treatment. If you have any questions or reservations about taking the medication due to side effects or interactions with other medications, please call your primary care provider or contact the ER.

## 2023-03-06 LAB
ATRIAL RATE: 64 BPM
CALCULATED P AXIS, ECG09: 58 DEGREES
CALCULATED R AXIS, ECG10: 38 DEGREES
CALCULATED T AXIS, ECG11: 73 DEGREES
DIAGNOSIS, 93000: NORMAL
P-R INTERVAL, ECG05: 174 MS
Q-T INTERVAL, ECG07: 428 MS
QRS DURATION, ECG06: 96 MS
QTC CALCULATION (BEZET), ECG08: 441 MS
VENTRICULAR RATE, ECG03: 64 BPM

## 2023-04-17 ENCOUNTER — HOSPITAL ENCOUNTER (EMERGENCY)
Age: 63
Discharge: HOME | End: 2023-04-18
Payer: MEDICAID

## 2023-04-17 VITALS
WEIGHT: 150 LBS | OXYGEN SATURATION: 100 % | HEART RATE: 77 BPM | SYSTOLIC BLOOD PRESSURE: 141 MMHG | HEIGHT: 65 IN | BODY MASS INDEX: 24.99 KG/M2 | RESPIRATION RATE: 16 BRPM | DIASTOLIC BLOOD PRESSURE: 9 MMHG | TEMPERATURE: 97.8 F

## 2023-04-17 DIAGNOSIS — R25.2 LEG CRAMPS: Primary | ICD-10-CM

## 2023-04-17 PROCEDURE — 99283 EMERGENCY DEPT VISIT LOW MDM: CPT

## 2023-04-18 NOTE — ED PROVIDER NOTES
[facility] EMERGENCY DEPT  EMERGENCY DEPARTMENT HISTORY AND PHYSICAL EXAM      Date: 4/17/2023  Patient Name: Mendel Metro  MRN: [id number]  [place]: 1960  Date of evaluation: 4/17/2023  Provider: Lanie Davalos NP   Note Started: 11:34 PM 4/17/23    HISTORY OF PRESENT ILLNESS     Chief Complaint   Patient presents with    Leg Pain       History Provided By: Patient    HPI: Mendel Metro is a 65 y.o. female with a past medical history as noted below presents to the emergency room with cc of leg cramps. Patient states she has had intermittent bilateral thigh cramping since yesterday. She denies any cramps or leg pain presently. She denies any trauma or recent illness, specifically denies any N/V/D or fevers. She states the cramps lasted approximately 1 minute and self resolved. She states \"I just wanted to let you guys know in case it happens again \". She states she is ready to go and declines any further work-up at this time. PAST MEDICAL HISTORY   Past Medical History:  Past Medical History:   Diagnosis Date    Alcohol abuse 5/29/2017    Autoimmune disease ([facility]     Hepatitis C     [facility]     Hypertension     Psychotic disorder ([facility]     Schizophrenia ([facility]        Past Surgical History:  No past surgical history on file. Family History:  Family History   Family history unknown: Yes       Social History:  Social History     Tobacco Use    Smoking status: Every Day     Packs/day: 0.50     Types: Cigarettes    Smokeless tobacco: Never    Tobacco comments:     Smokes about 5 cigarettes per day   Vaping Use    Vaping Use: Never used   Substance Use Topics    Alcohol use:  Yes     Alcohol/week: 0.8 standard drinks     Types: 1 Cans of beer per week    Drug use: Yes     Types: Cocaine     Comment: Pt reports a history of crack cocaine use       Allergies:  No Known Allergies    PCP: Rocael Rebolledo NP    Current Meds:   Previous Medications    ALBUTEROL (PROAIR HFA) 90 MCG/ACTUATION INHALER    Take 2 Puffs by inhalation every six (6) hours as needed for Wheezing or Shortness of Breath. BENZONATATE (TESSALON) 100 MG CAPSULE        BENZTROPINE (COGENTIN) 0.5 MG TABLET    Take 1 Tablet by mouth two (2) times a day. BETAMETHASONE VALERATE (VALISONE) 0.1 % TOPICAL CREAM        RKUDZDAJQ-CBCEBHPP-AIPYGMX ALA (BIKTARVY) TAB TABLET    Take 1 Tablet by mouth daily for 90 days. Indications: HIV    DILTIAZEM ER (CARDIZEM CD) 120 MG CAPSULE        DIVALPROEX ER (DEPAKOTE ER) 250 MG ER TABLET    Take 250 mg by mouth two (2) times a day. FAMOTIDINE (PEPCID) 40 MG TABLET    Take 40 mg by mouth nightly. LISINOPRIL (PRINIVIL, ZESTRIL) 20 MG TABLET    Take 20 mg by mouth daily. MULTIVITAMIN (ONE A DAY) TABLET    Take 1 Tablet by mouth daily. RISPERIDONE (RISPERDAL) 2 MG TABLET    Take 2 mg by mouth two (2) times a day. TAB-A-ABRAHAM 400 MCG TAB TABLET        TRIMETHOPRIM-SULFAMETHOXAZOLE (BACTRIM DS, SEPTRA DS) 160-800 MG PER TABLET    Take 1 Tablet by mouth every Monday, Wednesday, Friday. Indications: Pneumocystis jiroveci pneumonia prevention       PHYSICAL EXAM     ED Triage Vitals [04/17/23 1904]   ED Encounter Vitals Group      BP (!) 151/98      Pulse (Heart Rate) 73      Resp Rate 16      Temp 97.8 °F (36.6 °C)      Temp src       O2 Sat (%) 100 %      Weight 150 lb      Height 5' 5\"      Physical Exam  Vitals and nursing note reviewed. Constitutional:       General: She is not in acute distress. Appearance: Normal appearance. HENT:      Head: Normocephalic and atraumatic. Eyes:      Extraocular Movements: Extraocular movements intact. Conjunctiva/sclera: Conjunctivae normal.   Cardiovascular:      Rate and Rhythm: Normal rate and regular rhythm. Pulses:           Dorsalis pedis pulses are 2+ on the right side and 2+ on the left side. Heart sounds: Normal heart sounds.    Pulmonary:      Effort: Pulmonary effort is normal.      Breath sounds: Normal breath sounds. No wheezing or rales. Abdominal:      General: Bowel sounds are normal.      Palpations: Abdomen is soft. Tenderness: There is no abdominal tenderness. Musculoskeletal:         General: Normal range of motion. Cervical back: Normal range of motion and neck supple. Right upper leg: Normal.      Left upper leg: Normal.      Right knee: Normal.      Left knee: Normal.      Right lower leg: Normal. No edema. Left lower leg: Normal. No edema. Comments: Moving all extremities without difficulty, ambulating without difficulty   Skin:     General: Skin is warm and dry. Neurological:      General: No focal deficit present. Mental Status: She is alert. Psychiatric:         Mood and Affect: Mood normal.         Behavior: Behavior normal. Behavior is cooperative. Ambulating without difficulty  LAB, EKG AND DIAGNOSTIC RESULTS   Labs:  No results found for this or any previous visit (from the past 12 hour(s)). EKG: Initial EKG interpreted by me. Not Applicable    Radiologic Studies:  Non-plain film images such as CT, Ultrasound and MRI are read by the radiologist. Plain radiographic images are visualized and preliminarily interpreted by the ED Physician with the following findings: Not Applicable    Interpretation per the Radiologist below, if available at the time of this note:  No results found. PROCEDURES   Unless otherwise noted below, none. Procedures      CRITICAL CARE TIME   Patient does not meet Critical Care Time, 0 minutes    ED COURSE and DIFFERENTIAL DIAGNOSIS/MDM   CC/HPI/PE Summary, DDx: Patient presents with leg cramps. Ddx: Contusion, metabolic disturbance, ARF, liver failure. Less likely DVT, cellulitis,  PAD based on physical exam and hpi. Will get labs and Doppler Leg PRN.        Records Reviewed (source and summary of external notes): Prior medical records and Nursing notes    Vitals:    Vitals:    04/17/23 1904 04/17/23 2135   BP: (!) 151/98 (!) 141/9   Pulse: 73 77   Resp: 16 16   Temp: 97.8 °F (36.6 °C)    SpO2: 100% 100%   Weight: 68 kg (150 lb)    Height: 5' 5\" (1.651 m)         ED COURSE  ED Course as of 04/17/23 2334   Mon Apr 17, 2023 2033 Patient declines any blood work. She states she does not needed and is ready to go. She is able to ambulate in the hallway without any difficulty. Will discharge with PCP follow-up. Red flags and return precautions discussed. Reinforced importance of adequate hydration with water. Return to the ED for any worsening symptoms [LP]      ED Course User Index  [LP] Heather Plunkett NP       Disposition Considerations (Tests not done, Shared Decision Making, Pt Expectation of Test or Treatment.): Not Applicable    Patient was given the following medications:  Medications - No data to display    CONSULTS: (Who and What was discussed)  None     Social Determinants affecting Dx or Tx: None    Smoking Cessation: Not Applicable    FINAL IMPRESSION     1. Leg cramps          DISPOSITION/PLAN   Discharged    Discharge Note: The patient is stable for discharge home. The signs, symptoms, diagnosis, and discharge instructions have been discussed, understanding conveyed, and agreed upon. The patient is to follow up as recommended or return to ER should their symptoms worsen.       PATIENT REFERRED TO:  Follow-up Information       Follow up With Specialties Details Why Contact Info Darroll Bence, NP Nurse Practitioner, Nurse Practitioner Schedule an appointment as soon as possible for a visit  for ER follow up, As needed     [facility] EMERGENCY DEPT Emergency Medicine  If symptoms worsen [address] [address]  [phone]              DISCHARGE MEDICATIONS:  Current Discharge Medication List            DISCONTINUED MEDICATIONS:  Current Discharge Medication List          I am the Primary Clinician of Record: Angela Worley NP (electronically signed)    (Please note that parts of this dictation were completed with voice recognition software. Quite often unanticipated grammatical, syntax, homophones, and other interpretive errors are inadvertently transcribed by the computer software. Please disregards these errors.  Please excuse any errors that have escaped final proofreading.)

## 2023-04-25 ENCOUNTER — HOSPITAL ENCOUNTER (EMERGENCY)
Age: 63
Discharge: LEFT BEFORE DISCHARGE | End: 2023-04-25
Payer: MEDICAID

## 2023-04-25 PROCEDURE — 75810000275 HC EMERGENCY DEPT VISIT NO LEVEL OF CARE

## 2023-06-15 DIAGNOSIS — B18.2 CHRONIC HEPATITIS C WITHOUT HEPATIC COMA (HCC): ICD-10-CM

## 2023-06-15 DIAGNOSIS — B20 CURRENTLY ASYMPTOMATIC HIV INFECTION, WITH HISTORY OF HIV-RELATED ILLNESS (HCC): ICD-10-CM

## 2023-07-06 LAB
ALBUMIN SERPL-MCNC: 3.2 G/DL (ref 3.8–4.8)
ALBUMIN/GLOB SERPL: 0.8 {RATIO} (ref 1.2–2.2)
ALP SERPL-CCNC: 170 IU/L (ref 44–121)
ALT SERPL-CCNC: 33 IU/L (ref 0–32)
AST SERPL-CCNC: 83 IU/L (ref 0–40)
BASOPHILS # BLD AUTO: 0 X10E3/UL (ref 0–0.2)
BASOPHILS NFR BLD AUTO: 0 %
BILIRUB SERPL-MCNC: 0.7 MG/DL (ref 0–1.2)
BUN SERPL-MCNC: 10 MG/DL (ref 8–27)
BUN/CREAT SERPL: 10 (ref 12–28)
CALCIUM SERPL-MCNC: 8.9 MG/DL (ref 8.7–10.3)
CD3+CD4+ CELLS # BLD: 320 /UL (ref 359–1519)
CD3+CD4+ CELLS NFR BLD: 20 % (ref 30.8–58.5)
CHLORIDE SERPL-SCNC: 90 MMOL/L (ref 96–106)
CO2 SERPL-SCNC: 25 MMOL/L (ref 20–29)
CREAT SERPL-MCNC: 0.99 MG/DL (ref 0.57–1)
EGFRCR SERPLBLD CKD-EPI 2021: 65 ML/MIN/1.73
EOSINOPHIL # BLD AUTO: 0.1 X10E3/UL (ref 0–0.4)
EOSINOPHIL NFR BLD AUTO: 3 %
ERYTHROCYTE [DISTWIDTH] IN BLOOD BY AUTOMATED COUNT: 12.3 % (ref 11.7–15.4)
GLOBULIN SER CALC-MCNC: 4.1 G/DL (ref 1.5–4.5)
GLUCOSE SERPL-MCNC: 105 MG/DL (ref 70–99)
HCT VFR BLD AUTO: 35 % (ref 34–46.6)
HCV RNA SERPL NAA+PROBE-ACNC: NORMAL IU/ML
HCV RNA SERPL NAA+PROBE-ACNC: NORMAL IU/ML
HCV RNA SERPL NAA+PROBE-LOG IU: 7.15 LOG10 IU/ML
HGB BLD-MCNC: 12.5 G/DL (ref 11.1–15.9)
IMM GRANULOCYTES # BLD AUTO: 0 X10E3/UL (ref 0–0.1)
IMM GRANULOCYTES NFR BLD AUTO: 0 %
LYMPHOCYTES # BLD AUTO: 1.6 X10E3/UL (ref 0.7–3.1)
LYMPHOCYTES NFR BLD AUTO: 45 %
MCH RBC QN AUTO: 30.6 PG (ref 26.6–33)
MCHC RBC AUTO-ENTMCNC: 35.7 G/DL (ref 31.5–35.7)
MCV RBC AUTO: 86 FL (ref 79–97)
MONOCYTES # BLD AUTO: 0.3 X10E3/UL (ref 0.1–0.9)
MONOCYTES NFR BLD AUTO: 10 %
NEUTROPHILS # BLD AUTO: 1.5 X10E3/UL (ref 1.4–7)
NEUTROPHILS NFR BLD AUTO: 42 %
PLATELET # BLD AUTO: 191 X10E3/UL (ref 150–450)
POTASSIUM SERPL-SCNC: 4.2 MMOL/L (ref 3.5–5.2)
PROT SERPL-MCNC: 7.3 G/DL (ref 6–8.5)
RBC # BLD AUTO: 4.08 X10E6/UL (ref 3.77–5.28)
SODIUM SERPL-SCNC: 126 MMOL/L (ref 134–144)
TEST INFORMATION: NORMAL
WBC # BLD AUTO: 3.6 X10E3/UL (ref 3.4–10.8)

## 2023-07-07 LAB
A2 MACROGLOB SERPL-MCNC: 521 MG/DL (ref 110–276)
ALT SERPL W P-5'-P-CCNC: 45 IU/L (ref 0–40)
APO A-I SERPL-MCNC: 147 MG/DL (ref 116–209)
BILIRUB SERPL-MCNC: 0.5 MG/DL (ref 0–1.2)
FIBROSIS SCORING:: ABNORMAL
FIBROSIS STAGE SERPL QL: ABNORMAL
GGT SERPL-CCNC: 196 IU/L (ref 0–60)
HAPTOGLOB SERPL-MCNC: 52 MG/DL (ref 37–355)
HIV 1 RNA RT + PR + IN MUT DET PLAS SEQ: NORMAL
HIV1 RNA # SERPL NAA+PROBE: 20 COPIES/ML
HIV1 RNA SERPL NAA+PROBE-LOG#: 1.3 LOG10COPY/ML
INTERPRETATIONS:: ABNORMAL
LABORATORY COMMENT REPORT: ABNORMAL
LIVER FIBR SCORE SERPL CALC.FIBROSURE: 0.88 (ref 0–0.21)
NECROINFLAMM ACTIVITY SCORING:: ABNORMAL
NECROINFLAMMATORY ACT GRADE SERPL QL: ABNORMAL
NECROINFLAMMATORY ACT SCORE SERPL: 0.45 (ref 0–0.17)
SERVICE CMNT-IMP: ABNORMAL
SPECIMEN CONDITION: NORMAL
TEST PERFORMANCE INFO SPEC: ABNORMAL

## 2023-07-27 LAB
HCV NS5 MUT DET ISLT GENOTYP: NORMAL
REF LAB TEST METHOD: NORMAL

## 2023-08-02 ENCOUNTER — HOSPITAL ENCOUNTER (EMERGENCY)
Facility: HOSPITAL | Age: 63
Discharge: HOME | End: 2023-08-02
Attending: STUDENT IN AN ORGANIZED HEALTH CARE EDUCATION/TRAINING PROGRAM
Payer: MEDICAID

## 2023-08-02 VITALS
WEIGHT: 180 LBS | TEMPERATURE: 98.7 F | HEART RATE: 65 BPM | RESPIRATION RATE: 18 BRPM | HEIGHT: 66 IN | SYSTOLIC BLOOD PRESSURE: 135 MMHG | DIASTOLIC BLOOD PRESSURE: 84 MMHG | OXYGEN SATURATION: 98 % | BODY MASS INDEX: 28.93 KG/M2

## 2023-08-02 DIAGNOSIS — R56.9 SEIZURE (HCC): Primary | ICD-10-CM

## 2023-08-02 DIAGNOSIS — N28.9 RENAL IMPAIRMENT: ICD-10-CM

## 2023-08-02 LAB
ALBUMIN SERPL-MCNC: 2.6 G/DL (ref 3.5–5)
ALBUMIN/GLOB SERPL: 0.5 (ref 1.1–2.2)
ALP SERPL-CCNC: 171 U/L (ref 45–117)
ALT SERPL-CCNC: 41 U/L (ref 12–78)
ANION GAP SERPL CALC-SCNC: 8 MMOL/L (ref 5–15)
APPEARANCE UR: ABNORMAL
AST SERPL W P-5'-P-CCNC: 69 U/L (ref 15–37)
BACTERIA URNS QL MICRO: ABNORMAL /HPF
BASOPHILS # BLD: 0 K/UL (ref 0–0.1)
BASOPHILS NFR BLD: 0 % (ref 0–1)
BILIRUB SERPL-MCNC: 0.6 MG/DL (ref 0.2–1)
BILIRUB UR QL: NEGATIVE
BUN SERPL-MCNC: 14 MG/DL (ref 6–20)
BUN/CREAT SERPL: 9 (ref 12–20)
CA-I BLD-MCNC: 8.4 MG/DL (ref 8.5–10.1)
CHLORIDE SERPL-SCNC: 99 MMOL/L (ref 97–108)
CO2 SERPL-SCNC: 27 MMOL/L (ref 21–32)
COLOR UR: YELLOW
CREAT SERPL-MCNC: 1.55 MG/DL (ref 0.55–1.02)
DIFFERENTIAL METHOD BLD: ABNORMAL
EOSINOPHIL # BLD: 0 K/UL (ref 0–0.4)
EOSINOPHIL NFR BLD: 1 % (ref 0–7)
EPITH CASTS URNS QL MICRO: ABNORMAL /LPF
ERYTHROCYTE [DISTWIDTH] IN BLOOD BY AUTOMATED COUNT: 13.5 % (ref 11.5–14.5)
GLOBULIN SER CALC-MCNC: 5.4 G/DL (ref 2–4)
GLUCOSE SERPL-MCNC: 88 MG/DL (ref 65–100)
GLUCOSE UR STRIP.AUTO-MCNC: NEGATIVE MG/DL
HCT VFR BLD AUTO: 33.1 % (ref 35–47)
HGB BLD-MCNC: 11.8 G/DL (ref 11.5–16)
HGB UR QL STRIP: NEGATIVE
HYALINE CASTS URNS QL MICRO: ABNORMAL /LPF (ref 0–5)
IMM GRANULOCYTES # BLD AUTO: 0 K/UL (ref 0–0.04)
IMM GRANULOCYTES NFR BLD AUTO: 0 % (ref 0–0.5)
KETONES UR QL STRIP.AUTO: NEGATIVE MG/DL
LEUKOCYTE ESTERASE UR QL STRIP.AUTO: ABNORMAL
LYMPHOCYTES # BLD: 1.3 K/UL (ref 0.8–3.5)
LYMPHOCYTES NFR BLD: 33 % (ref 12–49)
MAGNESIUM SERPL-MCNC: 1.9 MG/DL (ref 1.6–2.4)
MCH RBC QN AUTO: 31.2 PG (ref 26–34)
MCHC RBC AUTO-ENTMCNC: 35.6 G/DL (ref 30–36.5)
MCV RBC AUTO: 87.6 FL (ref 80–99)
MONOCYTES # BLD: 0.6 K/UL (ref 0–1)
MONOCYTES NFR BLD: 14 % (ref 5–13)
MUCOUS THREADS URNS QL MICRO: ABNORMAL /LPF
NEUTS SEG # BLD: 2 K/UL (ref 1.8–8)
NEUTS SEG NFR BLD: 52 % (ref 32–75)
NITRITE UR QL STRIP.AUTO: NEGATIVE
NRBC # BLD: 0 K/UL (ref 0–0.01)
NRBC BLD-RTO: 0 PER 100 WBC
PH UR STRIP: 5 (ref 5–8)
PLATELET # BLD AUTO: 157 K/UL (ref 150–400)
PMV BLD AUTO: 12.1 FL (ref 8.9–12.9)
POTASSIUM SERPL-SCNC: 3.4 MMOL/L (ref 3.5–5.1)
PROT SERPL-MCNC: 8 G/DL (ref 6.4–8.2)
PROT UR STRIP-MCNC: 30 MG/DL
RBC # BLD AUTO: 3.78 M/UL (ref 3.8–5.2)
RBC #/AREA URNS HPF: ABNORMAL /HPF (ref 0–5)
SODIUM SERPL-SCNC: 134 MMOL/L (ref 136–145)
SP GR UR REFRACTOMETRY: 1.02 (ref 1–1.03)
URINE CULTURE IF INDICATED: ABNORMAL
UROBILINOGEN UR QL STRIP.AUTO: 4 EU/DL (ref 0.1–1)
VALPROATE SERPL-MCNC: 70 UG/ML (ref 50–100)
WBC # BLD AUTO: 3.9 K/UL (ref 3.6–11)
WBC URNS QL MICRO: ABNORMAL /HPF (ref 0–4)

## 2023-08-02 PROCEDURE — 36415 COLL VENOUS BLD VENIPUNCTURE: CPT

## 2023-08-02 PROCEDURE — 85025 COMPLETE CBC W/AUTO DIFF WBC: CPT

## 2023-08-02 PROCEDURE — 83735 ASSAY OF MAGNESIUM: CPT

## 2023-08-02 PROCEDURE — 80164 ASSAY DIPROPYLACETIC ACD TOT: CPT

## 2023-08-02 PROCEDURE — 81001 URINALYSIS AUTO W/SCOPE: CPT

## 2023-08-02 PROCEDURE — 80053 COMPREHEN METABOLIC PANEL: CPT

## 2023-08-02 PROCEDURE — 99283 EMERGENCY DEPT VISIT LOW MDM: CPT

## 2023-08-02 RX ORDER — POTASSIUM CHLORIDE 750 MG/1
40 TABLET, FILM COATED, EXTENDED RELEASE ORAL ONCE
Status: DISCONTINUED | OUTPATIENT
Start: 2023-08-02 | End: 2023-08-02 | Stop reason: HOSPADM

## 2023-08-02 ASSESSMENT — PAIN - FUNCTIONAL ASSESSMENT
PAIN_FUNCTIONAL_ASSESSMENT: NONE - DENIES PAIN
PAIN_FUNCTIONAL_ASSESSMENT: NONE - DENIES PAIN

## 2023-08-02 NOTE — ED PROVIDER NOTES
[facility] EMERGENCY DEPT  EMERGENCY DEPARTMENT HISTORY AND PHYSICAL EXAM      Date: 8/2/2023  Patient Name: Lottie Day  MRN: [id number]  [address] [facility] 1960  Date of evaluation: 8/2/2023  Provider: Sakshi Baum MD   Note Started: 10:58 AM EDT 8/2/23    HISTORY OF PRESENT ILLNESS     Chief Complaint   Patient presents with    Seizures       History Provided By: Patient    HPI: Lottie Day is a 65 y.o. female with PMH of alcohol use disorder, autoimmune disease, hepatitis C, HIV, hypertension, schizophrenia, and seizure disorder on valproic acid comes to the ED after having a seizure. Patient reports that she has frequent seizures and today she had seizures that was witnessed. Denies any head trauma or headache. Initially arrives to the ED mildly confused. However, back to baseline. She reports compliance with medications. She is on highly active antiretroviral therapy for HIV and reports that she has been compliant with that as well. Denies any recent illnesses. No fever, chills, sweats, coughing, runny nose, congestion, abdominal pain, chest pain, headache, changes in vision, hearing, speech, numbness, weakness, difficulty ambulating. Denies any recent change in her bowel, dater, urinary habits. PAST MEDICAL HISTORY   Past Medical History:  Past Medical History:   Diagnosis Date    Alcohol abuse 5/29/2017    Autoimmune disease ([address] [place] [address])     Hepatitis C     HIV (human immunodeficiency virus infection) ([address] [place] [address])     Hypertension     Psychotic disorder ([address] [place] [address])     Schizophrenia ([address] [place] [address])        Past Surgical History:  No past surgical history on file. Family History:  Family History   Family history unknown: Yes       Social History:  Social History     Tobacco Use    Smoking status: Every Day     Packs/day: 0.50     Types: Cigarettes    Smokeless tobacco: Never   Vaping Use    Vaping Use: Never used   Substance Use Topics    Alcohol use:  Yes     Alcohol/week: 0.8 standard drinks    Drug use: Yes     Types: Cocaine,
No

## 2023-08-02 NOTE — ED TRIAGE NOTES
Patient arrives to er via ems for seizures bs 120 she was unresponsive on ems arrival, patient became alert and vs improved enroute.

## 2023-08-03 NOTE — ED NOTES
Transportation has been arranged through insurance([employer]).  Trip number [phone]     Josefa Garcia  08/02/23 2047

## 2023-08-03 NOTE — ED NOTES
Contacted insurance once again (3rd time). Agent stated that another trip would have to be set up for today since this initial trip was set up on yesterday.  Trip number [phone]     Baldo Lott  08/03/23 2802

## 2023-08-09 ENCOUNTER — APPOINTMENT (OUTPATIENT)
Facility: HOSPITAL | Age: 63
End: 2023-08-09
Payer: MEDICAID

## 2023-08-09 ENCOUNTER — HOSPITAL ENCOUNTER (EMERGENCY)
Facility: HOSPITAL | Age: 63
Discharge: HOME | End: 2023-08-09
Attending: EMERGENCY MEDICINE
Payer: MEDICAID

## 2023-08-09 VITALS
HEIGHT: 67 IN | OXYGEN SATURATION: 98 % | DIASTOLIC BLOOD PRESSURE: 76 MMHG | BODY MASS INDEX: 23.54 KG/M2 | RESPIRATION RATE: 16 BRPM | WEIGHT: 150 LBS | TEMPERATURE: 97.5 F | SYSTOLIC BLOOD PRESSURE: 124 MMHG | HEART RATE: 75 BPM

## 2023-08-09 DIAGNOSIS — N17.9 AKI (ACUTE KIDNEY INJURY) (HCC): ICD-10-CM

## 2023-08-09 DIAGNOSIS — R42 DIZZINESS: Primary | ICD-10-CM

## 2023-08-09 LAB
ALBUMIN SERPL-MCNC: 2.5 G/DL (ref 3.5–5)
ALBUMIN/GLOB SERPL: 0.5 (ref 1.1–2.2)
ALP SERPL-CCNC: 155 U/L (ref 45–117)
ALT SERPL-CCNC: 39 U/L (ref 12–78)
ANION GAP SERPL CALC-SCNC: 7 MMOL/L (ref 5–15)
AST SERPL W P-5'-P-CCNC: 74 U/L (ref 15–37)
BASOPHILS # BLD: 0 K/UL (ref 0–0.1)
BASOPHILS NFR BLD: 1 % (ref 0–1)
BILIRUB SERPL-MCNC: 0.6 MG/DL (ref 0.2–1)
BNP SERPL-MCNC: 555 PG/ML
BUN SERPL-MCNC: 15 MG/DL (ref 6–20)
BUN/CREAT SERPL: 11 (ref 12–20)
CA-I BLD-MCNC: 8.8 MG/DL (ref 8.5–10.1)
CHLORIDE SERPL-SCNC: 95 MMOL/L (ref 97–108)
CO2 SERPL-SCNC: 28 MMOL/L (ref 21–32)
CREAT SERPL-MCNC: 1.42 MG/DL (ref 0.55–1.02)
DIFFERENTIAL METHOD BLD: ABNORMAL
EKG ATRIAL RATE: 60 BPM
EKG DIAGNOSIS: NORMAL
EKG P AXIS: 38 DEGREES
EKG P-R INTERVAL: 196 MS
EKG Q-T INTERVAL: 450 MS
EKG QRS DURATION: 92 MS
EKG QTC CALCULATION (BAZETT): 450 MS
EKG R AXIS: -2 DEGREES
EKG T AXIS: 71 DEGREES
EKG VENTRICULAR RATE: 60 BPM
EOSINOPHIL # BLD: 0 K/UL (ref 0–0.4)
EOSINOPHIL NFR BLD: 1 % (ref 0–7)
ERYTHROCYTE [DISTWIDTH] IN BLOOD BY AUTOMATED COUNT: 13.5 % (ref 11.5–14.5)
GLOBULIN SER CALC-MCNC: 5.2 G/DL (ref 2–4)
GLUCOSE SERPL-MCNC: 159 MG/DL (ref 65–100)
HCT VFR BLD AUTO: 32.3 % (ref 35–47)
HGB BLD-MCNC: 12.1 G/DL (ref 11.5–16)
IMM GRANULOCYTES # BLD AUTO: 0 K/UL (ref 0–0.04)
IMM GRANULOCYTES NFR BLD AUTO: 0 % (ref 0–0.5)
LYMPHOCYTES # BLD: 1.2 K/UL (ref 0.8–3.5)
LYMPHOCYTES NFR BLD: 32 % (ref 12–49)
MCH RBC QN AUTO: 31.9 PG (ref 26–34)
MCHC RBC AUTO-ENTMCNC: 37.5 G/DL (ref 30–36.5)
MCV RBC AUTO: 85.2 FL (ref 80–99)
MONOCYTES # BLD: 0.4 K/UL (ref 0–1)
MONOCYTES NFR BLD: 11 % (ref 5–13)
NEUTS SEG # BLD: 2.1 K/UL (ref 1.8–8)
NEUTS SEG NFR BLD: 55 % (ref 32–75)
NRBC # BLD: 0 K/UL (ref 0–0.01)
NRBC BLD-RTO: 0 PER 100 WBC
PLATELET # BLD AUTO: 234 K/UL (ref 150–400)
PMV BLD AUTO: 12.9 FL (ref 8.9–12.9)
POTASSIUM SERPL-SCNC: 3.7 MMOL/L (ref 3.5–5.1)
PROT SERPL-MCNC: 7.7 G/DL (ref 6.4–8.2)
RBC # BLD AUTO: 3.79 M/UL (ref 3.8–5.2)
SODIUM SERPL-SCNC: 130 MMOL/L (ref 136–145)
TROPONIN I SERPL HS-MCNC: 13 NG/L (ref 0–51)
VALPROATE SERPL-MCNC: 95 UG/ML (ref 50–100)
WBC # BLD AUTO: 3.8 K/UL (ref 3.6–11)

## 2023-08-09 PROCEDURE — 84484 ASSAY OF TROPONIN QUANT: CPT

## 2023-08-09 PROCEDURE — 80164 ASSAY DIPROPYLACETIC ACD TOT: CPT

## 2023-08-09 PROCEDURE — 83880 ASSAY OF NATRIURETIC PEPTIDE: CPT

## 2023-08-09 PROCEDURE — 99285 EMERGENCY DEPT VISIT HI MDM: CPT

## 2023-08-09 PROCEDURE — 93005 ELECTROCARDIOGRAM TRACING: CPT | Performed by: EMERGENCY MEDICINE

## 2023-08-09 PROCEDURE — 71045 X-RAY EXAM CHEST 1 VIEW: CPT

## 2023-08-09 PROCEDURE — 80053 COMPREHEN METABOLIC PANEL: CPT

## 2023-08-09 PROCEDURE — 36415 COLL VENOUS BLD VENIPUNCTURE: CPT

## 2023-08-09 PROCEDURE — 85025 COMPLETE CBC W/AUTO DIFF WBC: CPT

## 2023-08-09 NOTE — ED PROVIDER NOTES
[facility] EMERGENCY DEPT  EMERGENCY DEPARTMENT HISTORY AND PHYSICAL EXAM      Date: 8/9/2023  Patient Name: Sierra Sam  MRN: [id number]  [address] [facility] 1960  Date of evaluation: 8/9/2023  Provider: Elsie Oakley MD   Note Started: 12:44 PM EDT 8/9/23    HISTORY OF PRESENT ILLNESS     Chief Complaint   Patient presents with    Dizziness       History Provided By: Patient    HPI: Sierra Sam is a 65 y.o. female patient presents via EMS. Patient usually gets dizzy when she has her coffee had a coffee today and got dizzy. Feeling better. No extremity numbness or weakness. No headaches. No black or bloody stool no abdominal pain. PAST MEDICAL HISTORY   Past Medical History:  Past Medical History:   Diagnosis Date    Alcohol abuse 5/29/2017    Autoimmune disease ([address] [place] [address])     Hepatitis C     HIV (human immunodeficiency virus infection) ([address] [place] [address])     Hypertension     Psychotic disorder ([address] [place] [address])     Schizophrenia ([address] [place] [address])        Past Surgical History:  No past surgical history on file. Family History:  Family History   Family history unknown: Yes       Social History:  Social History     Tobacco Use    Smoking status: Every Day     Packs/day: 0.50     Types: Cigarettes    Smokeless tobacco: Never   Vaping Use    Vaping Use: Never used   Substance Use Topics    Alcohol use: Yes     Alcohol/week: 0.8 standard drinks    Drug use: Yes     Types: Cocaine, Marijuana Violet Callander)       Allergies:  No Known Allergies    PCP: PAU Miller CNP    Current Meds:   No current facility-administered medications for this encounter.      Current Outpatient Medications   Medication Sig Dispense Refill    albuterol sulfate HFA (PROVENTIL;VENTOLIN;PROAIR) 108 (90 Base) MCG/ACT inhaler Inhale 2 puffs into the lungs every 6 hours as needed      benzonatate (TESSALON) 100 MG capsule ceived the following from [facility]: Outside name: benzonatate (TESSALON) 100 mg capsule (Patient not taking: Reported on 6/15/2023)      benztropine

## 2023-08-09 NOTE — DISCHARGE INSTRUCTIONS
Thank you! Thank you for allowing me to care for you in the emergency department. It is my goal to provide you with excellent care. If you have not received excellent quality care, please ask to speak to the nurse manager. Please fill out the survey that will come to you by mail or email since we listen to your feedback! Below you will find a list of your tests from today's visit. Should you have any questions, please do not hesitate to call the emergency department.     Labs  Recent Results (from the past 12 hour(s))   CBC with Auto Differential    Collection Time: 08/09/23 10:26 AM   Result Value Ref Range    WBC 3.8 3.6 - 11.0 K/uL    RBC 3.79 (L) 3.80 - 5.20 M/uL    Hemoglobin 12.1 11.5 - 16.0 g/dL    Hematocrit 32.3 (L) 35.0 - 47.0 %    MCV 85.2 80.0 - 99.0 FL    MCH 31.9 26.0 - 34.0 PG    MCHC 37.5 (H) 30.0 - 36.5 g/dL    RDW 13.5 11.5 - 14.5 %    Platelets 076 503 - 936 K/uL    MPV 12.9 8.9 - 12.9 FL    Nucleated RBCs 0.0 0.0  WBC    nRBC 0.00 0.00 - 0.01 K/uL    Neutrophils % 55 32 - 75 %    Lymphocytes % 32 12 - 49 %    Monocytes % 11 5 - 13 %    Eosinophils % 1 0 - 7 %    Basophils % 1 0 - 1 %    Immature Granulocytes 0 0 - 0.5 %    Neutrophils Absolute 2.1 1.8 - 8.0 K/UL    Lymphocytes Absolute 1.2 0.8 - 3.5 K/UL    Monocytes Absolute 0.4 0.0 - 1.0 K/UL    Eosinophils Absolute 0.0 0.0 - 0.4 K/UL    Basophils Absolute 0.0 0.0 - 0.1 K/UL    Absolute Immature Granulocyte 0.0 0.00 - 0.04 K/UL    Differential Type AUTOMATED     Troponin    Collection Time: 08/09/23 10:26 AM   Result Value Ref Range    Troponin, High Sensitivity 13 0 - 51 ng/L   Valproic Acid Level, Total    Collection Time: 08/09/23 10:26 AM   Result Value Ref Range    Valproic Acid 95 50 - 100 ug/mL   EKG 12 Lead    Collection Time: 08/09/23 10:32 AM   Result Value Ref Range    Ventricular Rate 60 BPM    Atrial Rate 60 BPM    P-R Interval 196 ms    QRS Duration 92 ms    Q-T Interval 450 ms    QTc Calculation (Bazett) 450 ms

## 2023-08-09 NOTE — ED NOTES
Pt ambulated down the ED hallway and back with walker from home. Pt denies dizziness, palpitations, or any other symptoms/pain. Pt escorted by nurse. Gait is steady. MD notified.       Dav De La O RN  08/09/23 0623

## 2023-10-16 ENCOUNTER — APPOINTMENT (OUTPATIENT)
Facility: HOSPITAL | Age: 63
End: 2023-10-16
Payer: MEDICAID

## 2023-10-16 ENCOUNTER — HOSPITAL ENCOUNTER (EMERGENCY)
Facility: HOSPITAL | Age: 63
Discharge: HOME | End: 2023-10-16
Attending: STUDENT IN AN ORGANIZED HEALTH CARE EDUCATION/TRAINING PROGRAM
Payer: MEDICAID

## 2023-10-16 VITALS
OXYGEN SATURATION: 100 % | RESPIRATION RATE: 16 BRPM | HEART RATE: 65 BPM | HEIGHT: 67 IN | DIASTOLIC BLOOD PRESSURE: 107 MMHG | SYSTOLIC BLOOD PRESSURE: 157 MMHG | TEMPERATURE: 98 F | WEIGHT: 145 LBS | BODY MASS INDEX: 22.76 KG/M2

## 2023-10-16 DIAGNOSIS — R56.9 SEIZURE-LIKE ACTIVITY (HCC): Primary | ICD-10-CM

## 2023-10-16 LAB
ALBUMIN SERPL-MCNC: 2.4 G/DL (ref 3.5–5)
ALBUMIN/GLOB SERPL: 0.5 (ref 1.1–2.2)
ALP SERPL-CCNC: 154 U/L (ref 45–117)
ALT SERPL-CCNC: 49 U/L (ref 12–78)
ANION GAP SERPL CALC-SCNC: 3 MMOL/L (ref 5–15)
AST SERPL W P-5'-P-CCNC: ABNORMAL U/L (ref 15–37)
BASOPHILS # BLD: 0 K/UL (ref 0–0.1)
BASOPHILS NFR BLD: 0 % (ref 0–1)
BILIRUB SERPL-MCNC: 0.6 MG/DL (ref 0.2–1)
BUN SERPL-MCNC: 14 MG/DL (ref 6–20)
BUN/CREAT SERPL: 10 (ref 12–20)
CA-I BLD-MCNC: 8.5 MG/DL (ref 8.5–10.1)
CHLORIDE SERPL-SCNC: 100 MMOL/L (ref 97–108)
CO2 SERPL-SCNC: 30 MMOL/L (ref 21–32)
CREAT SERPL-MCNC: 1.39 MG/DL (ref 0.55–1.02)
DIFFERENTIAL METHOD BLD: ABNORMAL
EKG ATRIAL RATE: 74 BPM
EKG DIAGNOSIS: NORMAL
EKG P AXIS: 73 DEGREES
EKG P-R INTERVAL: 182 MS
EKG Q-T INTERVAL: 414 MS
EKG QRS DURATION: 94 MS
EKG QTC CALCULATION (BAZETT): 459 MS
EKG R AXIS: 65 DEGREES
EKG T AXIS: 75 DEGREES
EKG VENTRICULAR RATE: 74 BPM
EOSINOPHIL # BLD: 0.1 K/UL (ref 0–0.4)
EOSINOPHIL NFR BLD: 2 % (ref 0–7)
ERYTHROCYTE [DISTWIDTH] IN BLOOD BY AUTOMATED COUNT: 14.5 % (ref 11.5–14.5)
GLOBULIN SER CALC-MCNC: 5.1 G/DL (ref 2–4)
GLUCOSE SERPL-MCNC: 124 MG/DL (ref 65–100)
HCT VFR BLD AUTO: 32.1 % (ref 35–47)
HGB BLD-MCNC: 11.3 G/DL (ref 11.5–16)
IMM GRANULOCYTES # BLD AUTO: 0 K/UL (ref 0–0.04)
IMM GRANULOCYTES NFR BLD AUTO: 0 % (ref 0–0.5)
LYMPHOCYTES # BLD: 1.2 K/UL (ref 0.8–3.5)
LYMPHOCYTES NFR BLD: 35 % (ref 12–49)
MCH RBC QN AUTO: 31.7 PG (ref 26–34)
MCHC RBC AUTO-ENTMCNC: 35.2 G/DL (ref 30–36.5)
MCV RBC AUTO: 89.9 FL (ref 80–99)
MONOCYTES # BLD: 0.6 K/UL (ref 0–1)
MONOCYTES NFR BLD: 17 % (ref 5–13)
NEUTS SEG # BLD: 1.4 K/UL (ref 1.8–8)
NEUTS SEG NFR BLD: 46 % (ref 32–75)
NRBC # BLD: 0 K/UL (ref 0–0.01)
NRBC BLD-RTO: 0 PER 100 WBC
PLATELET # BLD AUTO: 129 K/UL (ref 150–400)
PMV BLD AUTO: 12.7 FL (ref 8.9–12.9)
POTASSIUM SERPL-SCNC: 3.9 MMOL/L (ref 3.5–5.1)
POTASSIUM SERPL-SCNC: ABNORMAL MMOL/L (ref 3.5–5.1)
PROT SERPL-MCNC: 7.5 G/DL (ref 6.4–8.2)
RBC # BLD AUTO: 3.57 M/UL (ref 3.8–5.2)
RBC MORPH BLD: ABNORMAL
SODIUM SERPL-SCNC: 133 MMOL/L (ref 136–145)
WBC # BLD AUTO: 3.3 K/UL (ref 3.6–11)

## 2023-10-16 PROCEDURE — 85025 COMPLETE CBC W/AUTO DIFF WBC: CPT

## 2023-10-16 PROCEDURE — 80053 COMPREHEN METABOLIC PANEL: CPT

## 2023-10-16 PROCEDURE — 93005 ELECTROCARDIOGRAM TRACING: CPT | Performed by: STUDENT IN AN ORGANIZED HEALTH CARE EDUCATION/TRAINING PROGRAM

## 2023-10-16 PROCEDURE — 36415 COLL VENOUS BLD VENIPUNCTURE: CPT

## 2023-10-16 PROCEDURE — 70450 CT HEAD/BRAIN W/O DYE: CPT

## 2023-10-16 PROCEDURE — 71045 X-RAY EXAM CHEST 1 VIEW: CPT

## 2023-10-16 PROCEDURE — 99285 EMERGENCY DEPT VISIT HI MDM: CPT

## 2023-10-16 PROCEDURE — 84132 ASSAY OF SERUM POTASSIUM: CPT

## 2023-10-16 ASSESSMENT — LIFESTYLE VARIABLES
HOW OFTEN DO YOU HAVE A DRINK CONTAINING ALCOHOL: NEVER
HOW MANY STANDARD DRINKS CONTAINING ALCOHOL DO YOU HAVE ON A TYPICAL DAY: PATIENT DOES NOT DRINK

## 2023-10-16 ASSESSMENT — PAIN - FUNCTIONAL ASSESSMENT: PAIN_FUNCTIONAL_ASSESSMENT: NONE - DENIES PAIN

## 2023-10-16 NOTE — ED TRIAGE NOTES
Pt arrives via ems for a reported seizure from a day group home. Upon EMS arrival those who found her stated that they found her slumped back in a chair. She immediately awoke when they touched her. Pt A&O on arrival to the ED.

## 2023-10-30 ENCOUNTER — HOSPITAL ENCOUNTER (EMERGENCY)
Facility: HOSPITAL | Age: 63
Discharge: HOME | End: 2023-10-30
Attending: STUDENT IN AN ORGANIZED HEALTH CARE EDUCATION/TRAINING PROGRAM
Payer: MEDICAID

## 2023-10-30 ENCOUNTER — APPOINTMENT (OUTPATIENT)
Facility: HOSPITAL | Age: 63
End: 2023-10-30
Payer: MEDICAID

## 2023-10-30 VITALS
HEART RATE: 61 BPM | BODY MASS INDEX: 24.33 KG/M2 | RESPIRATION RATE: 18 BRPM | SYSTOLIC BLOOD PRESSURE: 120 MMHG | WEIGHT: 155 LBS | HEIGHT: 67 IN | TEMPERATURE: 98.5 F | OXYGEN SATURATION: 98 % | DIASTOLIC BLOOD PRESSURE: 72 MMHG

## 2023-10-30 DIAGNOSIS — R11.2 NAUSEA AND VOMITING, UNSPECIFIED VOMITING TYPE: Primary | ICD-10-CM

## 2023-10-30 PROCEDURE — 71045 X-RAY EXAM CHEST 1 VIEW: CPT

## 2023-10-30 PROCEDURE — 99283 EMERGENCY DEPT VISIT LOW MDM: CPT

## 2023-10-30 ASSESSMENT — PAIN - FUNCTIONAL ASSESSMENT: PAIN_FUNCTIONAL_ASSESSMENT: NONE - DENIES PAIN

## 2023-10-30 NOTE — ED TRIAGE NOTES
Pt arrived by EMS complain of seizure around [id number] today. Per EMS pt vomited and now has a cough.  Pt states she did not have a seizure she vomited after eating breakfast.

## 2023-10-30 NOTE — ED PROVIDER NOTES
albuterol sulfate HFA (PROVENTIL;VENTOLIN;PROAIR) 108 (90 Base) MCG/ACT inhaler Inhale 2 puffs into the lungs every 6 hours as needed      benzonatate (TESSALON) 100 MG capsule ceived the following from [facility]: Outside name: benzonatate (TESSALON) 100 mg capsule (Patient not taking: Reported on 6/15/2023)      benztropine (COGENTIN) 0.5 MG tablet Take 1 tablet by mouth 2 times daily      betamethasone valerate (VALISONE) 0.1 % cream ceived the following from [facility]: Outside name: betamethasone valerate (VALISONE) 0.1 % topical cream (Patient not taking: Reported on 6/15/2023)      bictegravir-emtricitab-tenofovir alafenamide (BIKTARVY) -25 MG TABS per tablet Take 1 tablet by mouth daily      dilTIAZem (TIAZAC) 120 MG extended release capsule ceived the following from [facility]: Outside name: dilTIAZem ER (CARDIZEM CD) 120 mg capsule      divalproex (DEPAKOTE ER) 250 MG extended release tablet Take by mouth 2 times daily      famotidine (PEPCID) 40 MG tablet Take by mouth      lisinopril (PRINIVIL;ZESTRIL) 20 MG tablet Take by mouth daily      risperiDONE (RISPERDAL) 2 MG tablet Take by mouth 2 times daily      sulfamethoxazole-trimethoprim (BACTRIM DS;SEPTRA DS) 800-160 MG per tablet Take 1 tablet by mouth         Social Determinants of Health:   Social Determinants of Health     Tobacco Use: High Risk (10/30/2023)    Patient History     Smoking Tobacco Use: Every Day     Smokeless Tobacco Use: Never     Passive Exposure: Not on file   Alcohol Use: Not At Risk (10/16/2023)    AUDIT-C     Frequency of Alcohol Consumption: Never     Average Number of Drinks: Patient does not drink     Frequency of Binge Drinking: Never   Financial Resource Strain: Not on file   Food Insecurity: Not on file   Transportation Needs: Not on file   Physical Activity: Not on file   Stress: Not on file   Social Connections: Not on file   Intimate Partner Violence: Not on

## 2024-03-15 ENCOUNTER — APPOINTMENT (OUTPATIENT)
Facility: HOSPITAL | Age: 64
End: 2024-03-15
Payer: MEDICAID

## 2024-03-15 ENCOUNTER — HOSPITAL ENCOUNTER (EMERGENCY)
Facility: HOSPITAL | Age: 64
Discharge: HOME | End: 2024-03-15
Attending: EMERGENCY MEDICINE
Payer: MEDICAID

## 2024-03-15 VITALS
DIASTOLIC BLOOD PRESSURE: 72 MMHG | SYSTOLIC BLOOD PRESSURE: 143 MMHG | RESPIRATION RATE: 16 BRPM | HEIGHT: 67 IN | BODY MASS INDEX: 24.33 KG/M2 | TEMPERATURE: 98.2 F | OXYGEN SATURATION: 100 % | WEIGHT: 155 LBS | HEART RATE: 61 BPM

## 2024-03-15 DIAGNOSIS — R56.9 SEIZURE-LIKE ACTIVITY (HCC): Primary | ICD-10-CM

## 2024-03-15 LAB
ALBUMIN SERPL-MCNC: 2.2 G/DL (ref 3.5–5)
ALBUMIN/GLOB SERPL: 0.4 (ref 1.1–2.2)
ALP SERPL-CCNC: 144 U/L (ref 45–117)
ALT SERPL-CCNC: 24 U/L (ref 12–78)
ANION GAP SERPL CALC-SCNC: 3 MMOL/L (ref 5–15)
AST SERPL W P-5'-P-CCNC: 61 U/L (ref 15–37)
BASOPHILS # BLD: 0 K/UL (ref 0–0.1)
BASOPHILS NFR BLD: 0 % (ref 0–1)
BILIRUB SERPL-MCNC: 0.5 MG/DL (ref 0.2–1)
BUN SERPL-MCNC: 17 MG/DL (ref 6–20)
BUN/CREAT SERPL: 12 (ref 12–20)
CA-I BLD-MCNC: 8.6 MG/DL (ref 8.5–10.1)
CHLORIDE SERPL-SCNC: 103 MMOL/L (ref 97–108)
CO2 SERPL-SCNC: 28 MMOL/L (ref 21–32)
CREAT SERPL-MCNC: 1.45 MG/DL (ref 0.55–1.02)
DIFFERENTIAL METHOD BLD: ABNORMAL
EOSINOPHIL # BLD: 0 K/UL (ref 0–0.4)
EOSINOPHIL NFR BLD: 0 % (ref 0–7)
ERYTHROCYTE [DISTWIDTH] IN BLOOD BY AUTOMATED COUNT: 19.3 % (ref 11.5–14.5)
GLOBULIN SER CALC-MCNC: 5.6 G/DL (ref 2–4)
GLUCOSE SERPL-MCNC: 110 MG/DL (ref 65–100)
HCT VFR BLD AUTO: 30.9 % (ref 35–47)
HGB BLD-MCNC: 10.4 G/DL (ref 11.5–16)
IMM GRANULOCYTES # BLD AUTO: 0 K/UL
IMM GRANULOCYTES NFR BLD AUTO: 0 %
LYMPHOCYTES # BLD: 1.1 K/UL (ref 0.8–3.5)
LYMPHOCYTES NFR BLD: 42 % (ref 12–49)
MAGNESIUM SERPL-MCNC: 2 MG/DL (ref 1.6–2.4)
MCH RBC QN AUTO: 26.4 PG (ref 26–34)
MCHC RBC AUTO-ENTMCNC: 33.7 G/DL (ref 30–36.5)
MCV RBC AUTO: 78.4 FL (ref 80–99)
MONOCYTES # BLD: 0.2 K/UL (ref 0–1)
MONOCYTES NFR BLD: 6 % (ref 5–13)
NEUTS BAND NFR BLD MANUAL: 4 % (ref 0–6)
NEUTS SEG # BLD: 1.4 K/UL (ref 1.8–8)
NEUTS SEG NFR BLD: 48 % (ref 32–75)
NRBC # BLD: 0 K/UL (ref 0–0.01)
NRBC BLD-RTO: 0 PER 100 WBC
PLATELET # BLD AUTO: 108 K/UL (ref 150–400)
POTASSIUM SERPL-SCNC: 3.6 MMOL/L (ref 3.5–5.1)
PROT SERPL-MCNC: 7.8 G/DL (ref 6.4–8.2)
RBC # BLD AUTO: 3.94 M/UL (ref 3.8–5.2)
RBC MORPH BLD: ABNORMAL
SODIUM SERPL-SCNC: 134 MMOL/L (ref 136–145)
WBC # BLD AUTO: 2.7 K/UL (ref 3.6–11)

## 2024-03-15 PROCEDURE — 85025 COMPLETE CBC W/AUTO DIFF WBC: CPT

## 2024-03-15 PROCEDURE — 71045 X-RAY EXAM CHEST 1 VIEW: CPT

## 2024-03-15 PROCEDURE — 70450 CT HEAD/BRAIN W/O DYE: CPT

## 2024-03-15 PROCEDURE — 83735 ASSAY OF MAGNESIUM: CPT

## 2024-03-15 PROCEDURE — 80053 COMPREHEN METABOLIC PANEL: CPT

## 2024-03-15 PROCEDURE — 36415 COLL VENOUS BLD VENIPUNCTURE: CPT

## 2024-03-15 PROCEDURE — 99284 EMERGENCY DEPT VISIT MOD MDM: CPT

## 2024-03-15 NOTE — DISCHARGE INSTRUCTIONS
Thank you!  Thank you for allowing me to care for you in the emergency department. It is my goal to provide you with excellent care.  Please fill out the survey that will come to you by mail or email since we listen to your feedback!     Below you will find a list of your tests from today's visit.  Should you have any questions, please do not hesitate to call the emergency department.    Labs  Recent Results (from the past 12 hour(s))   CBC with Auto Differential    Collection Time: 03/15/24 12:04 PM   Result Value Ref Range    WBC 2.7 (L) 3.6 - 11.0 K/uL    RBC 3.94 3.80 - 5.20 M/uL    Hemoglobin 10.4 (L) 11.5 - 16.0 g/dL    Hematocrit 30.9 (L) 35.0 - 47.0 %    MCV 78.4 (L) 80.0 - 99.0 FL    MCH 26.4 26.0 - 34.0 PG    MCHC 33.7 30.0 - 36.5 g/dL    RDW 19.3 (H) 11.5 - 14.5 %    Platelets 108 (L) 150 - 400 K/uL    Nucleated RBCs 0.0 0.0  WBC    nRBC 0.00 0.00 - 0.01 K/uL    Neutrophils % 48 32 - 75 %    Band Neutrophils 4 0 - 6 %    Lymphocytes % 42 12 - 49 %    Monocytes % 6 5 - 13 %    Eosinophils % 0 0 - 7 %    Basophils % 0 0 - 1 %    Immature Granulocytes 0 %    Neutrophils Absolute 1.4 (L) 1.8 - 8.0 K/UL    Lymphocytes Absolute 1.1 0.8 - 3.5 K/UL    Monocytes Absolute 0.2 0.0 - 1.0 K/UL    Eosinophils Absolute 0.0 0.0 - 0.4 K/UL    Basophils Absolute 0.0 0.0 - 0.1 K/UL    Absolute Immature Granulocyte 0.0 K/UL    Differential Type Manual      RBC Comment Normocytic, Normochromic     CMP    Collection Time: 03/15/24 12:04 PM   Result Value Ref Range    Sodium 134 (L) 136 - 145 mmol/L    Potassium 3.6 3.5 - 5.1 mmol/L    Chloride 103 97 - 108 mmol/L    CO2 28 21 - 32 mmol/L    Anion Gap 3 (L) 5 - 15 mmol/L    Glucose 110 (H) 65 - 100 mg/dL    BUN 17 6 - 20 mg/dL    Creatinine 1.45 (H) 0.55 - 1.02 mg/dL    Bun/Cre Ratio 12 12 - 20      Est, Glom Filt Rate 41 (L) >60 ml/min/1.73m2    Calcium 8.6 8.5 - 10.1 mg/dL    Total Bilirubin 0.5 0.2 - 1.0 mg/dL    AST 61 (H) 15 - 37 U/L    ALT 24 12 - 78 U/L     Alk Phosphatase 144 (H) 45 - 117 U/L    Total Protein 7.8 6.4 - 8.2 g/dL    Albumin 2.2 (L) 3.5 - 5.0 g/dL    Globulin 5.6 (H) 2.0 - 4.0 g/dL    Albumin/Globulin Ratio 0.4 (L) 1.1 - 2.2     Magnesium    Collection Time: 03/15/24 12:04 PM   Result Value Ref Range    Magnesium 2.0 1.6 - 2.4 mg/dL       Radiologic Studies  XR CHEST PORTABLE   Final Result      No acute process on portable chest.         CT Head W/O Contrast   Final Result   No acute intracranial abnormality identified.              ------------------------------------------------------------------------------------------------------------  The exam and treatment you received in the Emergency Department were for an urgent problem and are not intended as complete care. It is important that you follow-up with a doctor, nurse practitioner, or physician assistant to:  (1) confirm your diagnosis,  (2) re-evaluation of changes in your illness and treatment, and (3) for ongoing care. Please take your discharge instructions with you when you go to your follow-up appointment.     If you have any problem arranging a follow-up appointment, contact the Emergency Department.  If your symptoms become worse or you do not improve as expected and you are unable to reach your health care provider, please return to the Emergency Department. We are available 24 hours a day.     If a prescription has been provided, please have it filled as soon as possible to prevent a delay in treatment. If you have any questions or reservations about taking the medication due to side effects or interactions with other medications, please call your primary care provider or contact the ER.

## 2024-03-15 NOTE — ED PROVIDER NOTES
[facility] EMERGENCY DEPT  EMERGENCY DEPARTMENT HISTORY AND PHYSICAL EXAM      Date: 3/15/2024  Patient Name: Darcy Arthru  MRN: [id number]  Birthdate 1960  Date of evaluation: 3/15/2024  Provider: Kristan Estrada MD   Note Started: 2:16 PM EDT 3/15/24    HISTORY OF PRESENT ILLNESS     Chief Complaint   Patient presents with    Seizures       History Provided By: EMS    HPI: Darcy Arthur is a 63 y.o. female past medical history of alcohol use disorder, hep C, HIV, hypertension and schizophrenia presents for evaluation of possible seizure.  Per EMS patient had a staring spell that lasted a few seconds.  She was incontinent of urine.  Patient does not recall this event.  On chart review patient does take Depakote.    PAST MEDICAL HISTORY   Past Medical History:  Past Medical History:   Diagnosis Date    Alcohol abuse 5/29/2017    Autoimmune disease (HCC)     Hepatitis C     HIV (human immunodeficiency virus infection) (HCC)     Hypertension     Psychotic disorder (HCC)     Schizophrenia (HCC)        Past Surgical History:  No past surgical history on file.    Family History:  Family History   Family history unknown: Yes       Social History:  Social History     Tobacco Use    Smoking status: Every Day     Current packs/day: 0.50     Types: Cigarettes    Smokeless tobacco: Never   Vaping Use    Vaping Use: Never used   Substance Use Topics    Alcohol use: Not Currently     Alcohol/week: 0.8 standard drinks of alcohol     Types: 1 Standard drinks or equivalent per week    Drug use: Yes     Types: Cocaine, Marijuana (Weed)       Allergies:  No Known Allergies    PCP: Dia Frederick APRN - CNP    Current Meds:   No current facility-administered medications for this encounter.     Current Outpatient Medications   Medication Sig Dispense Refill    albuterol sulfate HFA (PROVENTIL;VENTOLIN;PROAIR) 108 (90 Base) MCG/ACT inhaler Inhale 2 puffs into the lungs every 6 hours as needed      benzonatate (TESSALON) 100 MG capsule ceived    Physical Exam  Vitals and nursing note reviewed.   Constitutional:       General: She is not in acute distress.     Appearance: Normal appearance. She is normal weight.   HENT:      Head: Normocephalic and atraumatic.      Nose: Nose normal.      Mouth/Throat:      Mouth: Mucous membranes are moist.   Eyes:      Conjunctiva/sclera: Conjunctivae normal.   Cardiovascular:      Rate and Rhythm: Normal rate.      Pulses: Normal pulses.   Pulmonary:      Effort: Pulmonary effort is normal. No respiratory distress.   Musculoskeletal:         General: No swelling or deformity. Normal range of motion.   Neurological:      General: No focal deficit present.      Mental Status: She is alert and oriented to person, place, and time.      Sensory: No sensory deficit.      Motor: No weakness.      Gait: Gait normal.   Psychiatric:         Mood and Affect: Mood normal.         Behavior: Behavior normal.           SCREENINGS                   LAB, EKG AND DIAGNOSTIC RESULTS   Labs:  Recent Results (from the past 12 hour(s))   CBC with Auto Differential    Collection Time: 03/15/24 12:04 PM   Result Value Ref Range    WBC 2.7 (L) 3.6 - 11.0 K/uL    RBC 3.94 3.80 - 5.20 M/uL    Hemoglobin 10.4 (L) 11.5 - 16.0 g/dL    Hematocrit 30.9 (L) 35.0 - 47.0 %    MCV 78.4 (L) 80.0 - 99.0 FL    MCH 26.4 26.0 - 34.0 PG    MCHC 33.7 30.0 - 36.5 g/dL    RDW 19.3 (H) 11.5 - 14.5 %    Platelets 108 (L) 150 - 400 K/uL    Nucleated RBCs 0.0 0.0  WBC    nRBC 0.00 0.00 - 0.01 K/uL    Neutrophils % 48 32 - 75 %    Band Neutrophils 4 0 - 6 %    Lymphocytes % 42 12 - 49 %    Monocytes % 6 5 - 13 %    Eosinophils % 0 0 - 7 %    Basophils % 0 0 - 1 %    Immature Granulocytes 0 %    Neutrophils Absolute 1.4 (L) 1.8 - 8.0 K/UL    Lymphocytes Absolute 1.1 0.8 - 3.5 K/UL    Monocytes Absolute 0.2 0.0 - 1.0 K/UL    Eosinophils Absolute 0.0 0.0 - 0.4 K/UL    Basophils Absolute 0.0 0.0 - 0.1 K/UL    Absolute Immature Granulocyte 0.0 K/UL    Differential

## 2024-06-08 ENCOUNTER — TRANSCRIBE ORDERS (OUTPATIENT)
Facility: HOSPITAL | Age: 64
End: 2024-06-08

## 2024-06-08 DIAGNOSIS — Z12.31 VISIT FOR SCREENING MAMMOGRAM: Primary | ICD-10-CM

## 2024-06-17 NOTE — PROGRESS NOTES
[facility]                                     Progress Note    Patient Name: Sherie Wilson  Date: 6/17/2024         Service Type: Individual      Session Start Time: 12:05pm  Session End Time: 1:05pm     Session Length: 60 minutes     Session #: 40    Attendees: Client attended alone    Service Modality:  In person         DATA  Extended Session (53+ minutes): PROLONGED SERVICE IN THE OUTPATIENT SETTING REQUIRING DIRECT (FACE-TO-FACE) PATIENT CONTACT BEYOND THE USUAL SERVICE:    - Longer session due to limited access to mental health appointments and necessity to address patient's distress / complexity  Interactive Complexity: No  Crisis: No         Progress Since Last Session (Related to Symptoms / Goals / Homework):   Symptoms: Worsening (depression)    Homework: Achieved / completed to satisfaction      Episode of Care Goals: Satisfactory progress - ACTION (Actively working towards change); Intervened by reinforcing change plan / affirming steps taken     Current / Ongoing Stressors and Concerns:   Today, patient reports that it has been a challenging few days.  Patient reports that she recently started weight watchers.  Patient reports that she is contemplating moving into senior living.  Remainder of session was spent processing through themes related to grief, loneliness, and aging.  In the coming weeks, patient will practice validating her emotions without judging them.  Patient will engage in social outings.  Patient will utilize positive self affirmations.     Treatment Objective(s) Addressed in This Session:   Patient will cultivate self acceptance and self love  use cognitive strategies identified in therapy to challenge anxious thoughts  Identify negative self-talk and behaviors: challenge core beliefs, myths, and actions  Patient will compile a list of boundaries she would like to set with others  Patient will learn and utilize assertive communication  Pulmonary Progress Note      NAME: Mega Bruno   :  1960  MRM:  [id number]    Date/Time: 3/28/2022  4:22 PM         Subjective:     Patient seen and examined. Overnight events noted    On 6 L nasal cannula oxygen  Gradual improvement  Awake and alert  No acute distress  Has a sitter    COVID-19 ruled out. ID input is noted. She is taking minced and moist diet without any problems. Diet changed to puréed with thin liquids per speech. Past Medical History reviewed and unchanged from Admission History and Physical       Objective:     Physical Exam     Vitals:      Last 24hrs VS reviewed since prior progress note. Most recent are:    Visit Vitals  /76   Pulse 86   Temp 98.8 °F (37.1 °C)   Resp 16   Ht 5' 4.02\" (1.626 m)   Wt 56.1 kg (123 lb 10.9 oz)   SpO2 95%   BMI 21.22 kg/m²     SpO2 Readings from Last 6 Encounters:   22 95%   22 99%   22 98%   21 96%   21 97%   09/15/21 97%    O2 Flow Rate (L/min): 4 l/min (decreased to 3 lpm nc)     No intake or output data in the 24 hours ending 22 1503   General: Lying in bed in mild respiratory distress, on nasal cannula oxygen  Eye: Pupils are equal and reactive to light. Throat and Neck: Oropharynx without thrush. Neck is supple and trachea central.  No obvious lymphadenopathy. Lung: Reduced air entry bilaterally with prolonged exhalation but no wheezing. Occasional crackles. No significant change. Heart: S1+S2. No murmurs  Abdomen: soft, non-tender. Bowel sounds normal. No masses; obese  Extremities: No edema, no cyanosis or clubbing. Pulses are palpable. : Not done  Skin: No cyanosis  Neurologic:  Alert and awake, confused at times, grossly nonfocal  Psychiatric:  Unable to perform due to patient's condition    XR CHEST PORT   Final Result      XR CHEST PORT   Final Result      XR CHEST PORT   Final Result   Diffuse opacities in the lungs, increased.       XR CHEST PORT   Final Result FINDINGS/IMPRESSION:   Persistent diffuse airspace opacity throughout the lungs. Cardiac silhouette stable in size. Negative for pneumothorax. CT CHEST WO CONT   Final Result   Extensive diffuse bilateral pneumonitis and patchy pneumonia      XR CHEST PORT   Final Result   Diffuse predominantly interstitial process in both lungs with some   greater involvement on the left. Differential considerations include pulmonary   edema and diffuse infection.                 XR SWALLOW FUNC VIDEO    (Results Pending)       Lab Data Reviewed: (see below)      Medications:  Current Facility-Administered Medications   Medication Dose Route Frequency    methylPREDNISolone (PF) (SOLU-MEDROL) injection 40 mg  40 mg IntraVENous Q12H    nystatin (MYCOSTATIN) 100,000 unit/mL oral suspension 500,000 Units  500,000 Units Oral QID    dilTIAZem ER (CARDIZEM CD) capsule 120 mg  120 mg Oral DAILY    acetaminophen (TYLENOL) suppository 650 mg  650 mg Rectal Q4H PRN    albuterol-ipratropium (DUO-NEB) 2.5 MG-0.5 MG/3 ML  3 mL Nebulization Q6HWA RT    trimethoprim-sulfamethoxazole (BACTRIM) 187.04 mg in dextrose 5% 500 mL  10 mg/kg/day IntraVENous Q8H    divalproex (DEPAKOTE SPRINKLE) capsule 250 mg  250 mg Oral Q12H    metoprolol (LOPRESSOR) injection 5 mg  5 mg IntraVENous Q6H PRN    benztropine (COGENTIN) tablet 0.5 mg  0.5 mg Oral BID    cloNIDine HCL (CATAPRES) tablet 0.1 mg  0.1 mg Oral DAILY    famotidine (PEPCID) tablet 20 mg  20 mg Oral QHS    lisinopriL (PRINIVIL, ZESTRIL) tablet 20 mg  20 mg Oral DAILY    risperiDONE (RisperDAL) tablet 2 mg  2 mg Oral BID    multivitamin with folic acid (ONE DAILY WITH FOLIC ACID) tablet 1 Tablet  1 Tablet Oral DAILY    sodium chloride (NS) flush 5-40 mL  5-40 mL IntraVENous Q8H    sodium chloride (NS) flush 5-40 mL  5-40 mL IntraVENous PRN    acetaminophen (TYLENOL) tablet 650 mg  650 mg Oral Q4H PRN    enoxaparin (LOVENOX) injection 40 mg  40 mg SubCUTAneous Q24H    skills         Intervention:   CBT: cognitive reframing  Interpersonal Therapy: rapport building  Motivational Interviewing: OARS skills, stages of change  Solution Focused: strengths-based    Assessments completed prior to visit:  The following assessments were completed by patient for this visit:  PHQ9:       1/8/2024    11:06 AM 1/15/2024    12:07 PM 3/11/2024    12:08 PM 3/25/2024    12:08 PM 5/6/2024    12:04 PM 5/13/2024    11:05 AM 6/17/2024    12:07 PM   PHQ-9 SCORE   PHQ-9 Total Score MyChart 0 0 4 (Minimal depression) 1 (Minimal depression) 0 0 0   PHQ-9 Total Score 0 0 4 1 0 0 0       GAD7:       12/5/2019    11:00 AM 5/8/2023     2:10 PM 5/30/2023    11:01 AM 6/16/2023    10:16 AM 1/8/2024    11:08 AM 4/8/2024    11:46 AM   LA-7 SCORE   Total Score  6 (mild anxiety) 7 (mild anxiety) 7 (mild anxiety)  0 (minimal anxiety)   Total Score 3 6 7 7    7 0 0     PROMIS 10-Global Health (only subscores and total score):       5/30/2023    11:28 AM 8/30/2023    10:38 AM 9/6/2023    11:28 AM 9/25/2023    12:01 PM 1/8/2024    11:08 AM 1/15/2024    12:08 PM 4/8/2024    11:47 AM   PROMIS-10 Scores Only   Global Mental Health Score 15 16    16 12 17 17 19    19 18    18   Global Physical Health Score 17 17    17 13 18 18 18    18 16    16   PROMIS TOTAL - SUBSCORES 32 33    33 25 35 35 37    37 34    34         ASSESSMENT: Current Emotional / Mental Status (status of significant symptoms):   Risk status (Self / Other harm or suicidal ideation)   Patient denies current fears or concerns for personal safety.   Patient denies current or recent suicidal ideation or behaviors.   Patient denies current or recent homicidal ideation or behaviors.   Patient denies current or recent self injurious behavior or ideation.   Patient denies other safety concerns.   Patient reports there has been no change in risk factors since their last session.     Patient reports there has been no change in protective factors since their last  albuterol CONCENTRATE 2.5mg/0.5 mL neb soln  2.5 mg Nebulization Q4H PRN    furosemide (LASIX) injection 40 mg  40 mg IntraVENous DAILY    hydrOXYzine pamoate (VISTARIL) capsule 25 mg  25 mg Oral Q6H PRN    LORazepam (ATIVAN) injection 0.5 mg  0.5 mg IntraVENous Q4H PRN       ______________________________________________________________________      Lab Review:     Recent Labs     03/27/22  0841 03/26/22  0720   WBC 6.0 4.4   HGB 10.5* 9.5*   HCT 30.2* 27.6*    173     Recent Labs     03/27/22  0841 03/26/22  0720    140   K 4.1 3.9    107   CO2 29 28   * 157*   BUN 25* 25*   CREA 0.86 0.83   CA 8.5 8.0*     No components found for: GLPOC  No results for input(s): PH, PCO2, PO2, HCO3, FIO2 in the last 72 hours. No results for input(s): INR, INREXT, INREXT, INREXT in the last 72 hours. Other pertinent lab:          Assessment & Plan:      Assessment:      1. Acute respiratory failure with hypoxia due to #2 and 3  2. Bilateral healthcare associated pneumonia  3. Acute CHF and sinus tachycardia due to #4  4. Severe sepsis  5. Altered mental status/acute metabolic encephalopathy  6. COPD with exacerbation  7. HIV  8. Schizophrenia  9. History of alcohol abuse     Plan:      Patient admitted to the ICU, now transferred to [facility] telemetry. We will be watching her closely     Currently high flow oxygen, 40 L and 30%. Previously she was on nonrebreather mask. Her oxygen requirements are slowly improving  Blood gases 3/24 morning showed 7.4 4/41/98 on 40% high flow oxygen. Blood gases done 3/23 morning showed 7.43/43/91 on 40% high flow oxygen. Blood gases done 3/22 morning showed 7.4 0/49/107 on nonrebreather mask. Blood gases done 3/18/2022 showed 7.50/36/116 on nonrebreather mask. We wean down oxygen as tolerated. Discussed with RT. Now on 6 L  If she decompensates will initiate BiPAP but she is not a CO2 retainer. Chest x-ray shows bilateral infiltrates. session.     Recommended that patient call 911 or go to the local ED should there be a change in any of these risk factors.     Appearance:   Appropriate    Eye Contact:   Good    Psychomotor Behavior: Normal    Attitude:   Cooperative  Friendly Pleasant   Orientation:   All   Speech    Rate / Production: Normal     Volume:  Normal    Mood:    Sad  Grieving   Affect:    Tearful   Thought Content:  Clear    Thought Form:  Coherent  Logical    Insight:    Good      Medication Review:   No changes to current psychiatric medication(s)     Medication Compliance:   Yes     Changes in Health Issues:   None reported     Chemical Use Review:   Substance Use: Chemical use reviewed, no active concerns identified      Tobacco Use: No current tobacco use.      Diagnosis:  1. Adjustment disorder with mixed anxiety and depressed mood                Collateral Reports Completed:   Not Applicable    PLAN: (Patient Tasks / Therapist Tasks / Other)   In the coming weeks, patient will practice validating her emotions without judging them.    Patient will engage in social outings.    Patient will utilize positive self affirmations.          Braydon Flores, [facility]  6/17/2024    ______________________________________________________________________    Individual Treatment Plan    Patient's Name: Sherie Wilson  YOB: 1945    Date of Creation: 6/16/2023  Date Treatment Plan Last Reviewed/Revised: 6/17/2024    DSM5 Diagnoses: Adjustment Disorders  309.28 (F43.23) With mixed anxiety and depressed mood  Psychosocial / Contextual Factors: strong bharathi, medication management with PCP, hx in music education, hx of therapy, relational stressors with colleague, some health concerns.   PROMIS (reviewed every 90 days):       5/30/2023    11:28 AM 8/30/2023    10:38 AM 9/6/2023    11:28 AM 9/25/2023    12:01 PM 1/8/2024    11:08 AM 1/15/2024    12:08 PM 4/8/2024    11:47 AM   PROMIS-10 Scores Only   Global Mental Health Score 15 16     "16 12 17 17 19    19 18    18   Global Physical Health Score 17 17    17 13 18 18 18    18 16    16   PROMIS TOTAL - SUBSCORES 32 33    33 25 35 35 37    37 34    34       Referral / Collaboration:  Referral to another professional/service is not indicated at this time..    Anticipated number of session for this episode of care:  25+  Anticipation frequency of session: Weekly  Anticipated Duration of each session: 53 or more minutes  Treatment plan will be reviewed in 90 days or when goals have been changed.       MeasurableTreatment Goal(s) related to diagnosis / functional impairment(s)  Goal 1: Patient will decrease symptoms of anxiety and depression and increase coping skills for adjustment as evidenced by decreased PHQ-9 scores and LA-7 scores.  \"I will know I've met my goal when I feel more like myself, stop crying so much, and feel confident.\"    Objective #A (Patient Action)    Patient will cultivate self-acceptance and self-love.    Patient will identify her values and strengths.  Patient will improve ability to name and identify her emotions.  Status: continued: 12/11/2023, 3/11/2024, 6/17/20254    Objective #B  Patient will learn and utilize assertive communication skills.  Patient will  compile a list of boundaries she would like to set with others .  Status: continue: 12/11/2023, 3/11/2024, 6/17/2024    Objective #C  Patient will use cognitive strategies identified in therapy to challenge anxious thoughts  Identify negative self-talk and behaviors: challenge core beliefs, myths, and actions.  Status: continued: 12/11/2023, 3/11/2024, 6/17/2024    Intervention(s)  Therapist will teach CBT skills to challenge cognitive distortions and core beliefs.  Therapist will teach and model positive self-talk behaviors.  Therapist will use psychodynamic approaches to explore early attachments and schemas.  Therapist will teach DBT mindfulness and emotion regulation skills.    Therapist will teach ACT skills to " Personally reviewed. There has been significant improvement overall. Patient has COPD  Continue nebulizers  Added IV Solu-Medrol, which I believe is helping her. Appreciate ID input. Treating as possible PCP. Bactrim added. She is aspiration risk. On puréed with thin liquids as per speech. Appreciate input. Urinary retention. Had a [place] catheter. Nystatin swish and swallow. She has thrush.     Patient has combination of CHF and healthcare associated pneumonia, speech is also involved. There is a possibility of aspiration pneumonia. Continue broad-spectrum antibiotics, currently on Zosyn and Bactrim. Of Levaquin. Appreciate ID input. COVID-19 ruled out. Patient developed sinus tachycardia. Much improved. Now started on diltiazem. Appreciate input from cardiology.     Started on Lasix IV daily since blood pressure stable  Intake and output charting  Check electrolytes and replace as needed  Monitor renal function with fluid change     Monitor mental status      DVT and GI prophylaxis     Case discussed in detail with RN, RT, and care team  Thank you for involving me in the care of this patient  I will follow with you closely during hospitalization     Time spent more than 30 minutes in direct patient care with no overlap reviewing results and records, decision making, and answering questions.       Yogesh Boss MD  [facility] engage in value-based living.        Patient has reviewed and agreed to the above plan.      AMELIA De Leon, [facility]  6/17/2024

## 2024-07-02 ENCOUNTER — HOSPITAL ENCOUNTER (EMERGENCY)
Facility: HOSPITAL | Age: 64
Discharge: HOME | End: 2024-07-03
Attending: EMERGENCY MEDICINE
Payer: MEDICAID

## 2024-07-02 DIAGNOSIS — R60.0 BILATERAL LOWER EXTREMITY EDEMA: Primary | ICD-10-CM

## 2024-07-02 PROCEDURE — 99285 EMERGENCY DEPT VISIT HI MDM: CPT

## 2024-07-02 ASSESSMENT — LIFESTYLE VARIABLES
HOW MANY STANDARD DRINKS CONTAINING ALCOHOL DO YOU HAVE ON A TYPICAL DAY: PATIENT DOES NOT DRINK
HOW OFTEN DO YOU HAVE A DRINK CONTAINING ALCOHOL: NEVER

## 2024-07-02 ASSESSMENT — PAIN SCALES - GENERAL: PAINLEVEL_OUTOF10: 0

## 2024-07-02 ASSESSMENT — PAIN - FUNCTIONAL ASSESSMENT: PAIN_FUNCTIONAL_ASSESSMENT: 0-10

## 2024-07-03 ENCOUNTER — APPOINTMENT (OUTPATIENT)
Facility: HOSPITAL | Age: 64
End: 2024-07-03
Payer: MEDICAID

## 2024-07-03 ENCOUNTER — APPOINTMENT (OUTPATIENT)
Facility: HOSPITAL | Age: 64
End: 2024-07-03
Attending: EMERGENCY MEDICINE
Payer: MEDICAID

## 2024-07-03 VITALS
BODY MASS INDEX: 22.76 KG/M2 | HEART RATE: 66 BPM | TEMPERATURE: 97.6 F | RESPIRATION RATE: 15 BRPM | HEIGHT: 67 IN | SYSTOLIC BLOOD PRESSURE: 147 MMHG | OXYGEN SATURATION: 97 % | DIASTOLIC BLOOD PRESSURE: 102 MMHG | WEIGHT: 145 LBS

## 2024-07-03 LAB
ALBUMIN SERPL-MCNC: 2.6 G/DL (ref 3.5–5)
ALBUMIN/GLOB SERPL: 0.4 (ref 1.1–2.2)
ALP SERPL-CCNC: 131 U/L (ref 45–117)
ALT SERPL-CCNC: 29 U/L (ref 12–78)
ANION GAP SERPL CALC-SCNC: 3 MMOL/L (ref 5–15)
AST SERPL W P-5'-P-CCNC: 68 U/L (ref 15–37)
BASOPHILS # BLD: 0 K/UL (ref 0–0.1)
BASOPHILS NFR BLD: 0 % (ref 0–1)
BILIRUB SERPL-MCNC: 0.6 MG/DL (ref 0.2–1)
BNP SERPL-MCNC: 580 PG/ML
BUN SERPL-MCNC: 13 MG/DL (ref 6–20)
BUN/CREAT SERPL: 11 (ref 12–20)
CA-I BLD-MCNC: 8.8 MG/DL (ref 8.5–10.1)
CHLORIDE SERPL-SCNC: 99 MMOL/L (ref 97–108)
CO2 SERPL-SCNC: 30 MMOL/L (ref 21–32)
CREAT SERPL-MCNC: 1.22 MG/DL (ref 0.55–1.02)
D DIMER PPP FEU-MCNC: 0.63 UG/ML(FEU)
DIFFERENTIAL METHOD BLD: ABNORMAL
ECHO BSA: 1.76 M2
EKG ATRIAL RATE: 76 BPM
EKG DIAGNOSIS: NORMAL
EKG P AXIS: 73 DEGREES
EKG P-R INTERVAL: 156 MS
EKG Q-T INTERVAL: 384 MS
EKG QRS DURATION: 88 MS
EKG QTC CALCULATION (BAZETT): 432 MS
EKG R AXIS: 47 DEGREES
EKG T AXIS: 65 DEGREES
EKG VENTRICULAR RATE: 76 BPM
EOSINOPHIL # BLD: 0 K/UL (ref 0–0.4)
EOSINOPHIL NFR BLD: 1 % (ref 0–7)
ERYTHROCYTE [DISTWIDTH] IN BLOOD BY AUTOMATED COUNT: 16.6 % (ref 11.5–14.5)
GLOBULIN SER CALC-MCNC: 6 G/DL (ref 2–4)
GLUCOSE SERPL-MCNC: 86 MG/DL (ref 65–100)
HCT VFR BLD AUTO: 33.2 % (ref 35–47)
HGB BLD-MCNC: 11.5 G/DL (ref 11.5–16)
IMM GRANULOCYTES # BLD AUTO: 0 K/UL (ref 0–0.04)
IMM GRANULOCYTES NFR BLD AUTO: 0 % (ref 0–0.5)
LYMPHOCYTES # BLD: 1.1 K/UL (ref 0.8–3.5)
LYMPHOCYTES NFR BLD: 44 % (ref 12–49)
MCH RBC QN AUTO: 29.9 PG (ref 26–34)
MCHC RBC AUTO-ENTMCNC: 34.6 G/DL (ref 30–36.5)
MCV RBC AUTO: 86.2 FL (ref 80–99)
MONOCYTES # BLD: 0.2 K/UL (ref 0–1)
MONOCYTES NFR BLD: 9 % (ref 5–13)
NEUTS SEG # BLD: 1.2 K/UL (ref 1.8–8)
NEUTS SEG NFR BLD: 46 % (ref 32–75)
NRBC # BLD: 0 K/UL (ref 0–0.01)
NRBC BLD-RTO: 0 PER 100 WBC
PLATELET # BLD AUTO: 110 K/UL (ref 150–400)
PMV BLD AUTO: 11.1 FL (ref 8.9–12.9)
POTASSIUM SERPL-SCNC: 4.2 MMOL/L (ref 3.5–5.1)
PROT SERPL-MCNC: 8.6 G/DL (ref 6.4–8.2)
RBC # BLD AUTO: 3.85 M/UL (ref 3.8–5.2)
RBC MORPH BLD: ABNORMAL
SODIUM SERPL-SCNC: 132 MMOL/L (ref 136–145)
TROPONIN I SERPL HS-MCNC: 19 NG/L (ref 0–51)
WBC # BLD AUTO: 2.5 K/UL (ref 3.6–11)
WBC MORPH BLD: ABNORMAL

## 2024-07-03 PROCEDURE — 84484 ASSAY OF TROPONIN QUANT: CPT

## 2024-07-03 PROCEDURE — 71045 X-RAY EXAM CHEST 1 VIEW: CPT

## 2024-07-03 PROCEDURE — 80053 COMPREHEN METABOLIC PANEL: CPT

## 2024-07-03 PROCEDURE — 6360000002 HC RX W HCPCS: Performed by: EMERGENCY MEDICINE

## 2024-07-03 PROCEDURE — 85025 COMPLETE CBC W/AUTO DIFF WBC: CPT

## 2024-07-03 PROCEDURE — 93005 ELECTROCARDIOGRAM TRACING: CPT | Performed by: EMERGENCY MEDICINE

## 2024-07-03 PROCEDURE — 96374 THER/PROPH/DIAG INJ IV PUSH: CPT

## 2024-07-03 PROCEDURE — 6370000000 HC RX 637 (ALT 250 FOR IP): Performed by: EMERGENCY MEDICINE

## 2024-07-03 PROCEDURE — 96375 TX/PRO/DX INJ NEW DRUG ADDON: CPT

## 2024-07-03 PROCEDURE — 85379 FIBRIN DEGRADATION QUANT: CPT

## 2024-07-03 PROCEDURE — 83880 ASSAY OF NATRIURETIC PEPTIDE: CPT

## 2024-07-03 PROCEDURE — 93970 EXTREMITY STUDY: CPT

## 2024-07-03 PROCEDURE — 36415 COLL VENOUS BLD VENIPUNCTURE: CPT

## 2024-07-03 RX ORDER — OXYCODONE HYDROCHLORIDE AND ACETAMINOPHEN 5; 325 MG/1; MG/1
1 TABLET ORAL
Status: COMPLETED | OUTPATIENT
Start: 2024-07-03 | End: 2024-07-03

## 2024-07-03 RX ORDER — HYDRALAZINE HYDROCHLORIDE 20 MG/ML
10 INJECTION INTRAMUSCULAR; INTRAVENOUS
Status: COMPLETED | OUTPATIENT
Start: 2024-07-03 | End: 2024-07-03

## 2024-07-03 RX ORDER — CLONIDINE HYDROCHLORIDE 0.1 MG/1
0.1 TABLET ORAL
Status: COMPLETED | OUTPATIENT
Start: 2024-07-03 | End: 2024-07-03

## 2024-07-03 RX ORDER — FUROSEMIDE 10 MG/ML
20 INJECTION INTRAMUSCULAR; INTRAVENOUS
Status: COMPLETED | OUTPATIENT
Start: 2024-07-03 | End: 2024-07-03

## 2024-07-03 RX ADMIN — OXYCODONE HYDROCHLORIDE AND ACETAMINOPHEN 1 TABLET: 5; 325 TABLET ORAL at 01:54

## 2024-07-03 RX ADMIN — CLONIDINE HYDROCHLORIDE 0.1 MG: 0.1 TABLET ORAL at 01:54

## 2024-07-03 RX ADMIN — FUROSEMIDE 20 MG: 10 INJECTION, SOLUTION INTRAMUSCULAR; INTRAVENOUS at 02:49

## 2024-07-03 RX ADMIN — HYDRALAZINE HYDROCHLORIDE 10 MG: 20 INJECTION INTRAMUSCULAR; INTRAVENOUS at 02:50

## 2024-07-03 ASSESSMENT — PAIN SCALES - GENERAL
PAINLEVEL_OUTOF10: 5
PAINLEVEL_OUTOF10: 0

## 2024-07-03 ASSESSMENT — PAIN DESCRIPTION - ORIENTATION: ORIENTATION: RIGHT;LEFT

## 2024-07-03 ASSESSMENT — PAIN DESCRIPTION - LOCATION: LOCATION: LEG

## 2024-07-03 ASSESSMENT — PAIN - FUNCTIONAL ASSESSMENT: PAIN_FUNCTIONAL_ASSESSMENT: NONE - DENIES PAIN

## 2024-07-03 NOTE — ED TRIAGE NOTES
Patient arrives from [facility] home via EMS. Patient called for bilateral leg pain and swelling. When patient arrives to the ED she reports no pain at this time. Patient also hypertensive for EMS, 200/137. Hx schizophrenia, HIV+, HTN, Borderline intellectual functioning, Hep C

## 2024-07-03 NOTE — DISCHARGE INSTRUCTIONS
treatment. If you have any questions or reservations about taking the medication due to side effects or interactions with other medications, please call your primary care provider or contact us directly.  Again, THANK YOU for choosing us to care for YOU!

## 2024-07-03 NOTE — ED PROVIDER NOTES
[facility] EMERGENCY DEPT  EMERGENCY DEPARTMENT HISTORY AND PHYSICAL EXAM      Date: 7/2/2024  Patient Name: Darcy Arthur      History of Presenting Illness       Chief Complaint   Patient presents with    Leg Swelling       History was provided by: Patient    Location/Duration/Severity/Modifying factors     Darcy Arthur is a 63 y.o. female who arrived to the emergency department by by private vehicle with complaints of Leg Swelling        Patient is a 63-year-old female who presents to the ED with a chief complaint of bilateral leg pain and swelling.  She complains of pain and swelling starting this afternoon.  She says both her about the same anteriorly on the front portion of both legs.  Denies fevers or chills or chest pain or shortness of breath, no other complaints.  On her exam she has increased swelling on the left side more than the right side and she says the left side hurts a little bit more than the right side.  Denies any history of DVT or PE in the past, does not take any blood thinning medications.          There are no other complaints, changes, or physical findings at this time.    PCP: Dia Frederick, APRN - CNP    No current facility-administered medications for this encounter.     Current Outpatient Medications   Medication Sig Dispense Refill    albuterol sulfate HFA (PROVENTIL;VENTOLIN;PROAIR) 108 (90 Base) MCG/ACT inhaler Inhale 2 puffs into the lungs every 6 hours as needed      benzonatate (TESSALON) 100 MG capsule ceived the following from [facility]: Outside name: benzonatate (TESSALON) 100 mg capsule (Patient not taking: Reported on 6/15/2023)      benztropine (COGENTIN) 0.5 MG tablet Take 1 tablet by mouth 2 times daily      betamethasone valerate (VALISONE) 0.1 % cream ceived the following from [facility]: Outside name: betamethasone valerate (VALISONE) 0.1 % topical cream (Patient not taking: Reported on 6/15/2023)      bictegravir-emtricitab-tenofovir

## 2024-07-03 NOTE — ED NOTES
Verbal report given  Jerry. Per Jerry, Pt is to go in Medicaid cab, and is safe to go in medicaid cab.

## 2024-07-18 ENCOUNTER — APPOINTMENT (OUTPATIENT)
Facility: HOSPITAL | Age: 64
DRG: 469 | End: 2024-07-18
Payer: MEDICAID

## 2024-07-18 ENCOUNTER — HOSPITAL ENCOUNTER (INPATIENT)
Facility: HOSPITAL | Age: 64
LOS: 4 days | Discharge: HOME | DRG: 469 | End: 2024-07-22
Attending: EMERGENCY MEDICINE | Admitting: INTERNAL MEDICINE
Payer: MEDICAID

## 2024-07-18 ENCOUNTER — APPOINTMENT (OUTPATIENT)
Facility: HOSPITAL | Age: 64
DRG: 469 | End: 2024-07-18
Attending: EMERGENCY MEDICINE
Payer: MEDICAID

## 2024-07-18 DIAGNOSIS — R41.82 ALTERED MENTAL STATUS, UNSPECIFIED ALTERED MENTAL STATUS TYPE: Primary | ICD-10-CM

## 2024-07-18 LAB
ALBUMIN SERPL-MCNC: 2.7 G/DL (ref 3.5–5)
ALBUMIN/GLOB SERPL: 0.4 (ref 1.1–2.2)
ALP SERPL-CCNC: 103 U/L (ref 45–117)
ALT SERPL-CCNC: 28 U/L (ref 12–78)
AMPHET UR QL SCN: NEGATIVE
ANION GAP SERPL CALC-SCNC: 4 MMOL/L (ref 5–15)
APPEARANCE UR: CLEAR
AST SERPL W P-5'-P-CCNC: ABNORMAL U/L (ref 15–37)
BACTERIA URNS QL MICRO: NEGATIVE /HPF
BARBITURATES UR QL SCN: NEGATIVE
BASE EXCESS BLD CALC-SCNC: 4.2 MMOL/L
BASOPHILS # BLD: 0 K/UL (ref 0–0.1)
BASOPHILS NFR BLD: 1 % (ref 0–1)
BENZODIAZ UR QL: NEGATIVE
BILIRUB SERPL-MCNC: 0.5 MG/DL (ref 0.2–1)
BILIRUB UR QL: NEGATIVE
BUN SERPL-MCNC: 26 MG/DL (ref 6–20)
BUN/CREAT SERPL: 14 (ref 12–20)
CA-I BLD-MCNC: 1.16 MMOL/L (ref 1.12–1.32)
CA-I BLD-MCNC: 8.8 MG/DL (ref 8.5–10.1)
CANNABINOIDS UR QL SCN: NEGATIVE
CHLORIDE BLD-SCNC: 103 MMOL/L (ref 98–107)
CHLORIDE SERPL-SCNC: 103 MMOL/L (ref 97–108)
CO2 BLD-SCNC: 31 MMOL/L
CO2 SERPL-SCNC: 28 MMOL/L (ref 21–32)
COCAINE UR QL SCN: NEGATIVE
COLOR UR: ABNORMAL
CREAT SERPL-MCNC: 1.89 MG/DL (ref 0.55–1.02)
CREAT UR-MCNC: 1.61 MG/DL (ref 0.6–1.3)
DIFFERENTIAL METHOD BLD: ABNORMAL
EOSINOPHIL # BLD: 0 K/UL (ref 0–0.4)
EOSINOPHIL NFR BLD: 1 % (ref 0–7)
EPITH CASTS URNS QL MICRO: ABNORMAL /LPF
ERYTHROCYTE [DISTWIDTH] IN BLOOD BY AUTOMATED COUNT: 16.5 % (ref 11.5–14.5)
FLUAV RNA SPEC QL NAA+PROBE: NOT DETECTED
FLUBV RNA SPEC QL NAA+PROBE: NOT DETECTED
GLOBULIN SER CALC-MCNC: 6.5 G/DL (ref 2–4)
GLUCOSE BLD STRIP.AUTO-MCNC: 102 MG/DL (ref 65–100)
GLUCOSE BLD STRIP.AUTO-MCNC: 86 MG/DL (ref 65–100)
GLUCOSE SERPL-MCNC: 116 MG/DL (ref 65–100)
GLUCOSE UR STRIP.AUTO-MCNC: NEGATIVE MG/DL
HCO3 BLD-SCNC: 30.8 MMOL/L (ref 19–28)
HCT VFR BLD AUTO: 33.8 % (ref 35–47)
HGB BLD-MCNC: 11.6 G/DL (ref 11.5–16)
HGB UR QL STRIP: NEGATIVE
IMM GRANULOCYTES # BLD AUTO: 0 K/UL (ref 0–0.04)
IMM GRANULOCYTES NFR BLD AUTO: 1 % (ref 0–0.5)
KETONES UR QL STRIP.AUTO: NEGATIVE MG/DL
LACTATE BLD-SCNC: 1.02 MMOL/L (ref 0.4–2)
LACTATE BLD-SCNC: 1.57 MMOL/L (ref 0.4–2)
LEUKOCYTE ESTERASE UR QL STRIP.AUTO: NEGATIVE
LYMPHOCYTES # BLD: 1.2 K/UL (ref 0.8–3.5)
LYMPHOCYTES NFR BLD: 64 % (ref 12–49)
Lab: NORMAL
MCH RBC QN AUTO: 30.1 PG (ref 26–34)
MCHC RBC AUTO-ENTMCNC: 34.3 G/DL (ref 30–36.5)
MCV RBC AUTO: 87.8 FL (ref 80–99)
METHADONE UR QL: NEGATIVE
MONOCYTES # BLD: 0.2 K/UL (ref 0–1)
MONOCYTES NFR BLD: 11 % (ref 5–13)
NEUTS SEG # BLD: 0.4 K/UL (ref 1.8–8)
NEUTS SEG NFR BLD: 22 % (ref 32–75)
NITRITE UR QL STRIP.AUTO: NEGATIVE
NRBC # BLD: 0 K/UL (ref 0–0.01)
NRBC BLD-RTO: 0 PER 100 WBC
OPIATES UR QL: NEGATIVE
PCO2 BLD: 53.5 MMHG (ref 35–45)
PCP UR QL: NEGATIVE
PERFORMED BY:: ABNORMAL
PERFORMED BY:: NORMAL
PH BLD: 7.37 (ref 7.35–7.45)
PH UR STRIP: 6 (ref 5–8)
PLATELET # BLD AUTO: 91 K/UL (ref 150–400)
PMV BLD AUTO: 10.9 FL (ref 8.9–12.9)
PO2 BLD: <27 MMHG (ref 75–100)
POTASSIUM BLD-SCNC: 4.2 MMOL/L (ref 3.5–5.5)
POTASSIUM SERPL-SCNC: ABNORMAL MMOL/L (ref 3.5–5.1)
PROCALCITONIN SERPL-MCNC: <0.05 NG/ML
PROT SERPL-MCNC: 9.2 G/DL (ref 6.4–8.2)
PROT UR STRIP-MCNC: 30 MG/DL
RBC # BLD AUTO: 3.85 M/UL (ref 3.8–5.2)
RBC #/AREA URNS HPF: ABNORMAL /HPF (ref 0–5)
RBC MORPH BLD: ABNORMAL
SARS-COV-2 RNA RESP QL NAA+PROBE: NOT DETECTED
SODIUM BLD-SCNC: 143 MMOL/L (ref 136–145)
SODIUM SERPL-SCNC: 135 MMOL/L (ref 136–145)
SP GR UR REFRACTOMETRY: 1.01 (ref 1–1.03)
SPECIMEN SITE: ABNORMAL
SPECIMEN SITE: NORMAL
TROPONIN I SERPL HS-MCNC: 20 NG/L (ref 0–51)
URINE CULTURE IF INDICATED: ABNORMAL
UROBILINOGEN UR QL STRIP.AUTO: 4 EU/DL (ref 0.1–1)
WBC # BLD AUTO: 1.8 K/UL (ref 3.6–11)
WBC MORPH BLD: ABNORMAL
WBC URNS QL MICRO: ABNORMAL /HPF (ref 0–4)

## 2024-07-18 PROCEDURE — 36415 COLL VENOUS BLD VENIPUNCTURE: CPT

## 2024-07-18 PROCEDURE — 0202U NFCT DS 22 TRGT SARS-COV-2: CPT

## 2024-07-18 PROCEDURE — 84484 ASSAY OF TROPONIN QUANT: CPT

## 2024-07-18 PROCEDURE — 6360000002 HC RX W HCPCS: Performed by: EMERGENCY MEDICINE

## 2024-07-18 PROCEDURE — 2580000003 HC RX 258: Performed by: STUDENT IN AN ORGANIZED HEALTH CARE EDUCATION/TRAINING PROGRAM

## 2024-07-18 PROCEDURE — 6370000000 HC RX 637 (ALT 250 FOR IP): Performed by: INTERNAL MEDICINE

## 2024-07-18 PROCEDURE — 85025 COMPLETE CBC W/AUTO DIFF WBC: CPT

## 2024-07-18 PROCEDURE — 71045 X-RAY EXAM CHEST 1 VIEW: CPT

## 2024-07-18 PROCEDURE — 83605 ASSAY OF LACTIC ACID: CPT

## 2024-07-18 PROCEDURE — 6360000002 HC RX W HCPCS: Performed by: PHYSICIAN ASSISTANT

## 2024-07-18 PROCEDURE — 94761 N-INVAS EAR/PLS OXIMETRY MLT: CPT

## 2024-07-18 PROCEDURE — 87636 SARSCOV2 & INF A&B AMP PRB: CPT

## 2024-07-18 PROCEDURE — 80053 COMPREHEN METABOLIC PANEL: CPT

## 2024-07-18 PROCEDURE — 93005 ELECTROCARDIOGRAM TRACING: CPT | Performed by: EMERGENCY MEDICINE

## 2024-07-18 PROCEDURE — 96374 THER/PROPH/DIAG INJ IV PUSH: CPT

## 2024-07-18 PROCEDURE — 81001 URINALYSIS AUTO W/SCOPE: CPT

## 2024-07-18 PROCEDURE — 80307 DRUG TEST PRSMV CHEM ANLYZR: CPT

## 2024-07-18 PROCEDURE — 70450 CT HEAD/BRAIN W/O DYE: CPT

## 2024-07-18 PROCEDURE — 99285 EMERGENCY DEPT VISIT HI MDM: CPT

## 2024-07-18 PROCEDURE — 84145 PROCALCITONIN (PCT): CPT

## 2024-07-18 PROCEDURE — 82947 ASSAY GLUCOSE BLOOD QUANT: CPT

## 2024-07-18 PROCEDURE — 84132 ASSAY OF SERUM POTASSIUM: CPT

## 2024-07-18 PROCEDURE — 2580000003 HC RX 258: Performed by: EMERGENCY MEDICINE

## 2024-07-18 PROCEDURE — 82803 BLOOD GASES ANY COMBINATION: CPT

## 2024-07-18 PROCEDURE — 87040 BLOOD CULTURE FOR BACTERIA: CPT

## 2024-07-18 PROCEDURE — 87641 MR-STAPH DNA AMP PROBE: CPT

## 2024-07-18 PROCEDURE — 82330 ASSAY OF CALCIUM: CPT

## 2024-07-18 PROCEDURE — 80047 BASIC METABLC PNL IONIZED CA: CPT

## 2024-07-18 PROCEDURE — 1100000000 HC RM PRIVATE

## 2024-07-18 PROCEDURE — 84295 ASSAY OF SERUM SODIUM: CPT

## 2024-07-18 PROCEDURE — 6370000000 HC RX 637 (ALT 250 FOR IP): Performed by: PHYSICIAN ASSISTANT

## 2024-07-18 PROCEDURE — 6360000002 HC RX W HCPCS: Performed by: STUDENT IN AN ORGANIZED HEALTH CARE EDUCATION/TRAINING PROGRAM

## 2024-07-18 RX ORDER — RISPERIDONE 2 MG/1
2 TABLET ORAL 2 TIMES DAILY
Status: DISCONTINUED | OUTPATIENT
Start: 2024-07-18 | End: 2024-07-22 | Stop reason: HOSPADM

## 2024-07-18 RX ORDER — 0.9 % SODIUM CHLORIDE 0.9 %
30 INTRAVENOUS SOLUTION INTRAVENOUS ONCE
Status: COMPLETED | OUTPATIENT
Start: 2024-07-18 | End: 2024-07-18

## 2024-07-18 RX ORDER — DIVALPROEX SODIUM 125 MG/1
125 TABLET, DELAYED RELEASE ORAL 2 TIMES DAILY
Status: DISCONTINUED | OUTPATIENT
Start: 2024-07-18 | End: 2024-07-22 | Stop reason: HOSPADM

## 2024-07-18 RX ORDER — HYDRALAZINE HYDROCHLORIDE 20 MG/ML
10 INJECTION INTRAMUSCULAR; INTRAVENOUS EVERY 6 HOURS PRN
Status: DISCONTINUED | OUTPATIENT
Start: 2024-07-18 | End: 2024-07-22 | Stop reason: HOSPADM

## 2024-07-18 RX ORDER — DIVALPROEX SODIUM 250 MG/1
250 TABLET, EXTENDED RELEASE ORAL DAILY
Status: DISCONTINUED | OUTPATIENT
Start: 2024-07-18 | End: 2024-07-18 | Stop reason: CLARIF

## 2024-07-18 RX ORDER — FAMOTIDINE 20 MG/1
20 TABLET, FILM COATED ORAL DAILY
Status: DISCONTINUED | OUTPATIENT
Start: 2024-07-19 | End: 2024-07-22 | Stop reason: HOSPADM

## 2024-07-18 RX ORDER — ALBUTEROL SULFATE 90 UG/1
2 AEROSOL, METERED RESPIRATORY (INHALATION) EVERY 6 HOURS PRN
Status: DISCONTINUED | OUTPATIENT
Start: 2024-07-18 | End: 2024-07-22 | Stop reason: HOSPADM

## 2024-07-18 RX ORDER — SULFAMETHOXAZOLE AND TRIMETHOPRIM 800; 160 MG/1; MG/1
1 TABLET ORAL
Status: DISCONTINUED | OUTPATIENT
Start: 2024-07-19 | End: 2024-07-19

## 2024-07-18 RX ORDER — DILTIAZEM HYDROCHLORIDE 120 MG/1
120 CAPSULE, COATED, EXTENDED RELEASE ORAL DAILY
Status: DISCONTINUED | OUTPATIENT
Start: 2024-07-19 | End: 2024-07-22 | Stop reason: HOSPADM

## 2024-07-18 RX ADMIN — CEFTRIAXONE SODIUM 1000 MG: 1 INJECTION, POWDER, FOR SOLUTION INTRAMUSCULAR; INTRAVENOUS at 16:06

## 2024-07-18 RX ADMIN — SODIUM CHLORIDE 3000 ML: 9 INJECTION, SOLUTION INTRAVENOUS at 16:00

## 2024-07-18 RX ADMIN — RISPERIDONE 2 MG: 1 TABLET, FILM COATED ORAL at 20:04

## 2024-07-18 RX ADMIN — HYDRALAZINE HYDROCHLORIDE 10 MG: 20 INJECTION INTRAMUSCULAR; INTRAVENOUS at 20:05

## 2024-07-18 RX ADMIN — DIVALPROEX SODIUM 125 MG: 125 TABLET, DELAYED RELEASE ORAL at 22:24

## 2024-07-18 RX ADMIN — VANCOMYCIN HYDROCHLORIDE 2500 MG: 5 INJECTION, POWDER, LYOPHILIZED, FOR SOLUTION INTRAVENOUS at 19:00

## 2024-07-18 NOTE — H&P
Hospitalist Admission Note    NAME: Darcy Arthur   :  1960   MRN:  [id number]     Date/Time:  2024 6:24 PM    Patient PCP: Carol Bingham APRN - NP    ______________________________________________________________________  Given the patient's current clinical presentation, I have a high level of concern for decompensation if discharged from the emergency department.  Complex decision making was performed, which includes reviewing the patient's available past medical records, laboratory results, and x-ray films.       My assessment of this patient's clinical condition and my plan of care is as follows.    Assessment / Plan:    Altered mental status suspicious for infectious source  Viral etiology with a history of HIV  Reported cough  Lymphocytes elevated with neutropenia  ID consultation  COVID pending  Respiratory panel ordered  Continue prophylactic Bactrim which she takes every   Continue Biktarvy   Continue vancomycin  Continue Rocephin  Consider LP if no improvement  CT of the head pending although no acute abnormality noted    Schizophrenia  Continue risperidone  Continue Depakote    Essential hypertension  Continue diltiazem  Hold lisinopril    RAYO  Baseline creatinine 1.2  Current creatinine 1.89  Hold lisinopril  Gentle IV fluids    Autoimmune disorder  Appears stable    Medical Decision Making     [x] High (any 2)     A. Problems (any 1)  [x] Acute/Chronic Illness/injury posing threat to life or bodily function:    [] Severe exacerbation of chronic illness:    ---------------------------------------------------------------------  B. Risk of Treatment (any 1)   [x] Drugs/treatments that require intensive monitoring for toxicity include:    [x] IV ABX requiring serial renal monitoring for nephrotoxicity:     [] IV Narcotic analgesia for adverse drug reaction  [] Aggressive IV diuresis requiring serial monitoring for renal impairment and electrolyte derangements  [x]

## 2024-07-18 NOTE — ED NOTES
Pt undressed and changed into gown, straight cath for urine, repositioned for comfort, fluids continue to infuse, warm blanket for comfort

## 2024-07-18 NOTE — ED PROVIDER NOTES
[facility] EMERGENCY DEPT  EMERGENCY DEPARTMENT HISTORY AND PHYSICAL EXAM      Date: 7/18/2024  Patient Name: Darcy Arthur  MRN: [id number]  Birthdate 1960  Date of evaluation: 7/18/2024  Provider: Huyen Littlejohn MD   Note Started: 2:42 PM EDT 7/18/24    HISTORY OF PRESENT ILLNESS     Chief Complaint   Patient presents with    Altered Mental Status     Altered mental  status greater than 24 hours, fsbs 116. EMS states strong urine smell at this time.       History Provided By: Patient    HPI: Darcy Arthur is a 63 y.o. female patient feeling ill for the last day.  Confusion.  Small strong urine smell.  No fevers or falls per patient.    PAST MEDICAL HISTORY   Past Medical History:  Past Medical History:   Diagnosis Date    Alcohol abuse 5/29/2017    Autoimmune disease (HCC)     Hepatitis C     HIV (human immunodeficiency virus infection) (HCC)     Hypertension     Psychotic disorder (HCC)     Schizophrenia (HCC)        Past Surgical History:  No past surgical history on file.    Family History:  Family History   Family history unknown: Yes       Social History:  Social History     Tobacco Use    Smoking status: Every Day     Current packs/day: 0.50     Types: Cigarettes    Smokeless tobacco: Never   Vaping Use    Vaping Use: Never used   Substance Use Topics    Alcohol use: Not Currently     Alcohol/week: 0.8 standard drinks of alcohol     Types: 1 Standard drinks or equivalent per week    Drug use: Yes     Types: Cocaine, Marijuana (Weed)       Allergies:  No Known Allergies    PCP: Carol Bingham APRN - NP    Current Meds:   Current Facility-Administered Medications   Medication Dose Route Frequency Provider Last Rate Last Admin    sodium chloride 0.9 % bolus 2,994 mL  30 mL/kg IntraVENous Once Huyen Littlejohn MD        cefTRIAXone (ROCEPHIN) 1,000 mg in sterile water 10 mL IV syringe  1,000 mg IntraVENous NOW Huyen Littlejohn MD         Current Outpatient Medications   Medication Sig Dispense Refill

## 2024-07-19 LAB
ANION GAP SERPL CALC-SCNC: 4 MMOL/L (ref 5–15)
BASOPHILS # BLD: 0 K/UL (ref 0–0.1)
BASOPHILS NFR BLD: 1 % (ref 0–1)
BUN SERPL-MCNC: 21 MG/DL (ref 6–20)
BUN/CREAT SERPL: 15 (ref 12–20)
CA-I BLD-MCNC: 8.4 MG/DL (ref 8.5–10.1)
CHLORIDE SERPL-SCNC: 113 MMOL/L (ref 97–108)
CO2 SERPL-SCNC: 24 MMOL/L (ref 21–32)
CREAT SERPL-MCNC: 1.39 MG/DL (ref 0.55–1.02)
CRP SERPL-MCNC: <0.29 MG/DL (ref 0–0.3)
DIFFERENTIAL METHOD BLD: ABNORMAL
EKG ATRIAL RATE: 85 BPM
EKG DIAGNOSIS: NORMAL
EKG P AXIS: 50 DEGREES
EKG P-R INTERVAL: 154 MS
EKG Q-T INTERVAL: 386 MS
EKG QRS DURATION: 76 MS
EKG QTC CALCULATION (BAZETT): 459 MS
EKG R AXIS: 22 DEGREES
EKG T AXIS: 67 DEGREES
EKG VENTRICULAR RATE: 85 BPM
EOSINOPHIL # BLD: 0 K/UL (ref 0–0.4)
EOSINOPHIL NFR BLD: 1 % (ref 0–7)
ERYTHROCYTE [DISTWIDTH] IN BLOOD BY AUTOMATED COUNT: 17 % (ref 11.5–14.5)
GLUCOSE SERPL-MCNC: 96 MG/DL (ref 65–100)
HCT VFR BLD AUTO: 35.2 % (ref 35–47)
HGB BLD-MCNC: 11.5 G/DL (ref 11.5–16)
IMM GRANULOCYTES # BLD AUTO: 0 K/UL (ref 0–0.04)
IMM GRANULOCYTES NFR BLD AUTO: 0 % (ref 0–0.5)
LYMPHOCYTES # BLD: 1.1 K/UL (ref 0.8–3.5)
LYMPHOCYTES NFR BLD: 57 % (ref 12–49)
MCH RBC QN AUTO: 29.9 PG (ref 26–34)
MCHC RBC AUTO-ENTMCNC: 32.7 G/DL (ref 30–36.5)
MCV RBC AUTO: 91.7 FL (ref 80–99)
MONOCYTES # BLD: 0.2 K/UL (ref 0–1)
MONOCYTES NFR BLD: 11 % (ref 5–13)
MRSA DNA SPEC QL NAA+PROBE: NOT DETECTED
NEUTS SEG # BLD: 0.6 K/UL (ref 1.8–8)
NEUTS SEG NFR BLD: 30 % (ref 32–75)
NRBC # BLD: 0 K/UL (ref 0–0.01)
NRBC BLD-RTO: 0 PER 100 WBC
PLATELET # BLD AUTO: 81 K/UL (ref 150–400)
PMV BLD AUTO: 11.3 FL (ref 8.9–12.9)
POTASSIUM SERPL-SCNC: 4.1 MMOL/L (ref 3.5–5.1)
RBC # BLD AUTO: 3.84 M/UL (ref 3.8–5.2)
SODIUM SERPL-SCNC: 141 MMOL/L (ref 136–145)
WBC # BLD AUTO: 1.9 K/UL (ref 3.6–11)

## 2024-07-19 PROCEDURE — 85025 COMPLETE CBC W/AUTO DIFF WBC: CPT

## 2024-07-19 PROCEDURE — 6370000000 HC RX 637 (ALT 250 FOR IP): Performed by: PHYSICIAN ASSISTANT

## 2024-07-19 PROCEDURE — 6360000002 HC RX W HCPCS: Performed by: PHYSICIAN ASSISTANT

## 2024-07-19 PROCEDURE — 1100000000 HC RM PRIVATE

## 2024-07-19 PROCEDURE — 87522 HEPATITIS C REVRS TRNSCRPJ: CPT

## 2024-07-19 PROCEDURE — 86140 C-REACTIVE PROTEIN: CPT

## 2024-07-19 PROCEDURE — 80048 BASIC METABOLIC PNL TOTAL CA: CPT

## 2024-07-19 PROCEDURE — 36415 COLL VENOUS BLD VENIPUNCTURE: CPT

## 2024-07-19 PROCEDURE — 86361 T CELL ABSOLUTE COUNT: CPT

## 2024-07-19 PROCEDURE — 94761 N-INVAS EAR/PLS OXIMETRY MLT: CPT

## 2024-07-19 PROCEDURE — 82962 GLUCOSE BLOOD TEST: CPT

## 2024-07-19 PROCEDURE — 87536 HIV-1 QUANT&REVRSE TRNSCRPJ: CPT

## 2024-07-19 PROCEDURE — 87798 DETECT AGENT NOS DNA AMP: CPT

## 2024-07-19 PROCEDURE — 99223 1ST HOSP IP/OBS HIGH 75: CPT | Performed by: INTERNAL MEDICINE

## 2024-07-19 PROCEDURE — 6370000000 HC RX 637 (ALT 250 FOR IP): Performed by: INTERNAL MEDICINE

## 2024-07-19 PROCEDURE — 2580000003 HC RX 258: Performed by: PHYSICIAN ASSISTANT

## 2024-07-19 RX ORDER — ONDANSETRON 2 MG/ML
4 INJECTION INTRAMUSCULAR; INTRAVENOUS EVERY 6 HOURS PRN
Status: DISCONTINUED | OUTPATIENT
Start: 2024-07-19 | End: 2024-07-22 | Stop reason: HOSPADM

## 2024-07-19 RX ORDER — ACETAMINOPHEN 325 MG/1
650 TABLET ORAL EVERY 6 HOURS PRN
Status: DISCONTINUED | OUTPATIENT
Start: 2024-07-19 | End: 2024-07-22 | Stop reason: HOSPADM

## 2024-07-19 RX ORDER — SODIUM CHLORIDE 0.9 % (FLUSH) 0.9 %
5-40 SYRINGE (ML) INJECTION EVERY 12 HOURS SCHEDULED
Status: DISCONTINUED | OUTPATIENT
Start: 2024-07-19 | End: 2024-07-22 | Stop reason: HOSPADM

## 2024-07-19 RX ORDER — MAGNESIUM SULFATE IN WATER 40 MG/ML
2000 INJECTION, SOLUTION INTRAVENOUS PRN
Status: DISCONTINUED | OUTPATIENT
Start: 2024-07-19 | End: 2024-07-22 | Stop reason: HOSPADM

## 2024-07-19 RX ORDER — POTASSIUM CHLORIDE 7.45 MG/ML
10 INJECTION INTRAVENOUS PRN
Status: DISCONTINUED | OUTPATIENT
Start: 2024-07-19 | End: 2024-07-22 | Stop reason: HOSPADM

## 2024-07-19 RX ORDER — ONDANSETRON 4 MG/1
4 TABLET, ORALLY DISINTEGRATING ORAL EVERY 8 HOURS PRN
Status: DISCONTINUED | OUTPATIENT
Start: 2024-07-19 | End: 2024-07-22 | Stop reason: HOSPADM

## 2024-07-19 RX ORDER — POTASSIUM CHLORIDE 20 MEQ/1
40 TABLET, EXTENDED RELEASE ORAL PRN
Status: DISCONTINUED | OUTPATIENT
Start: 2024-07-19 | End: 2024-07-22 | Stop reason: HOSPADM

## 2024-07-19 RX ORDER — SODIUM CHLORIDE 0.9 % (FLUSH) 0.9 %
5-40 SYRINGE (ML) INJECTION PRN
Status: DISCONTINUED | OUTPATIENT
Start: 2024-07-19 | End: 2024-07-22 | Stop reason: HOSPADM

## 2024-07-19 RX ORDER — ACETAMINOPHEN 650 MG/1
650 SUPPOSITORY RECTAL EVERY 6 HOURS PRN
Status: DISCONTINUED | OUTPATIENT
Start: 2024-07-19 | End: 2024-07-22 | Stop reason: HOSPADM

## 2024-07-19 RX ORDER — ENOXAPARIN SODIUM 100 MG/ML
40 INJECTION SUBCUTANEOUS DAILY
Status: DISCONTINUED | OUTPATIENT
Start: 2024-07-19 | End: 2024-07-22 | Stop reason: HOSPADM

## 2024-07-19 RX ORDER — SODIUM CHLORIDE 9 MG/ML
INJECTION, SOLUTION INTRAVENOUS PRN
Status: DISCONTINUED | OUTPATIENT
Start: 2024-07-19 | End: 2024-07-22 | Stop reason: HOSPADM

## 2024-07-19 RX ORDER — POLYETHYLENE GLYCOL 3350 17 G/17G
17 POWDER, FOR SOLUTION ORAL DAILY PRN
Status: DISCONTINUED | OUTPATIENT
Start: 2024-07-19 | End: 2024-07-22 | Stop reason: HOSPADM

## 2024-07-19 RX ADMIN — CEFTRIAXONE SODIUM 1000 MG: 1 INJECTION, POWDER, FOR SOLUTION INTRAMUSCULAR; INTRAVENOUS at 16:33

## 2024-07-19 RX ADMIN — DILTIAZEM HYDROCHLORIDE 120 MG: 120 CAPSULE, COATED, EXTENDED RELEASE ORAL at 08:29

## 2024-07-19 RX ADMIN — RISPERIDONE 2 MG: 1 TABLET, FILM COATED ORAL at 20:33

## 2024-07-19 RX ADMIN — ENOXAPARIN SODIUM 40 MG: 100 INJECTION SUBCUTANEOUS at 13:45

## 2024-07-19 RX ADMIN — RISPERIDONE 2 MG: 1 TABLET, FILM COATED ORAL at 08:29

## 2024-07-19 RX ADMIN — SODIUM CHLORIDE, PRESERVATIVE FREE 10 ML: 5 INJECTION INTRAVENOUS at 20:39

## 2024-07-19 RX ADMIN — FAMOTIDINE 20 MG: 20 TABLET, FILM COATED ORAL at 08:29

## 2024-07-19 RX ADMIN — DIVALPROEX SODIUM 125 MG: 125 TABLET, DELAYED RELEASE ORAL at 20:33

## 2024-07-19 RX ADMIN — HYDRALAZINE HYDROCHLORIDE 10 MG: 20 INJECTION INTRAMUSCULAR; INTRAVENOUS at 20:33

## 2024-07-19 RX ADMIN — BICTEGRAVIR SODIUM, EMTRICITABINE, AND TENOFOVIR ALAFENAMIDE FUMARATE 1 TABLET: 50; 200; 25 TABLET ORAL at 09:28

## 2024-07-19 RX ADMIN — HYDRALAZINE HYDROCHLORIDE 10 MG: 20 INJECTION INTRAMUSCULAR; INTRAVENOUS at 05:23

## 2024-07-19 RX ADMIN — DIVALPROEX SODIUM 125 MG: 125 TABLET, DELAYED RELEASE ORAL at 08:29

## 2024-07-19 NOTE — PLAN OF CARE
Problem: Discharge Planning  Goal: Discharge to home or other facility with appropriate resources  Outcome: Progressing  Note: Spoke with patient.  Patient plans on returning to her current group home at discharge

## 2024-07-19 NOTE — CARE COORDINATION
07/19/24 1112   Service Assessment   Patient Orientation Person;Alert and Oriented   Cognition Alert   History Provided By Other (see comment)  ([facility])   Primary Caregiver Private caregiver  ([facility])   Support Systems Other (Comment)  ([facility])   Patient's Healthcare Decision Maker is: Legal Next of Kin   PCP Verified by CM Yes   Last Visit to PCP Within last 3 months   Prior Functional Level Assistance with the following:;Cooking;Housework;Shopping   Current Functional Level Assistance with the following:;Bathing;Dressing;Toileting;Feeding;Cooking;Housework;Shopping;Mobility   Can patient return to prior living arrangement Yes   Ability to make needs known: Good   Family able to assist with home care needs: Other (comment)  ([facility])   Would you like for me to discuss the discharge plan with any other family members/significant others, and if so, who? No   Financial Resources Medicaid   Community Resources None   Social/Functional History   Lives With Other (comment)  ([facility])   Type of Home Assisted living   Home Layout One level   Bathroom Shower/Tub None   Bathroom Equipment None   Home Equipment None   Discharge Planning   Type of Residence Assisted living   Living Arrangements Other (Comment)  ([facility])   Current Services Prior To Admission None   Potential Assistance Needed N/A   DME Ordered? No   Patient expects to be discharged to: Assisted living   Services At/After Discharge   Mode of Transport at Discharge Other (see comment)  ([facility])   Confirm Follow Up Transport Other (see comment)  ([facility])     CM assessment complete and demographics confirmed. Patient resides at [facility]. CM spoke with staff at facility and they state that patient was able to function independently prior to admission. Patient was able to walk on their own and complete ADLs. No DME or history of HH.     Staff reports that patient attends a day

## 2024-07-19 NOTE — PLAN OF CARE
Problem: Discharge Planning  Goal: Discharge to home or other facility with appropriate resources  7/19/2024 0140 by Yeni Butcher RN  Outcome: Progressing  Note: Spoke with patient.  Patient plans on returning to her current group home at discharge     Problem: Safety - Adult  Goal: Free from fall injury  Outcome: Progressing     Problem: Pain  Goal: Verbalizes/displays adequate comfort level or baseline comfort level  Outcome: Progressing     Problem: Skin/Tissue Integrity  Goal: Absence of new skin breakdown  Description: 1.  Monitor for areas of redness and/or skin breakdown  2.  Assess vascular access sites hourly  3.  Every 4-6 hours minimum:  Change oxygen saturation probe site  4.  Every 4-6 hours:  If on nasal continuous positive airway pressure, respiratory therapy assess nares and determine need for appliance change or resting period.  Outcome: Progressing

## 2024-07-19 NOTE — ED NOTES
ED TO INPATIENT SBAR HANDOFF    Patient Name: Darcy Arthur   Preferred Name: Darcy  : 1960  63 y.o.   Family/Caregiver Present: no   Code Status Order: No Order  PO Status: NPO:No  Telemetry Order: Yes  C-SSRS: Risk of Suicide: No Risk    Restraints:     Sepsis Risk Score      Situation  Chief Complaint   Patient presents with    Altered Mental Status     Altered mental  status greater than 24 hours, fsbs 116. EMS states strong urine smell at this time.     Brief Description of Patient's Condition: From Group Home, staff reports AMS for greater than 24 hours. Recently dx with UTI.  Mental Status: disoriented and alert  Arrived from:Group [place]  Imaging:   CT Head W/O Contrast   Final Result   No evidence of acute process.            Electronically signed by TAQUERIA FINN      CT Head W/O Contrast   Final Result   Essentially nondiagnostic study due to severe patient motion. Repeat imaging is   recommended when the patient can appropriately cooperate or be appropriately   sedated.            Electronically signed by Jose Eduardo Vera MD      XR CHEST PORTABLE   Final Result   No acute cardiopulmonary disease.         Electronically signed by Long Lee MD        Abnormal labs:   Abnormal Labs Reviewed   CBC WITH AUTO DIFFERENTIAL - Abnormal; Notable for the following components:       Result Value    WBC 1.8 (*)     Hematocrit 33.8 (*)     RDW 16.5 (*)     Platelets 91 (*)     Neutrophils % 22 (*)     Lymphocytes % 64 (*)     Immature Granulocytes % 1 (*)     Neutrophils Absolute 0.4 (*)     All other components within normal limits   COMPREHENSIVE METABOLIC PANEL - Abnormal; Notable for the following components:    Sodium 135 (*)     Anion Gap 4 (*)     Glucose 116 (*)     BUN 26 (*)     Creatinine 1.89 (*)     Est, Glom Filt Rate 30 (*)     Total Protein 9.2 (*)     Albumin 2.7 (*)     Globulin 6.5 (*)     Albumin/Globulin Ratio 0.4 (*)     All other components within normal limits   PROCALCITONIN - Abnormal;

## 2024-07-19 NOTE — CONSULTS
Consult Note            Date:7/19/2024        Patient Name:Darcy Arthur     YOB: 1960     Age:63 y.o.    Consults Infectious Disease    Chief Complaint     Chief Complaint   Patient presents with    Altered Mental Status     Altered mental  status greater than 24 hours, fsbs 116. EMS states strong urine smell at this time.      Altered mental status    History Obtained From   patient    History of Present Illness   This is a 63 year old female, with psychotic disorder/institutionalized, followed in ID Clinic for HIV-1 infection and chronic HCV infection.  Last seen a year ago at which time her CD4 count was 320/mm3 (20%) with viral load of 20 copies/ml on Biktarvy.   HCV remains untreated and HCV RNA when last checked was 14,100,000 IU/ml. She was scheduled to follow-up in Dec 2024 but was not seen.      She was brought to ED because of altered mental status. She was afebrile.  Exam remarkable for disorientation but no other physical findings. She was leukopenic but not neutropenic.  Both procal and CRP were normal. UA was unremarkable.  Rapid flu tests and Covid-19 were negative.  CXR showed no acute disease and CT Head showed no acute intracranial disease.  Blood cultures were sent and patient was started on IV Vancomycin and Ceftriaxone.     Patient asleep but easily arroused.  She did recognize me and greeted me appropriately.  She remembers being confused but does not know why.   She offers no complaints at this time.    Past Medical History:   Diagnosis Date    Alcohol abuse 5/29/2017    Autoimmune disease (HCC)     Hepatitis C     HIV (human immunodeficiency virus infection) (HCC)     Hypertension     Psychotic disorder (HCC)     Schizophrenia (HCC)         Past Surgical History   No past surgical history on file.     Medications     Prior to Admission medications    Medication Sig Start Date End Date Taking? Authorizing Provider   albuterol sulfate HFA (PROVENTIL;VENTOLIN;PROAIR) 108 (90 Base)

## 2024-07-19 NOTE — ED NOTES
Assumed care of patient at this time. Patient connected to continuous cardiac, bp, sp02 monitoring. No signs of distress noted. Call bell within reach, bed locked and in lowest position.

## 2024-07-20 LAB
ALBUMIN SERPL-MCNC: 2.3 G/DL (ref 3.5–5)
ALBUMIN/GLOB SERPL: 0.4 (ref 1.1–2.2)
ALP SERPL-CCNC: 100 U/L (ref 45–117)
ALT SERPL-CCNC: 21 U/L (ref 12–78)
AMMONIA PLAS-SCNC: 51 UMOL/L
ANION GAP SERPL CALC-SCNC: 5 MMOL/L (ref 5–15)
AST SERPL W P-5'-P-CCNC: 44 U/L (ref 15–37)
B PERT DNA SPEC QL NAA+PROBE: NOT DETECTED
BASOPHILS # BLD: 0 K/UL (ref 0–0.1)
BASOPHILS NFR BLD: 0 % (ref 0–1)
BILIRUB SERPL-MCNC: 0.4 MG/DL (ref 0.2–1)
BORDETELLA PARAPERTUSSIS BY PCR: NOT DETECTED
BUN SERPL-MCNC: 17 MG/DL (ref 6–20)
BUN/CREAT SERPL: 12 (ref 12–20)
C PNEUM DNA SPEC QL NAA+PROBE: NOT DETECTED
CA-I BLD-MCNC: 8.3 MG/DL (ref 8.5–10.1)
CHLORIDE SERPL-SCNC: 112 MMOL/L (ref 97–108)
CO2 SERPL-SCNC: 24 MMOL/L (ref 21–32)
CREAT SERPL-MCNC: 1.42 MG/DL (ref 0.55–1.02)
DIFFERENTIAL METHOD BLD: ABNORMAL
EOSINOPHIL # BLD: 0 K/UL (ref 0–0.4)
EOSINOPHIL NFR BLD: 1 % (ref 0–7)
ERYTHROCYTE [DISTWIDTH] IN BLOOD BY AUTOMATED COUNT: 16.3 % (ref 11.5–14.5)
FLUAV SUBTYP SPEC NAA+PROBE: NOT DETECTED
FLUBV RNA SPEC QL NAA+PROBE: NOT DETECTED
GLOBULIN SER CALC-MCNC: 5.5 G/DL (ref 2–4)
GLUCOSE BLD STRIP.AUTO-MCNC: 138 MG/DL (ref 65–100)
GLUCOSE SERPL-MCNC: 125 MG/DL (ref 65–100)
HADV DNA SPEC QL NAA+PROBE: NOT DETECTED
HCOV 229E RNA SPEC QL NAA+PROBE: NOT DETECTED
HCOV HKU1 RNA SPEC QL NAA+PROBE: NOT DETECTED
HCOV NL63 RNA SPEC QL NAA+PROBE: NOT DETECTED
HCOV OC43 RNA SPEC QL NAA+PROBE: NOT DETECTED
HCT VFR BLD AUTO: 29.2 % (ref 35–47)
HGB BLD-MCNC: 10 G/DL (ref 11.5–16)
HMPV RNA SPEC QL NAA+PROBE: NOT DETECTED
HPIV1 RNA SPEC QL NAA+PROBE: NOT DETECTED
HPIV2 RNA SPEC QL NAA+PROBE: NOT DETECTED
HPIV3 RNA SPEC QL NAA+PROBE: NOT DETECTED
HPIV4 RNA SPEC QL NAA+PROBE: NOT DETECTED
IMM GRANULOCYTES # BLD AUTO: 0 K/UL
IMM GRANULOCYTES NFR BLD AUTO: 0 %
LYMPHOCYTES # BLD: 1.2 K/UL (ref 0.8–3.5)
LYMPHOCYTES NFR BLD: 59 % (ref 12–49)
M PNEUMO DNA SPEC QL NAA+PROBE: NOT DETECTED
MCH RBC QN AUTO: 29.9 PG (ref 26–34)
MCHC RBC AUTO-ENTMCNC: 34.2 G/DL (ref 30–36.5)
MCV RBC AUTO: 87.2 FL (ref 80–99)
MONOCYTES # BLD: 0.2 K/UL (ref 0–1)
MONOCYTES NFR BLD: 11 % (ref 5–13)
NEUTS SEG # BLD: 0.6 K/UL (ref 1.8–8)
NEUTS SEG NFR BLD: 29 % (ref 32–75)
NRBC # BLD: 0 K/UL (ref 0–0.01)
NRBC BLD-RTO: 0 PER 100 WBC
PERFORMED BY:: ABNORMAL
PLATELET # BLD AUTO: 71 K/UL (ref 150–400)
PMV BLD AUTO: 11.1 FL (ref 8.9–12.9)
POTASSIUM SERPL-SCNC: 4 MMOL/L (ref 3.5–5.1)
PROT SERPL-MCNC: 7.8 G/DL (ref 6.4–8.2)
RBC # BLD AUTO: 3.35 M/UL (ref 3.8–5.2)
RBC MORPH BLD: ABNORMAL
RSV RNA SPEC QL NAA+PROBE: NOT DETECTED
RV+EV RNA SPEC QL NAA+PROBE: NOT DETECTED
SARS-COV-2 RNA RESP QL NAA+PROBE: NOT DETECTED
SODIUM SERPL-SCNC: 141 MMOL/L (ref 136–145)
WBC # BLD AUTO: 2 K/UL (ref 3.6–11)
WBC MORPH BLD: ABNORMAL

## 2024-07-20 PROCEDURE — 85025 COMPLETE CBC W/AUTO DIFF WBC: CPT

## 2024-07-20 PROCEDURE — 1100000000 HC RM PRIVATE

## 2024-07-20 PROCEDURE — 6370000000 HC RX 637 (ALT 250 FOR IP): Performed by: INTERNAL MEDICINE

## 2024-07-20 PROCEDURE — 36415 COLL VENOUS BLD VENIPUNCTURE: CPT

## 2024-07-20 PROCEDURE — 82140 ASSAY OF AMMONIA: CPT

## 2024-07-20 PROCEDURE — 86777 TOXOPLASMA ANTIBODY: CPT

## 2024-07-20 PROCEDURE — 80053 COMPREHEN METABOLIC PANEL: CPT

## 2024-07-20 PROCEDURE — 99232 SBSQ HOSP IP/OBS MODERATE 35: CPT | Performed by: INTERNAL MEDICINE

## 2024-07-20 PROCEDURE — 6360000002 HC RX W HCPCS: Performed by: PHYSICIAN ASSISTANT

## 2024-07-20 PROCEDURE — 94761 N-INVAS EAR/PLS OXIMETRY MLT: CPT

## 2024-07-20 PROCEDURE — 6370000000 HC RX 637 (ALT 250 FOR IP): Performed by: PHYSICIAN ASSISTANT

## 2024-07-20 PROCEDURE — 2580000003 HC RX 258: Performed by: PHYSICIAN ASSISTANT

## 2024-07-20 RX ADMIN — DIVALPROEX SODIUM 125 MG: 125 TABLET, DELAYED RELEASE ORAL at 08:52

## 2024-07-20 RX ADMIN — DIVALPROEX SODIUM 125 MG: 125 TABLET, DELAYED RELEASE ORAL at 21:58

## 2024-07-20 RX ADMIN — SODIUM CHLORIDE, PRESERVATIVE FREE 10 ML: 5 INJECTION INTRAVENOUS at 08:53

## 2024-07-20 RX ADMIN — RISPERIDONE 2 MG: 1 TABLET, FILM COATED ORAL at 21:59

## 2024-07-20 RX ADMIN — SODIUM CHLORIDE, PRESERVATIVE FREE 10 ML: 5 INJECTION INTRAVENOUS at 21:59

## 2024-07-20 RX ADMIN — ACETAMINOPHEN 650 MG: 325 TABLET ORAL at 04:12

## 2024-07-20 RX ADMIN — RISPERIDONE 2 MG: 1 TABLET, FILM COATED ORAL at 08:52

## 2024-07-20 RX ADMIN — BICTEGRAVIR SODIUM, EMTRICITABINE, AND TENOFOVIR ALAFENAMIDE FUMARATE 1 TABLET: 50; 200; 25 TABLET ORAL at 08:52

## 2024-07-20 RX ADMIN — ENOXAPARIN SODIUM 40 MG: 100 INJECTION SUBCUTANEOUS at 08:53

## 2024-07-20 RX ADMIN — FAMOTIDINE 20 MG: 20 TABLET, FILM COATED ORAL at 08:51

## 2024-07-20 RX ADMIN — DILTIAZEM HYDROCHLORIDE 120 MG: 120 CAPSULE, COATED, EXTENDED RELEASE ORAL at 08:52

## 2024-07-20 RX ADMIN — HYDRALAZINE HYDROCHLORIDE 10 MG: 20 INJECTION INTRAMUSCULAR; INTRAVENOUS at 16:47

## 2024-07-20 NOTE — PLAN OF CARE
Problem: Discharge Planning  Goal: Discharge to home or other facility with appropriate resources  7/20/2024 1104 by Otoniel Fish RN  Outcome: Progressing  Flowsheets (Taken 7/20/2024 0730)  Discharge to home or other facility with appropriate resources:   Identify barriers to discharge with patient and caregiver   Identify discharge learning needs (meds, wound care, etc)  7/19/2024 2256 by Rahel Pate RN  Outcome: Progressing  Flowsheets (Taken 7/19/2024 2007)  Discharge to home or other facility with appropriate resources:   Identify barriers to discharge with patient and caregiver   Arrange for needed discharge resources and transportation as appropriate   Identify discharge learning needs (meds, wound care, etc)     Problem: Safety - Adult  Goal: Free from fall injury  7/20/2024 1104 by Otoniel Fish RN  Outcome: Progressing  7/19/2024 2256 by Rahel Pate RN  Outcome: Progressing     Problem: Pain  Goal: Verbalizes/displays adequate comfort level or baseline comfort level  7/20/2024 1104 by Otoniel Fish RN  Outcome: Progressing  Flowsheets (Taken 7/20/2024 1049)  Verbalizes/displays adequate comfort level or baseline comfort level: Assess pain using appropriate pain scale  7/19/2024 2256 by Rahel Pate RN  Outcome: Progressing     Problem: Skin/Tissue Integrity  Goal: Absence of new skin breakdown  Description: 1.  Monitor for areas of redness and/or skin breakdown  2.  Assess vascular access sites hourly  3.  Every 4-6 hours minimum:  Change oxygen saturation probe site  4.  Every 4-6 hours:  If on nasal continuous positive airway pressure, respiratory therapy assess nares and determine need for appliance change or resting period.  7/20/2024 1104 by Otoniel Fish RN  Outcome: Progressing  7/19/2024 2256 by Rahel Pate RN  Outcome: Progressing

## 2024-07-20 NOTE — PLAN OF CARE
Problem: Discharge Planning  Goal: Discharge to home or other facility with appropriate resources  Outcome: Progressing     Problem: Safety - Adult  Goal: Free from fall injury  7/19/2024 2256 by Rahel Pate RN  Outcome: Progressing  7/19/2024 1035 by Otoniel Fish RN  Outcome: Progressing     Problem: Pain  Goal: Verbalizes/displays adequate comfort level or baseline comfort level  7/19/2024 2256 by Rahel Pate RN  Outcome: Progressing  7/19/2024 1035 by Otoniel Fish RN  Outcome: Progressing     Problem: Skin/Tissue Integrity  Goal: Absence of new skin breakdown  Description: 1.  Monitor for areas of redness and/or skin breakdown  2.  Assess vascular access sites hourly  3.  Every 4-6 hours minimum:  Change oxygen saturation probe site  4.  Every 4-6 hours:  If on nasal continuous positive airway pressure, respiratory therapy assess nares and determine need for appliance change or resting period.  7/19/2024 2256 by Rahel Pate RN  Outcome: Progressing  7/19/2024 1035 by Otoniel Fish, RN  Outcome: Progressing

## 2024-07-21 LAB
ALBUMIN SERPL-MCNC: 2.4 G/DL (ref 3.5–5)
ALBUMIN/GLOB SERPL: 0.4 (ref 1.1–2.2)
ALP SERPL-CCNC: 121 U/L (ref 45–117)
ALT SERPL-CCNC: 23 U/L (ref 12–78)
AMMONIA PLAS-SCNC: 41 UMOL/L
ANION GAP SERPL CALC-SCNC: 5 MMOL/L (ref 5–15)
AST SERPL W P-5'-P-CCNC: 48 U/L (ref 15–37)
BASOPHILS # BLD: 0 K/UL (ref 0–0.1)
BASOPHILS NFR BLD: 0 % (ref 0–1)
BILIRUB SERPL-MCNC: 0.4 MG/DL (ref 0.2–1)
BUN SERPL-MCNC: 17 MG/DL (ref 6–20)
BUN/CREAT SERPL: 13 (ref 12–20)
CA-I BLD-MCNC: 8.5 MG/DL (ref 8.5–10.1)
CHLORIDE SERPL-SCNC: 110 MMOL/L (ref 97–108)
CO2 SERPL-SCNC: 22 MMOL/L (ref 21–32)
CREAT SERPL-MCNC: 1.29 MG/DL (ref 0.55–1.02)
DIFFERENTIAL METHOD BLD: ABNORMAL
EOSINOPHIL # BLD: 0 K/UL (ref 0–0.4)
EOSINOPHIL NFR BLD: 0 % (ref 0–7)
ERYTHROCYTE [DISTWIDTH] IN BLOOD BY AUTOMATED COUNT: 17 % (ref 11.5–14.5)
GLOBULIN SER CALC-MCNC: 6.2 G/DL (ref 2–4)
GLUCOSE SERPL-MCNC: 121 MG/DL (ref 65–100)
HCT VFR BLD AUTO: 33.2 % (ref 35–47)
HGB BLD-MCNC: 11.1 G/DL (ref 11.5–16)
HIV1 RNA # SERPL NAA+PROBE: NORMAL COPIES/ML
HIV1 RNA SERPL NAA+PROBE-LOG#: 5.35 LOG10COPY/ML
IMM GRANULOCYTES # BLD AUTO: 0 K/UL (ref 0–0.04)
IMM GRANULOCYTES NFR BLD AUTO: 0 % (ref 0–0.5)
LYMPHOCYTES # BLD: 1.9 K/UL (ref 0.8–3.5)
LYMPHOCYTES NFR BLD: 74 % (ref 12–49)
MCH RBC QN AUTO: 29.9 PG (ref 26–34)
MCHC RBC AUTO-ENTMCNC: 33.4 G/DL (ref 30–36.5)
MCV RBC AUTO: 89.5 FL (ref 80–99)
MONOCYTES # BLD: 0.3 K/UL (ref 0–1)
MONOCYTES NFR BLD: 9 % (ref 5–13)
NEUTS SEG # BLD: 0.5 K/UL (ref 1.8–8)
NEUTS SEG NFR BLD: 17 % (ref 32–75)
NRBC # BLD: 0 K/UL (ref 0–0.01)
NRBC BLD-RTO: 0 PER 100 WBC
PLATELET # BLD AUTO: 71 K/UL (ref 150–400)
PMV BLD AUTO: 11.6 FL (ref 8.9–12.9)
POTASSIUM SERPL-SCNC: 3.9 MMOL/L (ref 3.5–5.1)
PROT SERPL-MCNC: 8.6 G/DL (ref 6.4–8.2)
RBC # BLD AUTO: 3.71 M/UL (ref 3.8–5.2)
SODIUM SERPL-SCNC: 137 MMOL/L (ref 136–145)
T GONDII IGG SERPL IA-ACNC: <3 IU/ML (ref 0–7.1)
WBC # BLD AUTO: 2.7 K/UL (ref 3.6–11)

## 2024-07-21 PROCEDURE — 6360000002 HC RX W HCPCS: Performed by: PHYSICIAN ASSISTANT

## 2024-07-21 PROCEDURE — 99232 SBSQ HOSP IP/OBS MODERATE 35: CPT | Performed by: INTERNAL MEDICINE

## 2024-07-21 PROCEDURE — 36415 COLL VENOUS BLD VENIPUNCTURE: CPT

## 2024-07-21 PROCEDURE — 6370000000 HC RX 637 (ALT 250 FOR IP): Performed by: INTERNAL MEDICINE

## 2024-07-21 PROCEDURE — 85025 COMPLETE CBC W/AUTO DIFF WBC: CPT

## 2024-07-21 PROCEDURE — 94761 N-INVAS EAR/PLS OXIMETRY MLT: CPT

## 2024-07-21 PROCEDURE — 82140 ASSAY OF AMMONIA: CPT

## 2024-07-21 PROCEDURE — 2580000003 HC RX 258: Performed by: PHYSICIAN ASSISTANT

## 2024-07-21 PROCEDURE — 80053 COMPREHEN METABOLIC PANEL: CPT

## 2024-07-21 PROCEDURE — 6370000000 HC RX 637 (ALT 250 FOR IP): Performed by: PHYSICIAN ASSISTANT

## 2024-07-21 PROCEDURE — 1100000000 HC RM PRIVATE

## 2024-07-21 RX ORDER — NICOTINE 21 MG/24HR
1 PATCH, TRANSDERMAL 24 HOURS TRANSDERMAL DAILY
Status: DISCONTINUED | OUTPATIENT
Start: 2024-07-21 | End: 2024-07-22 | Stop reason: HOSPADM

## 2024-07-21 RX ADMIN — SODIUM CHLORIDE, PRESERVATIVE FREE 10 ML: 5 INJECTION INTRAVENOUS at 20:26

## 2024-07-21 RX ADMIN — RISPERIDONE 2 MG: 1 TABLET, FILM COATED ORAL at 08:44

## 2024-07-21 RX ADMIN — DIVALPROEX SODIUM 125 MG: 125 TABLET, DELAYED RELEASE ORAL at 08:44

## 2024-07-21 RX ADMIN — HYDRALAZINE HYDROCHLORIDE 10 MG: 20 INJECTION INTRAMUSCULAR; INTRAVENOUS at 16:11

## 2024-07-21 RX ADMIN — RISPERIDONE 2 MG: 1 TABLET, FILM COATED ORAL at 20:25

## 2024-07-21 RX ADMIN — BICTEGRAVIR SODIUM, EMTRICITABINE, AND TENOFOVIR ALAFENAMIDE FUMARATE 1 TABLET: 50; 200; 25 TABLET ORAL at 08:45

## 2024-07-21 RX ADMIN — DIVALPROEX SODIUM 125 MG: 125 TABLET, DELAYED RELEASE ORAL at 20:25

## 2024-07-21 RX ADMIN — FAMOTIDINE 20 MG: 20 TABLET, FILM COATED ORAL at 08:44

## 2024-07-21 RX ADMIN — DILTIAZEM HYDROCHLORIDE 120 MG: 120 CAPSULE, COATED, EXTENDED RELEASE ORAL at 08:42

## 2024-07-21 RX ADMIN — SODIUM CHLORIDE, PRESERVATIVE FREE 10 ML: 5 INJECTION INTRAVENOUS at 08:44

## 2024-07-21 RX ADMIN — ENOXAPARIN SODIUM 40 MG: 100 INJECTION SUBCUTANEOUS at 08:44

## 2024-07-21 NOTE — CASE COMMUNICATION
Discharge noted:  Patient's facility states they do not accept patient back on weekends.  Possible Monday discharge.

## 2024-07-21 NOTE — PLAN OF CARE
Problem: Discharge Planning  Goal: Discharge to home or other facility with appropriate resources  7/21/2024 1024 by Maryellen Hernandez RN  Outcome: Progressing  7/20/2024 2301 by Rahel Pate RN  Outcome: Progressing  Flowsheets (Taken 7/20/2024 1920)  Discharge to home or other facility with appropriate resources:   Identify barriers to discharge with patient and caregiver   Arrange for needed discharge resources and transportation as appropriate   Identify discharge learning needs (meds, wound care, etc)     Problem: Safety - Adult  Goal: Free from fall injury  7/21/2024 1024 by Maryellen Hernandez RN  Outcome: Progressing  7/20/2024 2301 by Rahel Pate RN  Outcome: Progressing     Problem: Pain  Goal: Verbalizes/displays adequate comfort level or baseline comfort level  7/21/2024 1024 by Maryellen Hernandez RN  Outcome: Progressing  7/20/2024 2301 by Rahel Pate RN  Outcome: Progressing     Problem: Skin/Tissue Integrity  Goal: Absence of new skin breakdown  Description: 1.  Monitor for areas of redness and/or skin breakdown  2.  Assess vascular access sites hourly  3.  Every 4-6 hours minimum:  Change oxygen saturation probe site  4.  Every 4-6 hours:  If on nasal continuous positive airway pressure, respiratory therapy assess nares and determine need for appliance change or resting period.  7/21/2024 1024 by Maryellen Hernandez RN  Outcome: Progressing  7/20/2024 2301 by Rahel Pate RN  Outcome: Progressing

## 2024-07-21 NOTE — PLAN OF CARE
Problem: Discharge Planning  Goal: Discharge to home or other facility with appropriate resources  7/21/2024 1947 by Pascale Haywood RN  Outcome: Progressing  7/21/2024 1024 by Maryellen Hernandez RN  Outcome: Progressing     Problem: Safety - Adult  Goal: Free from fall injury  7/21/2024 1947 by Pascale Haywood RN  Outcome: Progressing  7/21/2024 1024 by Maryellen Hernandez RN  Outcome: Progressing     Problem: Pain  Goal: Verbalizes/displays adequate comfort level or baseline comfort level  7/21/2024 1947 by Pascale Haywood RN  Outcome: Progressing  7/21/2024 1024 by Maryellen Hernandez RN  Outcome: Progressing     Problem: Skin/Tissue Integrity  Goal: Absence of new skin breakdown  Description: 1.  Monitor for areas of redness and/or skin breakdown  2.  Assess vascular access sites hourly  3.  Every 4-6 hours minimum:  Change oxygen saturation probe site  4.  Every 4-6 hours:  If on nasal continuous positive airway pressure, respiratory therapy assess nares and determine need for appliance change or resting period.  7/21/2024 1947 by Pascale Haywood RN  Outcome: Progressing  7/21/2024 1024 by Maryellen Hernandez RN  Outcome: Progressing

## 2024-07-21 NOTE — PLAN OF CARE
Problem: Discharge Planning  Goal: Discharge to home or other facility with appropriate resources  7/20/2024 2301 by Rahel Pate RN  Outcome: Progressing  7/20/2024 1104 by Otoniel Fish RN  Outcome: Progressing  Flowsheets (Taken 7/20/2024 0730)  Discharge to home or other facility with appropriate resources:   Identify barriers to discharge with patient and caregiver   Identify discharge learning needs (meds, wound care, etc)     Problem: Safety - Adult  Goal: Free from fall injury  7/20/2024 2301 by Rahel Pate RN  Outcome: Progressing  7/20/2024 1104 by Otoniel Fish RN  Outcome: Progressing     Problem: Pain  Goal: Verbalizes/displays adequate comfort level or baseline comfort level  7/20/2024 2301 by Rahel Pate RN  Outcome: Progressing  7/20/2024 1104 by Otoniel Fish RN  Outcome: Progressing  Flowsheets (Taken 7/20/2024 1049)  Verbalizes/displays adequate comfort level or baseline comfort level: Assess pain using appropriate pain scale     Problem: Skin/Tissue Integrity  Goal: Absence of new skin breakdown  Description: 1.  Monitor for areas of redness and/or skin breakdown  2.  Assess vascular access sites hourly  3.  Every 4-6 hours minimum:  Change oxygen saturation probe site  4.  Every 4-6 hours:  If on nasal continuous positive airway pressure, respiratory therapy assess nares and determine need for appliance change or resting period.  7/20/2024 2301 by Rahel Pate RN  Outcome: Progressing  7/20/2024 1104 by Otoniel Fish RN  Outcome: Progressing

## 2024-07-22 VITALS
BODY MASS INDEX: 36.65 KG/M2 | TEMPERATURE: 97.3 F | HEART RATE: 94 BPM | OXYGEN SATURATION: 98 % | SYSTOLIC BLOOD PRESSURE: 155 MMHG | WEIGHT: 220 LBS | DIASTOLIC BLOOD PRESSURE: 85 MMHG | RESPIRATION RATE: 18 BRPM | HEIGHT: 65 IN

## 2024-07-22 PROBLEM — R41.82 ALTERED MENTAL STATUS: Status: RESOLVED | Noted: 2024-07-18 | Resolved: 2024-07-22

## 2024-07-22 LAB
ALBUMIN SERPL-MCNC: 2.5 G/DL (ref 3.5–5)
ALBUMIN/GLOB SERPL: 0.4 (ref 1.1–2.2)
ALP SERPL-CCNC: 127 U/L (ref 45–117)
ALT SERPL-CCNC: 25 U/L (ref 12–78)
ANION GAP SERPL CALC-SCNC: 4 MMOL/L (ref 5–15)
AST SERPL W P-5'-P-CCNC: 45 U/L (ref 15–37)
BASOPHILS # BLD AUTO: 0 X10E3/UL (ref 0–0.2)
BASOPHILS # BLD: 0 K/UL (ref 0–0.1)
BASOPHILS NFR BLD AUTO: 1 %
BASOPHILS NFR BLD: 0 % (ref 0–1)
BILIRUB SERPL-MCNC: 0.4 MG/DL (ref 0.2–1)
BUN SERPL-MCNC: 13 MG/DL (ref 6–20)
BUN/CREAT SERPL: 14 (ref 12–20)
CA-I BLD-MCNC: 8.6 MG/DL (ref 8.5–10.1)
CD3+CD4+ CELLS # BLD: 226 /UL (ref 359–1519)
CD3+CD4+ CELLS NFR BLD: 20.5 % (ref 30.8–58.5)
CHLORIDE SERPL-SCNC: 110 MMOL/L (ref 97–108)
CO2 SERPL-SCNC: 26 MMOL/L (ref 21–32)
CREAT SERPL-MCNC: 0.93 MG/DL (ref 0.55–1.02)
DIFFERENTIAL METHOD BLD: ABNORMAL
EOSINOPHIL # BLD AUTO: 0 X10E3/UL (ref 0–0.4)
EOSINOPHIL # BLD: 0 K/UL (ref 0–0.4)
EOSINOPHIL NFR BLD AUTO: 0 %
EOSINOPHIL NFR BLD: 0 % (ref 0–7)
ERYTHROCYTE [DISTWIDTH] IN BLOOD BY AUTOMATED COUNT: 15.9 % (ref 11.5–14.5)
ERYTHROCYTE [DISTWIDTH] IN BLOOD BY AUTOMATED COUNT: 16.8 % (ref 11.7–15.4)
GLOBULIN SER CALC-MCNC: 5.9 G/DL (ref 2–4)
GLUCOSE SERPL-MCNC: 89 MG/DL (ref 65–100)
GRAPH: NORMAL
HCT VFR BLD AUTO: 30.7 % (ref 35–47)
HCT VFR BLD AUTO: 33.5 % (ref 34–46.6)
HCV RNA SERPL NAA+PROBE-ACNC: NORMAL IU/ML
HCV RNA SERPL NAA+PROBE-LOG IU: NORMAL LOG10 IU/ML
HGB BLD-MCNC: 10.5 G/DL (ref 11.5–16)
HGB BLD-MCNC: 11.2 G/DL (ref 11.1–15.9)
IMM GRANULOCYTES # BLD AUTO: 0 K/UL
IMM GRANULOCYTES # BLD: 0 X10E3/UL (ref 0–0.1)
IMM GRANULOCYTES NFR BLD AUTO: 0 %
IMM GRANULOCYTES NFR BLD: 1 %
IMMATURE CELLS: ABNORMAL
LYMPHOCYTES # BLD AUTO: 1.1 X10E3/UL (ref 0.7–3.1)
LYMPHOCYTES # BLD: 1.3 K/UL (ref 0.8–3.5)
LYMPHOCYTES NFR BLD AUTO: 55 %
LYMPHOCYTES NFR BLD: 59 % (ref 12–49)
MCH RBC QN AUTO: 29.5 PG (ref 26.6–33)
MCH RBC QN AUTO: 29.6 PG (ref 26–34)
MCHC RBC AUTO-ENTMCNC: 33.4 G/DL (ref 31.5–35.7)
MCHC RBC AUTO-ENTMCNC: 34.2 G/DL (ref 30–36.5)
MCV RBC AUTO: 86.5 FL (ref 80–99)
MCV RBC AUTO: 88 FL (ref 79–97)
MONOCYTES # BLD AUTO: 0.2 X10E3/UL (ref 0.1–0.9)
MONOCYTES # BLD: 0.3 K/UL (ref 0–1)
MONOCYTES NFR BLD AUTO: 10 %
MONOCYTES NFR BLD: 12 % (ref 5–13)
MORPHOLOGY BLD-IMP: ABNORMAL
NEUTROPHILS # BLD AUTO: 0.6 X10E3/UL (ref 1.4–7)
NEUTROPHILS NFR BLD AUTO: 33 %
NEUTS BAND NFR BLD MANUAL: 2 % (ref 0–6)
NEUTS SEG # BLD: 0.7 K/UL (ref 1.8–8)
NEUTS SEG NFR BLD: 27 % (ref 32–75)
NRBC # BLD: 0 K/UL (ref 0–0.01)
NRBC BLD AUTO-RTO: ABNORMAL %
NRBC BLD-RTO: 0 PER 100 WBC
PLATELET # BLD AUTO: 77 K/UL (ref 150–400)
PLATELET # BLD AUTO: 95 X10E3/UL (ref 150–450)
PMV BLD AUTO: 10.7 FL (ref 8.9–12.9)
POTASSIUM SERPL-SCNC: 3.6 MMOL/L (ref 3.5–5.1)
PROT SERPL-MCNC: 8.4 G/DL (ref 6.4–8.2)
RBC # BLD AUTO: 3.55 M/UL (ref 3.8–5.2)
RBC # BLD AUTO: 3.8 X10E6/UL (ref 3.77–5.28)
RBC MORPH BLD: ABNORMAL
RBC MORPH BLD: ABNORMAL
SODIUM SERPL-SCNC: 140 MMOL/L (ref 136–145)
TEST INFORMATION: NORMAL
WBC # BLD AUTO: 1.9 X10E3/UL (ref 3.4–10.8)
WBC # BLD AUTO: 2.3 K/UL (ref 3.6–11)

## 2024-07-22 PROCEDURE — 36415 COLL VENOUS BLD VENIPUNCTURE: CPT

## 2024-07-22 PROCEDURE — 80053 COMPREHEN METABOLIC PANEL: CPT

## 2024-07-22 PROCEDURE — 87901 NFCT AGT GNTYP ALYS HIV1 REV: CPT

## 2024-07-22 PROCEDURE — 85025 COMPLETE CBC W/AUTO DIFF WBC: CPT

## 2024-07-22 PROCEDURE — 6370000000 HC RX 637 (ALT 250 FOR IP): Performed by: INTERNAL MEDICINE

## 2024-07-22 PROCEDURE — 6370000000 HC RX 637 (ALT 250 FOR IP): Performed by: PHYSICIAN ASSISTANT

## 2024-07-22 PROCEDURE — 6360000002 HC RX W HCPCS: Performed by: PHYSICIAN ASSISTANT

## 2024-07-22 PROCEDURE — 2580000003 HC RX 258: Performed by: PHYSICIAN ASSISTANT

## 2024-07-22 RX ORDER — HYDRALAZINE HYDROCHLORIDE 10 MG/1
10 TABLET, FILM COATED ORAL EVERY 12 HOURS SCHEDULED
Status: DISCONTINUED | OUTPATIENT
Start: 2024-07-22 | End: 2024-07-22 | Stop reason: HOSPADM

## 2024-07-22 RX ORDER — HYDRALAZINE HYDROCHLORIDE 10 MG/1
10 TABLET, FILM COATED ORAL EVERY 12 HOURS SCHEDULED
Qty: 60 TABLET | Refills: 0 | Status: SHIPPED | OUTPATIENT
Start: 2024-07-22 | End: 2024-08-21

## 2024-07-22 RX ADMIN — ENOXAPARIN SODIUM 40 MG: 100 INJECTION SUBCUTANEOUS at 08:19

## 2024-07-22 RX ADMIN — HYDRALAZINE HYDROCHLORIDE 10 MG: 20 INJECTION INTRAMUSCULAR; INTRAVENOUS at 04:11

## 2024-07-22 RX ADMIN — RISPERIDONE 2 MG: 1 TABLET, FILM COATED ORAL at 08:18

## 2024-07-22 RX ADMIN — DIVALPROEX SODIUM 125 MG: 125 TABLET, DELAYED RELEASE ORAL at 08:18

## 2024-07-22 RX ADMIN — HYDRALAZINE HYDROCHLORIDE 10 MG: 10 TABLET, FILM COATED ORAL at 08:18

## 2024-07-22 RX ADMIN — FAMOTIDINE 20 MG: 20 TABLET, FILM COATED ORAL at 08:18

## 2024-07-22 RX ADMIN — BICTEGRAVIR SODIUM, EMTRICITABINE, AND TENOFOVIR ALAFENAMIDE FUMARATE 1 TABLET: 50; 200; 25 TABLET ORAL at 08:18

## 2024-07-22 RX ADMIN — SODIUM CHLORIDE, PRESERVATIVE FREE 10 ML: 5 INJECTION INTRAVENOUS at 08:18

## 2024-07-22 RX ADMIN — DILTIAZEM HYDROCHLORIDE 120 MG: 120 CAPSULE, COATED, EXTENDED RELEASE ORAL at 08:18

## 2024-07-22 NOTE — PROGRESS NOTES
Hospitalist Progress Note    NAME:   Darcy Arthur   : 1960   MRN: [id number]     Date/Time: 2024 1:55 PM  Patient PCP: Carol Bingham APRN - NP    Estimated discharge date:24 hours  Barriers: ID clearance, Facility to evaluate baseline      Assessment / Plan:    Altered mental status suspicious for infectious source  Viral etiology with a history of HIV  Reported cough  Lymphocytes elevated with neutropenia  ID consultation  COVID neg  Respiratory panel ordered  Continue prophylactic Bactrim which she takes every   Continue Biktarvy   Continue vancomycin  Continue Rocephin  Consider LP if no improvement  CT of the head no Acute process     Schizophrenia  Continue risperidone  Continue Depakote     Essential hypertension  Continue diltiazem  Hold lisinopril     RAYO  Baseline creatinine 1.2  Current creatinine 1.89->1.38  Hold lisinopril  Gentle IV fluids     Autoimmune disorder  Appears stable      Medical Decision Making     [x] High (any 2)     A. Problems (any 1)  [x] Acute/Chronic Illness/injury posing threat to life or bodily function:    [] Severe exacerbation of chronic illness:    ---------------------------------------------------------------------  B. Risk of Treatment (any 1)   [x] Drugs/treatments that require intensive monitoring for toxicity include:    [x] IV ABX requiring serial renal monitoring for nephrotoxicity:     [] IV Narcotic analgesia for adverse drug reaction  [] Aggressive IV diuresis requiring serial monitoring for renal impairment and electrolyte derangements  [x] Critical electrolyte abnormalities requiring IV replacement and close serial monitoring  [] Insulin - monitoring serial FSBS for Hypoglycemic adverse drug reaction  [] Other -   [] Change in code status:    [] Decision to escalate care:    [] Major surgery/procedure with associated risk factors:     ----------------------------------------------------------------------  C. Data (any 
4 Eyes Skin Assessment     NAME:  Darcy Arthur  YOB: 1960  MEDICAL RECORD NUMBER:  [id number]    The patient is being assessed for  Admission    I agree that at least one RN has performed a thorough Head to Toe Skin Assessment on the patient. ALL assessment sites listed below have been assessed.      Areas assessed by both nurses:    Head, Face, Ears, Shoulders, Back, Chest, Arms, Elbows, Hands, Sacrum. Buttock, Coccyx, Ischium, Legs. Feet and Heels, and Under Medical Devices         Does the Patient have a Wound? No noted wound(s)       Wesly Prevention initiated by RN: Yes  Wound Care Orders initiated by RN: No    Pressure Injury (Stage 3,4, Unstageable, DTI, NWPT, and Complex wounds) if present, place Wound referral order by RN under : No    New Ostomies, if present place, Ostomy referral order under : No     Nurse 1 eSignature: Electronically signed by Ewelina Dinero RN on 7/19/24 at 2:49 AM EDT    **SHARE this note so that the co-signing nurse can place an eSignature**    Nurse 2 eSignature: Electronically signed by Yeni Butcher RN on 7/19/24 at 2:50 AM EDT    
Discharge instructions and paperwork given at the bedside to the patient. Verified Pharmacy of choice with the patient . Patient cleared by attending Dr. Nikunj Sierra PA, Dr. Michelle ID  Patient Alert and oriented X2, Patient groomed and dressed assisted by staff members. peripheral IV removed, No complicated noted, Catheter intact and dressing placed. Vital signs stable at this time. No question or concerns from the the patient. Contacted twice at [phone] to [facility] No Answer and voicemail is full.    Patient escorted via  stretcher by [facility] transportation at 1145. All personal belongs given to the patient.    
[facility] Formulary: Substitution Information  All Therapeutic Interchanges are equivalent frequency unless otherwise noted.  Non-Formulary Medication Formulary Interchange   Divalproex  mg daily Divalproex DR 125mg BID     
Progress Note  Date:2024       Room:[facility] [place]  Patient Name:Darcy Arthur     YOB: 1960     Age:63 y.o.        Subjective    Subjective  Patient with longstanding HIV infection and Hepatitis C infection, admitted because of altered mental status, but she is apparently back at her baseline.  Her serum ammonia level was elevated.  CNS opportunistic infection felt to be unlikely.     Objective         Vitals Last 24 Hours:  TEMPERATURE:  Temp  Av.8 °F (37.1 °C)  Min: 98.2 °F (36.8 °C)  Max: 99.5 °F (37.5 °C)  RESPIRATIONS RANGE: Resp  Av.3  Min: 16  Max: 18  PULSE OXIMETRY RANGE: SpO2  Av.3 %  Min: 96 %  Max: 100 %  PULSE RANGE: Pulse  Av.3  Min: 78  Max: 103  BLOOD PRESSURE RANGE: Systolic (24hrs), Av , Min:151 , Max:189   ; Diastolic (24hrs), Av, Min:61, Max:106       Objective  Vitals and nursing note reviewed.   Constitutional:       Appearance: She is ill-appearing.   HENT: unremarkable   Eyes:      Extraocular Movements: Extraocular movements intact.      Pupils: Pupils are equal, round, and reactive to light.   Cardiovascular:      Rate and Rhythm: Normal rate and regular rhythm.      Heart sounds: No murmur heard.  Pulmonary:      Effort: Pulmonary effort is normal.      Breath sounds: Normal breath sounds.   Abdominal:      General: There is no distension.      Palpations: Abdomen is soft.      Tenderness: There is no abdominal tenderness.   Genitourinary:     Comments: No Cortes  Musculoskeletal:      Cervical back: Neck supple.      Right lower leg: No edema.      Left lower leg: No edema.   Skin:     Findings: No rash.   Neurological:  nonfocal, mild confusion  Psychiatric:         Behavior: Behavior normal.      Labs/Imaging/Diagnostics    Labs:  CBC:  Recent Labs     24  1408 24  0858 24  0503   WBC 1.8* PENDING  1.9* 2.0*   RBC 3.85 PENDING  3.84 3.35*   HGB 11.6 PENDING  11.5 10.0*   HCT 33.8* PENDING  35.2 29.2*   MCV 87.8 PENDING  91.7 
Progress Note  Date:2024       Room:[facility]  Patient Name:Darcy Arthur     YOB: 1960     Age:63 y.o.        Subjective    Subjective  Patient with longstanding HIV infection and Hepatitis C infection, admitted because of altered mental status, but she is apparently back at her baseline.  Her serum ammonia level was elevated and now decreased.  CNS opportunistic infection felt to be unlikely. Respiratory virus panel negative.  HIV-1 RNA markedly elevated.     Objective         Vitals Last 24 Hours:  TEMPERATURE:  Temp  Av.6 °F (37 °C)  Min: 97.3 °F (36.3 °C)  Max: 99.1 °F (37.3 °C)  RESPIRATIONS RANGE: Resp  Av  Min: 16  Max: 18  PULSE OXIMETRY RANGE: SpO2  Av %  Min: 96 %  Max: 100 %  PULSE RANGE: Pulse  Av.5  Min: 81  Max: 91  BLOOD PRESSURE RANGE: Systolic (24hrs), Av , Min:150 , Max:189   ; Diastolic (24hrs), Av, Min:91, Max:106       Objective:  Vital signs: (most recent): Blood pressure (!) 150/91, pulse 91, temperature 97.3 °F (36.3 °C), temperature source Oral, resp. rate 18, height 1.651 m (5' 5\"), weight 99.8 kg (220 lb), SpO2 98 %.      Vitals and nursing note reviewed.   Constitutional:       Appearance: She is ill-appearing.   HENT: unremarkable   Eyes:      Extraocular Movements: Extraocular movements intact.      Pupils: Pupils are equal, round, and reactive to light.   Cardiovascular:      Rate and Rhythm: Normal rate and regular rhythm.      Heart sounds: No murmur heard.  Pulmonary:      Effort: Pulmonary effort is normal.      Breath sounds: Normal breath sounds.   Abdominal:      General: There is no distension.      Palpations: Abdomen is soft.      Tenderness: There is no abdominal tenderness.   Genitourinary:     Comments: No Cortes  Musculoskeletal:      Cervical back: Neck supple.      Right lower leg: No edema.      Left lower leg: No edema.   Skin:     Findings: No rash.   Neurological:  nonfocal, mild confusion  Psychiatric:         Behavior: 
Vancomycin Dosing Consult  Darcy Arthur is a 63 y.o. female with sepsis. Pharmacy was consulted by Dr. Seipp to dose and monitor vancomycin. Today is day 1.    Antibiotic regimen: Vancomycin + Rocephin    Temp (24hrs), Av.5 °F (36.9 °C), Min:98.5 °F (36.9 °C), Max:98.5 °F (36.9 °C)    Recent Labs     24  1406 24  1408   WBC  --  1.8*   CREATININE 1.61* 1.89*   BUN  --  26*     Est CrCl: 36 mL/min. RAYO  Concomitant nephrotoxic drugs: None    Cultures:    Blood cultures x 2- pending    MRSA Swab: Pending    Target range: Re-dose for random level <15 mcg/mL    Recent level history:  Date/Time Dose & Interval Measured Level (mcg/mL) Associated AUC/LINDA              Assessment/Plan:   Elevated pro-calcitonin  LD Vancomycin IV 2500 mg x 1 given in ED  RAYO, pulse dose for now.  Level scheduled for  @1700  BMP ordered through   Antimicrobial stop date 7 days per consult      
critical care TIME:  35  Minutes    Total CRITICAL CARE TIME Spent:   Minutes non procedure based      Comments   >50% of visit spent in counseling and coordination of care     ________________________________________________________________________  Pawan Soto PA-C     Procedures: see electronic medical records for all procedures/Xrays and details which were not copied into this note but were reviewed prior to creation of Plan.      LABS:  I reviewed today's most current labs and imaging studies.  Pertinent labs include:  Recent Labs     07/18/24  1408 07/19/24  0858 07/20/24  0503   WBC 1.8* 1.9* 2.0*   HGB 11.6 11.5 10.0*   HCT 33.8* 35.2 29.2*   PLT 91* 81* 71*       Recent Labs     07/18/24  1408 07/19/24  0858 07/20/24  0503   * 141 141   K Hemolyzed, Recollection Recommended 4.1 4.0    113* 112*   CO2 28 24 24   BUN 26* 21* 17   ALT 28  --  21         Signed: Pawan Soto PA-C

## 2024-07-22 NOTE — PLAN OF CARE
Problem: Discharge Planning  Goal: Discharge to home or other facility with appropriate resources  7/22/2024 0744 by Maryellen Hernandez RN  Outcome: Progressing  7/21/2024 1947 by Pascale Haywood RN  Outcome: Progressing     Problem: Safety - Adult  Goal: Free from fall injury  7/22/2024 0744 by Maryellen Hernandez RN  Outcome: Progressing  7/21/2024 1947 by Pascale Haywood RN  Outcome: Progressing     Problem: Pain  Goal: Verbalizes/displays adequate comfort level or baseline comfort level  7/22/2024 0744 by Maryellen Hernandez RN  Outcome: Progressing  7/21/2024 1947 by Pascale Haywood RN  Outcome: Progressing     Problem: Skin/Tissue Integrity  Goal: Absence of new skin breakdown  Description: 1.  Monitor for areas of redness and/or skin breakdown  2.  Assess vascular access sites hourly  3.  Every 4-6 hours minimum:  Change oxygen saturation probe site  4.  Every 4-6 hours:  If on nasal continuous positive airway pressure, respiratory therapy assess nares and determine need for appliance change or resting period.  7/22/2024 0744 by Maryellen Hernandez RN  Outcome: Progressing  7/21/2024 1947 by Pascale Haywood RN  Outcome: Progressing

## 2024-07-22 NOTE — DISCHARGE SUMMARY
Discharge Summary    Name: Darcy Arthur  [id number]  YOB: 1960 (Age: 63 y.o.)   Date of Admission: 7/18/2024  Date of Discharge: 7/22/2024  Attending Physician: Man Gould MD    Discharge Diagnosis:   Principal Problem (Resolved):    Altered mental status  Active Problems:    * No active hospital problems. *  Hepatic encephalopathy  Thrombocytopenia  Leukopenia  HIV  Chronic hepatitis C untreated  Schizophrenia  Essential hypertension  Acute kidney injury  Metabolic encephalopathy  Autoimmune disorder      Consultations:  IP CONSULT TO INFECTIOUS DISEASES    Brief Hospital Course by Main Problems:   63-year-old female admitted on 7/18/2024 with altered mental status and history of HIV, essential hypertension, schizophrenia and autoimmune disorder who also presented with an RAYO. Her mentation has improved and is back to baseline. She was found to have a slightly elevated ammonia although is probably chronic. Most likely her altered mental status was related to metabolic encephalopathy. Of note at time of admission patient was found to be leukopenic and neutropenic. ID consultation and patient apparently has been compliant with medications. Respiratory panel negative. CD4 count was low at 20.5, CD4 helper count 226.  Toxoplasmosis was normal.  Etiology for the leukopenia is unclear.  Is most likely viral either HIV versus the hepatitis C chronic infection.  Patient will follow-up with Dr. Michelle in approximately 2 weeks and she will need ongoing treatment for the hepatitis C as she is currently naïve in treatment.  She will continue her previously prescribed medications.  She is medically cleared for discharge.  Patient was empirically treated with vancomycin and ceftriaxone which was discontinued after no obvious infectious source was noted.  If leukopenia does not continue to improve patient may benefit from hematology evaluation as an outpatient.  
BEV                  DISPOSITION:    Home with Family:       Home with HH/PT/OT/RN:    SNF/LTC:    [facility]:    OTHER: [facility]           Code status:   Recommended diet: regular diet  Recommended activity: activity as tolerated  Wound care: None      Follow up with:   PCP : Carol Bingham APRN - NP Braxton, Hannah, APRN - NP  [address]  [facility] [address] [phone]    Follow up in 1 week(s)      Bao Michelle MD  [address] [place] [id number] [phone]    Follow up in 2 week(s)            Total time in minutes spent coordinating this discharge (includes going over instructions, follow-up, prescriptions, and preparing report for sign off to her PCP) :  35 minutes

## 2024-07-22 NOTE — CARE COORDINATION
1102: CM faxed DC summary to [facility] at [phone] as requested by  Jerry.     Transition of Care Plan:    RUR: 12%  Prior Level of Functioning: requires some assistance  Disposition: return to halfway at [facility]  If SNF or IPR: Date FOC offered: n/a  Date FOC received: n/a  Accepting facility: [facility]  Date authorization started with reference number: n/a  Date authorization received and expires: n/a  Follow up appointments: unit secretary to setup as needed  DME needed: none  Transportation at discharge: medicaid transport  IM/IMM Medicare/ letter given: n/a, medicaid  Is patient a  and connected with VA? no  If yes, was  transfer form completed and VA notified? N/a  Caregiver Contact:  Jerry  Discharge Caregiver contacted prior to discharge? yes  Care Conference needed? no  Barriers to discharge: none

## 2024-07-24 LAB
BACTERIA SPEC CULT: NORMAL
BACTERIA SPEC CULT: NORMAL
Lab: NORMAL
Lab: NORMAL

## 2024-07-28 LAB
HIV RT+PR MUT DET ISLT: NORMAL
HIV RT+PR MUT DET ISLT: NORMAL

## 2024-07-29 LAB
Lab: NORMAL
Lab: NORMAL
REFERENCE LAB: NORMAL

## 2024-08-05 ENCOUNTER — HOSPITAL ENCOUNTER (EMERGENCY)
Facility: HOSPITAL | Age: 64
Discharge: HOME | End: 2024-08-05
Attending: EMERGENCY MEDICINE
Payer: MEDICAID

## 2024-08-05 VITALS
TEMPERATURE: 97.9 F | WEIGHT: 145 LBS | DIASTOLIC BLOOD PRESSURE: 82 MMHG | BODY MASS INDEX: 24.13 KG/M2 | SYSTOLIC BLOOD PRESSURE: 133 MMHG | RESPIRATION RATE: 16 BRPM | OXYGEN SATURATION: 100 % | HEART RATE: 84 BPM

## 2024-08-05 DIAGNOSIS — G40.919 BREAKTHROUGH SEIZURE (HCC): Primary | ICD-10-CM

## 2024-08-05 LAB — VALPROATE SERPL-MCNC: 109 UG/ML (ref 50–100)

## 2024-08-05 PROCEDURE — 80164 ASSAY DIPROPYLACETIC ACD TOT: CPT

## 2024-08-05 PROCEDURE — 36415 COLL VENOUS BLD VENIPUNCTURE: CPT

## 2024-08-05 PROCEDURE — 99283 EMERGENCY DEPT VISIT LOW MDM: CPT

## 2024-08-05 PROCEDURE — 6370000000 HC RX 637 (ALT 250 FOR IP): Performed by: EMERGENCY MEDICINE

## 2024-08-05 RX ORDER — VALPROIC ACID 250 MG/5ML
500 SOLUTION ORAL ONCE
Status: COMPLETED | OUTPATIENT
Start: 2024-08-05 | End: 2024-08-05

## 2024-08-05 RX ADMIN — VALPROIC ACID 500 MG: 250 SOLUTION ORAL at 12:53

## 2024-08-05 ASSESSMENT — PAIN - FUNCTIONAL ASSESSMENT
PAIN_FUNCTIONAL_ASSESSMENT: NONE - DENIES PAIN
PAIN_FUNCTIONAL_ASSESSMENT: NONE - DENIES PAIN

## 2024-08-05 NOTE — ED PROVIDER NOTES
[facility] EMERGENCY DEPT  EMERGENCY DEPARTMENT HISTORY AND PHYSICAL EXAM      Date: 8/5/2024  Patient Name: Dacry Arthur  MRN: [id number]  Birthdate 1960  Date of evaluation: 8/5/2024  Provider: Robert Armijo MD   Note Started: 11:38 AM EDT 8/5/24    HISTORY OF PRESENT ILLNESS     Chief Complaint   Patient presents with    Seizures     Patient was at day care seating and started to have a seizure laying against the wall. Patient dis not fall. Patient has h/o seizures. Here for further evaluation.       History Provided By: Patient EMS    HPI: Darcy Arthur is a 63 y.o. female with a history of alcohol abuse, hepatitis C, seizure disorder, schizophrenia presenting by EMS for seizure.  Patient states that she was at  and she started to feel funny typically when her seizures started.  EMS reported that she had a seizure laying against the wall.  Did not fall no head trauma.  Has a history of this and is on Depakote.  Has been taking her medications.  States that she feels completely fine now.  Glucose was normal in route    PAST MEDICAL HISTORY   Past Medical History:  Past Medical History:   Diagnosis Date    Alcohol abuse 5/29/2017    Autoimmune disease (HCC)     Hepatitis C     History of seizure     HIV (human immunodeficiency virus infection) (HCC)     Hypertension     Psychotic disorder (HCC)     Schizophrenia (HCC)        Past Surgical History:  No past surgical history on file.    Family History:  Family History   Family history unknown: Yes       Social History:  Social History     Tobacco Use    Smoking status: Every Day     Current packs/day: 0.50     Types: Cigarettes    Smokeless tobacco: Never   Vaping Use    Vaping Use: Never used   Substance Use Topics    Alcohol use: Not Currently     Alcohol/week: 0.8 standard drinks of alcohol     Types: 1 Standard drinks or equivalent per week    Drug use: Yes     Types: Cocaine, Marijuana (Weed)       Allergies:  No Known Allergies    PCP: Carol Bingham APRN

## 2024-08-14 ENCOUNTER — APPOINTMENT (OUTPATIENT)
Facility: HOSPITAL | Age: 64
DRG: 469 | End: 2024-08-14
Payer: MEDICAID

## 2024-08-14 ENCOUNTER — HOSPITAL ENCOUNTER (INPATIENT)
Facility: HOSPITAL | Age: 64
LOS: 5 days | Discharge: NURSING, REHAB OR LONG-TERM CARE | DRG: 469 | End: 2024-08-19
Attending: EMERGENCY MEDICINE | Admitting: INTERNAL MEDICINE
Payer: MEDICAID

## 2024-08-14 ENCOUNTER — HOSPITAL ENCOUNTER (EMERGENCY)
Facility: HOSPITAL | Age: 64
Discharge: HOME | DRG: 469 | End: 2024-08-14
Attending: STUDENT IN AN ORGANIZED HEALTH CARE EDUCATION/TRAINING PROGRAM
Payer: MEDICAID

## 2024-08-14 VITALS
WEIGHT: 200 LBS | RESPIRATION RATE: 18 BRPM | TEMPERATURE: 97.8 F | OXYGEN SATURATION: 97 % | HEIGHT: 67 IN | DIASTOLIC BLOOD PRESSURE: 63 MMHG | SYSTOLIC BLOOD PRESSURE: 107 MMHG | BODY MASS INDEX: 31.39 KG/M2 | HEART RATE: 86 BPM

## 2024-08-14 DIAGNOSIS — U07.1 COVID: Primary | ICD-10-CM

## 2024-08-14 DIAGNOSIS — N17.9 AKI (ACUTE KIDNEY INJURY) (HCC): ICD-10-CM

## 2024-08-14 DIAGNOSIS — G62.9 NEUROPATHY: Primary | ICD-10-CM

## 2024-08-14 PROBLEM — G93.40 ENCEPHALOPATHY: Status: ACTIVE | Noted: 2024-08-14

## 2024-08-14 PROBLEM — G93.40 ENCEPHALOPATHY ACUTE: Status: ACTIVE | Noted: 2024-08-14

## 2024-08-14 LAB
ALBUMIN SERPL-MCNC: 2.8 G/DL (ref 3.5–5)
ALBUMIN/GLOB SERPL: 0.5 (ref 1.1–2.2)
ALP SERPL-CCNC: 92 U/L (ref 45–117)
ALT SERPL-CCNC: 22 U/L (ref 12–78)
AMMONIA PLAS-SCNC: 70 UMOL/L
ANION GAP SERPL CALC-SCNC: 5 MMOL/L (ref 5–15)
AST SERPL W P-5'-P-CCNC: 42 U/L (ref 15–37)
BASOPHILS # BLD: 0 K/UL (ref 0–0.1)
BASOPHILS NFR BLD: 0 % (ref 0–1)
BILIRUB SERPL-MCNC: 0.5 MG/DL (ref 0.2–1)
BUN SERPL-MCNC: 23 MG/DL (ref 6–20)
BUN/CREAT SERPL: 10 (ref 12–20)
CA-I BLD-MCNC: 8.8 MG/DL (ref 8.5–10.1)
CHLORIDE SERPL-SCNC: 106 MMOL/L (ref 97–108)
CO2 SERPL-SCNC: 26 MMOL/L (ref 21–32)
CREAT SERPL-MCNC: 2.21 MG/DL (ref 0.55–1.02)
DIFFERENTIAL METHOD BLD: ABNORMAL
EOSINOPHIL # BLD: 0.1 K/UL (ref 0–0.4)
EOSINOPHIL NFR BLD: 4 % (ref 0–7)
ERYTHROCYTE [DISTWIDTH] IN BLOOD BY AUTOMATED COUNT: 16 % (ref 11.5–14.5)
FLUAV RNA SPEC QL NAA+PROBE: NOT DETECTED
FLUBV RNA SPEC QL NAA+PROBE: NOT DETECTED
GLOBULIN SER CALC-MCNC: 6.2 G/DL (ref 2–4)
GLUCOSE SERPL-MCNC: 80 MG/DL (ref 65–100)
HCT VFR BLD AUTO: 29.4 % (ref 35–47)
HGB BLD-MCNC: 10.1 G/DL (ref 11.5–16)
IMM GRANULOCYTES # BLD AUTO: 0 K/UL (ref 0–0.04)
IMM GRANULOCYTES NFR BLD AUTO: 0 % (ref 0–0.5)
LYMPHOCYTES # BLD: 1 K/UL (ref 0.8–3.5)
LYMPHOCYTES NFR BLD: 40 % (ref 12–49)
MCH RBC QN AUTO: 29.8 PG (ref 26–34)
MCHC RBC AUTO-ENTMCNC: 34.4 G/DL (ref 30–36.5)
MCV RBC AUTO: 86.7 FL (ref 80–99)
MONOCYTES # BLD: 0.3 K/UL (ref 0–1)
MONOCYTES NFR BLD: 13 % (ref 5–13)
NEUTS SEG # BLD: 1 K/UL (ref 1.8–8)
NEUTS SEG NFR BLD: 43 % (ref 32–75)
NRBC # BLD: 0 K/UL (ref 0–0.01)
NRBC BLD-RTO: 0 PER 100 WBC
PLATELET # BLD AUTO: 90 K/UL (ref 150–400)
PMV BLD AUTO: 10.1 FL (ref 8.9–12.9)
POTASSIUM SERPL-SCNC: 4.4 MMOL/L (ref 3.5–5.1)
PROT SERPL-MCNC: 9 G/DL (ref 6.4–8.2)
RBC # BLD AUTO: 3.39 M/UL (ref 3.8–5.2)
RBC MORPH BLD: ABNORMAL
SARS-COV-2 RNA RESP QL NAA+PROBE: DETECTED
SODIUM SERPL-SCNC: 137 MMOL/L (ref 136–145)
TROPONIN I SERPL HS-MCNC: 20 NG/L (ref 0–51)
WBC # BLD AUTO: 2.4 K/UL (ref 3.6–11)

## 2024-08-14 PROCEDURE — 99285 EMERGENCY DEPT VISIT HI MDM: CPT

## 2024-08-14 PROCEDURE — 82140 ASSAY OF AMMONIA: CPT

## 2024-08-14 PROCEDURE — 82607 VITAMIN B-12: CPT

## 2024-08-14 PROCEDURE — 99282 EMERGENCY DEPT VISIT SF MDM: CPT

## 2024-08-14 PROCEDURE — 85025 COMPLETE CBC W/AUTO DIFF WBC: CPT

## 2024-08-14 PROCEDURE — 94761 N-INVAS EAR/PLS OXIMETRY MLT: CPT

## 2024-08-14 PROCEDURE — 96360 HYDRATION IV INFUSION INIT: CPT

## 2024-08-14 PROCEDURE — 1100000000 HC RM PRIVATE

## 2024-08-14 PROCEDURE — 87636 SARSCOV2 & INF A&B AMP PRB: CPT

## 2024-08-14 PROCEDURE — 84484 ASSAY OF TROPONIN QUANT: CPT

## 2024-08-14 PROCEDURE — 70450 CT HEAD/BRAIN W/O DYE: CPT

## 2024-08-14 PROCEDURE — 84439 ASSAY OF FREE THYROXINE: CPT

## 2024-08-14 PROCEDURE — 80053 COMPREHEN METABOLIC PANEL: CPT

## 2024-08-14 PROCEDURE — 84443 ASSAY THYROID STIM HORMONE: CPT

## 2024-08-14 PROCEDURE — 36415 COLL VENOUS BLD VENIPUNCTURE: CPT

## 2024-08-14 PROCEDURE — 2580000003 HC RX 258: Performed by: EMERGENCY MEDICINE

## 2024-08-14 RX ORDER — POLYETHYLENE GLYCOL 3350 17 G/17G
17 POWDER, FOR SOLUTION ORAL DAILY PRN
Status: DISCONTINUED | OUTPATIENT
Start: 2024-08-14 | End: 2024-08-19 | Stop reason: HOSPADM

## 2024-08-14 RX ORDER — ONDANSETRON 4 MG/1
4 TABLET, ORALLY DISINTEGRATING ORAL EVERY 8 HOURS PRN
Status: DISCONTINUED | OUTPATIENT
Start: 2024-08-14 | End: 2024-08-19 | Stop reason: HOSPADM

## 2024-08-14 RX ORDER — ACETAMINOPHEN 325 MG/1
650 TABLET ORAL EVERY 6 HOURS PRN
Status: DISCONTINUED | OUTPATIENT
Start: 2024-08-14 | End: 2024-08-19 | Stop reason: HOSPADM

## 2024-08-14 RX ORDER — SODIUM CHLORIDE 0.9 % (FLUSH) 0.9 %
5-40 SYRINGE (ML) INJECTION EVERY 12 HOURS SCHEDULED
Status: DISCONTINUED | OUTPATIENT
Start: 2024-08-15 | End: 2024-08-19 | Stop reason: HOSPADM

## 2024-08-14 RX ORDER — ENOXAPARIN SODIUM 100 MG/ML
40 INJECTION SUBCUTANEOUS DAILY
Status: DISCONTINUED | OUTPATIENT
Start: 2024-08-15 | End: 2024-08-19 | Stop reason: HOSPADM

## 2024-08-14 RX ORDER — 0.9 % SODIUM CHLORIDE 0.9 %
1000 INTRAVENOUS SOLUTION INTRAVENOUS ONCE
Status: COMPLETED | OUTPATIENT
Start: 2024-08-14 | End: 2024-08-14

## 2024-08-14 RX ORDER — SODIUM CHLORIDE, SODIUM LACTATE, POTASSIUM CHLORIDE, CALCIUM CHLORIDE 600; 310; 30; 20 MG/100ML; MG/100ML; MG/100ML; MG/100ML
INJECTION, SOLUTION INTRAVENOUS CONTINUOUS
Status: DISPENSED | OUTPATIENT
Start: 2024-08-15 | End: 2024-08-17

## 2024-08-14 RX ORDER — ONDANSETRON 2 MG/ML
4 INJECTION INTRAMUSCULAR; INTRAVENOUS EVERY 6 HOURS PRN
Status: DISCONTINUED | OUTPATIENT
Start: 2024-08-14 | End: 2024-08-19 | Stop reason: HOSPADM

## 2024-08-14 RX ORDER — ACETAMINOPHEN 650 MG/1
650 SUPPOSITORY RECTAL EVERY 6 HOURS PRN
Status: DISCONTINUED | OUTPATIENT
Start: 2024-08-14 | End: 2024-08-19 | Stop reason: HOSPADM

## 2024-08-14 RX ORDER — SODIUM CHLORIDE 9 MG/ML
INJECTION, SOLUTION INTRAVENOUS PRN
Status: DISCONTINUED | OUTPATIENT
Start: 2024-08-14 | End: 2024-08-19 | Stop reason: HOSPADM

## 2024-08-14 RX ORDER — SODIUM CHLORIDE 0.9 % (FLUSH) 0.9 %
5-40 SYRINGE (ML) INJECTION PRN
Status: DISCONTINUED | OUTPATIENT
Start: 2024-08-14 | End: 2024-08-19 | Stop reason: HOSPADM

## 2024-08-14 RX ORDER — POTASSIUM CHLORIDE 20 MEQ/1
40 TABLET, EXTENDED RELEASE ORAL PRN
Status: DISCONTINUED | OUTPATIENT
Start: 2024-08-14 | End: 2024-08-19 | Stop reason: HOSPADM

## 2024-08-14 RX ORDER — MAGNESIUM SULFATE IN WATER 40 MG/ML
2000 INJECTION, SOLUTION INTRAVENOUS PRN
Status: DISCONTINUED | OUTPATIENT
Start: 2024-08-14 | End: 2024-08-19 | Stop reason: HOSPADM

## 2024-08-14 RX ORDER — POTASSIUM CHLORIDE 7.45 MG/ML
10 INJECTION INTRAVENOUS PRN
Status: DISCONTINUED | OUTPATIENT
Start: 2024-08-14 | End: 2024-08-19 | Stop reason: HOSPADM

## 2024-08-14 RX ADMIN — SODIUM CHLORIDE 1000 ML: 9 INJECTION, SOLUTION INTRAVENOUS at 18:45

## 2024-08-14 ASSESSMENT — PAIN - FUNCTIONAL ASSESSMENT
PAIN_FUNCTIONAL_ASSESSMENT: 0-10
PAIN_FUNCTIONAL_ASSESSMENT: 0-10

## 2024-08-14 ASSESSMENT — PAIN SCALES - GENERAL
PAINLEVEL_OUTOF10: 0
PAINLEVEL_OUTOF10: 8
PAINLEVEL_OUTOF10: 5

## 2024-08-14 ASSESSMENT — PAIN DESCRIPTION - ORIENTATION: ORIENTATION: RIGHT;LEFT

## 2024-08-14 ASSESSMENT — PAIN DESCRIPTION - LOCATION: LOCATION: LEG

## 2024-08-14 ASSESSMENT — PAIN DESCRIPTION - DESCRIPTORS: DESCRIPTORS: ACHING;SORE

## 2024-08-14 NOTE — ED TRIAGE NOTES
Pt at [facility] today [facility], with Care Giver Lindsey Robison [phone]  Hx of neuropathy  Normally she is walking around.  Pt states legs hurt all over since yesterday denies falls, no trauma,   Not happened before.    Legs non tender, Dp's 1+ no pitting edema noted  Pt did stand and shuffled feet and moved to bed from chair without much help but very slow  Says the legs are just sore and states no pain at present.

## 2024-08-14 NOTE — ED PROVIDER NOTES
[facility] EMERGENCY DEPT  EMERGENCY DEPARTMENT HISTORY AND PHYSICAL EXAM      Date: 8/14/2024  Patient Name: Darcy Arthur  MRN: [id number]  Birthdate 1960  Date of evaluation: 8/14/2024  Provider: Hiram Levin MD   Note Started: 12:27 PM EDT 8/14/24    HISTORY OF PRESENT ILLNESS     Chief Complaint   Patient presents with    Leg Pain    unable to walk       History Provided By: Patient    HPI: Darcy Arthur is a 64 y.o. female with PMH of hep C, HIV, DM, schizophrenia, and alcohol use who comes to the ED from support group today with concerns for difficulty walking.  Patient has a walker at home and lives in assisted living facility.  She does have history of neuropathy in which she is being managed by her neurologist.  Has no new numbness or weakness.  There is no recent falls.  Patient comes to the ED with her counselor due to concerns for difficulty walking that is been progressive.  Patient has not been using her walker as instructed.    PAST MEDICAL HISTORY   Past Medical History:  Past Medical History:   Diagnosis Date    Alcohol abuse 5/29/2017    Autoimmune disease (HCC)     Hepatitis C     History of seizure     HIV (human immunodeficiency virus infection) (HCC)     Hypertension     Psychotic disorder (HCC)     Schizophrenia (HCC)        Past Surgical History:  History reviewed. No pertinent surgical history.    Family History:  Family History   Family history unknown: Yes       Social History:  Social History     Tobacco Use    Smoking status: Every Day     Current packs/day: 1.00     Types: Cigarettes    Smokeless tobacco: Never   Vaping Use    Vaping status: Never Used   Substance Use Topics    Alcohol use: Not Currently     Alcohol/week: 0.8 standard drinks of alcohol     Types: 1 Standard drinks or equivalent per week    Drug use: Yes     Types: Cocaine, Marijuana (Weed)       Allergies:  No Known Allergies    PCP: Carol Bingham APRN - NP    Current Meds:   No current facility-administered

## 2024-08-14 NOTE — ED TRIAGE NOTES
Staff states pt is unable to stand or walk since last night. Just not herself, A&Ox3, disoriented to time.     Pt from [facility] [phone]

## 2024-08-14 NOTE — ED NOTES
Pt ambulated with assistance with walker down the entire allred from Room 4 to Room 7 without difficulty, pt denied pain.

## 2024-08-15 ENCOUNTER — APPOINTMENT (OUTPATIENT)
Facility: HOSPITAL | Age: 64
DRG: 469 | End: 2024-08-15
Payer: MEDICAID

## 2024-08-15 LAB
ANION GAP SERPL CALC-SCNC: 4 MMOL/L (ref 5–15)
APPEARANCE UR: CLEAR
BACTERIA URNS QL MICRO: ABNORMAL /HPF
BASOPHILS # BLD: 0 K/UL (ref 0–0.1)
BASOPHILS NFR BLD: 1 % (ref 0–1)
BILIRUB UR QL: NEGATIVE
BUN SERPL-MCNC: 22 MG/DL (ref 6–20)
BUN/CREAT SERPL: 12 (ref 12–20)
CA-I BLD-MCNC: 8.4 MG/DL (ref 8.5–10.1)
CHLORIDE SERPL-SCNC: 112 MMOL/L (ref 97–108)
CO2 SERPL-SCNC: 25 MMOL/L (ref 21–32)
COLOR UR: ABNORMAL
CREAT SERPL-MCNC: 1.8 MG/DL (ref 0.55–1.02)
CRP SERPL-MCNC: 0.63 MG/DL (ref 0–0.3)
DIFFERENTIAL METHOD BLD: ABNORMAL
EOSINOPHIL # BLD: 0 K/UL (ref 0–0.4)
EOSINOPHIL NFR BLD: 1 % (ref 0–7)
EPITH CASTS URNS QL MICRO: ABNORMAL /LPF
ERYTHROCYTE [DISTWIDTH] IN BLOOD BY AUTOMATED COUNT: 15.7 % (ref 11.5–14.5)
GLUCOSE SERPL-MCNC: 65 MG/DL (ref 65–100)
GLUCOSE UR STRIP.AUTO-MCNC: NEGATIVE MG/DL
HCT VFR BLD AUTO: 29.1 % (ref 35–47)
HGB BLD-MCNC: 9.9 G/DL (ref 11.5–16)
HGB UR QL STRIP: NEGATIVE
HYALINE CASTS URNS QL MICRO: ABNORMAL /LPF (ref 0–5)
IMM GRANULOCYTES # BLD AUTO: 0 K/UL
IMM GRANULOCYTES NFR BLD AUTO: 0 %
KETONES UR QL STRIP.AUTO: NEGATIVE MG/DL
LEUKOCYTE ESTERASE UR QL STRIP.AUTO: NEGATIVE
LYMPHOCYTES # BLD: 1.4 K/UL (ref 0.8–3.5)
LYMPHOCYTES NFR BLD: 63 % (ref 12–49)
MCH RBC QN AUTO: 29.6 PG (ref 26–34)
MCHC RBC AUTO-ENTMCNC: 34 G/DL (ref 30–36.5)
MCV RBC AUTO: 87.1 FL (ref 80–99)
MONOCYTES # BLD: 0 K/UL (ref 0–1)
MONOCYTES NFR BLD: 2 % (ref 5–13)
MUCOUS THREADS URNS QL MICRO: ABNORMAL /LPF
NEUTS BAND NFR BLD MANUAL: 1 % (ref 0–6)
NEUTS SEG # BLD: 0.7 K/UL (ref 1.8–8)
NEUTS SEG NFR BLD: 32 % (ref 32–75)
NITRITE UR QL STRIP.AUTO: NEGATIVE
NRBC # BLD: 0 K/UL (ref 0–0.01)
NRBC BLD-RTO: 0 PER 100 WBC
PH UR STRIP: 5 (ref 5–8)
PLATELET # BLD AUTO: 77 K/UL (ref 150–400)
PMV BLD AUTO: 10.7 FL (ref 8.9–12.9)
POTASSIUM SERPL-SCNC: 4.3 MMOL/L (ref 3.5–5.1)
PROCALCITONIN SERPL-MCNC: 0.05 NG/ML
PROT UR STRIP-MCNC: 30 MG/DL
RBC # BLD AUTO: 3.34 M/UL (ref 3.8–5.2)
RBC #/AREA URNS HPF: ABNORMAL /HPF (ref 0–5)
RBC MORPH BLD: ABNORMAL
RBC MORPH BLD: ABNORMAL
SODIUM SERPL-SCNC: 141 MMOL/L (ref 136–145)
SP GR UR REFRACTOMETRY: 1.02 (ref 1–1.03)
T4 FREE SERPL-MCNC: 1.1 NG/DL (ref 0.8–1.5)
TSH SERPL DL<=0.05 MIU/L-ACNC: 1.72 UIU/ML (ref 0.36–3.74)
URINE CULTURE IF INDICATED: ABNORMAL
UROBILINOGEN UR QL STRIP.AUTO: 2 EU/DL (ref 0.1–1)
VIT B12 SERPL-MCNC: 666 PG/ML (ref 193–986)
WBC # BLD AUTO: 2.1 K/UL (ref 3.6–11)
WBC MORPH BLD: ABNORMAL
WBC URNS QL MICRO: ABNORMAL /HPF (ref 0–4)

## 2024-08-15 PROCEDURE — 99223 1ST HOSP IP/OBS HIGH 75: CPT | Performed by: INTERNAL MEDICINE

## 2024-08-15 PROCEDURE — 71045 X-RAY EXAM CHEST 1 VIEW: CPT

## 2024-08-15 PROCEDURE — 6360000002 HC RX W HCPCS: Performed by: INTERNAL MEDICINE

## 2024-08-15 PROCEDURE — 1100000000 HC RM PRIVATE

## 2024-08-15 PROCEDURE — 81001 URINALYSIS AUTO W/SCOPE: CPT

## 2024-08-15 PROCEDURE — 6370000000 HC RX 637 (ALT 250 FOR IP): Performed by: INTERNAL MEDICINE

## 2024-08-15 PROCEDURE — 84145 PROCALCITONIN (PCT): CPT

## 2024-08-15 PROCEDURE — 86140 C-REACTIVE PROTEIN: CPT

## 2024-08-15 PROCEDURE — 85025 COMPLETE CBC W/AUTO DIFF WBC: CPT

## 2024-08-15 PROCEDURE — 2580000003 HC RX 258: Performed by: INTERNAL MEDICINE

## 2024-08-15 PROCEDURE — 80048 BASIC METABOLIC PNL TOTAL CA: CPT

## 2024-08-15 PROCEDURE — 36415 COLL VENOUS BLD VENIPUNCTURE: CPT

## 2024-08-15 PROCEDURE — 94761 N-INVAS EAR/PLS OXIMETRY MLT: CPT

## 2024-08-15 RX ORDER — BENZTROPINE MESYLATE 1 MG/1
0.5 TABLET ORAL 2 TIMES DAILY
Status: DISCONTINUED | OUTPATIENT
Start: 2024-08-15 | End: 2024-08-19 | Stop reason: HOSPADM

## 2024-08-15 RX ORDER — DILTIAZEM HYDROCHLORIDE 120 MG/1
120 CAPSULE, COATED, EXTENDED RELEASE ORAL DAILY
Status: DISCONTINUED | OUTPATIENT
Start: 2024-08-15 | End: 2024-08-19 | Stop reason: HOSPADM

## 2024-08-15 RX ORDER — DIVALPROEX SODIUM 250 MG/1
250 TABLET, DELAYED RELEASE ORAL EVERY 12 HOURS SCHEDULED
Status: DISCONTINUED | OUTPATIENT
Start: 2024-08-15 | End: 2024-08-19 | Stop reason: HOSPADM

## 2024-08-15 RX ORDER — RISPERIDONE 1 MG/1
2 TABLET ORAL 2 TIMES DAILY
Status: DISCONTINUED | OUTPATIENT
Start: 2024-08-15 | End: 2024-08-19 | Stop reason: HOSPADM

## 2024-08-15 RX ORDER — HYDRALAZINE HYDROCHLORIDE 20 MG/ML
10 INJECTION INTRAMUSCULAR; INTRAVENOUS EVERY 6 HOURS PRN
Status: DISCONTINUED | OUTPATIENT
Start: 2024-08-15 | End: 2024-08-19 | Stop reason: HOSPADM

## 2024-08-15 RX ADMIN — BENZTROPINE MESYLATE 0.5 MG: 1 TABLET ORAL at 20:23

## 2024-08-15 RX ADMIN — DIVALPROEX SODIUM 250 MG: 250 TABLET, DELAYED RELEASE ORAL at 11:47

## 2024-08-15 RX ADMIN — BICTEGRAVIR SODIUM, EMTRICITABINE, AND TENOFOVIR ALAFENAMIDE FUMARATE 1 TABLET: 50; 200; 25 TABLET ORAL at 11:47

## 2024-08-15 RX ADMIN — DILTIAZEM HYDROCHLORIDE 120 MG: 120 CAPSULE, COATED, EXTENDED RELEASE ORAL at 10:33

## 2024-08-15 RX ADMIN — HYDRALAZINE HYDROCHLORIDE 10 MG: 20 INJECTION, SOLUTION INTRAMUSCULAR; INTRAVENOUS at 22:18

## 2024-08-15 RX ADMIN — RISPERIDONE 2 MG: 1 TABLET, FILM COATED ORAL at 10:33

## 2024-08-15 RX ADMIN — BENZTROPINE MESYLATE 0.5 MG: 1 TABLET ORAL at 10:33

## 2024-08-15 RX ADMIN — SODIUM CHLORIDE, PRESERVATIVE FREE 10 ML: 5 INJECTION INTRAVENOUS at 22:20

## 2024-08-15 RX ADMIN — SODIUM CHLORIDE, POTASSIUM CHLORIDE, SODIUM LACTATE AND CALCIUM CHLORIDE: 600; 310; 30; 20 INJECTION, SOLUTION INTRAVENOUS at 11:50

## 2024-08-15 RX ADMIN — DIVALPROEX SODIUM 250 MG: 250 TABLET, DELAYED RELEASE ORAL at 20:23

## 2024-08-15 RX ADMIN — SODIUM CHLORIDE, POTASSIUM CHLORIDE, SODIUM LACTATE AND CALCIUM CHLORIDE: 600; 310; 30; 20 INJECTION, SOLUTION INTRAVENOUS at 20:23

## 2024-08-15 RX ADMIN — SODIUM CHLORIDE, POTASSIUM CHLORIDE, SODIUM LACTATE AND CALCIUM CHLORIDE: 600; 310; 30; 20 INJECTION, SOLUTION INTRAVENOUS at 00:25

## 2024-08-15 RX ADMIN — SODIUM CHLORIDE, PRESERVATIVE FREE 10 ML: 5 INJECTION INTRAVENOUS at 10:34

## 2024-08-15 RX ADMIN — RISPERIDONE 2 MG: 1 TABLET, FILM COATED ORAL at 20:23

## 2024-08-15 ASSESSMENT — PAIN SCALES - GENERAL: PAINLEVEL_OUTOF10: 0

## 2024-08-15 NOTE — CONSULTS
bictegravir-emtricitab-tenofovir alafenamide (BIKTARVY) -25 MG TABS per tablet Take 1 tablet by mouth daily 7/11/22   Automatic Reconciliation, Ar   dilTIAZem (TIAZAC) 120 MG extended release capsule ceived the following from [facility]: Outside name: dilTIAZem ER (CARDIZEM CD) 120 mg capsule 11/11/22   Automatic Reconciliation, Ar   divalproex (DEPAKOTE ER) 250 MG extended release tablet Take by mouth 2 times daily 3/16/22   Automatic Reconciliation, Ar   famotidine (PEPCID) 40 MG tablet Take by mouth    Automatic Reconciliation, Ar   lisinopril (PRINIVIL;ZESTRIL) 20 MG tablet Take by mouth daily    Automatic Reconciliation, Ar   risperiDONE (RISPERDAL) 2 MG tablet Take by mouth 2 times daily    Automatic Reconciliation, Ar   sulfamethoxazole-trimethoprim (BACTRIM DS;SEPTRA DS) 800-160 MG per tablet Take 1 tablet by mouth 7/11/22   Automatic Reconciliation, Ar        albuterol (PROVENTIL) (2.5 MG/3ML) 0.083% nebulizer solution 2.5 mg, Q6H PRN  benztropine (COGENTIN) tablet 0.5 mg, BID  bictegravir-emtricitab-tenofovir alafenamide (BIKTARVY) -25 MG per tablet 1 tablet, Daily  dilTIAZem (CARDIZEM CD) extended release capsule 120 mg, Daily  divalproex (DEPAKOTE) DR tablet 250 mg, 2 times per day  risperiDONE (RISPERDAL) tablet 2 mg, BID  sodium chloride flush 0.9 % injection 5-40 mL, 2 times per day  sodium chloride flush 0.9 % injection 5-40 mL, PRN  0.9 % sodium chloride infusion, PRN  potassium chloride (KLOR-CON M) extended release tablet 40 mEq, PRN   Or  potassium bicarb-citric acid (EFFER-K) effervescent tablet 40 mEq, PRN   Or  potassium chloride 10 mEq/100 mL IVPB (Peripheral Line), PRN  magnesium sulfate 2000 mg in 50 mL IVPB premix, PRN  enoxaparin (LOVENOX) injection 40 mg, Daily  ondansetron (ZOFRAN-ODT) disintegrating tablet 4 mg, Q8H PRN   Or  ondansetron (ZOFRAN) injection 4 mg, Q6H PRN  polyethylene glycol (GLYCOLAX) packet 17 g, Daily PRN  acetaminophen (TYLENOL) tablet

## 2024-08-15 NOTE — CARE COORDINATION
08/15/24 1341   Service Assessment   Patient Orientation Alert and Oriented   Cognition Alert   History Provided By Patient;Other (see comment)  ()   Primary Caregiver Self   Support Systems /   PCP Verified by CM Yes   Last Visit to PCP Within last 3 months   Prior Functional Level Assistance with the following:;Cooking;Housework;Shopping   Current Functional Level Assistance with the following:;Cooking;Housework;Shopping   Can patient return to prior living arrangement Yes   Ability to make needs known: Fair   Family able to assist with home care needs: No   Would you like for me to discuss the discharge plan with any other family members/significant others, and if so, who? Yes   Financial Resources Medicaid   Social/Functional History   Lives With Other (comment)  (Group Home)   Type of Home House   Bathroom Toilet Standard   Home Equipment None   ADL Assistance Independent   Homemaking Assistance Independent   Ambulation Assistance Independent   Transfer Assistance Independent   Active  No   Discharge Planning   Type of Residence Group [place]   Services At/After Discharge   Services At/After Discharge None   Mode of Transport at Discharge Other (see comment)  ([facility] Home Staff or Mental Health Counselor)   Confirm Follow Up Transport Other (see comment)  (Mental Health Counselor)     Patient is from [facility]. Spoke with , Jerry, at [phone] and confirmed information. Patient discharged last month and had no issues. Attending all follow up appointments and did not have any home health. She has been independent in all ADL's. Does not use DME. She has been attending her Day Program and her Mental Health Counselor transports her to her appointments. Per Jerry patient does not have any family and is her own decision maker. Discharge plan at this time is to return to [facility]. Having covid is not an issue upon returning.

## 2024-08-15 NOTE — H&P
V2.0  History and Physical      Name:  Darcy Arthur /Age/Sex: 1960  (64 y.o. female)   MRN & CSN:  [id number] & [id number] Encounter Date/Time: 2024 9:38 PM EDT   Location:   PCP: Carol Bingham APRN - NP       Hospital Day: 1    Assessment and Plan:   Darcy Arthur is a 64 y.o. female with pmh of schizophrenia who resides at a local group home who presents with staff stating that she was not acting herself.      Hospital Problems             Last Modified POA    * (Principal) Encephalopathy acute 2024 Yes    COVID 2024 Yes    Acute kidney injury (HCC) 2024 Yes    Encephalopathy 2024 Yes         Principal Problem:    Encephalopathy acute  Plan:   At risk for CNS infections secondary to HIV status.  Consider CNS bleed.  Consider hepatic encephalopathy and other metabolic derangements.  Check ammonia level, TSH, and B12   stat CT scan of the head without contrast   neurochecks every 4 hours   consider neurology consultation   consider LP if diagnosis remains in doubt and patient not improving  Hold usual home medications as patient is not alert enough to take medicine or nutrition by mouth  Will allow clear liquids and diet to be advanced as she hopefully improves    Active Problems:    COVID  Plan:   Not hypoxic, specific therapy not warranted, supportive therapy      Acute kidney injury (HCC)  Plan:   Suspect secondary to insensible losses from fever and decreased oral intake and that this is a prerenal situation  LR at 125 mL/h  BMP daily         Admit to In-Patient    Disposition:   Current Living situation: Group home  Expected Disposition: Group home  Estimated D/C: 3-4 days    Diet Clear liquid advance as tolerated   DVT Prophylaxis [x] Lovenox, []  Heparin, [] SCDs, [] Ambulation,  [] Eliquis, [] Xarelto, [] Coumadin   Code Status Full   Surrogate Decision Maker/ POA Unknown, group home should have this information     Personally reviewed Lab Studies and Imaging

## 2024-08-15 NOTE — ED PROVIDER NOTES
[facility] EMERGENCY DEPT  EMERGENCY DEPARTMENT HISTORY AND PHYSICAL EXAM      Date: 8/14/2024  Patient Name: Darcy Arthur  MRN: [id number]  Birthdate 1960  Date of evaluation: 8/14/2024  Provider: Kristan Estrada MD   Note Started: 8:11 PM EDT 8/14/24    HISTORY OF PRESENT ILLNESS     Chief Complaint   Patient presents with    Fatigue       History Provided By: Patient    HPI: Darcy Arthur is a 64 y.o. female past medical history of alcohol use disorder, hep C, HIV, hypertension and schizophrenia presents for evaluation of generalized fatigue.  States she is unable to walk today.  Also decreased appetite.  No fever cough or any other associated symptoms.  Denies dysuria or hematuria.    PAST MEDICAL HISTORY   Past Medical History:  Past Medical History:   Diagnosis Date    Alcohol abuse 5/29/2017    Autoimmune disease (HCC)     Hepatitis C     History of seizure     HIV (human immunodeficiency virus infection) (HCC)     Hypertension     Psychotic disorder (HCC)     Schizophrenia (HCC)        Past Surgical History:  No past surgical history on file.    Family History:  Family History   Family history unknown: Yes       Social History:  Social History     Tobacco Use    Smoking status: Every Day     Current packs/day: 1.00     Types: Cigarettes    Smokeless tobacco: Never   Vaping Use    Vaping status: Never Used   Substance Use Topics    Alcohol use: Not Currently     Alcohol/week: 0.8 standard drinks of alcohol     Types: 1 Standard drinks or equivalent per week    Drug use: Yes     Types: Cocaine, Marijuana (Weed)       Allergies:  No Known Allergies    PCP: Carol Bingham APRN - NP    Current Meds:   No current facility-administered medications for this encounter.     Current Outpatient Medications   Medication Sig Dispense Refill    hydrALAZINE (APRESOLINE) 10 MG tablet Take 1 tablet by mouth every 12 hours 60 tablet 0    albuterol sulfate HFA (PROVENTIL;VENTOLIN;PROAIR) 108 (90 Base) MCG/ACT inhaler Inhale 2 puffs

## 2024-08-15 NOTE — ED NOTES
ED TO INPATIENT SBAR HANDOFF    Patient Name: Darcy Arthur   Preferred Name: Darcy  : 1960  64 y.o.   Family/Caregiver Present: no   Code Status Order: Prior  PO Status: NPO:No  Telemetry Order: no  C-SSRS: Risk of Suicide: No Risk  Sitter no   Restraints:     Sepsis Risk Score      Situation  Chief Complaint   Patient presents with    Fatigue     Brief Description of Patient's Condition: stable  Mental Status: oriented, alert, coherent, logical, thought processes intact, able to concentrate and follow conversation, and confused to time  [facility] from:[facility]  Imaging:   CT HEAD WO CONTRAST    (Results Pending)     Abnormal labs:   Abnormal Labs Reviewed   COVID-19 & INFLUENZA COMBO - Abnormal; Notable for the following components:       Result Value    SARS-CoV-2, PCR DETECTED (*)     All other components within normal limits   CBC WITH AUTO DIFFERENTIAL - Abnormal; Notable for the following components:    WBC 2.4 (*)     RBC 3.39 (*)     Hemoglobin 10.1 (*)     Hematocrit 29.4 (*)     RDW 16.0 (*)     Platelets 90 (*)     Neutrophils Absolute 1.0 (*)     All other components within normal limits   COMPREHENSIVE METABOLIC PANEL - Abnormal; Notable for the following components:    BUN 23 (*)     Creatinine 2.21 (*)     BUN/Creatinine Ratio 10 (*)     Est, Glom Filt Rate 24 (*)     AST 42 (*)     Total Protein 9.0 (*)     Albumin 2.8 (*)     Globulin 6.2 (*)     Albumin/Globulin Ratio 0.5 (*)     All other components within normal limits       Background  Allergies: No Known Allergies  History:   Past Medical History:   Diagnosis Date    Alcohol abuse 2017    Autoimmune disease (HCC)     Hepatitis C     History of seizure     HIV (human immunodeficiency virus infection) (HCC)     Hypertension     Psychotic disorder (HCC)     Schizophrenia (HCC)        Assessment  Vitals: MEWS Score: 1  Level of Consciousness: Alert (0)   Vitals:    24 2000 24 2044 24 2200 24 2215   BP: 116/72

## 2024-08-15 NOTE — PLAN OF CARE
Problem: Discharge Planning  Goal: Discharge to home or other facility with appropriate resources  8/15/2024 1622 by Estephania Salazar RN  Outcome: Progressing  8/15/2024 0420 by Dariusz Gross RN  Outcome: Progressing     Problem: Safety - Adult  Goal: Free from fall injury  Outcome: Progressing     Problem: ABCDS Injury Assessment  Goal: Absence of physical injury  Outcome: Progressing     Problem: Skin/Tissue Integrity  Goal: Absence of new skin breakdown  Description: 1.  Monitor for areas of redness and/or skin breakdown  2.  Assess vascular access sites hourly  3.  Every 4-6 hours minimum:  Change oxygen saturation probe site  4.  Every 4-6 hours:  If on nasal continuous positive airway pressure, respiratory therapy assess nares and determine need for appliance change or resting period.  Outcome: Progressing     Problem: Discharge Planning  Goal: Discharge to home or other facility with appropriate resources  8/15/2024 1622 by Estephania Salazar RN  Outcome: Progressing  8/15/2024 0420 by Dariusz Gross RN  Outcome: Progressing     Problem: Safety - Adult  Goal: Free from fall injury  Outcome: Progressing     Problem: ABCDS Injury Assessment  Goal: Absence of physical injury  Outcome: Progressing     Problem: Skin/Tissue Integrity  Goal: Absence of new skin breakdown  Description: 1.  Monitor for areas of redness and/or skin breakdown  2.  Assess vascular access sites hourly  3.  Every 4-6 hours minimum:  Change oxygen saturation probe site  4.  Every 4-6 hours:  If on nasal continuous positive airway pressure, respiratory therapy assess nares and determine need for appliance change or resting period.  Outcome: Progressing

## 2024-08-16 ENCOUNTER — APPOINTMENT (OUTPATIENT)
Facility: HOSPITAL | Age: 64
DRG: 469 | End: 2024-08-16
Payer: MEDICAID

## 2024-08-16 LAB
ANION GAP SERPL CALC-SCNC: 1 MMOL/L (ref 5–15)
BASOPHILS # BLD: 0 K/UL (ref 0–0.1)
BASOPHILS NFR BLD: 1 % (ref 0–1)
BUN SERPL-MCNC: 13 MG/DL (ref 6–20)
BUN/CREAT SERPL: 10 (ref 12–20)
CA-I BLD-MCNC: 8.4 MG/DL (ref 8.5–10.1)
CHLORIDE SERPL-SCNC: 113 MMOL/L (ref 97–108)
CO2 SERPL-SCNC: 28 MMOL/L (ref 21–32)
CREAT SERPL-MCNC: 1.26 MG/DL (ref 0.55–1.02)
DIFFERENTIAL METHOD BLD: ABNORMAL
EOSINOPHIL # BLD: 0.1 K/UL (ref 0–0.4)
EOSINOPHIL NFR BLD: 6 % (ref 0–7)
ERYTHROCYTE [DISTWIDTH] IN BLOOD BY AUTOMATED COUNT: 15.9 % (ref 11.5–14.5)
GLUCOSE SERPL-MCNC: 71 MG/DL (ref 65–100)
HCT VFR BLD AUTO: 27.4 % (ref 35–47)
HGB BLD-MCNC: 9.2 G/DL (ref 11.5–16)
IMM GRANULOCYTES # BLD AUTO: 0 K/UL (ref 0–0.04)
IMM GRANULOCYTES NFR BLD AUTO: 1 % (ref 0–0.5)
LYMPHOCYTES # BLD: 1.3 K/UL (ref 0.8–3.5)
LYMPHOCYTES NFR BLD: 58 % (ref 12–49)
MCH RBC QN AUTO: 29.1 PG (ref 26–34)
MCHC RBC AUTO-ENTMCNC: 33.6 G/DL (ref 30–36.5)
MCV RBC AUTO: 86.7 FL (ref 80–99)
MONOCYTES # BLD: 0.2 K/UL (ref 0–1)
MONOCYTES NFR BLD: 8 % (ref 5–13)
NEUTS SEG # BLD: 0.6 K/UL (ref 1.8–8)
NEUTS SEG NFR BLD: 26 % (ref 32–75)
NRBC # BLD: 0 K/UL (ref 0–0.01)
NRBC BLD-RTO: 0 PER 100 WBC
PLATELET # BLD AUTO: 72 K/UL (ref 150–400)
PMV BLD AUTO: 10.8 FL (ref 8.9–12.9)
POTASSIUM SERPL-SCNC: 4.2 MMOL/L (ref 3.5–5.1)
RBC # BLD AUTO: 3.16 M/UL (ref 3.8–5.2)
SODIUM SERPL-SCNC: 142 MMOL/L (ref 136–145)
WBC # BLD AUTO: 2.1 K/UL (ref 3.6–11)

## 2024-08-16 PROCEDURE — 99232 SBSQ HOSP IP/OBS MODERATE 35: CPT | Performed by: INTERNAL MEDICINE

## 2024-08-16 PROCEDURE — 36415 COLL VENOUS BLD VENIPUNCTURE: CPT

## 2024-08-16 PROCEDURE — 94761 N-INVAS EAR/PLS OXIMETRY MLT: CPT

## 2024-08-16 PROCEDURE — 80048 BASIC METABOLIC PNL TOTAL CA: CPT

## 2024-08-16 PROCEDURE — 2580000003 HC RX 258: Performed by: INTERNAL MEDICINE

## 2024-08-16 PROCEDURE — 85025 COMPLETE CBC W/AUTO DIFF WBC: CPT

## 2024-08-16 PROCEDURE — 6370000000 HC RX 637 (ALT 250 FOR IP): Performed by: INTERNAL MEDICINE

## 2024-08-16 PROCEDURE — 1100000000 HC RM PRIVATE

## 2024-08-16 PROCEDURE — 6360000002 HC RX W HCPCS: Performed by: INTERNAL MEDICINE

## 2024-08-16 PROCEDURE — 71045 X-RAY EXAM CHEST 1 VIEW: CPT

## 2024-08-16 RX ORDER — HYDRALAZINE HYDROCHLORIDE 25 MG/1
25 TABLET, FILM COATED ORAL EVERY 12 HOURS SCHEDULED
Status: DISCONTINUED | OUTPATIENT
Start: 2024-08-16 | End: 2024-08-17

## 2024-08-16 RX ADMIN — SODIUM CHLORIDE, POTASSIUM CHLORIDE, SODIUM LACTATE AND CALCIUM CHLORIDE: 600; 310; 30; 20 INJECTION, SOLUTION INTRAVENOUS at 06:31

## 2024-08-16 RX ADMIN — RISPERIDONE 2 MG: 1 TABLET, FILM COATED ORAL at 20:29

## 2024-08-16 RX ADMIN — DIVALPROEX SODIUM 250 MG: 250 TABLET, DELAYED RELEASE ORAL at 20:29

## 2024-08-16 RX ADMIN — HYDRALAZINE HYDROCHLORIDE 10 MG: 20 INJECTION, SOLUTION INTRAMUSCULAR; INTRAVENOUS at 20:30

## 2024-08-16 RX ADMIN — RISPERIDONE 2 MG: 1 TABLET, FILM COATED ORAL at 09:42

## 2024-08-16 RX ADMIN — SODIUM CHLORIDE, PRESERVATIVE FREE 10 ML: 5 INJECTION INTRAVENOUS at 21:35

## 2024-08-16 RX ADMIN — DILTIAZEM HYDROCHLORIDE 120 MG: 120 CAPSULE, COATED, EXTENDED RELEASE ORAL at 09:42

## 2024-08-16 RX ADMIN — HYDRALAZINE HYDROCHLORIDE 10 MG: 20 INJECTION, SOLUTION INTRAMUSCULAR; INTRAVENOUS at 09:44

## 2024-08-16 RX ADMIN — HYDRALAZINE HYDROCHLORIDE 25 MG: 25 TABLET ORAL at 20:29

## 2024-08-16 RX ADMIN — DIVALPROEX SODIUM 250 MG: 250 TABLET, DELAYED RELEASE ORAL at 09:42

## 2024-08-16 RX ADMIN — BICTEGRAVIR SODIUM, EMTRICITABINE, AND TENOFOVIR ALAFENAMIDE FUMARATE 1 TABLET: 50; 200; 25 TABLET ORAL at 09:42

## 2024-08-16 RX ADMIN — SODIUM CHLORIDE, PRESERVATIVE FREE 10 ML: 5 INJECTION INTRAVENOUS at 09:45

## 2024-08-16 RX ADMIN — BENZTROPINE MESYLATE 0.5 MG: 1 TABLET ORAL at 09:42

## 2024-08-16 RX ADMIN — BENZTROPINE MESYLATE 0.5 MG: 1 TABLET ORAL at 20:29

## 2024-08-16 ASSESSMENT — PAIN SCALES - GENERAL: PAINLEVEL_OUTOF10: 0

## 2024-08-16 NOTE — PLAN OF CARE
Problem: Discharge Planning  Goal: Discharge to home or other facility with appropriate resources  8/16/2024 1244 by Marianne Snyder RN  Outcome: Progressing  Flowsheets (Taken 8/16/2024 0806)  Discharge to home or other facility with appropriate resources: Identify barriers to discharge with patient and caregiver  8/15/2024 2318 by Gail James LPN  Outcome: Progressing     Problem: Safety - Adult  Goal: Free from fall injury  8/16/2024 1244 by Marianne Snyder RN  Outcome: Progressing  8/15/2024 2318 by Gail James LPN  Outcome: Progressing     Problem: ABCDS Injury Assessment  Goal: Absence of physical injury  8/16/2024 1244 by Marianne Snyder RN  Outcome: Progressing  8/15/2024 2318 by Gail James LPN  Outcome: Progressing     Problem: Skin/Tissue Integrity  Goal: Absence of new skin breakdown  Description: 1.  Monitor for areas of redness and/or skin breakdown  2.  Assess vascular access sites hourly  3.  Every 4-6 hours minimum:  Change oxygen saturation probe site  4.  Every 4-6 hours:  If on nasal continuous positive airway pressure, respiratory therapy assess nares and determine need for appliance change or resting period.  8/16/2024 1244 by Marianne Snyder RN  Outcome: Progressing  8/15/2024 2318 by Gail James LPN  Outcome: Progressing

## 2024-08-16 NOTE — CARE COORDINATION
Clinical chart reviewed. Discharge plan remains to return to [facility] once medically stable. She will need to be independent as she was on admission to return since she attends a Day Program. PT has been ordered for eval to ensure she is at her baseline. Patient is able to return with Covid. Please call Jerry the  on discharge at [phone].

## 2024-08-16 NOTE — PLAN OF CARE
Problem: Discharge Planning  Goal: Discharge to home or other facility with appropriate resources  8/15/2024 2318 by Gail James LPN  Outcome: Progressing  8/15/2024 1622 by Estephania Salazar RN  Outcome: Progressing     Problem: Safety - Adult  Goal: Free from fall injury  8/15/2024 2318 by Gail James LPN  Outcome: Progressing  8/15/2024 1622 by Estephania Salazar RN  Outcome: Progressing     Problem: ABCDS Injury Assessment  Goal: Absence of physical injury  8/15/2024 2318 by Gail James LPN  Outcome: Progressing  8/15/2024 1622 by Estephania Salazar RN  Outcome: Progressing     Problem: Skin/Tissue Integrity  Goal: Absence of new skin breakdown  Description: 1.  Monitor for areas of redness and/or skin breakdown  2.  Assess vascular access sites hourly  3.  Every 4-6 hours minimum:  Change oxygen saturation probe site  4.  Every 4-6 hours:  If on nasal continuous positive airway pressure, respiratory therapy assess nares and determine need for appliance change or resting period.  8/15/2024 2318 by Gail James LPN  Outcome: Progressing  8/15/2024 1622 by Estephania Salazar RN  Outcome: Progressing

## 2024-08-17 LAB
ANION GAP SERPL CALC-SCNC: 6 MMOL/L (ref 5–15)
BASOPHILS # BLD: 0 K/UL (ref 0–0.1)
BASOPHILS NFR BLD: 0 % (ref 0–1)
BUN SERPL-MCNC: 7 MG/DL (ref 6–20)
BUN/CREAT SERPL: 6 (ref 12–20)
CA-I BLD-MCNC: 8.8 MG/DL (ref 8.5–10.1)
CHLORIDE SERPL-SCNC: 107 MMOL/L (ref 97–108)
CO2 SERPL-SCNC: 25 MMOL/L (ref 21–32)
CREAT SERPL-MCNC: 1.23 MG/DL (ref 0.55–1.02)
CRP SERPL-MCNC: 0.36 MG/DL (ref 0–0.3)
DIFFERENTIAL METHOD BLD: ABNORMAL
EOSINOPHIL # BLD: 0 K/UL (ref 0–0.4)
EOSINOPHIL NFR BLD: 0 % (ref 0–7)
ERYTHROCYTE [DISTWIDTH] IN BLOOD BY AUTOMATED COUNT: 15.7 % (ref 11.5–14.5)
GLUCOSE SERPL-MCNC: 118 MG/DL (ref 65–100)
HCT VFR BLD AUTO: 29 % (ref 35–47)
HGB BLD-MCNC: 10 G/DL (ref 11.5–16)
IMM GRANULOCYTES # BLD AUTO: 0 K/UL
IMM GRANULOCYTES NFR BLD AUTO: 0 %
LDH SERPL L TO P-CCNC: 254 U/L (ref 81–246)
LYMPHOCYTES # BLD: 1.7 K/UL (ref 0.8–3.5)
LYMPHOCYTES NFR BLD: 70 % (ref 12–49)
MCH RBC QN AUTO: 29.7 PG (ref 26–34)
MCHC RBC AUTO-ENTMCNC: 34.5 G/DL (ref 30–36.5)
MCV RBC AUTO: 86.1 FL (ref 80–99)
MONOCYTES # BLD: 0.3 K/UL (ref 0–1)
MONOCYTES NFR BLD: 10 % (ref 5–13)
NEUTS SEG # BLD: 0.5 K/UL (ref 1.8–8)
NEUTS SEG NFR BLD: 20 % (ref 32–75)
NRBC # BLD: 0 K/UL (ref 0–0.01)
NRBC BLD-RTO: 0 PER 100 WBC
PLATELET # BLD AUTO: 79 K/UL (ref 150–400)
PMV BLD AUTO: 12.2 FL (ref 8.9–12.9)
POTASSIUM SERPL-SCNC: 3.8 MMOL/L (ref 3.5–5.1)
PROCALCITONIN SERPL-MCNC: 0.11 NG/ML
RBC # BLD AUTO: 3.37 M/UL (ref 3.8–5.2)
RBC MORPH BLD: ABNORMAL
SODIUM SERPL-SCNC: 138 MMOL/L (ref 136–145)
VALPROATE SERPL-MCNC: 97 UG/ML (ref 50–100)
WBC # BLD AUTO: 2.5 K/UL (ref 3.6–11)

## 2024-08-17 PROCEDURE — 86361 T CELL ABSOLUTE COUNT: CPT

## 2024-08-17 PROCEDURE — 80048 BASIC METABOLIC PNL TOTAL CA: CPT

## 2024-08-17 PROCEDURE — 97530 THERAPEUTIC ACTIVITIES: CPT

## 2024-08-17 PROCEDURE — 84145 PROCALCITONIN (PCT): CPT

## 2024-08-17 PROCEDURE — 86140 C-REACTIVE PROTEIN: CPT

## 2024-08-17 PROCEDURE — 36415 COLL VENOUS BLD VENIPUNCTURE: CPT

## 2024-08-17 PROCEDURE — 80164 ASSAY DIPROPYLACETIC ACD TOT: CPT

## 2024-08-17 PROCEDURE — 1100000000 HC RM PRIVATE

## 2024-08-17 PROCEDURE — 97161 PT EVAL LOW COMPLEX 20 MIN: CPT

## 2024-08-17 PROCEDURE — 99232 SBSQ HOSP IP/OBS MODERATE 35: CPT | Performed by: INTERNAL MEDICINE

## 2024-08-17 PROCEDURE — 87536 HIV-1 QUANT&REVRSE TRNSCRPJ: CPT

## 2024-08-17 PROCEDURE — 85027 COMPLETE CBC AUTOMATED: CPT

## 2024-08-17 PROCEDURE — 6370000000 HC RX 637 (ALT 250 FOR IP): Performed by: INTERNAL MEDICINE

## 2024-08-17 PROCEDURE — 83615 LACTATE (LD) (LDH) ENZYME: CPT

## 2024-08-17 PROCEDURE — 87901 NFCT AGT GNTYP ALYS HIV1 REV: CPT

## 2024-08-17 PROCEDURE — 2580000003 HC RX 258: Performed by: INTERNAL MEDICINE

## 2024-08-17 RX ORDER — HYDRALAZINE HYDROCHLORIDE 50 MG/1
50 TABLET, FILM COATED ORAL EVERY 8 HOURS SCHEDULED
Status: DISCONTINUED | OUTPATIENT
Start: 2024-08-17 | End: 2024-08-19 | Stop reason: HOSPADM

## 2024-08-17 RX ADMIN — BENZTROPINE MESYLATE 0.5 MG: 1 TABLET ORAL at 21:33

## 2024-08-17 RX ADMIN — DIVALPROEX SODIUM 250 MG: 250 TABLET, DELAYED RELEASE ORAL at 21:33

## 2024-08-17 RX ADMIN — BENZTROPINE MESYLATE 0.5 MG: 1 TABLET ORAL at 08:29

## 2024-08-17 RX ADMIN — SODIUM CHLORIDE, PRESERVATIVE FREE 10 ML: 5 INJECTION INTRAVENOUS at 08:30

## 2024-08-17 RX ADMIN — DIVALPROEX SODIUM 250 MG: 250 TABLET, DELAYED RELEASE ORAL at 08:29

## 2024-08-17 RX ADMIN — RISPERIDONE 2 MG: 1 TABLET, FILM COATED ORAL at 21:33

## 2024-08-17 RX ADMIN — SODIUM CHLORIDE, PRESERVATIVE FREE 10 ML: 5 INJECTION INTRAVENOUS at 21:35

## 2024-08-17 RX ADMIN — DILTIAZEM HYDROCHLORIDE 120 MG: 120 CAPSULE, COATED, EXTENDED RELEASE ORAL at 08:29

## 2024-08-17 RX ADMIN — HYDRALAZINE HYDROCHLORIDE 25 MG: 25 TABLET ORAL at 08:30

## 2024-08-17 RX ADMIN — RISPERIDONE 2 MG: 1 TABLET, FILM COATED ORAL at 08:29

## 2024-08-17 RX ADMIN — BICTEGRAVIR SODIUM, EMTRICITABINE, AND TENOFOVIR ALAFENAMIDE FUMARATE 1 TABLET: 50; 200; 25 TABLET ORAL at 08:29

## 2024-08-17 RX ADMIN — HYDRALAZINE HYDROCHLORIDE 50 MG: 50 TABLET ORAL at 21:33

## 2024-08-17 ASSESSMENT — PAIN SCALES - GENERAL: PAINLEVEL_OUTOF10: 0

## 2024-08-17 NOTE — PLAN OF CARE
PHYSICAL THERAPY EVALUATION  Patient: Darcy Arthur (64 y.o. female)  Date: 8/17/2024  Primary Diagnosis: Encephalopathy [G93.40]  RAYO (acute kidney injury) (HCC) [N17.9]  COVID [U07.1]       Precautions: Fall Risk                      Recommendations for nursing mobility: Out of bed to chair for meals, Use of bed/chair alarm for safety, Use of BSC for toileting , AD and gt belt for bed to chair , and Assist x1    In place during session: Peripheral IV and External Catheter    ASSESSMENT  Pt is a 64 y.o. female admitted on 8/14/2024 for not acting herself; pt currently being treated for COVID +.  Pt also with hx of schizophrenia, HIV, hep C.  Pt semi-supine upon PT arrival, agreeable to evaluation. Pt A&O x 2.  Pt reports no falls in the past 3 months.    Based on the objective data described below, the patient currently presents with impaired functional mobility, decreased ROM, impaired strength, decreased activity tolerance, poor safety awareness, impaired cognition, decreased command following, and impaired balance. (See below for objective details and assist levels).     Overall pt tolerated session fair today with no reports of pain. Pt required min A for bed mobility and transfers. Pt amb 30 feet with RW, gt belt and Brennen; demonstrates slow, shuffling steps, with difficulty advancing R LE to take a step.  Pt demos forward flexed posture requiring constant cues to stand erect and look straight ahead.  Pt denies any dizziness or lightheadedness with OOB activity, but demos poor safety awareness requiring repetitive cues for safety.  Pt amb to the toilet and was not following cues to keep the walker with her and wait until she was centered in front of the toilet to sit down.  Pt also exhibits SOB once ambulating to the toilet, with O2 sats being 99% and HR up to 119 bpm.  At this point, CNA arrived in pt's room with breakfast tray.  CNA assisted writer to get pt safely back to the bed and stated that in report, she

## 2024-08-17 NOTE — PLAN OF CARE
Problem: Discharge Planning  Goal: Discharge to home or other facility with appropriate resources  8/17/2024 0833 by Jose Enrique Subramanian RN  Outcome: Progressing  8/16/2024 2136 by Gail James LPN  Outcome: Progressing     Problem: Safety - Adult  Goal: Free from fall injury  Outcome: Progressing     Problem: ABCDS Injury Assessment  Goal: Absence of physical injury  Outcome: Progressing     Problem: Skin/Tissue Integrity  Goal: Absence of new skin breakdown  Description: 1.  Monitor for areas of redness and/or skin breakdown  2.  Assess vascular access sites hourly  3.  Every 4-6 hours minimum:  Change oxygen saturation probe site  4.  Every 4-6 hours:  If on nasal continuous positive airway pressure, respiratory therapy assess nares and determine need for appliance change or resting period.  Outcome: Progressing

## 2024-08-17 NOTE — PLAN OF CARE
Problem: Discharge Planning  Goal: Discharge to home or other facility with appropriate resources  8/16/2024 2136 by Gail James LPN  Outcome: Progressing  8/16/2024 1244 by Marianne Snyder RN  Outcome: Progressing  Flowsheets (Taken 8/16/2024 0806)  Discharge to home or other facility with appropriate resources: Identify barriers to discharge with patient and caregiver

## 2024-08-18 LAB
HIV1 RNA # SERPL NAA+PROBE: 9050 COPIES/ML
HIV1 RNA SERPL NAA+PROBE-LOG#: 3.96 LOG10COPY/ML

## 2024-08-18 PROCEDURE — 99232 SBSQ HOSP IP/OBS MODERATE 35: CPT | Performed by: INTERNAL MEDICINE

## 2024-08-18 PROCEDURE — 87536 HIV-1 QUANT&REVRSE TRNSCRPJ: CPT

## 2024-08-18 PROCEDURE — 2580000003 HC RX 258: Performed by: INTERNAL MEDICINE

## 2024-08-18 PROCEDURE — 6360000002 HC RX W HCPCS: Performed by: INTERNAL MEDICINE

## 2024-08-18 PROCEDURE — 86361 T CELL ABSOLUTE COUNT: CPT

## 2024-08-18 PROCEDURE — 6370000000 HC RX 637 (ALT 250 FOR IP): Performed by: INTERNAL MEDICINE

## 2024-08-18 PROCEDURE — 1100000000 HC RM PRIVATE

## 2024-08-18 PROCEDURE — 36415 COLL VENOUS BLD VENIPUNCTURE: CPT

## 2024-08-18 PROCEDURE — 94761 N-INVAS EAR/PLS OXIMETRY MLT: CPT

## 2024-08-18 RX ORDER — HYDRALAZINE HYDROCHLORIDE 50 MG/1
50 TABLET, FILM COATED ORAL EVERY 8 HOURS SCHEDULED
Qty: 90 TABLET | Refills: 3 | Status: SHIPPED | OUTPATIENT
Start: 2024-08-18

## 2024-08-18 RX ADMIN — SODIUM CHLORIDE, PRESERVATIVE FREE 10 ML: 5 INJECTION INTRAVENOUS at 22:27

## 2024-08-18 RX ADMIN — DIVALPROEX SODIUM 250 MG: 250 TABLET, DELAYED RELEASE ORAL at 21:30

## 2024-08-18 RX ADMIN — BENZTROPINE MESYLATE 0.5 MG: 1 TABLET ORAL at 08:59

## 2024-08-18 RX ADMIN — HYDRALAZINE HYDROCHLORIDE 10 MG: 20 INJECTION, SOLUTION INTRAMUSCULAR; INTRAVENOUS at 00:11

## 2024-08-18 RX ADMIN — BENZTROPINE MESYLATE 0.5 MG: 1 TABLET ORAL at 21:30

## 2024-08-18 RX ADMIN — RISPERIDONE 2 MG: 1 TABLET, FILM COATED ORAL at 08:59

## 2024-08-18 RX ADMIN — RISPERIDONE 2 MG: 1 TABLET, FILM COATED ORAL at 22:22

## 2024-08-18 RX ADMIN — DIVALPROEX SODIUM 250 MG: 250 TABLET, DELAYED RELEASE ORAL at 08:59

## 2024-08-18 RX ADMIN — HYDRALAZINE HYDROCHLORIDE 50 MG: 50 TABLET ORAL at 22:20

## 2024-08-18 RX ADMIN — HYDRALAZINE HYDROCHLORIDE 50 MG: 50 TABLET ORAL at 06:17

## 2024-08-18 RX ADMIN — BICTEGRAVIR SODIUM, EMTRICITABINE, AND TENOFOVIR ALAFENAMIDE FUMARATE 1 TABLET: 50; 200; 25 TABLET ORAL at 08:59

## 2024-08-18 RX ADMIN — HYDRALAZINE HYDROCHLORIDE 50 MG: 50 TABLET ORAL at 14:29

## 2024-08-18 RX ADMIN — DILTIAZEM HYDROCHLORIDE 120 MG: 120 CAPSULE, COATED, EXTENDED RELEASE ORAL at 09:00

## 2024-08-18 ASSESSMENT — PAIN SCALES - GENERAL
PAINLEVEL_OUTOF10: 0

## 2024-08-18 NOTE — DISCHARGE SUMMARY
tablet  Commonly known as: RISPERDAL            STOP taking these medications      lisinopril 20 MG tablet  Commonly known as: PRINIVIL;ZESTRIL     sulfamethoxazole-trimethoprim 800-160 MG per tablet  Commonly known as: BACTRIM DS;SEPTRA DS               Where to Get Your Medications        These medications were sent to [place], [place] - [address] [facility] [phone] -  [phone]  [address] [id number]      Phone: [phone]   hydrALAZINE 50 MG tablet           DISPOSITION:    Home with Family:       Home with HH/PT/OT/RN:    SNF/LTC:    BROWN:    OTHER: GROUP HOME       Code status:  FULL  Recommended diet: cardiac diet  Recommended activity: activity as tolerated      Follow up with:   PCP : Carol Bingham APRN - NP Braxton, Hannah, APRN - NP  [address] [facility] [id number] [phone]    Follow up in 2 week(s)      Bao Michelle MD  [address] [place], [place] [id number] [phone]    Follow up in 1 week(s)        Total time in minutes spent coordinating this discharge (includes going over instructions, follow-up, prescriptions, and preparing report for sign off to her PCP) :  35 minutes

## 2024-08-18 NOTE — PLAN OF CARE
Problem: Discharge Planning  Goal: Discharge to home or other facility with appropriate resources  Outcome: Progressing     Problem: Safety - Adult  Goal: Free from fall injury  Outcome: Progressing     Problem: ABCDS Injury Assessment  Goal: Absence of physical injury  Outcome: Progressing     Problem: Skin/Tissue Integrity  Goal: Absence of new skin breakdown  Description: 1.  Monitor for areas of redness and/or skin breakdown  2.  Assess vascular access sites hourly  3.  Every 4-6 hours minimum:  Change oxygen saturation probe site  4.  Every 4-6 hours:  If on nasal continuous positive airway pressure, respiratory therapy assess nares and determine need for appliance change or resting period.  Outcome: Progressing     Problem: Physical Therapy - Adult  Goal: By Discharge: Performs mobility at highest level of function for planned discharge setting.  See evaluation for individualized goals.  Description: FUNCTIONAL STATUS PRIOR TO ADMISSION: Patient was independent and active without use of DME. and The patient  was independent for basic and instrumental ADLs.    HOME SUPPORT PRIOR TO ADMISSION: The patient lived with staff in group home but did not require assistance.    Physical Therapy Goals  Initiated 8/17/2024  Pt stated goal: to get stronger and go home  Pt will be I with [facility] in 7 days.  Pt will perform bed mobility with [place] in 7 days.  Pt will perform transfers with [place] in 7 days.   Pt will amb 200 feet with LRAD safely with Modified [place] in 7 days.  Pt will demonstrate improvement in standing balance from fair/poor to fair/good in 7 days.    8/17/2024 1007 by Nay Hurtado, PT  Outcome: Progressing

## 2024-08-18 NOTE — CASE COMMUNICATION
Discharge Noted:  Accepting facility will not be able to accept over the weekend.  Jerry stated that they would have patient's medication.  [facility] does not accept patient on holidays or weekends as stated by Jerry.

## 2024-08-19 ENCOUNTER — HOSPITAL ENCOUNTER (EMERGENCY)
Facility: HOSPITAL | Age: 64
Discharge: HOME | End: 2024-08-20
Attending: STUDENT IN AN ORGANIZED HEALTH CARE EDUCATION/TRAINING PROGRAM
Payer: MEDICAID

## 2024-08-19 VITALS
HEIGHT: 67 IN | OXYGEN SATURATION: 100 % | TEMPERATURE: 98.2 F | RESPIRATION RATE: 20 BRPM | WEIGHT: 200 LBS | DIASTOLIC BLOOD PRESSURE: 91 MMHG | HEART RATE: 96 BPM | SYSTOLIC BLOOD PRESSURE: 139 MMHG | BODY MASS INDEX: 31.39 KG/M2

## 2024-08-19 DIAGNOSIS — M79.604 RIGHT LEG PAIN: Primary | ICD-10-CM

## 2024-08-19 LAB
BASOPHILS # BLD AUTO: 0 X10E3/UL (ref 0–0.2)
BASOPHILS # BLD AUTO: 0 X10E3/UL (ref 0–0.2)
BASOPHILS NFR BLD AUTO: 0 %
BASOPHILS NFR BLD AUTO: 0 %
CD3+CD4+ CELLS # BLD: 395 /UL (ref 359–1519)
CD3+CD4+ CELLS # BLD: 396 /UL (ref 359–1519)
CD3+CD4+ CELLS NFR BLD: 26.3 % (ref 30.8–58.5)
CD3+CD4+ CELLS NFR BLD: 26.4 % (ref 30.8–58.5)
EOSINOPHIL # BLD AUTO: 0.1 X10E3/UL (ref 0–0.4)
EOSINOPHIL # BLD AUTO: 0.1 X10E3/UL (ref 0–0.4)
EOSINOPHIL NFR BLD AUTO: 2 %
EOSINOPHIL NFR BLD AUTO: 3 %
ERYTHROCYTE [DISTWIDTH] IN BLOOD BY AUTOMATED COUNT: 15.5 % (ref 11.7–15.4)
ERYTHROCYTE [DISTWIDTH] IN BLOOD BY AUTOMATED COUNT: 15.7 % (ref 11.7–15.4)
HCT VFR BLD AUTO: 29.2 % (ref 34–46.6)
HCT VFR BLD AUTO: 30.6 % (ref 34–46.6)
HGB BLD-MCNC: 10 G/DL (ref 11.1–15.9)
HGB BLD-MCNC: 10.2 G/DL (ref 11.1–15.9)
HIV1 RNA # SERPL NAA+PROBE: NORMAL COPIES/ML
HIV1 RNA SERPL NAA+PROBE-LOG#: 4.15 LOG10COPY/ML
IMM GRANULOCYTES # BLD: 0 X10E3/UL (ref 0–0.1)
IMM GRANULOCYTES # BLD: 0 X10E3/UL (ref 0–0.1)
IMM GRANULOCYTES NFR BLD: 0 %
IMM GRANULOCYTES NFR BLD: 0 %
IMMATURE CELLS: ABNORMAL
IMMATURE CELLS: ABNORMAL
LYMPHOCYTES # BLD AUTO: 1.5 X10E3/UL (ref 0.7–3.1)
LYMPHOCYTES # BLD AUTO: 1.5 X10E3/UL (ref 0.7–3.1)
LYMPHOCYTES NFR BLD AUTO: 57 %
LYMPHOCYTES NFR BLD AUTO: 63 %
MCH RBC QN AUTO: 29.1 PG (ref 26.6–33)
MCH RBC QN AUTO: 29.4 PG (ref 26.6–33)
MCHC RBC AUTO-ENTMCNC: 33.3 G/DL (ref 31.5–35.7)
MCHC RBC AUTO-ENTMCNC: 34.2 G/DL (ref 31.5–35.7)
MCV RBC AUTO: 86 FL (ref 79–97)
MCV RBC AUTO: 87 FL (ref 79–97)
MONOCYTES # BLD AUTO: 0.2 X10E3/UL (ref 0.1–0.9)
MONOCYTES # BLD AUTO: 0.2 X10E3/UL (ref 0.1–0.9)
MONOCYTES NFR BLD AUTO: 8 %
MONOCYTES NFR BLD AUTO: 8 %
MORPHOLOGY BLD-IMP: ABNORMAL
MORPHOLOGY BLD-IMP: ABNORMAL
NEUTROPHILS # BLD AUTO: 0.7 X10E3/UL (ref 1.4–7)
NEUTROPHILS # BLD AUTO: 0.9 X10E3/UL (ref 1.4–7)
NEUTROPHILS NFR BLD AUTO: 26 %
NEUTROPHILS NFR BLD AUTO: 33 %
NRBC BLD AUTO-RTO: ABNORMAL %
NRBC BLD AUTO-RTO: ABNORMAL %
PLATELET # BLD AUTO: 78 X10E3/UL (ref 150–450)
PLATELET # BLD AUTO: 87 X10E3/UL (ref 150–450)
RBC # BLD AUTO: 3.4 X10E6/UL (ref 3.77–5.28)
RBC # BLD AUTO: 3.51 X10E6/UL (ref 3.77–5.28)
WBC # BLD AUTO: 2.5 X10E3/UL (ref 3.4–10.8)
WBC # BLD AUTO: 2.6 X10E3/UL (ref 3.4–10.8)

## 2024-08-19 PROCEDURE — 94761 N-INVAS EAR/PLS OXIMETRY MLT: CPT

## 2024-08-19 PROCEDURE — 99283 EMERGENCY DEPT VISIT LOW MDM: CPT

## 2024-08-19 PROCEDURE — 99232 SBSQ HOSP IP/OBS MODERATE 35: CPT | Performed by: INTERNAL MEDICINE

## 2024-08-19 PROCEDURE — 2580000003 HC RX 258: Performed by: INTERNAL MEDICINE

## 2024-08-19 PROCEDURE — 6370000000 HC RX 637 (ALT 250 FOR IP): Performed by: INTERNAL MEDICINE

## 2024-08-19 PROCEDURE — 36415 COLL VENOUS BLD VENIPUNCTURE: CPT

## 2024-08-19 PROCEDURE — 87522 HEPATITIS C REVRS TRNSCRPJ: CPT

## 2024-08-19 PROCEDURE — 6370000000 HC RX 637 (ALT 250 FOR IP): Performed by: STUDENT IN AN ORGANIZED HEALTH CARE EDUCATION/TRAINING PROGRAM

## 2024-08-19 RX ORDER — ACETAMINOPHEN 500 MG
1000 TABLET ORAL
Status: COMPLETED | OUTPATIENT
Start: 2024-08-19 | End: 2024-08-19

## 2024-08-19 RX ORDER — IBUPROFEN 400 MG/1
400 TABLET ORAL
Status: COMPLETED | OUTPATIENT
Start: 2024-08-19 | End: 2024-08-19

## 2024-08-19 RX ADMIN — DILTIAZEM HYDROCHLORIDE 120 MG: 120 CAPSULE, COATED, EXTENDED RELEASE ORAL at 10:12

## 2024-08-19 RX ADMIN — IBUPROFEN 400 MG: 400 TABLET, FILM COATED ORAL at 17:18

## 2024-08-19 RX ADMIN — HYDRALAZINE HYDROCHLORIDE 50 MG: 50 TABLET ORAL at 05:36

## 2024-08-19 RX ADMIN — RISPERIDONE 2 MG: 1 TABLET, FILM COATED ORAL at 10:12

## 2024-08-19 RX ADMIN — DIVALPROEX SODIUM 250 MG: 250 TABLET, DELAYED RELEASE ORAL at 10:12

## 2024-08-19 RX ADMIN — SODIUM CHLORIDE, PRESERVATIVE FREE 10 ML: 5 INJECTION INTRAVENOUS at 12:06

## 2024-08-19 RX ADMIN — BENZTROPINE MESYLATE 0.5 MG: 1 TABLET ORAL at 10:12

## 2024-08-19 RX ADMIN — ACETAMINOPHEN 1000 MG: 500 TABLET ORAL at 17:18

## 2024-08-19 RX ADMIN — BICTEGRAVIR SODIUM, EMTRICITABINE, AND TENOFOVIR ALAFENAMIDE FUMARATE 1 TABLET: 50; 200; 25 TABLET ORAL at 10:13

## 2024-08-19 ASSESSMENT — PAIN - FUNCTIONAL ASSESSMENT: PAIN_FUNCTIONAL_ASSESSMENT: 0-10

## 2024-08-19 ASSESSMENT — PAIN SCALES - GENERAL: PAINLEVEL_OUTOF10: 7

## 2024-08-19 NOTE — CARE COORDINATION
Pt dc earlier today from this hospital back to [facility], however, pt was dc w/ stretcher transport, when arrived at group [place], the group home refused to allow the stretcher company to bring the pt into the home, indicating that she had to ambulate. The pt refused to ambulate (behavioral), and the group home refused to receive her.     JOVANY has spoken w/ Jerry [phone], from [facility] who indicated that the pt can return this evening, but would need to be able to walk.  has ordered Medicaid cab, [phone], (trip ID [id number]). CM informed Medicaid pt is ready now, CM provided work cell number for  to contact, to more closely coordinate dc. Pt needs to receive meds and be walked ASAP.     1830 - Pt unable to ambulate on her own. However, with a rolling walker, pt was able to ambulate without issue. CM attempted to secure RW from [facility] after hours however, the representative indicated that they could not see the referral yet. At the same time Medicaid transport arrived and has left. Pt will need to remain in the ED until tomorrow morning. Nursing/ Provider/ Group Home aware.

## 2024-08-19 NOTE — PLAN OF CARE
Problem: Discharge Planning  Goal: Discharge to home or other facility with appropriate resources  8/18/2024 2314 by Deanna Parker RN  Outcome: Progressing  8/18/2024 1533 by Estephania Salazar RN  Outcome: Progressing     Problem: Safety - Adult  Goal: Free from fall injury  8/18/2024 2314 by Deanna Parker RN  Outcome: Progressing  8/18/2024 1533 by Estephania Salazar RN  Outcome: Progressing     Problem: ABCDS Injury Assessment  Goal: Absence of physical injury  8/18/2024 2314 by Deanna Parker RN  Outcome: Progressing  8/18/2024 1533 by Estephania Salazar RN  Outcome: Progressing     Problem: Skin/Tissue Integrity  Goal: Absence of new skin breakdown  Description: 1.  Monitor for areas of redness and/or skin breakdown  2.  Assess vascular access sites hourly  3.  Every 4-6 hours minimum:  Change oxygen saturation probe site  4.  Every 4-6 hours:  If on nasal continuous positive airway pressure, respiratory therapy assess nares and determine need for appliance change or resting period.  8/18/2024 2314 by Deanna Parker RN  Outcome: Progressing  8/18/2024 1533 by Estephania Salazar RN  Outcome: Progressing

## 2024-08-19 NOTE — CARE COORDINATION
CM reviewed chart. Pt from [facility].     CM spoke with Jerry ([phone]) at [facility]. They are able to accept patient today.    DC summary faxed to [facility]: [phone]. DC summary and AVS  faxed to [facility]: [phone] as requested by Jerry.     Medicaid transportation  time of 1:30p. Jerry notified.       Transition of Care Plan:    RUR: 19%  Prior Level of Functioning: Independent  Disposition: [facility]  If SNF or IPR: Date FOC offered: N/a  Date FOC received:   Accepting facility:   Date authorization started with reference number:   Date authorization received and expires:   Follow up appointments: Discharge summary  DME needed:   Transportation at discharge: Medicaid  IM/IMM Medicare/ letter given: N/A  Is patient a  and connected with VA?    If yes, was [place] transfer form completed and VA notified?   Caregiver Contact: Yes, Jerry @ [facility] notified  Discharge Caregiver contacted prior to discharge? Yes  Care Conference needed? N/A  Barriers to discharge: N/A

## 2024-08-19 NOTE — PLAN OF CARE
Problem: Discharge Planning  Goal: Discharge to home or other facility with appropriate resources  8/19/2024 1101 by Jerry Alvarado RN  Outcome: Progressing  8/18/2024 2314 by Deanna Parker RN  Outcome: Progressing  Flowsheets (Taken 8/18/2024 2130)  Discharge to home or other facility with appropriate resources: Identify barriers to discharge with patient and caregiver     Problem: Safety - Adult  Goal: Free from fall injury  8/19/2024 1101 by Jerry Alvarado RN  Outcome: Progressing  8/18/2024 2314 by Deanna Parker RN  Outcome: Progressing     Problem: ABCDS Injury Assessment  Goal: Absence of physical injury  8/19/2024 1101 by Jerry Alvarado RN  Outcome: Progressing  8/18/2024 2314 by Deanna Parker RN  Outcome: Progressing     Problem: Skin/Tissue Integrity  Goal: Absence of new skin breakdown  Description: 1.  Monitor for areas of redness and/or skin breakdown  2.  Assess vascular access sites hourly  3.  Every 4-6 hours minimum:  Change oxygen saturation probe site  4.  Every 4-6 hours:  If on nasal continuous positive airway pressure, respiratory therapy assess nares and determine need for appliance change or resting period.  8/19/2024 1101 by Jerry Alvarado RN  Outcome: Progressing  8/18/2024 2314 by Deanna Parker RN  Outcome: Progressing

## 2024-08-19 NOTE — ED PROVIDER NOTES
Medication Sig Dispense Refill    hydrALAZINE (APRESOLINE) 50 MG tablet Take 1 tablet by mouth every 8 hours 90 tablet 3    albuterol sulfate HFA (PROVENTIL;VENTOLIN;PROAIR) 108 (90 Base) MCG/ACT inhaler Inhale 2 puffs into the lungs every 6 hours as needed      benztropine (COGENTIN) 0.5 MG tablet Take 1 tablet by mouth 2 times daily      bictegravir-emtricitab-tenofovir alafenamide (BIKTARVY) -25 MG TABS per tablet Take 1 tablet by mouth daily      dilTIAZem (TIAZAC) 120 MG extended release capsule ceived the following from [facility]: Outside name: dilTIAZem ER (CARDIZEM CD) 120 mg capsule      divalproex (DEPAKOTE ER) 250 MG extended release tablet Take by mouth 2 times daily      famotidine (PEPCID) 40 MG tablet Take by mouth      risperiDONE (RISPERDAL) 2 MG tablet Take by mouth 2 times daily         Social Determinants of Health:   Social Determinants of Health     Tobacco Use: High Risk (8/19/2024)    Patient History     Smoking Tobacco Use: Every Day     Smokeless Tobacco Use: Never     Passive Exposure: Not on file   Alcohol Use: Not At Risk (8/19/2024)    AUDIT-C     Frequency of Alcohol Consumption: Never     Average Number of Drinks: Patient does not drink     Frequency of Binge Drinking: Never   Financial Resource Strain: Not on file   Food Insecurity: No Food Insecurity (8/15/2024)    Hunger Vital Sign     Worried About Running Out of Food in the Last Year: Never true     Ran Out of Food in the Last Year: Never true   Transportation Needs: No Transportation Needs (8/15/2024)    PRAPARE - Transportation     Lack of Transportation (Medical): No     Lack of Transportation (Non-Medical): No   Physical Activity: Not on file   Stress: Not on file   Social Connections: Not on file   Intimate Partner Violence: Not on file   Depression: Not at risk (6/15/2023)    PHQ-2     PHQ-2 Score: 0   Housing Stability: Low Risk  (8/15/2024)    Housing Stability Vital Sign     Unable to Pay for

## 2024-08-20 VITALS
HEART RATE: 74 BPM | DIASTOLIC BLOOD PRESSURE: 89 MMHG | SYSTOLIC BLOOD PRESSURE: 187 MMHG | BODY MASS INDEX: 31.39 KG/M2 | OXYGEN SATURATION: 96 % | WEIGHT: 200 LBS | TEMPERATURE: 98.1 F | RESPIRATION RATE: 18 BRPM | HEIGHT: 67 IN

## 2024-08-20 ASSESSMENT — PAIN SCALES - GENERAL
PAINLEVEL_OUTOF10: 0
PAINLEVEL_OUTOF10: 0

## 2024-08-20 NOTE — CARE COORDINATION
8/20/24 CM called [facility] @ [phone] & informed by Sarah that walker had just been approved & CM could pull from closet. Pt did sign Choice Letter for rollator. CM updated nsg/MD &  to set up transport.

## 2024-08-20 NOTE — DISCHARGE SUMMARY
Discharge Summary    Name: Darcy Arthur  [id number]  YOB: 1960 (Age: 64 y.o.)   Date of Admission: 8/19/2024  Date of Discharge: 8/20/2024  Attending Physician: Candido Santiago MD    Discharge Diagnosis:     Consultations:  None      Brief Admission History/Reason for Admission Per No admitting provider for patient encounter.:     Chief Complaint / Reason for Physician Visit  \" Generalized weakness\".  Discussed with RN events overnight.      8/14 - Admission H&P   64 y.o. female with pmh of schizophrenia who resides at a local group home who presents with staff stating that she was not acting herself.  Additional past medical history includes HIV, hepatitis C, history of alcoholism, and hypertension.     Emergency room evaluation significant for creatinine 2.21 with historically normal baseline.  Patient is currently COVID-positive and when tested approximately 4 weeks ago was negative.  She has not been hypoxic.  Patient herself can tell me that she is in a hospital but does not know the name.  She confirms that she lives in a group home.  Otherwise she is not giving any type of history.     8/15  Patient now significant improved and is alert and oriented x 2 and able to answer questions and follow commands.     8/16  Patient mproved and is alert and oriented x 3 today and able to answer questions and follow commands.  No overnight fever/chills, chest pain, shortness of breath, hypoxia noted.  Counseled on continued treatment with IV fluids with PT evaluation pending as this patient seems to be completely independent at group home, at [facility].        8/17  Patient mproved and is alert and oriented x 3 today and able to answer questions and follow commands.  No overnight fever/chills, chest pain, shortness of breath, hypoxia noted.  Counseled on continuing treatment and currently awaiting HIV RNA as well as Depakote level given persistent thrombocytopenia in

## 2024-08-20 NOTE — CARE COORDINATION
8/20/24 Jerry @ [facility] @ [phone] aware of pt having walker/walker around ED/room with walker, & ETA for transport up to 3hrs.

## 2024-08-20 NOTE — PROGRESS NOTES
POST FALL MANAGEMENT    Darcy Arthur  MEDICAL RECORD NUMBER:  [id number]  AGE: 64 y.o.   GENDER: female  : 1960  TODAYS DATE:  2024    Details     Fall Occurred: Yes    Was the Fall Witnessed:  No      Brief Review of Event: PT had incontinent episode on self stated \"I was trying to get to bathroom I didn't hit my head.\"  noted pt on floor and helped assist stated that pt was lying on right side with head resting on left forearm legs extended out.\" Pt denies any pain no injuries or distress noted at this time.  Code fall called socks were on, bed lowest position call bell in reach.        Who found the patient:        Where was the patient at the time of the fall: in room       Patient Comments: \" I was trying to go to bathroom, I didn't hit my head.\"       Date Fall Occurred:  2024 .       Time Fall Occurred: 6:30a.m.     Assessment     Post Fall Head to Toe Assessment Completed: Yes    Post Fall Predictive Analytic Score Reviewed: Yes     Post Fall Vitals Completed: Yes    Post Fall Neuro Checks Completed: Yes    Injury Occurred(if yes, describe injury):  no           Did the Patient Experience:(Check Jose Eduardo all that apply)    [] Patient hit head  [] Loss of consciousness  [] Change in mental status following the fall  [] Patient is on an anticoagulant medication      CT Performed:  no    Follow-up     Persons Notified of Fall:  (Provide names of persons notified)   [x] Physician: Richard  [] IVANNA:  [x] Nursing Supervisior:  [] Manager:  [] Pharmacist:  [] Family:  [x] Other:Charge Nurse       Electronically signed by Sarah Sanz RN 2024 at 7:03 AM   
      Hospitalist Progress Note    NAME:   Darcy Arthur   : 1960   MRN: [id number]     Date/Time: 2024 2:05 PM  Patient PCP: Carol Bingham APRN - NP    Estimated discharge date:  Barriers:       Assessment / Plan:    Encephalopathy acute  Significant improved as patient now alert and oriented x 3 and able to follow commands.  At risk for CNS infections secondary to HIV status.    Likely secondary to acute renal failure and COVID infection.  Minimally elevated ammonia level.  Normal TSH, and B12   Appreciate ID consultation, with no current indication for further antibiotic use, as patient clinically improving.  Patient states that she is compliant with her Biktarvy.    COVID-19  Not hypoxic, specific therapy not warranted, supportive therapy for now.  Patient came in with diffuse myalgias and weakness in her legs.     HIV  Patient states that she is compliant with her Biktarvy.  Appreciate ID consultation with HIV-1 RNA along with HCV RNA levels pending.    Moderate debility  Patient at group home is fairly independent and attends day program.  Pending PT evaluation.  Patient ambulating on her own with walker in the room.    Acute renal failure   Slowly improving with reduction in creatinine from 2.2-1.8 with IV fluids noted.  Continue to monitor daily.    Thrombocytopenia  After chart review, seems to be chronic as of .  Possibly due to HIV if patient is noncompliant with Biktarvy as well as possibility of Depakote side effect.  Will check Depakote level.    Hypertension  Elevated blood pressures noted.  Restarted home hydralazine and increase to 25 Mg twice daily.    DVT prophylaxis  Lovenox SC    Medical Decision Making:   I personally reviewed labs: BMP, CBC  I personally reviewed imaging:  I personally reviewed EKG:  Toxic drug monitoring:   Discussed case with: Nursing, patient    Total time 50 minutes.    Subjective:     Chief Complaint / Reason for Physician Visit  \" Generalized weakness\".  
      Hospitalist Progress Note    NAME:   Darcy Arthur   : 1960   MRN: [id number]     Date/Time: 8/15/2024 9:28 AM  Patient PCP: Carol Bingham APRN - NP    Estimated discharge date:  Barriers:       Assessment / Plan:    Encephalopathy acute  Significant improved as patient now alert and oriented x 2 and able to follow commands.  At risk for CNS infections secondary to HIV status.    Likely secondary to acute renal failure and COVID infection.  Minimally elevated ammonia level.  Normal TSH, and B12   Will obtain ID consultation as well for further evaluation with history of HIV.  Patient states that she is compliant with her Biktarvy.    COVID-19  Not hypoxic, specific therapy not warranted, supportive therapy for now.  Patient came in with diffuse myalgias and weakness in her legs.     Acute renal failure   Slowly improving with reduction in creatinine from 2.2-1.8 with IV fluids noted.  Continue to monitor daily.    DVT prophylaxis  Lovenox SC    Medical Decision Making:   I personally reviewed labs:  I personally reviewed imaging:  I personally reviewed EKG:  Toxic drug monitoring:   Discussed case with: Nursing, patient    Subjective:     Chief Complaint / Reason for Physician Visit  \" Generalized weakness\".  Discussed with RN events overnight.      - Admission H&P   64 y.o. female with pmh of schizophrenia who resides at a local group home who presents with staff stating that she was not acting herself.  Additional past medical history includes HIV, hepatitis C, history of alcoholism, and hypertension.     Emergency room evaluation significant for creatinine 2.21 with historically normal baseline.  Patient is currently COVID-positive and when tested approximately 4 weeks ago was negative.  She has not been hypoxic.  Patient herself can tell me that she is in a hospital but does not know the name.  She confirms that she lives in a group home.  Otherwise she is not giving any type of history.   
      Hospitalist Progress Note    NAME:   Darcy Arthur   : 1960   MRN: [id number]     Date/Time: 2024 9:00 AM  Patient PCP: Carol Bingham APRN - NP    Estimated discharge date:  Barriers:       Assessment / Plan:    Encephalopathy acute  Significant improved as patient now alert and oriented x 3 and able to follow commands.  At risk for CNS infections secondary to HIV status.    Likely secondary to acute renal failure and COVID infection.  Minimally elevated ammonia level.  Normal TSH, and B12   Appreciate ID consultation, with no current indication for further antibiotic use, as patient clinically improving.  Patient states that she is compliant with her Biktarvy.    COVID-19  Not hypoxic, specific therapy not warranted, supportive therapy for now.  Patient came in with diffuse myalgias and weakness in her legs.     Moderate debility  Patient at group home is fairly independent and attends day program.  Pending PT evaluation.    Acute renal failure   Slowly improving with reduction in creatinine from 2.2-1.8 with IV fluids noted.  Continue to monitor daily.    Hypertension  Elevated blood pressures noted.  Restarted home hydralazine and increase to 25 Mg twice daily.    DVT prophylaxis  Lovenox SC    Medical Decision Making:   I personally reviewed labs: BMP, CBC  I personally reviewed imaging:  I personally reviewed EKG:  Toxic drug monitoring:   Discussed case with: Nursing, patient    Subjective:     Chief Complaint / Reason for Physician Visit  \" Generalized weakness\".  Discussed with RN events overnight.      - Admission H&P   64 y.o. female with pmh of schizophrenia who resides at a local group home who presents with staff stating that she was not acting herself.  Additional past medical history includes HIV, hepatitis C, history of alcoholism, and hypertension.     Emergency room evaluation significant for creatinine 2.21 with historically normal baseline.  Patient is currently 
4 Eyes Skin Assessment     NAME:  Darcy Arthur  YOB: 1960  MEDICAL RECORD NUMBER:  [id number]    The patient is being assessed for  Admission    I agree that at least one RN has performed a thorough Head to Toe Skin Assessment on the patient. ALL assessment sites listed below have been assessed.      Areas assessed by both nurses:    Head, Face, Ears, Shoulders, Back, Chest, Arms, Elbows, Hands, Sacrum. Buttock, Coccyx, Ischium, Legs. Feet and Heels, and Under Medical Devices         Does the Patient have a Wound? No noted wound(s)       Wesly Prevention initiated by RN: No  Wound Care Orders initiated by RN: No    Pressure Injury (Stage 3,4, Unstageable, DTI, NWPT, and Complex wounds) if present, place Wound referral order by RN under : No    New Ostomies, if present place, Ostomy referral order under : No     Nurse 1 eSignature: Electronically signed by Dariusz Gross RN on 8/15/24 at 5:23 AM EDT    **SHARE this note so that the co-signing nurse can place an eSignature**    Nurse 2 eSignature: Electronically signed by Tomasz Burrows RN on 8/15/24 at 5:25 AM EDT   
DC instructions given. NO IV.   
Depakote 250 mg DR interchanged for Depakote 250 ER BID per [facility] therapeutic interchange policy  
Discharged instruction and prescription discussed with patient verbalized understanding. Escorted to exit with AVS and belongings in hand.   
Left voicemail with caregiver Lindsey Robison ([phone]) regarding patient home medication list as patient does not know them, Asked Ms. Robison to please return call with list.  
New order per Provider sanna Abdalla/aaron clear liquid diet and start regular diet.  
Progress Note  Date:2024       Room:[facility]  Patient Name:Darcy Arthur     YOB: 1960     Age:64 y.o.        Subjective    Subjective  Patient admitted because of leg pain and weakness followed by ID for HIV,  Hepatitis C and acutely Covid-19 which has not required specific treatment.  It appears that patient has been discharged back to Group Home.     Objective         Vitals Last 24 Hours:  TEMPERATURE:  Temp  Av.3 °F (36.8 °C)  Min: 97.8 °F (36.6 °C)  Max: 99 °F (37.2 °C)  RESPIRATIONS RANGE: Resp  Av.5  Min: 16  Max: 20  PULSE OXIMETRY RANGE: SpO2  Av.8 %  Min: 99 %  Max: 100 %  PULSE RANGE: Pulse  Av.8  Min: 74  Max: 96  BLOOD PRESSURE RANGE: Systolic (24hrs), Av , Min:138 , Max:146   ; Diastolic (24hrs), Av, Min:73, Max:91       Vitals and nursing note reviewed. Patient not available for re-examination  Constitutional:       Appearance: She is not ill-appearing.      Comments: ROOM AIR     HENT: unremarkable  Eyes:      Comments: closed   Cardiovascular:      Rate and Rhythm: Normal rate and regular rhythm.      Heart sounds: No murmur heard.  Pulmonary:      Effort: Pulmonary effort is normal.      Breath sounds: Normal breath sounds.   Abdominal:      General: Bowel sounds are normal. There is no distension.      Palpations: Abdomen is soft.   Genitourinary:     Comments: External urinary device  Musculoskeletal:      Cervical back: Neck supple.      Right lower leg: No edema.      Left lower leg: No edema.   Skin:     Findings: No rash.   Neurological: nonfocal, mild confusio    Psychiatric: no agitation        Labs/Imaging/Diagnostics    Labs:  CBC:  Recent Labs     24  0636 24  1135 24  0756   WBC 2.5* 2.5* 2.6*   RBC 3.51* 3.37* 3.40*   HGB 10.2* 10.0* 10.0*   HCT 30.6* 29.0* 29.2*   MCV 87 86.1 86   RDW 15.7* 15.7* 15.5*   PLT 87* 79* 78*     CHEMISTRIES:  Recent Labs     24  0636      K 3.8      CO2 25   BUN 7   CREATININE 
Progress Note  Date:2024       Room:[facility]  Patient Name:Darcy Arthur     YOB: 1960     Age:64 y.o.        Subjective    Subjective  Patient admitted because of leg pain and weakness followed by ID for HIV,  Hepatitis C and acutely Covid-19 which has not required specific treatment.     Objective         Vitals Last 24 Hours:  TEMPERATURE:  Temp  Av.3 °F (36.8 °C)  Min: 98.2 °F (36.8 °C)  Max: 98.4 °F (36.9 °C)  RESPIRATIONS RANGE: Resp  Av.5  Min: 14  Max: 20  PULSE OXIMETRY RANGE: SpO2  Av.5 %  Min: 96 %  Max: 100 %  PULSE RANGE: Pulse  Av.6  Min: 73  Max: 80  BLOOD PRESSURE RANGE: Systolic (24hrs), Av , Min:150 , Max:184   ; Diastolic (24hrs), Av, Min:75, Max:95       Vitals and nursing note reviewed.   Constitutional:       Appearance: She is not ill-appearing.      Comments: ROOM AIR     HENT:      Head: Normocephalic and atraumatic.      Right Ear: External ear normal.      Left Ear: External ear normal.      Nose: Nose normal.      Mouth/Throat:      Comments: Not examined  Eyes:      Comments: closed   Cardiovascular:      Rate and Rhythm: Normal rate and regular rhythm.      Heart sounds: No murmur heard.  Pulmonary:      Effort: Pulmonary effort is normal.      Breath sounds: Normal breath sounds.   Abdominal:      General: Bowel sounds are normal. There is no distension.      Palpations: Abdomen is soft.   Genitourinary:     Comments: External urinary device  Musculoskeletal:      Cervical back: Neck supple.      Right lower leg: No edema.      Left lower leg: No edema.   Skin:     Findings: No rash.   Neurological: nonfocal, mild confusio    Psychiatric: no agitation        Labs/Imaging/Diagnostics    Labs:  CBC:  Recent Labs     24  1741 08/15/24  0648 24  0542   WBC 2.4* 2.1* 2.1*   RBC 3.39* 3.34* 3.16*   HGB 10.1* 9.9* 9.2*   HCT 29.4* 29.1* 27.4*   MCV 86.7 87.1 86.7   RDW 16.0* 15.7* 15.9*   PLT 90* 77* 72*     CHEMISTRIES:  Recent Labs     
Solis Edwards ([phone]) at [facility]  no report needed from RN.  
112* 113* 107   CO2 25 28 25   BUN 22* 13 7   CREATININE 1.80* 1.26* 1.23*   GLUCOSE 65 71 118*        LIVER PROFILE:  No results for input(s): \"AST\", \"ALT\", \"BILIDIR\", \"BILITOT\", \"ALKPHOS\" in the last 72 hours.  Procal 0.05  CRP 0.63        Rapid flu tests Negative  SARS CoV-2 PCR Positive       Imaging Last 24 Hours:  XR CHEST PORTABLE    Result Date: 8/16/2024  EXAM:  XR CHEST PORTABLE INDICATION: Covid-19 COMPARISON: 8/15/2024 TECHNIQUE: 0936 hours portable chest AP view FINDINGS: The cardiac silhouette is within normal limits. The pulmonary vasculature is within normal limits. The lungs and pleural spaces are clear. The visualized bones and upper abdomen are age-appropriate.     No acute process on portable chest. Electronically signed by PHOENIX COONEY    XR CHEST PORTABLE    Result Date: 8/15/2024  EXAM:  XR CHEST PORTABLE INDICATION: Covid-19 COMPARISON: 7/18/2024 TECHNIQUE: 1448 hours portable chest AP view FINDINGS: The cardiac silhouette is within normal limits. The pulmonary vasculature is within normal limits. The lungs and pleural spaces are clear. The visualized bones and upper abdomen are age-appropriate.     No acute process on portable chest. Electronically signed by PHOENIX COONEY    Assessment//Plan           Hospital Problems             Last Modified POA    * (Principal) Encephalopathy acute 8/14/2024 Yes    COVID 8/14/2024 Yes    Acute kidney injury (HCC) 8/14/2024 Yes    Encephalopathy 8/14/2024 Yes      HIV-1 infection on Biktarvy  Covid-19 infection with no evidence of pneumonitis or hypoxia  Acute on chronic metabolic encephalopathy, doubt CNS opportunistic infection  Chronic Hepatitis C   Schizophrenia  6.   Leg pain with ambulatory problems     Comment:  Awaiting HIV-1 RNA which was elevated at last admission, raising concern for nonadherence.  She may  be candidate for Paxolovid, but not entirely clear how long she has been Covid positive.  She is not severely immunocompromised but may 
discharge from ID standpoint  6. Patient scheduled to see me in ID Clinic; will have her rescheduled in 2 weeks      Electronically signed by Bao Michelle MD

## 2024-08-20 NOTE — CARE COORDINATION
8/20/24 Pt continuing to await approval for rollator & then to return to [facility]. CM spoke with ( Jerry from [facility] @ [phone]) - stating pt can return with walker - but will be sent back to ED if pt doesn't walk. Pt ambulated in ED on 8/19/24 with walker - stay to walk today before d/c. CM to call [facility] ( Jerry) with ETA upon discharge.

## 2024-08-20 NOTE — CARE COORDINATION
8/20/24 Pt given walker from FELICIA anderson ( [facility]) & signed receipt forms.

## 2024-08-21 LAB
GRAPH: NORMAL
HCV RNA SERPL NAA+PROBE-ACNC: NORMAL IU/ML
HCV RNA SERPL NAA+PROBE-LOG IU: NORMAL LOG10 IU/ML
TEST INFORMATION: NORMAL

## 2024-08-26 ENCOUNTER — APPOINTMENT (OUTPATIENT)
Facility: HOSPITAL | Age: 64
End: 2024-08-26
Payer: MEDICAID

## 2024-08-26 ENCOUNTER — HOSPITAL ENCOUNTER (EMERGENCY)
Facility: HOSPITAL | Age: 64
Discharge: HOME | End: 2024-08-27
Attending: STUDENT IN AN ORGANIZED HEALTH CARE EDUCATION/TRAINING PROGRAM
Payer: MEDICAID
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
## 2024-08-26 PROCEDURE — 71045 X-RAY EXAM CHEST 1 VIEW: CPT

## 2024-08-26 PROCEDURE — 70450 CT HEAD/BRAIN W/O DYE: CPT

## 2024-08-26 PROCEDURE — 99285 EMERGENCY DEPT VISIT HI MDM: CPT

## 2024-08-26 PROCEDURE — 94761 N-INVAS EAR/PLS OXIMETRY MLT: CPT

## 2024-08-26 ASSESSMENT — PAIN - FUNCTIONAL ASSESSMENT: PAIN_FUNCTIONAL_ASSESSMENT: NONE - DENIES PAIN

## 2024-08-27 VITALS
RESPIRATION RATE: 18 BRPM | SYSTOLIC BLOOD PRESSURE: 168 MMHG | DIASTOLIC BLOOD PRESSURE: 95 MMHG | OXYGEN SATURATION: 99 % | HEART RATE: 88 BPM | TEMPERATURE: 97.8 F

## 2024-08-27 LAB
ALBUMIN SERPL-MCNC: 2.4 G/DL (ref 3.5–5)
ALBUMIN/GLOB SERPL: 0.3 (ref 1.1–2.2)
ALP SERPL-CCNC: 90 U/L (ref 45–117)
ALT SERPL-CCNC: 26 U/L (ref 12–78)
ANION GAP SERPL CALC-SCNC: 6 MMOL/L (ref 5–15)
APPEARANCE UR: CLEAR
AST SERPL W P-5'-P-CCNC: 71 U/L (ref 15–37)
BACTERIA URNS QL MICRO: ABNORMAL /HPF
BASOPHILS # BLD: 0 K/UL (ref 0–0.1)
BASOPHILS NFR BLD: 0 % (ref 0–1)
BILIRUB SERPL-MCNC: 0.6 MG/DL (ref 0.2–1)
BILIRUB UR QL: NEGATIVE
BUN SERPL-MCNC: 16 MG/DL (ref 6–20)
BUN/CREAT SERPL: 12 (ref 12–20)
CA-I BLD-MCNC: 8.8 MG/DL (ref 8.5–10.1)
CHLORIDE SERPL-SCNC: 104 MMOL/L (ref 97–108)
CK SERPL-CCNC: 301 U/L (ref 26–192)
CO2 SERPL-SCNC: 25 MMOL/L (ref 21–32)
COLOR UR: ABNORMAL
CREAT SERPL-MCNC: 1.33 MG/DL (ref 0.55–1.02)
DIFFERENTIAL METHOD BLD: ABNORMAL
EKG ATRIAL RATE: 98 BPM
EKG DIAGNOSIS: NORMAL
EKG P AXIS: 37 DEGREES
EKG P-R INTERVAL: 152 MS
EKG Q-T INTERVAL: 352 MS
EKG QRS DURATION: 84 MS
EKG QTC CALCULATION (BAZETT): 449 MS
EKG R AXIS: 25 DEGREES
EKG T AXIS: 49 DEGREES
EKG VENTRICULAR RATE: 98 BPM
EOSINOPHIL # BLD: 0 K/UL (ref 0–0.4)
EOSINOPHIL NFR BLD: 0 % (ref 0–7)
EPITH CASTS URNS QL MICRO: ABNORMAL /LPF
ERYTHROCYTE [DISTWIDTH] IN BLOOD BY AUTOMATED COUNT: 16.6 % (ref 11.5–14.5)
GLOBULIN SER CALC-MCNC: 7 G/DL (ref 2–4)
GLUCOSE BLD STRIP.AUTO-MCNC: 106 MG/DL (ref 65–100)
GLUCOSE SERPL-MCNC: 103 MG/DL (ref 65–100)
GLUCOSE UR STRIP.AUTO-MCNC: NEGATIVE MG/DL
HCT VFR BLD AUTO: 27.7 % (ref 35–47)
HGB BLD-MCNC: 9.5 G/DL (ref 11.5–16)
HGB UR QL STRIP: NEGATIVE
IMM GRANULOCYTES # BLD AUTO: 0 K/UL (ref 0–0.04)
IMM GRANULOCYTES NFR BLD AUTO: 0 % (ref 0–0.5)
KETONES UR QL STRIP.AUTO: NEGATIVE MG/DL
LACTATE BLD-SCNC: 1.19 MMOL/L (ref 0.4–2)
LEUKOCYTE ESTERASE UR QL STRIP.AUTO: NEGATIVE
LYMPHOCYTES # BLD: 2 K/UL (ref 0.8–3.5)
LYMPHOCYTES NFR BLD: 23 % (ref 12–49)
MCH RBC QN AUTO: 29.5 PG (ref 26–34)
MCHC RBC AUTO-ENTMCNC: 34.3 G/DL (ref 30–36.5)
MCV RBC AUTO: 86 FL (ref 80–99)
MONOCYTES # BLD: 0.8 K/UL (ref 0–1)
MONOCYTES NFR BLD: 9 % (ref 5–13)
NEUTS SEG # BLD: 5.9 K/UL (ref 1.8–8)
NEUTS SEG NFR BLD: 68 % (ref 32–75)
NITRITE UR QL STRIP.AUTO: NEGATIVE
NRBC # BLD: 0 K/UL (ref 0–0.01)
NRBC BLD-RTO: 0 PER 100 WBC
PERFORMED BY:: ABNORMAL
PERFORMED BY:: NORMAL
PH UR STRIP: 6 (ref 5–8)
PLATELET # BLD AUTO: 113 K/UL (ref 150–400)
POTASSIUM SERPL-SCNC: 4.4 MMOL/L (ref 3.5–5.1)
PROCALCITONIN SERPL-MCNC: 0.48 NG/ML
PROT SERPL-MCNC: 9.4 G/DL (ref 6.4–8.2)
PROT UR STRIP-MCNC: 100 MG/DL
RBC # BLD AUTO: 3.22 M/UL (ref 3.8–5.2)
RBC #/AREA URNS HPF: ABNORMAL /HPF (ref 0–5)
SODIUM SERPL-SCNC: 135 MMOL/L (ref 136–145)
SP GR UR REFRACTOMETRY: 1.01 (ref 1–1.03)
TROPONIN I SERPL HS-MCNC: 49 NG/L (ref 0–51)
URINE CULTURE IF INDICATED: ABNORMAL
UROBILINOGEN UR QL STRIP.AUTO: 2 EU/DL (ref 0.1–1)
WBC # BLD AUTO: 8.7 K/UL (ref 3.6–11)
WBC URNS QL MICRO: ABNORMAL /HPF (ref 0–4)

## 2024-08-27 PROCEDURE — 2580000003 HC RX 258: Performed by: STUDENT IN AN ORGANIZED HEALTH CARE EDUCATION/TRAINING PROGRAM

## 2024-08-27 PROCEDURE — 87040 BLOOD CULTURE FOR BACTERIA: CPT

## 2024-08-27 PROCEDURE — 96361 HYDRATE IV INFUSION ADD-ON: CPT

## 2024-08-27 PROCEDURE — 36415 COLL VENOUS BLD VENIPUNCTURE: CPT

## 2024-08-27 PROCEDURE — 82550 ASSAY OF CK (CPK): CPT

## 2024-08-27 PROCEDURE — 93005 ELECTROCARDIOGRAM TRACING: CPT | Performed by: STUDENT IN AN ORGANIZED HEALTH CARE EDUCATION/TRAINING PROGRAM

## 2024-08-27 PROCEDURE — 85025 COMPLETE CBC W/AUTO DIFF WBC: CPT

## 2024-08-27 PROCEDURE — 6360000002 HC RX W HCPCS: Performed by: STUDENT IN AN ORGANIZED HEALTH CARE EDUCATION/TRAINING PROGRAM

## 2024-08-27 PROCEDURE — 81001 URINALYSIS AUTO W/SCOPE: CPT

## 2024-08-27 PROCEDURE — 84484 ASSAY OF TROPONIN QUANT: CPT

## 2024-08-27 PROCEDURE — 6370000000 HC RX 637 (ALT 250 FOR IP): Performed by: STUDENT IN AN ORGANIZED HEALTH CARE EDUCATION/TRAINING PROGRAM

## 2024-08-27 PROCEDURE — 80053 COMPREHEN METABOLIC PANEL: CPT

## 2024-08-27 PROCEDURE — 84145 PROCALCITONIN (PCT): CPT

## 2024-08-27 PROCEDURE — 83605 ASSAY OF LACTIC ACID: CPT

## 2024-08-27 PROCEDURE — 82962 GLUCOSE BLOOD TEST: CPT

## 2024-08-27 PROCEDURE — 94761 N-INVAS EAR/PLS OXIMETRY MLT: CPT

## 2024-08-27 PROCEDURE — 96374 THER/PROPH/DIAG INJ IV PUSH: CPT

## 2024-08-27 RX ORDER — 0.9 % SODIUM CHLORIDE 0.9 %
1000 INTRAVENOUS SOLUTION INTRAVENOUS ONCE
Status: COMPLETED | OUTPATIENT
Start: 2024-08-27 | End: 2024-08-27

## 2024-08-27 RX ORDER — ACETAMINOPHEN 500 MG
1000 TABLET ORAL
Status: COMPLETED | OUTPATIENT
Start: 2024-08-27 | End: 2024-08-27

## 2024-08-27 RX ADMIN — SODIUM CHLORIDE 1000 ML: 9 INJECTION, SOLUTION INTRAVENOUS at 01:15

## 2024-08-27 RX ADMIN — CEFTRIAXONE SODIUM 1000 MG: 1 INJECTION, POWDER, FOR SOLUTION INTRAMUSCULAR; INTRAVENOUS at 01:14

## 2024-08-27 RX ADMIN — ACETAMINOPHEN 1000 MG: 500 TABLET ORAL at 01:15

## 2024-08-27 ASSESSMENT — PAIN SCALES - GENERAL
PAINLEVEL_OUTOF10: 0
PAINLEVEL_OUTOF10: 0

## 2024-08-27 ASSESSMENT — PAIN - FUNCTIONAL ASSESSMENT
PAIN_FUNCTIONAL_ASSESSMENT: 0-10
PAIN_FUNCTIONAL_ASSESSMENT: 0-10

## 2024-08-27 NOTE — ED TRIAGE NOTES
Pt brought in by EMS with reports of a Fall x 1 hour ago.  Staff from [facility] report pt has not been eating all day and has not been acting herself. When EMS arrived pt was sitting up in chair     Per EMS pt had a temp 100.9 and tachycardiac

## 2024-08-27 NOTE — ED NOTES
Pt returned to ED per [facility] due to group home's refusal of care. Case management contacted. Vital signs repeated.

## 2024-08-27 NOTE — ED PROVIDER NOTES
[facility] EMERGENCY DEPT  EMERGENCY DEPARTMENT HISTORY AND PHYSICAL EXAM      Date: 8/26/2024  Patient Name: Darcy Arthur  MRN: [id number]  Birthdate 1960  Date of evaluation: 8/26/2024  Provider: Jean Acosta MD   Note Started: 3:31 AM EDT 8/27/24    HISTORY OF PRESENT ILLNESS     Chief Complaint   Patient presents with    Fall       History Provided By: Patient, EMS    HPI: Darcy Arthur is a 64 y.o. female with past medical history as reviewed below presents for evaluation of being found in the floor.  Patient states that she fell, but does not know why she fell.  She denies any pain, denies focal motor weakness or loss of sensation.  She denies any sick symptoms recently, denies any known sick contacts.    PAST MEDICAL HISTORY   Past Medical History:  Past Medical History:   Diagnosis Date    Alcohol abuse 5/29/2017    Autoimmune disease (HCC)     Hepatitis C     History of seizure     HIV (human immunodeficiency virus infection) (HCC)     Hypertension     Psychotic disorder (HCC)     Schizophrenia (HCC)        Past Surgical History:  No past surgical history on file.    Family History:  Family History   Family history unknown: Yes       Social History:  Social History     Tobacco Use    Smoking status: Every Day     Current packs/day: 1.00     Types: Cigarettes    Smokeless tobacco: Never   Vaping Use    Vaping status: Never Used   Substance Use Topics    Alcohol use: Not Currently     Alcohol/week: 0.8 standard drinks of alcohol     Types: 1 Standard drinks or equivalent per week    Drug use: Yes     Types: Cocaine, Marijuana (Weed)       Allergies:  No Known Allergies    PCP: Carol Bingham APRN - NP    Current Meds:   No current facility-administered medications for this encounter.     Current Outpatient Medications   Medication Sig Dispense Refill    hydrALAZINE (APRESOLINE) 50 MG tablet Take 1 tablet by mouth every 8 hours 90 tablet 3    albuterol sulfate HFA (PROVENTIL;VENTOLIN;PROAIR) 108 (90  [phone]  [phone]    Schedule an appointment as soon as possible for a visit           DISCHARGE MEDICATIONS:     Medication List        ASK your doctor about these medications      albuterol sulfate  (90 Base) MCG/ACT inhaler  Commonly known as: PROVENTIL;VENTOLIN;PROAIR     benztropine 0.5 MG tablet  Commonly known as: COGENTIN     bictegravir-emtricitab-tenofovir alafenamide -25 MG Tabs per tablet  Commonly known as: BIKTARVY     dilTIAZem 120 MG extended release capsule  Commonly known as: TIAZAC     divalproex 250 MG extended release tablet  Commonly known as: DEPAKOTE ER     famotidine 40 MG tablet  Commonly known as: PEPCID     hydrALAZINE 50 MG tablet  Commonly known as: APRESOLINE  Take 1 tablet by mouth every 8 hours     risperiDONE 2 MG tablet  Commonly known as: RISPERDAL                DISCONTINUED MEDICATIONS:  Current Discharge Medication List          I am the Primary Clinician of Record. Jean Acosta MD (electronically signed)    (Please note that parts of this dictation were completed with voice recognition software. Quite often unanticipated grammatical, syntax, homophones, and other interpretive errors are inadvertently transcribed by the computer software. Please disregards these errors. Please excuse any errors that have escaped final proofreading.)      Jean Acosta MD  08/27/24 0336

## 2024-08-27 NOTE — CARE COORDINATION
8/27/24 JOVANY informed by nsg that pt was returning to ED/group home cannot care for. JOVANY spoke with caregiver from [facility] ( Jerry Minaya @ [phone]) stated pt was returned to home & walker bought out to pt & pt could not walk into home. Per caregiver when pt is in home she sits in one spot & incontinent - stating she cannot walk & that her legs hurt. MD/emilia updated.

## 2024-08-27 NOTE — CARE COORDINATION
8/27/24 CM &nsg watched pt transfer self from bed & ambulated in hallway & back to bed. CM & nsg watched pt transfer self from bed to W/C & back to bed by self. Pt had little/no difficulties & c/o some leg pain. CM spoke with [facility] ( Jerry Minaya @ [phone]) & informed of the above. Director also informed that if she feels pt is not appropriate  for [facility] home to issue 30 day notice/assist with finding other location suitable for  assisting pt with care - director verbalized understanding but continued to stated they can not put hands on pt/assist with transfers & if pt does not transfer self they will send back to ED. Director informed that pt will be sent via w/c & per director pt's walker will be @ the doorway awaiting her. Director also informed pt signed Choice Letter for HH - referral sent via [facility].

## 2024-08-27 NOTE — DISCHARGE INSTRUCTIONS
Thank you for choosing our Emergency Department for your care.  It is our privilege to care for you in your time of need.  In the next several days, you may receive a survey via email or mailed to your home about your experience with our team.  We would greatly appreciate you taking a few minutes to complete the survey, as we use this information to learn what we have done well and what we could be doing better. Thank you for trusting us with your care!    Below you will find a list of your tests from today's visit.   Labs  Recent Results (from the past 12 hour(s))   CBC with Auto Differential    Collection Time: 08/27/24  1:12 AM   Result Value Ref Range    WBC 8.7 3.6 - 11.0 K/uL    RBC 3.22 (L) 3.80 - 5.20 M/uL    Hemoglobin 9.5 (L) 11.5 - 16.0 g/dL    Hematocrit 27.7 (L) 35.0 - 47.0 %    MCV 86.0 80.0 - 99.0 FL    MCH 29.5 26.0 - 34.0 PG    MCHC 34.3 30.0 - 36.5 g/dL    RDW 16.6 (H) 11.5 - 14.5 %    Platelets 113 (L) 150 - 400 K/uL    Nucleated RBCs 0.0 0.0  WBC    nRBC 0.00 0.00 - 0.01 K/uL    Neutrophils % 68 32 - 75 %    Lymphocytes % 23 12 - 49 %    Monocytes % 9 5 - 13 %    Eosinophils % 0 0 - 7 %    Basophils % 0 0 - 1 %    Immature Granulocytes % 0 0 - 0.5 %    Neutrophils Absolute 5.9 1.8 - 8.0 K/UL    Lymphocytes Absolute 2.0 0.8 - 3.5 K/UL    Monocytes Absolute 0.8 0.0 - 1.0 K/UL    Eosinophils Absolute 0.0 0.0 - 0.4 K/UL    Basophils Absolute 0.0 0.0 - 0.1 K/UL    Immature Granulocytes Absolute 0.0 0.00 - 0.04 K/UL    Differential Type AUTOMATED     Comprehensive Metabolic Panel    Collection Time: 08/27/24  1:12 AM   Result Value Ref Range    Sodium 135 (L) 136 - 145 mmol/L    Potassium 4.4 3.5 - 5.1 mmol/L    Chloride 104 97 - 108 mmol/L    CO2 25 21 - 32 mmol/L    Anion Gap 6 5 - 15 mmol/L    Glucose 103 (H) 65 - 100 mg/dL    BUN 16 6 - 20 mg/dL    Creatinine 1.33 (H) 0.55 - 1.02 mg/dL    BUN/Creatinine Ratio 12 12 - 20      Est, Glom Filt Rate 45 (L) >60 ml/min/1.73m2    Calcium 8.8 8.5

## 2024-08-28 LAB
HIV RT+PR MUT DET ISLT: NORMAL
HIV RT+PR MUT DET ISLT: NORMAL

## 2024-09-01 LAB
BACTERIA SPEC CULT: NORMAL
BACTERIA SPEC CULT: NORMAL
Lab: NORMAL
Lab: NORMAL

## 2024-09-02 LAB
BACTERIA SPEC CULT: NORMAL
BACTERIA SPEC CULT: NORMAL
Lab: NORMAL
Lab: NORMAL

## 2024-09-24 ENCOUNTER — OFFICE VISIT (OUTPATIENT)
Age: 64
End: 2024-09-24
Payer: MEDICAID

## 2024-09-24 VITALS
OXYGEN SATURATION: 97 % | HEIGHT: 67 IN | HEART RATE: 100 BPM | BODY MASS INDEX: 29.35 KG/M2 | TEMPERATURE: 97.7 F | DIASTOLIC BLOOD PRESSURE: 87 MMHG | SYSTOLIC BLOOD PRESSURE: 130 MMHG | WEIGHT: 187 LBS

## 2024-09-24 DIAGNOSIS — B18.2 CHRONIC HEPATITIS C WITHOUT HEPATIC COMA (HCC): ICD-10-CM

## 2024-09-24 DIAGNOSIS — B20 HIV INFECTION, UNSPECIFIED SYMPTOM STATUS (HCC): Primary | ICD-10-CM

## 2024-09-24 DIAGNOSIS — Z21 HIV INFECTION, UNSPECIFIED SYMPTOM STATUS (HCC): ICD-10-CM

## 2024-09-24 PROCEDURE — 99214 OFFICE O/P EST MOD 30 MIN: CPT | Performed by: INTERNAL MEDICINE

## 2024-09-24 PROCEDURE — 3075F SYST BP GE 130 - 139MM HG: CPT | Performed by: INTERNAL MEDICINE

## 2024-09-24 PROCEDURE — 3079F DIAST BP 80-89 MM HG: CPT | Performed by: INTERNAL MEDICINE

## 2024-09-24 ASSESSMENT — ENCOUNTER SYMPTOMS
COUGH: 1
SHORTNESS OF BREATH: 0
EYES NEGATIVE: 1
GASTROINTESTINAL NEGATIVE: 1

## 2024-12-06 ENCOUNTER — HOSPITAL ENCOUNTER (EMERGENCY)
Facility: HOSPITAL | Age: 64
Discharge: HOME | DRG: 890 | End: 2024-12-06
Attending: STUDENT IN AN ORGANIZED HEALTH CARE EDUCATION/TRAINING PROGRAM
Payer: MEDICAID

## 2024-12-06 VITALS
RESPIRATION RATE: 18 BRPM | DIASTOLIC BLOOD PRESSURE: 75 MMHG | HEIGHT: 67 IN | BODY MASS INDEX: 26.21 KG/M2 | OXYGEN SATURATION: 100 % | WEIGHT: 167 LBS | HEART RATE: 103 BPM | TEMPERATURE: 98.2 F | SYSTOLIC BLOOD PRESSURE: 140 MMHG
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
## 2024-12-06 PROCEDURE — 99283 EMERGENCY DEPT VISIT LOW MDM: CPT

## 2024-12-06 ASSESSMENT — PAIN - FUNCTIONAL ASSESSMENT: PAIN_FUNCTIONAL_ASSESSMENT: 0-10

## 2024-12-06 ASSESSMENT — PAIN SCALES - GENERAL: PAINLEVEL_OUTOF10: 0

## 2024-12-06 NOTE — ED TRIAGE NOTES
Pt came in from a fall out of a chair. Pt did not hit head, not on thinners. No complaints at the time of triage.

## 2024-12-06 NOTE — ED PROVIDER NOTES
[facility] EMERGENCY DEPT  EMERGENCY DEPARTMENT HISTORY AND PHYSICAL EXAM      Date: 12/6/2024  Patient Name: Darcy Arthur  MRN: [id number]  Birthdate 1960  Date of evaluation: 12/6/2024  Provider: Hiram Levin MD   Note Started: 2:08 PM EST 12/6/24    HISTORY OF PRESENT ILLNESS     Chief Complaint   Patient presents with    Fall       History Provided By: Patient    HPI: Darcy Arthur is a 64 y.o. female with PMH medications below comes ED for evaluation of mechanical fall that she sustained in the group home.  Patient fell out of chair.  She has no trauma to her head.  She has no evidence of trauma to any of her joints.  She is denying any complaints.  She reports has been in her normal state of health.  Group home sent her to the ED for evaluation.  No recent illnesses.    PAST MEDICAL HISTORY   Past Medical History:  Past Medical History:   Diagnosis Date    Alcohol abuse 5/29/2017    Autoimmune disease (HCC)     Hepatitis C     History of seizure     HIV (human immunodeficiency virus infection) (HCC)     Hypertension     Psychotic disorder (HCC)     Schizophrenia (HCC)        Past Surgical History:  History reviewed. No pertinent surgical history.    Family History:  Family History   Family history unknown: Yes       Social History:  Social History     Tobacco Use    Smoking status: Every Day     Current packs/day: 1.00     Types: Cigarettes    Smokeless tobacco: Never   Vaping Use    Vaping status: Never Used   Substance Use Topics    Alcohol use: Not Currently     Alcohol/week: 0.8 standard drinks of alcohol     Types: 1 Standard drinks or equivalent per week    Drug use: Yes     Types: Cocaine, Marijuana (Weed)       Allergies:  No Known Allergies    PCP: Carol Bingham APRN - NP    Current Meds:   No current facility-administered medications for this encounter.     Current Outpatient Medications   Medication Sig Dispense Refill    hydrALAZINE (APRESOLINE) 50 MG tablet Take 1 tablet by mouth every 8 hours    Interpersonal Safety: Not At Risk (12/6/2024)    Interpersonal Safety Domain Source: IP Abuse Screening     Physical abuse: Denies     Verbal abuse: Denies     Emotional abuse: Denies     Financial abuse: Denies     Sexual abuse: Denies   Utilities: Not At Risk (8/15/2024)    [facility] Utilities     Threatened with loss of utilities: No       PHYSICAL EXAM   Physical Exam  Vitals and nursing note reviewed.   Constitutional:       General: She is not in acute distress.     Appearance: Normal appearance. She is not ill-appearing, toxic-appearing or diaphoretic.   HENT:      Head: Normocephalic and atraumatic.   Eyes:      Extraocular Movements: Extraocular movements intact.      Conjunctiva/sclera: Conjunctivae normal.   Cardiovascular:      Rate and Rhythm: Normal rate and regular rhythm.      Heart sounds: No murmur heard.     No friction rub. No gallop.   Pulmonary:      Effort: Pulmonary effort is normal. No respiratory distress.      Breath sounds: Normal breath sounds. No stridor. No wheezing, rhonchi or rales.   Abdominal:      Palpations: Abdomen is soft.      Tenderness: There is no abdominal tenderness.   Musculoskeletal:         General: No swelling, tenderness, deformity or signs of injury.      Right lower leg: No swelling or tenderness. No edema.      Left lower leg: No swelling or tenderness. No edema.   Neurological:      General: No focal deficit present.      Mental Status: She is alert and oriented to person, place, and time.         SCREENINGS                No data recorded    LAB, EKG AND DIAGNOSTIC RESULTS   Labs:  No results found for this or any previous visit (from the past 12 hour(s)).    EKG:.Not Applicable    Radiologic Studies:  Non-plain film images such as CT, Ultrasound and MRI are read by the radiologist. Plain radiographic images are visualized and preliminarily interpreted by the ED Provider with the following findings: Not Applicable.    Interpretation per the Radiologist below, if

## 2024-12-08 ENCOUNTER — APPOINTMENT (OUTPATIENT)
Facility: HOSPITAL | Age: 64
DRG: 890 | End: 2024-12-08
Payer: MEDICAID

## 2024-12-08 ENCOUNTER — HOSPITAL ENCOUNTER (INPATIENT)
Facility: HOSPITAL | Age: 64
LOS: 3 days | Discharge: HOME | DRG: 890 | End: 2024-12-12
Attending: STUDENT IN AN ORGANIZED HEALTH CARE EDUCATION/TRAINING PROGRAM | Admitting: FAMILY MEDICINE
Payer: MEDICAID

## 2024-12-08 DIAGNOSIS — J18.9 PNEUMONIA DUE TO INFECTIOUS ORGANISM, UNSPECIFIED LATERALITY, UNSPECIFIED PART OF LUNG: Primary | ICD-10-CM

## 2024-12-08 DIAGNOSIS — R06.02 SHORTNESS OF BREATH: ICD-10-CM

## 2024-12-08 LAB
ALBUMIN SERPL-MCNC: 2.6 G/DL (ref 3.5–5)
ALBUMIN/GLOB SERPL: 0.4 (ref 1.1–2.2)
ALP SERPL-CCNC: 79 U/L (ref 45–117)
ALT SERPL-CCNC: 19 U/L (ref 12–78)
ANION GAP SERPL CALC-SCNC: 5 MMOL/L (ref 2–12)
APPEARANCE UR: CLEAR
AST SERPL W P-5'-P-CCNC: 47 U/L (ref 15–37)
BACTERIA URNS QL MICRO: ABNORMAL /HPF
BASOPHILS # BLD: 0 K/UL (ref 0–0.1)
BASOPHILS NFR BLD: 0 % (ref 0–1)
BILIRUB SERPL-MCNC: 0.8 MG/DL (ref 0.2–1)
BILIRUB UR QL: NEGATIVE
BUN SERPL-MCNC: 14 MG/DL (ref 6–20)
BUN/CREAT SERPL: 13 (ref 12–20)
CA-I BLD-MCNC: 8.7 MG/DL (ref 8.5–10.1)
CHLORIDE SERPL-SCNC: 108 MMOL/L (ref 97–108)
CO2 SERPL-SCNC: 29 MMOL/L (ref 21–32)
COLOR UR: ABNORMAL
CREAT SERPL-MCNC: 1.08 MG/DL (ref 0.55–1.02)
DIFFERENTIAL METHOD BLD: ABNORMAL
EKG ATRIAL RATE: 94 BPM
EKG DIAGNOSIS: NORMAL
EKG P AXIS: 63 DEGREES
EKG P-R INTERVAL: 146 MS
EKG Q-T INTERVAL: 346 MS
EKG QRS DURATION: 78 MS
EKG QTC CALCULATION (BAZETT): 432 MS
EKG R AXIS: 67 DEGREES
EKG T AXIS: 63 DEGREES
EKG VENTRICULAR RATE: 94 BPM
EOSINOPHIL # BLD: 0 K/UL (ref 0–0.4)
EOSINOPHIL NFR BLD: 0 % (ref 0–7)
EPITH CASTS URNS QL MICRO: ABNORMAL /LPF
ERYTHROCYTE [DISTWIDTH] IN BLOOD BY AUTOMATED COUNT: 16.8 % (ref 11.5–14.5)
FLUAV RNA SPEC QL NAA+PROBE: NOT DETECTED
FLUBV RNA SPEC QL NAA+PROBE: NOT DETECTED
GLOBULIN SER CALC-MCNC: 6.4 G/DL (ref 2–4)
GLUCOSE BLD STRIP.AUTO-MCNC: 94 MG/DL (ref 65–100)
GLUCOSE SERPL-MCNC: 91 MG/DL (ref 65–100)
GLUCOSE UR STRIP.AUTO-MCNC: NEGATIVE MG/DL
HCT VFR BLD AUTO: 29.6 % (ref 35–47)
HGB BLD-MCNC: 9.9 G/DL (ref 11.5–16)
HGB UR QL STRIP: ABNORMAL
HYALINE CASTS URNS QL MICRO: ABNORMAL /LPF (ref 0–5)
IMM GRANULOCYTES # BLD AUTO: 0 K/UL (ref 0–0.04)
IMM GRANULOCYTES NFR BLD AUTO: 1 % (ref 0–0.5)
KETONES UR QL STRIP.AUTO: NEGATIVE MG/DL
LACTATE BLD-SCNC: 1.27 MMOL/L (ref 0.4–2)
LEUKOCYTE ESTERASE UR QL STRIP.AUTO: NEGATIVE
LYMPHOCYTES # BLD: 1.3 K/UL (ref 0.8–3.5)
LYMPHOCYTES NFR BLD: 24 % (ref 12–49)
MCH RBC QN AUTO: 27.6 PG (ref 26–34)
MCHC RBC AUTO-ENTMCNC: 33.4 G/DL (ref 30–36.5)
MCV RBC AUTO: 82.5 FL (ref 80–99)
MONOCYTES # BLD: 0.6 K/UL (ref 0–1)
MONOCYTES NFR BLD: 11 % (ref 5–13)
NEUTS SEG # BLD: 3.6 K/UL (ref 1.8–8)
NEUTS SEG NFR BLD: 64 % (ref 32–75)
NITRITE UR QL STRIP.AUTO: NEGATIVE
NRBC # BLD: 0 K/UL (ref 0–0.01)
NRBC BLD-RTO: 0 PER 100 WBC
PERFORMED BY:: NORMAL
PH UR STRIP: 7 (ref 5–8)
PLATELET # BLD AUTO: 185 K/UL (ref 150–400)
PMV BLD AUTO: 11.2 FL (ref 8.9–12.9)
POTASSIUM SERPL-SCNC: 3.7 MMOL/L (ref 3.5–5.1)
PROCALCITONIN SERPL-MCNC: <0.05 NG/ML
PROT SERPL-MCNC: 9 G/DL (ref 6.4–8.2)
PROT UR STRIP-MCNC: 100 MG/DL
RBC # BLD AUTO: 3.59 M/UL (ref 3.8–5.2)
RBC #/AREA URNS HPF: ABNORMAL /HPF (ref 0–5)
SARS-COV-2 RNA RESP QL NAA+PROBE: NOT DETECTED
SERVICE CMNT-IMP: NORMAL
SODIUM SERPL-SCNC: 142 MMOL/L (ref 136–145)
SP GR UR REFRACTOMETRY: 1.01 (ref 1–1.03)
SPECIMEN SITE: NORMAL
TROPONIN I SERPL HS-MCNC: 25 NG/L (ref 0–51)
TROPONIN I SERPL HS-MCNC: 27 NG/L (ref 0–51)
URINE CULTURE IF INDICATED: ABNORMAL
UROBILINOGEN UR QL STRIP.AUTO: 4 EU/DL (ref 0.1–1)
WBC # BLD AUTO: 5.6 K/UL (ref 3.6–11)
WBC URNS QL MICRO: ABNORMAL /HPF (ref 0–4)

## 2024-12-08 PROCEDURE — 96365 THER/PROPH/DIAG IV INF INIT: CPT

## 2024-12-08 PROCEDURE — 71045 X-RAY EXAM CHEST 1 VIEW: CPT

## 2024-12-08 PROCEDURE — 2580000003 HC RX 258: Performed by: EMERGENCY MEDICINE

## 2024-12-08 PROCEDURE — 80053 COMPREHEN METABOLIC PANEL: CPT

## 2024-12-08 PROCEDURE — 93005 ELECTROCARDIOGRAM TRACING: CPT | Performed by: EMERGENCY MEDICINE

## 2024-12-08 PROCEDURE — 84484 ASSAY OF TROPONIN QUANT: CPT

## 2024-12-08 PROCEDURE — 81001 URINALYSIS AUTO W/SCOPE: CPT

## 2024-12-08 PROCEDURE — 71275 CT ANGIOGRAPHY CHEST: CPT

## 2024-12-08 PROCEDURE — 87040 BLOOD CULTURE FOR BACTERIA: CPT

## 2024-12-08 PROCEDURE — 6360000004 HC RX CONTRAST MEDICATION: Performed by: STUDENT IN AN ORGANIZED HEALTH CARE EDUCATION/TRAINING PROGRAM

## 2024-12-08 PROCEDURE — 84145 PROCALCITONIN (PCT): CPT

## 2024-12-08 PROCEDURE — 99285 EMERGENCY DEPT VISIT HI MDM: CPT

## 2024-12-08 PROCEDURE — 96375 TX/PRO/DX INJ NEW DRUG ADDON: CPT

## 2024-12-08 PROCEDURE — 87154 CUL TYP ID BLD PTHGN 6+ TRGT: CPT

## 2024-12-08 PROCEDURE — 85025 COMPLETE CBC W/AUTO DIFF WBC: CPT

## 2024-12-08 PROCEDURE — 87077 CULTURE AEROBIC IDENTIFY: CPT

## 2024-12-08 PROCEDURE — 6370000000 HC RX 637 (ALT 250 FOR IP): Performed by: STUDENT IN AN ORGANIZED HEALTH CARE EDUCATION/TRAINING PROGRAM

## 2024-12-08 PROCEDURE — 80047 BASIC METABLC PNL IONIZED CA: CPT

## 2024-12-08 PROCEDURE — 87636 SARSCOV2 & INF A&B AMP PRB: CPT

## 2024-12-08 PROCEDURE — 6360000002 HC RX W HCPCS: Performed by: EMERGENCY MEDICINE

## 2024-12-08 PROCEDURE — 87186 SC STD MICRODIL/AGAR DIL: CPT

## 2024-12-08 PROCEDURE — 2580000003 HC RX 258: Performed by: STUDENT IN AN ORGANIZED HEALTH CARE EDUCATION/TRAINING PROGRAM

## 2024-12-08 PROCEDURE — 2500000003 HC RX 250 WO HCPCS: Performed by: STUDENT IN AN ORGANIZED HEALTH CARE EDUCATION/TRAINING PROGRAM

## 2024-12-08 PROCEDURE — 83605 ASSAY OF LACTIC ACID: CPT

## 2024-12-08 PROCEDURE — 36415 COLL VENOUS BLD VENIPUNCTURE: CPT

## 2024-12-08 RX ORDER — ACETAMINOPHEN 500 MG
1000 TABLET ORAL
Status: COMPLETED | OUTPATIENT
Start: 2024-12-08 | End: 2024-12-08

## 2024-12-08 RX ORDER — 0.9 % SODIUM CHLORIDE 0.9 %
30 INTRAVENOUS SOLUTION INTRAVENOUS ONCE
Status: DISCONTINUED | OUTPATIENT
Start: 2024-12-08 | End: 2024-12-08

## 2024-12-08 RX ORDER — 0.9 % SODIUM CHLORIDE 0.9 %
500 INTRAVENOUS SOLUTION INTRAVENOUS ONCE
Status: COMPLETED | OUTPATIENT
Start: 2024-12-08 | End: 2024-12-08

## 2024-12-08 RX ORDER — HYDRALAZINE HYDROCHLORIDE 50 MG/1
50 TABLET, FILM COATED ORAL ONCE
Status: COMPLETED | OUTPATIENT
Start: 2024-12-08 | End: 2024-12-08

## 2024-12-08 RX ORDER — IOPAMIDOL 755 MG/ML
100 INJECTION, SOLUTION INTRAVASCULAR
Status: COMPLETED | OUTPATIENT
Start: 2024-12-08 | End: 2024-12-08

## 2024-12-08 RX ADMIN — IOPAMIDOL 100 ML: 755 INJECTION, SOLUTION INTRAVENOUS at 20:19

## 2024-12-08 RX ADMIN — SODIUM CHLORIDE 500 ML: 9 INJECTION, SOLUTION INTRAVENOUS at 14:50

## 2024-12-08 RX ADMIN — DOXYCYCLINE 100 MG: 100 INJECTION, POWDER, LYOPHILIZED, FOR SOLUTION INTRAVENOUS at 15:32

## 2024-12-08 RX ADMIN — WATER 1000 MG: 1 INJECTION INTRAMUSCULAR; INTRAVENOUS; SUBCUTANEOUS at 14:17

## 2024-12-08 RX ADMIN — HYDRALAZINE HYDROCHLORIDE 50 MG: 50 TABLET ORAL at 22:53

## 2024-12-08 RX ADMIN — ACETAMINOPHEN 1000 MG: 500 TABLET ORAL at 15:27

## 2024-12-08 ASSESSMENT — LIFESTYLE VARIABLES
HOW MANY STANDARD DRINKS CONTAINING ALCOHOL DO YOU HAVE ON A TYPICAL DAY: PATIENT DECLINED
HOW OFTEN DO YOU HAVE A DRINK CONTAINING ALCOHOL: PATIENT DECLINED

## 2024-12-08 ASSESSMENT — PAIN - FUNCTIONAL ASSESSMENT: PAIN_FUNCTIONAL_ASSESSMENT: NONE - DENIES PAIN

## 2024-12-08 NOTE — ED PROVIDER NOTES
radiologist. Plain radiographic images are visualized and preliminarily interpreted by the ED Provider with the below findings:    Interpreted by me as no pneumothorax    Interpretation per the Radiologist below, if available at the time of this note:  CTA CHEST W WO CONTRAST   Final Result      1. No pulmonary embolism, no acute airspace disease.   2. Trace left pleural effusion.   3. Cirrhosis.   4. Coronary artery calcifications.         Electronically signed by BAHMAN MARTELL      XR CHEST PORTABLE   Final Result   Mild pulmonary edema.      Electronically signed by Tahir Hicks           Diagnosis     Clinical Impression:   1. Pneumonia due to infectious organism, unspecified laterality, unspecified part of lung        Disposition & Disposition Considerations     DISPOSITION Decision To Admit 12/08/2024 04:39:19 PM   DISPOSITION CONDITION Stable           Admit Note: Pt is being admitted by Ruby. The results of their tests and reason(s) for their admission have been discussed with pt and/or available family. They convey agreement and understanding for the need to be admitted and for the admission diagnosis.    Additional Disposition Considerations:      Smoking Cessation:Not Applicable    DISCHARGE PLAN:  1.   Current Discharge Medication List        CONTINUE these medications which have NOT CHANGED    Details   hydrALAZINE (APRESOLINE) 50 MG tablet Take 1 tablet by mouth every 8 hours  Qty: 90 tablet, Refills: 3      albuterol sulfate HFA (PROVENTIL;VENTOLIN;PROAIR) 108 (90 Base) MCG/ACT inhaler Inhale 2 puffs into the lungs every 6 hours as needed      benztropine (COGENTIN) 0.5 MG tablet Take 1 tablet by mouth 2 times daily      bictegravir-emtricitab-tenofovir alafenamide (BIKTARVY) -25 MG TABS per tablet Take 1 tablet by mouth daily      dilTIAZem (TIAZAC) 120 MG extended release capsule ceived the following from [facility]: Outside name: dilTIAZem ER (CARDIZEM CD) 120 mg

## 2024-12-09 PROBLEM — R50.9 FEVER AND CHILLS: Status: ACTIVE | Noted: 2024-12-09

## 2024-12-09 LAB
ACCESSION NUMBER, LLC1M: ABNORMAL
ACINETOBACTER CALCOAC BAUMANNII COMPLEX BY PCR: NOT DETECTED
ALBUMIN SERPL-MCNC: 2.3 G/DL (ref 3.5–5)
ALBUMIN/GLOB SERPL: 0.4 (ref 1.1–2.2)
ALP SERPL-CCNC: 70 U/L (ref 45–117)
ALT SERPL-CCNC: 16 U/L (ref 12–78)
ANION GAP SERPL CALC-SCNC: 5 MMOL/L (ref 2–12)
AST SERPL W P-5'-P-CCNC: 45 U/L (ref 15–37)
BACTEROIDES FRAGILIS BY PCR: NOT DETECTED
BILIRUB SERPL-MCNC: 0.7 MG/DL (ref 0.2–1)
BIOFIRE TEST COMMENT: ABNORMAL
BUN SERPL-MCNC: 13 MG/DL (ref 6–20)
BUN/CREAT SERPL: 11 (ref 12–20)
CA-I BLD-MCNC: 8.5 MG/DL (ref 8.5–10.1)
CANDIDA ALBICANS BY PCR: NOT DETECTED
CANDIDA AURIS BY PCR: NOT DETECTED
CANDIDA GLABRATA: NOT DETECTED
CANDIDA KRUSEI BY PCR: NOT DETECTED
CANDIDA PARAPSILOSIS BY PCR: NOT DETECTED
CANDIDA TROPICALIS BY PCR: NOT DETECTED
CHLORIDE SERPL-SCNC: 105 MMOL/L (ref 97–108)
CO2 SERPL-SCNC: 28 MMOL/L (ref 21–32)
CREAT SERPL-MCNC: 1.2 MG/DL (ref 0.55–1.02)
CRYPTOCOCCUS NEOFORMANS/GATTII BY PCR: NOT DETECTED
ENTEROBACTER CLOACAE COMPLEX BY PCR: NOT DETECTED
ENTEROBACTERALES BY PCR: NOT DETECTED
ENTEROCOCCUS FAECALIS BY PCR: NOT DETECTED
ENTEROCOCCUS FAECIUM BY PCR: NOT DETECTED
ESCHERICHIA COLI: NOT DETECTED
FLUAV RNA SPEC QL NAA+PROBE: NOT DETECTED
FLUBV RNA SPEC QL NAA+PROBE: NOT DETECTED
GLOBULIN SER CALC-MCNC: 6.1 G/DL (ref 2–4)
GLUCOSE SERPL-MCNC: 83 MG/DL (ref 65–100)
HAEMOPHILUS INFLUENZAE BY PCR: NOT DETECTED
KLEBSIELLA AEROGENES BY PCR: NOT DETECTED
KLEBSIELLA OXYTOCA BY PCR: NOT DETECTED
KLEBSIELLA PNEUMONIAE GROUP BY PCR: NOT DETECTED
LISTERIA MONOCYTOGENES BY PCR: NOT DETECTED
NEISSERIA MENINGITIDIS BY PCR: NOT DETECTED
POTASSIUM SERPL-SCNC: 3.6 MMOL/L (ref 3.5–5.1)
PROCALCITONIN SERPL-MCNC: 0.1 NG/ML
PROT SERPL-MCNC: 8.4 G/DL (ref 6.4–8.2)
PROTEUS BY PCR: NOT DETECTED
PSEUDOMONAS AERUGINOSA, PSAEP: NOT DETECTED
RESISTANT GENE TARGETS: ABNORMAL
RSV BY NAA: NOT DETECTED
SALMONELLA SPECIES BY PCR: NOT DETECTED
SARS-COV-2 RNA RESP QL NAA+PROBE: NOT DETECTED
SERRATIA MARCESCENS BY PCR: NOT DETECTED
SODIUM SERPL-SCNC: 138 MMOL/L (ref 136–145)
SPECIMEN SOURCE: NORMAL
STAPHYLOCOCCUS AUREUS: NOT DETECTED
STAPHYLOCOCCUS EPIDERMIDIS BY PCR: NOT DETECTED
STAPHYLOCOCCUS LUGDUNENSIS BY PCR: NOT DETECTED
STAPHYLOCOCCUS: DETECTED
STENOTROPHOMONAS MALTOPHILIA BY PCR: NOT DETECTED
STREPTOCOCCUS AGALACTIAE (GROUP B): NOT DETECTED
STREPTOCOCCUS PNEUMONIAE , SPNP: NOT DETECTED
STREPTOCOCCUS PYOGENES (GROUP A), SPYOP: NOT DETECTED
STREPTOCOCCUS: NOT DETECTED

## 2024-12-09 PROCEDURE — 6360000002 HC RX W HCPCS: Performed by: INTERNAL MEDICINE

## 2024-12-09 PROCEDURE — 6370000000 HC RX 637 (ALT 250 FOR IP): Performed by: FAMILY MEDICINE

## 2024-12-09 PROCEDURE — 2580000003 HC RX 258: Performed by: FAMILY MEDICINE

## 2024-12-09 PROCEDURE — 6360000002 HC RX W HCPCS: Performed by: FAMILY MEDICINE

## 2024-12-09 PROCEDURE — 2580000003 HC RX 258: Performed by: INTERNAL MEDICINE

## 2024-12-09 PROCEDURE — 6370000000 HC RX 637 (ALT 250 FOR IP): Performed by: INTERNAL MEDICINE

## 2024-12-09 PROCEDURE — 1100000000 HC RM PRIVATE

## 2024-12-09 PROCEDURE — 87040 BLOOD CULTURE FOR BACTERIA: CPT

## 2024-12-09 PROCEDURE — 80053 COMPREHEN METABOLIC PANEL: CPT

## 2024-12-09 PROCEDURE — 87634 RSV DNA/RNA AMP PROBE: CPT

## 2024-12-09 PROCEDURE — 84145 PROCALCITONIN (PCT): CPT

## 2024-12-09 PROCEDURE — 99223 1ST HOSP IP/OBS HIGH 75: CPT | Performed by: INTERNAL MEDICINE

## 2024-12-09 PROCEDURE — 87636 SARSCOV2 & INF A&B AMP PRB: CPT

## 2024-12-09 RX ORDER — BENZTROPINE MESYLATE 1 MG/1
0.5 TABLET ORAL 2 TIMES DAILY
Status: DISCONTINUED | OUTPATIENT
Start: 2024-12-09 | End: 2024-12-09

## 2024-12-09 RX ORDER — DILTIAZEM HYDROCHLORIDE 120 MG/1
120 CAPSULE, COATED, EXTENDED RELEASE ORAL DAILY
Status: DISCONTINUED | OUTPATIENT
Start: 2024-12-09 | End: 2024-12-12 | Stop reason: HOSPADM

## 2024-12-09 RX ORDER — POTASSIUM CHLORIDE 7.45 MG/ML
10 INJECTION INTRAVENOUS PRN
Status: DISCONTINUED | OUTPATIENT
Start: 2024-12-09 | End: 2024-12-12 | Stop reason: HOSPADM

## 2024-12-09 RX ORDER — ONDANSETRON 2 MG/ML
4 INJECTION INTRAMUSCULAR; INTRAVENOUS EVERY 6 HOURS PRN
Status: DISCONTINUED | OUTPATIENT
Start: 2024-12-09 | End: 2024-12-12 | Stop reason: HOSPADM

## 2024-12-09 RX ORDER — SODIUM CHLORIDE 0.9 % (FLUSH) 0.9 %
5-40 SYRINGE (ML) INJECTION EVERY 12 HOURS SCHEDULED
Status: DISCONTINUED | OUTPATIENT
Start: 2024-12-09 | End: 2024-12-12 | Stop reason: HOSPADM

## 2024-12-09 RX ORDER — RISPERIDONE 1 MG/1
2 TABLET ORAL 2 TIMES DAILY
Status: DISCONTINUED | OUTPATIENT
Start: 2024-12-09 | End: 2024-12-12 | Stop reason: HOSPADM

## 2024-12-09 RX ORDER — FAMOTIDINE 20 MG/1
20 TABLET, FILM COATED ORAL 2 TIMES DAILY
Status: DISCONTINUED | OUTPATIENT
Start: 2024-12-09 | End: 2024-12-12 | Stop reason: HOSPADM

## 2024-12-09 RX ORDER — POTASSIUM CHLORIDE 1500 MG/1
40 TABLET, EXTENDED RELEASE ORAL PRN
Status: DISCONTINUED | OUTPATIENT
Start: 2024-12-09 | End: 2024-12-12 | Stop reason: HOSPADM

## 2024-12-09 RX ORDER — DIVALPROEX SODIUM 250 MG/1
250 TABLET, DELAYED RELEASE ORAL 2 TIMES DAILY
Status: DISCONTINUED | OUTPATIENT
Start: 2024-12-09 | End: 2024-12-12 | Stop reason: HOSPADM

## 2024-12-09 RX ORDER — ALBUTEROL SULFATE 90 UG/1
2 INHALANT RESPIRATORY (INHALATION) EVERY 6 HOURS PRN
Status: DISCONTINUED | OUTPATIENT
Start: 2024-12-09 | End: 2024-12-12 | Stop reason: HOSPADM

## 2024-12-09 RX ORDER — POLYETHYLENE GLYCOL 3350 17 G/17G
17 POWDER, FOR SOLUTION ORAL DAILY PRN
Status: DISCONTINUED | OUTPATIENT
Start: 2024-12-09 | End: 2024-12-12 | Stop reason: HOSPADM

## 2024-12-09 RX ORDER — HYDRALAZINE HYDROCHLORIDE 50 MG/1
50 TABLET, FILM COATED ORAL EVERY 8 HOURS SCHEDULED
Status: DISCONTINUED | OUTPATIENT
Start: 2024-12-09 | End: 2024-12-12 | Stop reason: HOSPADM

## 2024-12-09 RX ORDER — ACETAMINOPHEN 650 MG/1
650 SUPPOSITORY RECTAL EVERY 6 HOURS PRN
Status: DISCONTINUED | OUTPATIENT
Start: 2024-12-09 | End: 2024-12-12 | Stop reason: HOSPADM

## 2024-12-09 RX ORDER — SODIUM CHLORIDE 9 MG/ML
INJECTION, SOLUTION INTRAVENOUS PRN
Status: DISCONTINUED | OUTPATIENT
Start: 2024-12-09 | End: 2024-12-12 | Stop reason: HOSPADM

## 2024-12-09 RX ORDER — ONDANSETRON 4 MG/1
4 TABLET, ORALLY DISINTEGRATING ORAL EVERY 8 HOURS PRN
Status: DISCONTINUED | OUTPATIENT
Start: 2024-12-09 | End: 2024-12-12 | Stop reason: HOSPADM

## 2024-12-09 RX ORDER — ACETAMINOPHEN 325 MG/1
650 TABLET ORAL EVERY 6 HOURS PRN
Status: DISCONTINUED | OUTPATIENT
Start: 2024-12-09 | End: 2024-12-12 | Stop reason: HOSPADM

## 2024-12-09 RX ORDER — MAGNESIUM SULFATE IN WATER 40 MG/ML
2000 INJECTION, SOLUTION INTRAVENOUS PRN
Status: DISCONTINUED | OUTPATIENT
Start: 2024-12-09 | End: 2024-12-12 | Stop reason: HOSPADM

## 2024-12-09 RX ORDER — ENOXAPARIN SODIUM 100 MG/ML
40 INJECTION SUBCUTANEOUS DAILY
Status: DISCONTINUED | OUTPATIENT
Start: 2024-12-09 | End: 2024-12-12 | Stop reason: HOSPADM

## 2024-12-09 RX ORDER — SODIUM CHLORIDE 0.9 % (FLUSH) 0.9 %
5-40 SYRINGE (ML) INJECTION PRN
Status: DISCONTINUED | OUTPATIENT
Start: 2024-12-09 | End: 2024-12-12 | Stop reason: HOSPADM

## 2024-12-09 RX ADMIN — FAMOTIDINE 20 MG: 20 TABLET, FILM COATED ORAL at 08:25

## 2024-12-09 RX ADMIN — VANCOMYCIN HYDROCHLORIDE 1750 MG: 1 INJECTION, POWDER, LYOPHILIZED, FOR SOLUTION INTRAVENOUS at 14:21

## 2024-12-09 RX ADMIN — SODIUM CHLORIDE: 9 INJECTION, SOLUTION INTRAVENOUS at 01:44

## 2024-12-09 RX ADMIN — DILTIAZEM HYDROCHLORIDE 120 MG: 120 CAPSULE, COATED, EXTENDED RELEASE ORAL at 08:25

## 2024-12-09 RX ADMIN — SODIUM CHLORIDE, PRESERVATIVE FREE 10 ML: 5 INJECTION INTRAVENOUS at 19:51

## 2024-12-09 RX ADMIN — HYDRALAZINE HYDROCHLORIDE 50 MG: 50 TABLET ORAL at 14:23

## 2024-12-09 RX ADMIN — DIVALPROEX SODIUM 250 MG: 250 TABLET, DELAYED RELEASE ORAL at 08:25

## 2024-12-09 RX ADMIN — BICTEGRAVIR SODIUM, EMTRICITABINE, AND TENOFOVIR ALAFENAMIDE FUMARATE 1 TABLET: 50; 200; 25 TABLET ORAL at 08:30

## 2024-12-09 RX ADMIN — HYDRALAZINE HYDROCHLORIDE 50 MG: 50 TABLET ORAL at 20:58

## 2024-12-09 RX ADMIN — PIPERACILLIN AND TAZOBACTAM 4500 MG: 4; .5 INJECTION, POWDER, FOR SOLUTION INTRAVENOUS at 01:45

## 2024-12-09 RX ADMIN — HYDRALAZINE HYDROCHLORIDE 50 MG: 50 TABLET ORAL at 02:13

## 2024-12-09 RX ADMIN — ACETAMINOPHEN 650 MG: 325 TABLET ORAL at 01:39

## 2024-12-09 RX ADMIN — LEVOFLOXACIN 750 MG: 500 TABLET, FILM COATED ORAL at 14:23

## 2024-12-09 RX ADMIN — ACETAMINOPHEN 650 MG: 325 TABLET ORAL at 14:47

## 2024-12-09 RX ADMIN — FAMOTIDINE 20 MG: 20 TABLET, FILM COATED ORAL at 20:58

## 2024-12-09 RX ADMIN — PIPERACILLIN AND TAZOBACTAM 3375 MG: 3; .375 INJECTION, POWDER, LYOPHILIZED, FOR SOLUTION INTRAVENOUS at 06:33

## 2024-12-09 RX ADMIN — RISPERIDONE 2 MG: 1 TABLET, FILM COATED ORAL at 08:25

## 2024-12-09 RX ADMIN — DIVALPROEX SODIUM 250 MG: 250 TABLET, DELAYED RELEASE ORAL at 20:58

## 2024-12-09 RX ADMIN — RISPERIDONE 2 MG: 1 TABLET, FILM COATED ORAL at 20:58

## 2024-12-09 RX ADMIN — ENOXAPARIN SODIUM 40 MG: 100 INJECTION SUBCUTANEOUS at 08:26

## 2024-12-09 ASSESSMENT — ENCOUNTER SYMPTOMS
SHORTNESS OF BREATH: 1
CHEST TIGHTNESS: 0
WHEEZING: 0
EYES NEGATIVE: 1
GASTROINTESTINAL NEGATIVE: 1
COUGH: 1

## 2024-12-09 ASSESSMENT — PAIN SCALES - GENERAL
PAINLEVEL_OUTOF10: 0

## 2024-12-09 NOTE — CARE COORDINATION
CM met with patient at bedside to complete discharge planning assessment, however, patient did not provide much information at all and ended up staring at CM when CM asked questions.  CM will complete assessment via chart review.

## 2024-12-09 NOTE — CARE COORDINATION
12/09/24 6752   Service Assessment   Patient Orientation Person   Cognition Alert   History Provided By Medical Record   Primary Caregiver Other (Comment)  (From [facility]   Support Systems Home Care Staff   Patient's Healthcare Decision Maker is: Legal Next of Kin   PCP Verified by CM No   Prior Functional Level Cooking;Housework;Shopping   Current Functional Level Cooking;Housework;Shopping   Can patient return to prior living arrangement Unknown at present   Ability to make needs known: Poor   Family able to assist with home care needs: No   Would you like for me to discuss the discharge plan with any other family members/significant others, and if so, who? Yes       Advance Care Planning     General Advance Care Planning (ACP) Conversation    Date of Conversation: 12/9/2024  Conducted with: Patient with Decision Making Capacity  Other persons present: None    Healthcare Decision Maker: No healthcare decision makers have been documented.         Length of Voluntary ACP Conversation in minutes:  <16 minutes (Non-Billable)    Ismael Andres RN CM attempted to call Group Home Representative this morning without success.  CM met with patient at bedside, patient did not provide much information.  From what CM can see in chart. Patient resides at [facility] Located at [address] [place] [address]. Last visit patient was given a [facility] last admission. Per previous documentation patient does not have any family and is her own decision maker. Patient attends a day program and her mental health counselor transports her to appointments.  Patient will need to be ambulatory to return to [facility] on discharge.  PT/OT orders are already in.

## 2024-12-09 NOTE — CARE COORDINATION
CM attempted to call group home representative Jerry Minaya [phone], no answer to telephone. VM box is full, CM unable to leave voicemail.

## 2024-12-09 NOTE — ED NOTES
Blood cultures x 2 and all labs obtained prior to the administration of antibiotics.  
Incontinence care provided and patient placed on purwick at this time.  
Patient given sand which, apple sauce and juice.  
Patient remains hypertensive, MD aware. No BP medications to be given at this time.  
Report to MADAN Krause. Patient on continuous Cardiac, BP, and pulse oximetry however monitor is not validating. Patient watching TV with no complaints at this time.  
Spoke with Castillo Carrizales, Lab in reference to covid/flu combo  swab that was sent for patient with report of no specimen sent. I walked the specimen over to the lab with 2 sets of blood cultures, CBC, CMP, troponin, procalcitonin. Lactic acid obtained by POC machine.  Recollection ordered at this time and this writer walked lab over at 1801.  
  sodium chloride flush 0.9 % injection 5-40 mL (has no administration in time range)   0.9 % sodium chloride infusion (has no administration in time range)   potassium chloride (KLOR-CON M) extended release tablet 40 mEq (has no administration in time range)     Or   potassium bicarb-citric acid (EFFER-K) effervescent tablet 40 mEq (has no administration in time range)     Or   potassium chloride 10 mEq/100 mL IVPB (Peripheral Line) (has no administration in time range)   magnesium sulfate 2000 mg in 50 mL IVPB premix (has no administration in time range)   enoxaparin (LOVENOX) injection 40 mg (has no administration in time range)   ondansetron (ZOFRAN-ODT) disintegrating tablet 4 mg (has no administration in time range)     Or   ondansetron (ZOFRAN) injection 4 mg (has no administration in time range)   polyethylene glycol (GLYCOLAX) packet 17 g (has no administration in time range)   acetaminophen (TYLENOL) tablet 650 mg (has no administration in time range)     Or   acetaminophen (TYLENOL) suppository 650 mg (has no administration in time range)   piperacillin-tazobactam (ZOSYN) 3,375 mg in sodium chloride 0.9 % 50 mL IVPB (mini-bag) (has no administration in time range)   cefTRIAXone (ROCEPHIN) 1,000 mg in sterile water 10 mL IV syringe (1,000 mg IntraVENous Given 12/8/24 1417)   acetaminophen (TYLENOL) tablet 1,000 mg (1,000 mg Oral Given 12/8/24 1527)   doxycycline (VIBRAMYCIN) 100 mg in sodium chloride 0.9 % 100 mL IVPB (mini-bag) (0 mg IntraVENous Stopped 12/8/24 1650)   sodium chloride 0.9 % bolus 500 mL (0 mLs IntraVENous Stopped 12/8/24 1650)   iopamidol (ISOVUE-370) 76 % injection 100 mL (100 mLs IntraVENous Given 12/8/24 2019)   hydrALAZINE (APRESOLINE) tablet 50 mg (50 mg Oral Given 12/8/24 2253)     Last documented pain medication administration: n/a  Pertinent or High Risk Medications/Drips: no   If Yes, please provide details:   Blood Product Administration: no  If Yes, please provide details:

## 2024-12-09 NOTE — H&P
Min:98.3 °F (36.8 °C), Max:100.6 °F (38.1 °C)           Wt Readings from Last 12 Encounters:   12/08/24 75.8 kg (167 lb)   12/06/24 75.8 kg (167 lb)   09/24/24 84.8 kg (187 lb)   08/19/24 90.7 kg (200 lb)   08/14/24 90.7 kg (200 lb)   08/14/24 90.7 kg (200 lb)   08/05/24 65.8 kg (145 lb)   07/18/24 99.8 kg (220 lb)   07/02/24 65.8 kg (145 lb)   03/15/24 70.3 kg (155 lb)   10/30/23 70.3 kg (155 lb)   10/16/23 65.8 kg (145 lb)       Review of Systems   Patient not a good historian    PHYSICAL EXAM:  Head and neck examination normocephalic atraumatic neck supple no JVD  Heart normal sinus rhythm no murmur  Lungs good air entry no rhonchi no wheezing  Abdomen soft nontender bowel sound positive  Extremities no sinus no clubbing no edema  Neuro alert awake moving all EXTR        LAB DATA REVIEWED:    Recent Results (from the past 12 hour(s))   Urinalysis with Reflex to Culture    Collection Time: 12/08/24  2:02 PM    Specimen: Urine   Result Value Ref Range    Color, UA Kiana      Appearance Clear Clear      Specific Gravity, UA 1.015 1.003 - 1.030      pH, Urine 7.0 5.0 - 8.0      Protein,  (A) Negative mg/dL    Glucose, Ur Negative Negative mg/dL    Ketones, Urine Negative Negative mg/dL    Bilirubin, Urine Negative Negative      Blood, Urine Moderate (A) Negative      Urobilinogen, Urine 4.0 (H) 0.1 - 1.0 EU/dL    Nitrite, Urine Negative Negative      Leukocyte Esterase, Urine Negative Negative      WBC, UA 0-4 0 - 4 /hpf    RBC, UA 20-50 0 - 5 /hpf    Epithelial Cells, UA Moderate (A) Few /lpf    BACTERIA, URINE 1+ (A) Negative /hpf    Urine Culture if Indicated Culture not indicated by UA result Culture not indicated by UA result      Hyaline Casts, UA 0-2 0 - 5 /lpf   EKG 12 Lead    Collection Time: 12/08/24  2:05 PM   Result Value Ref Range    Ventricular Rate 94 BPM    Atrial Rate 94 BPM    P-R Interval 146 ms    QRS Duration 78 ms    Q-T Interval 346 ms    QTc Calculation (Bazett) 432 ms    P Axis 63

## 2024-12-10 ENCOUNTER — APPOINTMENT (OUTPATIENT)
Facility: HOSPITAL | Age: 64
DRG: 890 | End: 2024-12-10
Attending: INTERNAL MEDICINE
Payer: MEDICAID

## 2024-12-10 LAB
ALBUMIN SERPL-MCNC: 2.2 G/DL (ref 3.5–5)
AMMONIA PLAS-SCNC: 28 UMOL/L
ANION GAP SERPL CALC-SCNC: 5 MMOL/L (ref 2–12)
BASOPHILS # BLD: 0 K/UL (ref 0–0.1)
BASOPHILS NFR BLD: 1 % (ref 0–1)
BUN SERPL-MCNC: 15 MG/DL (ref 6–20)
BUN/CREAT SERPL: 12 (ref 12–20)
CA-I BLD-MCNC: 8.7 MG/DL (ref 8.5–10.1)
CHLORIDE SERPL-SCNC: 107 MMOL/L (ref 97–108)
CO2 SERPL-SCNC: 26 MMOL/L (ref 21–32)
CREAT SERPL-MCNC: 1.26 MG/DL (ref 0.55–1.02)
CRP SERPL-MCNC: 1.99 MG/DL (ref 0–0.3)
DIFFERENTIAL METHOD BLD: ABNORMAL
ECHO AO ASC DIAM: 3.5 CM
ECHO AO ASCENDING AORTA INDEX: 1.83 CM/M2
ECHO AO ROOT DIAM: 2.8 CM
ECHO AO ROOT INDEX: 1.47 CM/M2
ECHO AV AREA PEAK VELOCITY: 1.6 CM2
ECHO AV AREA VTI: 1.7 CM2
ECHO AV AREA/BSA PEAK VELOCITY: 0.8 CM2/M2
ECHO AV AREA/BSA VTI: 0.9 CM2/M2
ECHO AV MEAN GRADIENT: 7 MMHG
ECHO AV MEAN VELOCITY: 1.2 M/S
ECHO AV PEAK GRADIENT: 14 MMHG
ECHO AV PEAK VELOCITY: 1.9 M/S
ECHO AV VELOCITY RATIO: 0.89
ECHO AV VTI: 33.2 CM
ECHO BSA: 1.89 M2
ECHO IVC EXP: 1.7 CM
ECHO LA AREA 4C: 20.6 CM2
ECHO LA DIAMETER INDEX: 2.2 CM/M2
ECHO LA DIAMETER: 4.2 CM
ECHO LA MAJOR AXIS: 5.6 CM
ECHO LA TO AORTIC ROOT RATIO: 1.5
ECHO LA VOL MOD A4C: 62 ML (ref 22–52)
ECHO LA VOLUME INDEX MOD A4C: 32 ML/M2 (ref 16–34)
ECHO LV E' LATERAL VELOCITY: 11.4 CM/S
ECHO LV E' SEPTAL VELOCITY: 6.53 CM/S
ECHO LV EDV A4C: 64 ML
ECHO LV EDV INDEX A4C: 34 ML/M2
ECHO LV EJECTION FRACTION A4C: 59 %
ECHO LV EJECTION FRACTION BIPLANE: 59 % (ref 55–100)
ECHO LV ESV A4C: 26 ML
ECHO LV ESV INDEX A4C: 14 ML/M2
ECHO LV FRACTIONAL SHORTENING: 45 % (ref 28–44)
ECHO LV INTERNAL DIMENSION DIASTOLE INDEX: 2.2 CM/M2
ECHO LV INTERNAL DIMENSION DIASTOLIC: 4.2 CM (ref 3.9–5.3)
ECHO LV INTERNAL DIMENSION SYSTOLIC INDEX: 1.2 CM/M2
ECHO LV INTERNAL DIMENSION SYSTOLIC: 2.3 CM
ECHO LV IVSD: 1.1 CM (ref 0.6–0.9)
ECHO LV MASS 2D: 147 G (ref 67–162)
ECHO LV MASS INDEX 2D: 77 G/M2 (ref 43–95)
ECHO LV POSTERIOR WALL DIASTOLIC: 1 CM (ref 0.6–0.9)
ECHO LV RELATIVE WALL THICKNESS RATIO: 0.48
ECHO LVOT AREA: 1.8 CM2
ECHO LVOT AV VTI INDEX: 0.95
ECHO LVOT DIAM: 1.5 CM
ECHO LVOT MEAN GRADIENT: 6 MMHG
ECHO LVOT PEAK GRADIENT: 12 MMHG
ECHO LVOT PEAK VELOCITY: 1.7 M/S
ECHO LVOT STROKE VOLUME INDEX: 29.3 ML/M2
ECHO LVOT SV: 56 ML
ECHO LVOT VTI: 31.7 CM
ECHO MV A VELOCITY: 1.26 M/S
ECHO MV AREA VTI: 2 CM2
ECHO MV E DECELERATION TIME (DT): 162 MS
ECHO MV E VELOCITY: 1.08 M/S
ECHO MV E/A RATIO: 0.86
ECHO MV E/E' LATERAL: 9.47
ECHO MV E/E' RATIO (AVERAGED): 13.01
ECHO MV E/E' SEPTAL: 16.54
ECHO MV LVOT VTI INDEX: 0.88
ECHO MV MAX VELOCITY: 1.2 M/S
ECHO MV MEAN GRADIENT: 3 MMHG
ECHO MV MEAN VELOCITY: 0.8 M/S
ECHO MV PEAK GRADIENT: 6 MMHG
ECHO MV REGURGITANT PEAK GRADIENT: 55 MMHG
ECHO MV REGURGITANT PEAK VELOCITY: 3.7 M/S
ECHO MV VTI: 28 CM
ECHO PV ACCELERATION TIME (AT): 98 MS
ECHO PV MAX VELOCITY: 1.4 M/S
ECHO PV PEAK GRADIENT: 8 MMHG
ECHO RA AREA 4C: 16.4 CM2
ECHO RA END SYSTOLIC VOLUME APICAL 4 CHAMBER INDEX BSA: 19 ML/M2
ECHO RA VOLUME: 37 ML
ECHO RV BASAL DIMENSION: 3.1 CM
ECHO RV FREE WALL PEAK S': 11.5 CM/S
ECHO RV TAPSE: 1.9 CM (ref 1.7–?)
EOSINOPHIL # BLD: 0.1 K/UL (ref 0–0.4)
EOSINOPHIL NFR BLD: 2 % (ref 0–7)
ERYTHROCYTE [DISTWIDTH] IN BLOOD BY AUTOMATED COUNT: 17 % (ref 11.5–14.5)
GLUCOSE SERPL-MCNC: 120 MG/DL (ref 65–100)
HCT VFR BLD AUTO: 27.9 % (ref 35–47)
HGB BLD-MCNC: 9.2 G/DL (ref 11.5–16)
IMM GRANULOCYTES # BLD AUTO: 0 K/UL (ref 0–0.04)
IMM GRANULOCYTES NFR BLD AUTO: 0 % (ref 0–0.5)
LYMPHOCYTES # BLD: 1.2 K/UL (ref 0.8–3.5)
LYMPHOCYTES NFR BLD: 26 % (ref 12–49)
M PNEUMO IGM SER IA-ACNC: REACTIVE
MAGNESIUM SERPL-MCNC: 1.8 MG/DL (ref 1.6–2.4)
MCH RBC QN AUTO: 26.9 PG (ref 26–34)
MCHC RBC AUTO-ENTMCNC: 33 G/DL (ref 30–36.5)
MCV RBC AUTO: 81.6 FL (ref 80–99)
MONOCYTES # BLD: 0.4 K/UL (ref 0–1)
MONOCYTES NFR BLD: 9 % (ref 5–13)
MRSA DNA SPEC QL NAA+PROBE: NOT DETECTED
NEUTS SEG # BLD: 3 K/UL (ref 1.8–8)
NEUTS SEG NFR BLD: 62 % (ref 32–75)
NRBC # BLD: 0 K/UL (ref 0–0.01)
NRBC BLD-RTO: 0 PER 100 WBC
PHOSPHATE SERPL-MCNC: 2.9 MG/DL (ref 2.6–4.7)
PLATELET # BLD AUTO: 191 K/UL (ref 150–400)
PMV BLD AUTO: 11.2 FL (ref 8.9–12.9)
POTASSIUM SERPL-SCNC: 3.2 MMOL/L (ref 3.5–5.1)
PROCALCITONIN SERPL-MCNC: 0.23 NG/ML
RBC # BLD AUTO: 3.42 M/UL (ref 3.8–5.2)
SODIUM SERPL-SCNC: 138 MMOL/L (ref 136–145)
WBC # BLD AUTO: 4.8 K/UL (ref 3.6–11)

## 2024-12-10 PROCEDURE — 97530 THERAPEUTIC ACTIVITIES: CPT

## 2024-12-10 PROCEDURE — 87522 HEPATITIS C REVRS TRNSCRPJ: CPT

## 2024-12-10 PROCEDURE — 93306 TTE W/DOPPLER COMPLETE: CPT

## 2024-12-10 PROCEDURE — 87641 MR-STAPH DNA AMP PROBE: CPT

## 2024-12-10 PROCEDURE — 86738 MYCOPLASMA ANTIBODY: CPT

## 2024-12-10 PROCEDURE — 1100000000 HC RM PRIVATE

## 2024-12-10 PROCEDURE — 6370000000 HC RX 637 (ALT 250 FOR IP): Performed by: FAMILY MEDICINE

## 2024-12-10 PROCEDURE — 2580000003 HC RX 258: Performed by: FAMILY MEDICINE

## 2024-12-10 PROCEDURE — 6360000002 HC RX W HCPCS: Performed by: FAMILY MEDICINE

## 2024-12-10 PROCEDURE — 86140 C-REACTIVE PROTEIN: CPT

## 2024-12-10 PROCEDURE — 82140 ASSAY OF AMMONIA: CPT

## 2024-12-10 PROCEDURE — 6370000000 HC RX 637 (ALT 250 FOR IP): Performed by: INTERNAL MEDICINE

## 2024-12-10 PROCEDURE — 84145 PROCALCITONIN (PCT): CPT

## 2024-12-10 PROCEDURE — 86361 T CELL ABSOLUTE COUNT: CPT

## 2024-12-10 PROCEDURE — 97165 OT EVAL LOW COMPLEX 30 MIN: CPT

## 2024-12-10 PROCEDURE — 97161 PT EVAL LOW COMPLEX 20 MIN: CPT

## 2024-12-10 PROCEDURE — 83735 ASSAY OF MAGNESIUM: CPT

## 2024-12-10 PROCEDURE — 80069 RENAL FUNCTION PANEL: CPT

## 2024-12-10 PROCEDURE — 87536 HIV-1 QUANT&REVRSE TRNSCRPJ: CPT

## 2024-12-10 PROCEDURE — 99232 SBSQ HOSP IP/OBS MODERATE 35: CPT | Performed by: INTERNAL MEDICINE

## 2024-12-10 PROCEDURE — 6370000000 HC RX 637 (ALT 250 FOR IP): Performed by: HOSPITALIST

## 2024-12-10 PROCEDURE — 6360000002 HC RX W HCPCS: Performed by: PHYSICIAN ASSISTANT

## 2024-12-10 PROCEDURE — 85025 COMPLETE CBC W/AUTO DIFF WBC: CPT

## 2024-12-10 RX ORDER — POTASSIUM CHLORIDE 1500 MG/1
40 TABLET, EXTENDED RELEASE ORAL ONCE
Status: COMPLETED | OUTPATIENT
Start: 2024-12-10 | End: 2024-12-10

## 2024-12-10 RX ORDER — HYDRALAZINE HYDROCHLORIDE 20 MG/ML
10 INJECTION INTRAMUSCULAR; INTRAVENOUS EVERY 6 HOURS PRN
Status: DISCONTINUED | OUTPATIENT
Start: 2024-12-10 | End: 2024-12-12 | Stop reason: HOSPADM

## 2024-12-10 RX ADMIN — ACETAMINOPHEN 650 MG: 325 TABLET ORAL at 03:35

## 2024-12-10 RX ADMIN — ACETAMINOPHEN 650 MG: 325 TABLET ORAL at 09:55

## 2024-12-10 RX ADMIN — HYDRALAZINE HYDROCHLORIDE 10 MG: 20 INJECTION INTRAMUSCULAR; INTRAVENOUS at 03:29

## 2024-12-10 RX ADMIN — SODIUM CHLORIDE, PRESERVATIVE FREE 10 ML: 5 INJECTION INTRAVENOUS at 21:53

## 2024-12-10 RX ADMIN — BICTEGRAVIR SODIUM, EMTRICITABINE, AND TENOFOVIR ALAFENAMIDE FUMARATE 1 TABLET: 50; 200; 25 TABLET ORAL at 09:55

## 2024-12-10 RX ADMIN — FAMOTIDINE 20 MG: 20 TABLET, FILM COATED ORAL at 08:40

## 2024-12-10 RX ADMIN — HYDRALAZINE HYDROCHLORIDE 50 MG: 50 TABLET ORAL at 05:47

## 2024-12-10 RX ADMIN — POTASSIUM CHLORIDE 40 MEQ: 1500 TABLET, EXTENDED RELEASE ORAL at 11:46

## 2024-12-10 RX ADMIN — HYDRALAZINE HYDROCHLORIDE 50 MG: 50 TABLET ORAL at 14:47

## 2024-12-10 RX ADMIN — HYDRALAZINE HYDROCHLORIDE 50 MG: 50 TABLET ORAL at 21:52

## 2024-12-10 RX ADMIN — RISPERIDONE 2 MG: 1 TABLET, FILM COATED ORAL at 08:40

## 2024-12-10 RX ADMIN — LEVOFLOXACIN 750 MG: 500 TABLET, FILM COATED ORAL at 08:40

## 2024-12-10 RX ADMIN — ENOXAPARIN SODIUM 40 MG: 100 INJECTION SUBCUTANEOUS at 08:40

## 2024-12-10 RX ADMIN — DILTIAZEM HYDROCHLORIDE 120 MG: 120 CAPSULE, COATED, EXTENDED RELEASE ORAL at 08:40

## 2024-12-10 RX ADMIN — DIVALPROEX SODIUM 250 MG: 250 TABLET, DELAYED RELEASE ORAL at 21:30

## 2024-12-10 RX ADMIN — RISPERIDONE 2 MG: 1 TABLET, FILM COATED ORAL at 21:52

## 2024-12-10 RX ADMIN — FAMOTIDINE 20 MG: 20 TABLET, FILM COATED ORAL at 21:52

## 2024-12-10 RX ADMIN — SODIUM CHLORIDE, PRESERVATIVE FREE 10 ML: 5 INJECTION INTRAVENOUS at 08:40

## 2024-12-10 RX ADMIN — DIVALPROEX SODIUM 250 MG: 250 TABLET, DELAYED RELEASE ORAL at 08:40

## 2024-12-10 ASSESSMENT — PAIN SCALES - GENERAL
PAINLEVEL_OUTOF10: 0

## 2024-12-10 NOTE — DISCHARGE INSTRUCTIONS
Diet as before  Activity as before  Check labs at PCP office next visit cbc/bmp/mag  Return to Ed or call PCP immediately if symptoms reoccur or get worse

## 2024-12-10 NOTE — CARE COORDINATION
DCP: return to [facility] [place]  ERIN: 48-72 hours    Pt has PT/OT/SLP evals pending, pt has been transitioned to PO abx followed by ID, ECHO pending, and cultures pending.     1303: CM called pt group decision maker listed in chart Jerry Rei and discussed need for HH. Decision maker is agreeable to HH, and has no preference. CM also explained pt may not have an accepting HH, but the referrals will be sent. CM informed that they have pt anticipated to dc in 48-72 hours. CM was informed that pt is able to be accepted back but the group home does not accept back on Friday, weekends or holidays. CM will continue to follow.

## 2024-12-11 LAB
ANION GAP SERPL CALC-SCNC: 4 MMOL/L (ref 2–12)
BASOPHILS # BLD AUTO: 0 X10E3/UL (ref 0–0.2)
BASOPHILS # BLD: 0 K/UL (ref 0–0.1)
BASOPHILS NFR BLD AUTO: 1 %
BASOPHILS NFR BLD: 0 % (ref 0–1)
BUN SERPL-MCNC: 12 MG/DL (ref 6–20)
BUN/CREAT SERPL: 11 (ref 12–20)
CA-I BLD-MCNC: 8.2 MG/DL (ref 8.5–10.1)
CD3+CD4+ CELLS # BLD: 287 /UL (ref 359–1519)
CD3+CD4+ CELLS NFR BLD: 23.9 % (ref 30.8–58.5)
CHLORIDE SERPL-SCNC: 107 MMOL/L (ref 97–108)
CO2 SERPL-SCNC: 27 MMOL/L (ref 21–32)
CREAT SERPL-MCNC: 1.07 MG/DL (ref 0.55–1.02)
DIFFERENTIAL METHOD BLD: ABNORMAL
EOSINOPHIL # BLD AUTO: 0.1 X10E3/UL (ref 0–0.4)
EOSINOPHIL # BLD: 0.1 K/UL (ref 0–0.4)
EOSINOPHIL NFR BLD AUTO: 2 %
EOSINOPHIL NFR BLD: 2 % (ref 0–7)
ERYTHROCYTE [DISTWIDTH] IN BLOOD BY AUTOMATED COUNT: 16.1 % (ref 11.7–15.4)
ERYTHROCYTE [DISTWIDTH] IN BLOOD BY AUTOMATED COUNT: 16.6 % (ref 11.5–14.5)
GLUCOSE SERPL-MCNC: 83 MG/DL (ref 65–100)
HCT VFR BLD AUTO: 24.9 % (ref 35–47)
HCT VFR BLD AUTO: 29 % (ref 34–46.6)
HGB BLD-MCNC: 8.3 G/DL (ref 11.5–16)
HGB BLD-MCNC: 9.4 G/DL (ref 11.1–15.9)
HIV1 RNA # SERPL NAA+PROBE: 80 COPIES/ML
HIV1 RNA SERPL NAA+PROBE-LOG#: 1.9 LOG10COPY/ML
IMM GRANULOCYTES # BLD AUTO: 0 K/UL (ref 0–0.04)
IMM GRANULOCYTES # BLD: 0 X10E3/UL (ref 0–0.1)
IMM GRANULOCYTES NFR BLD AUTO: 0 % (ref 0–0.5)
IMM GRANULOCYTES NFR BLD: 1 %
IMMATURE CELLS: ABNORMAL
LYMPHOCYTES # BLD AUTO: 1.2 X10E3/UL (ref 0.7–3.1)
LYMPHOCYTES # BLD: 1.3 K/UL (ref 0.8–3.5)
LYMPHOCYTES NFR BLD AUTO: 26 %
LYMPHOCYTES NFR BLD: 25 % (ref 12–49)
MAGNESIUM SERPL-MCNC: 1.6 MG/DL (ref 1.6–2.4)
MCH RBC QN AUTO: 26.6 PG (ref 26–34)
MCH RBC QN AUTO: 26.9 PG (ref 26.6–33)
MCHC RBC AUTO-ENTMCNC: 32.4 G/DL (ref 31.5–35.7)
MCHC RBC AUTO-ENTMCNC: 33.3 G/DL (ref 30–36.5)
MCV RBC AUTO: 79.8 FL (ref 80–99)
MCV RBC AUTO: 83 FL (ref 79–97)
MONOCYTES # BLD AUTO: 0.4 X10E3/UL (ref 0.1–0.9)
MONOCYTES # BLD: 0.6 K/UL (ref 0–1)
MONOCYTES NFR BLD AUTO: 8 %
MONOCYTES NFR BLD: 10 % (ref 5–13)
MORPHOLOGY BLD-IMP: ABNORMAL
NEUTROPHILS # BLD AUTO: 3.1 X10E3/UL (ref 1.4–7)
NEUTROPHILS NFR BLD AUTO: 62 %
NEUTS SEG # BLD: 3.4 K/UL (ref 1.8–8)
NEUTS SEG NFR BLD: 63 % (ref 32–75)
NRBC # BLD: 0 K/UL (ref 0–0.01)
NRBC BLD AUTO-RTO: ABNORMAL %
NRBC BLD-RTO: 0 PER 100 WBC
PLATELET # BLD AUTO: 180 K/UL (ref 150–400)
PLATELET # BLD AUTO: 194 X10E3/UL (ref 150–450)
PMV BLD AUTO: 11.2 FL (ref 8.9–12.9)
POTASSIUM SERPL-SCNC: 3.5 MMOL/L (ref 3.5–5.1)
RBC # BLD AUTO: 3.12 M/UL (ref 3.8–5.2)
RBC # BLD AUTO: 3.5 X10E6/UL (ref 3.77–5.28)
SODIUM SERPL-SCNC: 138 MMOL/L (ref 136–145)
VALPROATE SERPL-MCNC: 60 UG/ML (ref 50–100)
WBC # BLD AUTO: 4.8 X10E3/UL (ref 3.4–10.8)
WBC # BLD AUTO: 5.4 K/UL (ref 3.6–11)

## 2024-12-11 PROCEDURE — 1100000000 HC RM PRIVATE

## 2024-12-11 PROCEDURE — 80048 BASIC METABOLIC PNL TOTAL CA: CPT

## 2024-12-11 PROCEDURE — 6370000000 HC RX 637 (ALT 250 FOR IP): Performed by: FAMILY MEDICINE

## 2024-12-11 PROCEDURE — 83735 ASSAY OF MAGNESIUM: CPT

## 2024-12-11 PROCEDURE — 99232 SBSQ HOSP IP/OBS MODERATE 35: CPT | Performed by: INTERNAL MEDICINE

## 2024-12-11 PROCEDURE — 85025 COMPLETE CBC W/AUTO DIFF WBC: CPT

## 2024-12-11 PROCEDURE — 36415 COLL VENOUS BLD VENIPUNCTURE: CPT

## 2024-12-11 PROCEDURE — 6360000002 HC RX W HCPCS: Performed by: FAMILY MEDICINE

## 2024-12-11 PROCEDURE — 80164 ASSAY DIPROPYLACETIC ACD TOT: CPT

## 2024-12-11 PROCEDURE — 6370000000 HC RX 637 (ALT 250 FOR IP): Performed by: INTERNAL MEDICINE

## 2024-12-11 PROCEDURE — 2580000003 HC RX 258: Performed by: FAMILY MEDICINE

## 2024-12-11 RX ADMIN — FAMOTIDINE 20 MG: 20 TABLET, FILM COATED ORAL at 21:05

## 2024-12-11 RX ADMIN — HYDRALAZINE HYDROCHLORIDE 50 MG: 50 TABLET ORAL at 05:08

## 2024-12-11 RX ADMIN — FAMOTIDINE 20 MG: 20 TABLET, FILM COATED ORAL at 08:47

## 2024-12-11 RX ADMIN — DIVALPROEX SODIUM 250 MG: 250 TABLET, DELAYED RELEASE ORAL at 08:47

## 2024-12-11 RX ADMIN — DILTIAZEM HYDROCHLORIDE 120 MG: 120 CAPSULE, COATED, EXTENDED RELEASE ORAL at 08:47

## 2024-12-11 RX ADMIN — BICTEGRAVIR SODIUM, EMTRICITABINE, AND TENOFOVIR ALAFENAMIDE FUMARATE 1 TABLET: 50; 200; 25 TABLET ORAL at 08:47

## 2024-12-11 RX ADMIN — HYDRALAZINE HYDROCHLORIDE 50 MG: 50 TABLET ORAL at 15:01

## 2024-12-11 RX ADMIN — SODIUM CHLORIDE, PRESERVATIVE FREE 10 ML: 5 INJECTION INTRAVENOUS at 21:06

## 2024-12-11 RX ADMIN — LEVOFLOXACIN 750 MG: 500 TABLET, FILM COATED ORAL at 08:47

## 2024-12-11 RX ADMIN — DIVALPROEX SODIUM 250 MG: 250 TABLET, DELAYED RELEASE ORAL at 21:05

## 2024-12-11 RX ADMIN — RISPERIDONE 2 MG: 1 TABLET, FILM COATED ORAL at 21:05

## 2024-12-11 RX ADMIN — RISPERIDONE 2 MG: 1 TABLET, FILM COATED ORAL at 08:47

## 2024-12-11 RX ADMIN — ENOXAPARIN SODIUM 40 MG: 100 INJECTION SUBCUTANEOUS at 08:47

## 2024-12-11 RX ADMIN — HYDRALAZINE HYDROCHLORIDE 50 MG: 50 TABLET ORAL at 21:47

## 2024-12-11 RX ADMIN — SODIUM CHLORIDE, PRESERVATIVE FREE 10 ML: 5 INJECTION INTRAVENOUS at 08:47

## 2024-12-11 ASSESSMENT — PAIN SCALES - GENERAL
PAINLEVEL_OUTOF10: 0
PAINLEVEL_OUTOF10: 0

## 2024-12-11 NOTE — CARE COORDINATION
CM reviewed chart.     DCP is for patient to return to [facility].     Patient is pending ID clearance, psych clearance, resolution of fever.     CM will continue to follow.

## 2024-12-11 NOTE — CONSULTS
[address] [address] [place]  [address]                              CONSULTATION      PATIENT NAME: CECI MAY                  : 1960  MED REC NO: [id number]                       ROOM: [id number]  ACCOUNT NO: [id number]                       ADMIT DATE: 2024  PROVIDER: Smith Centeno MD    DATE OF SERVICE:  12/10/2024    ATTENDING PHYSICIAN:  CLIFFORD WATERS    The patient is seen for followup.  He is resting, woke up promptly, alert, verbal, polite.  Engaging in conversation.  Denies depression.  Denies suicidal thought.  Denies homicidal thoughts.  No hallucination.  No delusions.  Knew this is December.  Could not tell me the day of the week or the date.  Knew her age.  Much better engaged in conversation.  No agitation today.  Still perplexed, confused.  Complying with the medication.  No side effects noted.        SMITH CENTENO MD      RK/AQS  D:  12/10/2024 23:53:27  T:  2024 00:42:27  JOB #:  [id number]     
COVID-19, influenza A and B not detected.  CT scan of the chest, no pulmonary embolism, no acute airspace disease, trace left pleural effusion, cirrhosis, coronary artery calcifications.  X-ray chest portable, mild pulmonary edema, cardiomegaly.    VITAL SIGNS:  Today, pulse 98, blood pressure 161/91, temperature 100, respirations 22, O2 saturation 98%.    ALLERGIES:  TO MEDICATIONS, NO KNOWN ALLERGIES.      MENTAL STATUS:  Average height and medium-built female patient, neat, clean. Initially made eye contact, only can tell me her month of birth, upon direct enquiry if that she saw a psychiatrist.  Beyond that, she could not tell me.  Perplexed, confused, got a little bit anxious, withdrawn, did not carry any further communication.  Does not appear to have any active auditory or visual hallucinations or paranoia, pleasantly confused.  Intellectual functions, insight, memory recall could not be tested.  No agitation.  Flat affect.    DIAGNOSES:    1. Schizophrenia, chronic, undifferentiated.  There may be mild-to-moderate cognitive impairment.  2. Human immunodeficiency virus positive.  3. Hepatitis C.  4. Cirrhosis of the liver.  5. Sepsis.  6. Hypertension.    DISPOSITION:  Continue Cogentin and risperidone, do Depakote level, ammonia level, reassess the case tomorrow.        MD SHAMAR JIMÉNEZ/CHERYLS  D:  12/10/2024 00:39:32  T:  12/10/2024 07:43:23  JOB #:  [id number]     
Negative   Negative   Leukocyte Esterase, Urine Negative   Negative   Appearance Clear   Clear   pH, Urine 5.0 - 8.0   7.0   Hyaline Casts, UA 0 - 5 /lpf 0-2   WBC, UA 0 - 4 /hpf 0-4   RBC, UA 0 - 5 /hpf 20-50   Epithelial Cells, UA Few /lpf Moderate !   Bacteria, UA Negative /hpf 1+ !         LIVER PROFILE:  Recent Labs     12/08/24  1500 12/09/24  0602   AST 47* 45*   ALT 19 16   BILITOT 0.8 0.7   ALKPHOS 79 70     Procal 0.10 <0.05    Rapid flu A&B Negative x 2  SARS CoV-2 Negative x 2    Blood cultures (12/8) No growth 20 hours  Blood cultures (12/8) Staphylococcus species, coagulase negative  Blood cultures (12/9) in process  Blood cultures (12/9) in process  Imaging/Diagnostics   Personally interpreted by me    CTA CHEST W WO CONTRAST    Result Date: 12/8/2024  1. No pulmonary embolism, no acute airspace disease. 2. Trace left pleural effusion. 3. Cirrhosis. 4. Coronary artery calcifications. Electronically signed by BAHMAN MARTELL    XR CHEST PORTABLE    Result Date: 12/8/2024  Mild pulmonary edema. Electronically signed by [facility] Problems             Last Modified POA    * (Principal) Fever and chills 12/9/2024 Yes     Fever with elevated procal  ?Chronic cough with normal CT Chest  Positive blood cultures (single set) with coagulase negative Staphylococci, consistent with contaminant  HIV-1 infection CDC Class A2 (CD4 <400), on Biktarvy  Chronic Hepatitis C infection, untreated  Cirrhosis, secondary to #4  Anemia  Schizophrenia    Comment:  Difficult to make a case for respiratory infection with unremarkable CT Chest, however, this would not rule out bronchitis.  She also complained of cough in Sept. 2024, but not sure I would consider this a chronic cough.  Mycoplasma infection may linger for some time. Bacterial pneumonia very unlikely but would still consider RSV.  Pneumocystis unlikely if CD4 still >200.  Would check her EF.    Plan   1. Continue Biktarvy for HIV

## 2024-12-11 NOTE — BSMART NOTE
affecting communication. The patient's preference for learning can be described as: N/A.  The patient's hearing is normal.  The patient's vision is normal.    The patient reports coping skills include: none    SHRUTHI Puente  [employer] LIAISON

## 2024-12-12 VITALS
TEMPERATURE: 98.6 F | SYSTOLIC BLOOD PRESSURE: 150 MMHG | BODY MASS INDEX: 26.68 KG/M2 | OXYGEN SATURATION: 98 % | HEART RATE: 82 BPM | WEIGHT: 169.97 LBS | HEIGHT: 67 IN | DIASTOLIC BLOOD PRESSURE: 76 MMHG | RESPIRATION RATE: 18 BRPM

## 2024-12-12 LAB
ANION GAP SERPL CALC-SCNC: 7 MMOL/L (ref 2–12)
BACTERIA SPEC CULT: ABNORMAL
BACTERIA SPEC CULT: ABNORMAL
BUN SERPL-MCNC: 10 MG/DL (ref 6–20)
BUN/CREAT SERPL: 11 (ref 12–20)
CA-I BLD-MCNC: 8.5 MG/DL (ref 8.5–10.1)
CHLORIDE SERPL-SCNC: 106 MMOL/L (ref 97–108)
CO2 SERPL-SCNC: 27 MMOL/L (ref 21–32)
CREAT SERPL-MCNC: 0.93 MG/DL (ref 0.55–1.02)
CRP SERPL-MCNC: 1.17 MG/DL (ref 0–0.3)
GLUCOSE SERPL-MCNC: 93 MG/DL (ref 65–100)
GRAPH: NORMAL
HCV RNA SERPL NAA+PROBE-ACNC: NORMAL IU/ML
HCV RNA SERPL NAA+PROBE-LOG IU: NORMAL LOG10 IU/ML
Lab: ABNORMAL
POTASSIUM SERPL-SCNC: 3.4 MMOL/L (ref 3.5–5.1)
PROCALCITONIN SERPL-MCNC: 0.11 NG/ML
SODIUM SERPL-SCNC: 140 MMOL/L (ref 136–145)
TEST INFORMATION: NORMAL

## 2024-12-12 PROCEDURE — 84145 PROCALCITONIN (PCT): CPT

## 2024-12-12 PROCEDURE — 86140 C-REACTIVE PROTEIN: CPT

## 2024-12-12 PROCEDURE — 97535 SELF CARE MNGMENT TRAINING: CPT

## 2024-12-12 PROCEDURE — 6360000002 HC RX W HCPCS: Performed by: FAMILY MEDICINE

## 2024-12-12 PROCEDURE — 2580000003 HC RX 258: Performed by: FAMILY MEDICINE

## 2024-12-12 PROCEDURE — 6370000000 HC RX 637 (ALT 250 FOR IP): Performed by: INTERNAL MEDICINE

## 2024-12-12 PROCEDURE — 36415 COLL VENOUS BLD VENIPUNCTURE: CPT

## 2024-12-12 PROCEDURE — 6370000000 HC RX 637 (ALT 250 FOR IP): Performed by: FAMILY MEDICINE

## 2024-12-12 PROCEDURE — 80048 BASIC METABOLIC PNL TOTAL CA: CPT

## 2024-12-12 PROCEDURE — 6370000000 HC RX 637 (ALT 250 FOR IP): Performed by: HOSPITALIST

## 2024-12-12 RX ORDER — LEVOFLOXACIN 750 MG/1
750 TABLET, FILM COATED ORAL DAILY
Qty: 7 TABLET | Refills: 0 | Status: SHIPPED | OUTPATIENT
Start: 2024-12-12 | End: 2024-12-19

## 2024-12-12 RX ORDER — POTASSIUM CHLORIDE 1500 MG/1
20 TABLET, EXTENDED RELEASE ORAL ONCE
Status: COMPLETED | OUTPATIENT
Start: 2024-12-12 | End: 2024-12-12

## 2024-12-12 RX ADMIN — RISPERIDONE 2 MG: 1 TABLET, FILM COATED ORAL at 10:52

## 2024-12-12 RX ADMIN — HYDRALAZINE HYDROCHLORIDE 50 MG: 50 TABLET ORAL at 14:24

## 2024-12-12 RX ADMIN — ENOXAPARIN SODIUM 40 MG: 100 INJECTION SUBCUTANEOUS at 10:53

## 2024-12-12 RX ADMIN — BICTEGRAVIR SODIUM, EMTRICITABINE, AND TENOFOVIR ALAFENAMIDE FUMARATE 1 TABLET: 50; 200; 25 TABLET ORAL at 10:53

## 2024-12-12 RX ADMIN — DILTIAZEM HYDROCHLORIDE 120 MG: 120 CAPSULE, COATED, EXTENDED RELEASE ORAL at 10:52

## 2024-12-12 RX ADMIN — HYDRALAZINE HYDROCHLORIDE 50 MG: 50 TABLET ORAL at 06:20

## 2024-12-12 RX ADMIN — LEVOFLOXACIN 750 MG: 500 TABLET, FILM COATED ORAL at 10:52

## 2024-12-12 RX ADMIN — DIVALPROEX SODIUM 250 MG: 250 TABLET, DELAYED RELEASE ORAL at 10:53

## 2024-12-12 RX ADMIN — FAMOTIDINE 20 MG: 20 TABLET, FILM COATED ORAL at 10:53

## 2024-12-12 RX ADMIN — SODIUM CHLORIDE, PRESERVATIVE FREE 10 ML: 5 INJECTION INTRAVENOUS at 10:45

## 2024-12-12 RX ADMIN — POTASSIUM CHLORIDE 20 MEQ: 1500 TABLET, EXTENDED RELEASE ORAL at 10:53

## 2024-12-12 ASSESSMENT — PAIN SCALES - GENERAL: PAINLEVEL_OUTOF10: 0

## 2024-12-12 NOTE — DISCHARGE SUMMARY
% 1 Not Estab. %    IMMATURE GRANULOCYTES ABSOLUTE 0.0 0.0 - 0.1 x10E3/uL    nRBC Test not performed %    Immature Cells Test not performed      Hematology Comments: Test not performed     HIV-1 RNA, quantitative, PCR    Collection Time: 12/10/24  9:11 AM   Result Value Ref Range    HIV 1 RNA QN RT PCR copies/mL 80 copies/mL    HIV 1 RNA QN RT PCR log copies/mL 1.903 [id number]/mL   HCV RT-PCR, Quant (Graph)    Collection Time: 12/10/24  9:11 AM   Result Value Ref Range    HCV IU/mL See Final Results IU/mL    HCV log 10 IU/mL Test not performed log10 IU/mL    TEST INFORMATION Comment      Graph PENDING    Mycoplasma pneumoniae antibody, IgM    Collection Time: 12/10/24  9:11 AM   Result Value Ref Range    Mycoplasma pneumo IgM Reactive (A) NONREACTIVE     Procalcitonin    Collection Time: 12/10/24  9:11 AM   Result Value Ref Range    Procalcitonin 0.23 (H) 0 ng/mL   C-Reactive Protein    Collection Time: 12/10/24  9:11 AM   Result Value Ref Range    CRP 1.99 (H) 0.00 - 0.30 mg/dL   Ammonia    Collection Time: 12/10/24  9:11 AM   Result Value Ref Range    Ammonia 28 <32 umol/L   Echo (TTE) complete (PRN contrast/bubble/strain/3D)    Collection Time: 12/10/24  7:20 PM   Result Value Ref Range    LV EDV A4C 64 mL    LV ESV A4C 26 mL    IVSd 1.1 (A) 0.6 - 0.9 cm    LVIDd 4.2 3.9 - 5.3 cm    LVIDs 2.3 cm    LVOT Diameter 1.5 cm    LVOT Mean Gradient 6 mmHg    LVOT VTI 31.7 cm    LVOT Peak Velocity 1.7 m/s    LVOT Peak Gradient 12 mmHg    LVPWd 1.0 (A) 0.6 - 0.9 cm    LV E' Lateral Velocity 11.40 cm/s    LV E' Septal Velocity 6.53 cm/s    LV Ejection Fraction A4C 59 %    LVOT Area 1.8 cm2    LVOT SV 56.0 ml    LA Major Axis 5.6 cm    LA Area 4C 20.6 cm2    LA Volume MOD A4C 62 (A) 22 - 52 mL    LA Diameter 4.2 cm    RA Area 4C 16.4 cm2    RA Volume 37 ml    AV Mean Gradient 7 mmHg    AV VTI 33.2 cm    AV Mean Velocity 1.2 m/s    AV Peak Velocity 1.9 m/s    AV Peak Gradient 14 mmHg    AV Area by VTI 1.7 cm2    AV Area by

## 2024-12-12 NOTE — CARE COORDINATION
Current disposition remains for patient to discharge back to group home.  CM team sent out home health referrals. Patient has no accepting home health company at this time.  Per previous CM notes, Group home does not accept patients back on Fridays, weekends or Holidays.  CM team will continue to follow.

## 2024-12-12 NOTE — PLAN OF CARE
Problem: Discharge Planning  Goal: Discharge to home or other facility with appropriate resources  12/10/2024 2217 by Deanna Parker RN  Outcome: Progressing  12/10/2024 0839 by Marianne Snyder RN  Outcome: Progressing     Problem: Cardiovascular - Adult  Goal: Maintains optimal cardiac output and hemodynamic stability  12/10/2024 2217 by Deanna Parker RN  Outcome: Progressing  12/10/2024 0839 by Marianne Snyder RN  Outcome: Progressing     Problem: Safety - Adult  Goal: Free from fall injury  12/10/2024 2217 by Deanna Parker RN  Outcome: Progressing  12/10/2024 0839 by Marianne Snyder RN  Outcome: Progressing     Problem: Skin/Tissue Integrity  Goal: Absence of new skin breakdown  Description: 1.  Monitor for areas of redness and/or skin breakdown  2.  Assess vascular access sites hourly  3.  Every 4-6 hours minimum:  Change oxygen saturation probe site  4.  Every 4-6 hours:  If on nasal continuous positive airway pressure, respiratory therapy assess nares and determine need for appliance change or resting period.  12/10/2024 2217 by Deanna Parker RN  Outcome: Progressing  12/10/2024 0839 by Marianne Snyder RN  Outcome: Progressing     Problem: Physical Therapy - Adult  Goal: By Discharge: Performs mobility at highest level of function for planned discharge setting.  See evaluation for individualized goals.  Description: FUNCTIONAL STATUS PRIOR TO ADMISSION: Patient was modified independent using a rolling walker for functional mobility.    HOME SUPPORT PRIOR TO ADMISSION: pt from [facility]. Pt reports that she has a roommate that assists her as needed    Physical Therapy Goals  Initiated 12/10/2024  Pt stated goal: to go to sleep  Pt will be I with [facility] in 7 days.  Pt will perform bed mobility with [place] in 7 days.  Pt will perform transfers with Modified [place] in 7 days.   Pt will amb 50-75 feet with LRAD safely with Modified [place] in 7 days.  Pt will ascend/descend 3 steps with 
  Problem: Discharge Planning  Goal: Discharge to home or other facility with appropriate resources  12/9/2024 1953 by Deanna Parker, RN  Outcome: Progressing  Flowsheets (Taken 12/9/2024 1942)  Discharge to home or other facility with appropriate resources: Identify barriers to discharge with patient and caregiver  12/9/2024 0820 by Marianne Snyder, RN  Outcome: Progressing     
  Problem: Discharge Planning  Goal: Discharge to home or other facility with appropriate resources  Outcome: Progressing     Problem: Cardiovascular - Adult  Goal: Maintains optimal cardiac output and hemodynamic stability  Outcome: Progressing     
  Problem: Discharge Planning  Goal: Discharge to home or other facility with appropriate resources  Outcome: Progressing     Problem: Cardiovascular - Adult  Goal: Maintains optimal cardiac output and hemodynamic stability  Outcome: Progressing     Problem: Safety - Adult  Goal: Free from fall injury  Outcome: Progressing     Problem: Skin/Tissue Integrity  Goal: Absence of new skin breakdown  Description: 1.  Monitor for areas of redness and/or skin breakdown  2.  Assess vascular access sites hourly  3.  Every 4-6 hours minimum:  Change oxygen saturation probe site  4.  Every 4-6 hours:  If on nasal continuous positive airway pressure, respiratory therapy assess nares and determine need for appliance change or resting period.  Outcome: Progressing     
OCCUPATIONAL THERAPY EVALUATION  Patient: Darcy Arthur (64 y.o. female)  Date: 12/10/2024  Primary Diagnosis: Fever and chills [R50.9]  Pneumonia due to infectious organism, unspecified laterality, unspecified part of lung [J18.9]       Precautions: Fall Risk, Bed Alarm, Other (Comment) (tele sitter)                Recommendations for nursing mobility: Out of bed to chair for meals, Encourage HEP in prep for ADLs/mobility; see handout for details, Use of bed/chair alarm for safety, AD and gt belt for bed to chair , Amb to bathroom with AD and gait belt, and Assist x1    In place during session:Nasal Cannula 2L and External Catheter  ASSESSMENT  Pt is a 64 y.o. female presenting to [facility] with c/o not feeling well and cough, admitted 12/8/24 and currently being treated for fever, likely from HIV - CTA chest shows no pneumonia, and suspected encephalopathy. Pt received semi-supine in bed upon arrival, AXO x2, and agreeable to OT evaluation.     Based on current observations, pt presents with decreased  functional mobility, independence in ADLs, high-level IADLs, strength, activity tolerance, endurance, safety awareness, cognition, balance (see below for objective details and assist levels).     Overall, pt tolerates session good with no c/o pain. Pt transferring from semi supine to sitting EOB with min A. Pt able to doff b/l socks with supervision while sitting EOB, pt requiring assist to start donning b/l socks on each foot, then patient able to pull up over heel. Pt washing face with set up A while sitting at EOB. Pt transferring into standing with CGA and RW. Pt ambulating household distance from EOB to restroom, and pt transferring onto commode with min A and safety cues to back up to commode and use hands on grab bars. Pt asking for help with susanna care and requiring encouragement to attempt task individually. Pt transferring into standing, and able to wipe bottom, requiring mod A for thoroughness. Pt ambulating back 
tests and measures addressing body structure, function, activity limitation and / or participation in recreation  LOW Complexity : Stable, uncomplicated  Other outcome measures [facility] 6  MEDIUM      Based on the above components, the patient evaluation is determined to be of the following complexity level: LOW    Pain Ratin/10 none reported  Pain Intervention(s):       Activity Tolerance:   Fair     After treatment patient left in no apparent distress:   Bed locked and in lowest position Patient left in no apparent distress in bed and Call bell within reach and nsg updated.    COMMUNICATION/EDUCATION:   The patient’s plan of care was discussed with: Occupational therapist and Registered nurse    Patient Education  Education Given To: Patient  Education Provided: Role of Therapy;Plan of Care;Transfer Training;Orientation  Education Method: Verbal  Barriers to Learning: Cognition  Education Outcome: Verbalized understanding;Continued education needed       Thank you for this referral.  Maddie Baugh, PT  Minutes: 16

## 2024-12-12 NOTE — CARE COORDINATION
Transition of Care Plan:    RUR: 18%  Prior Level of Functioning: Needed some assistance   Disposition: Home  ERIN: Today  If SNF or IPR: Date FOC offered: NA  Date FOC received: NA  Accepting facility: NA  Date authorization started with reference number: NA  Date authorization received and expires: NA  Follow up appointments: Group Home to arrange  DME needed:   Transportation at discharge: Medicaid Cab  IM/IMM Medicare/ letter given: NA  Is patient a  and connected with VA? No   If yes, was [place] transfer form completed and VA notified? NA  Caregiver Contact: Jerry Rei [phone]  Discharge Caregiver contacted prior to discharge? Yes  Care Conference needed? No  Barriers to discharge: None    CM consulted with RT.  Patient does not need home oxygen.  CM called Group home representative Jerry Minaya [phone].  She is agreeable to taking patient back today.  Patient will need medicaid cab, [facility] is helping assist with Medicaid cab home.  CM faxed discharge summary to [facility] [phone] and group [place] at [phone].  CM notified primary nurse.  Patient has not been accepted with any home health company.  CM notified Group home that patient was not accepted with a home health company.  CM notified patient of discharge today. Patient is agreeable to discharge today.

## 2024-12-12 NOTE — PROGRESS NOTES
Hospitalist Progress Note    NAME:   Darcy Arthur   : 1960   MRN: [id number]     Subjective:   Daily Progress Note:  12/10/2024    Chief complaint:  Chief Complaint   Patient presents with    Cough         Hospital course to date/HPI from H&P:  Darcy Arthur is a 64 y.o.  female with PMHx significant for schizophrenia hypertension cirrhosis of liver HIV disease hepatitis C was transferred from the group home because of cough congestion patient was not feeling well, no fever no chills no chest pain no shortness of breath no nausea no vomiting seen by the ER physician initial workup done including CAT scan chest x-ray was normal for any source of infection UA was also negative patient received doxycycline in the ER recommended patient to be admitted for rule out sepsis  We were asked to admit for work up and evaluation of the above problems.      24-patient seen for the first time, chart was reviewed.  Currently low-grade fevers noted.  ID evaluation pending.  No other acute issues reported to me by staff.    12/10/24-patient seen with nursing staff, continues to have low-grade temperatures. Mycoplasma IgM + and   Antibiotics adjusted by ID appreciate input.  Patient remains on nasal cannula oxygen.  No other acute issues reported to me by patient or nursing staff in the room at this time.  Potassium low being repleted.        Objective:     /74   Pulse 89   Temp 98.6 °F (37 °C)   Resp 16   Ht 1.702 m (5' 7\")   Wt 79.8 kg (175 lb 14.8 oz)   SpO2 99%   BMI 27.55 kg/m²  O2 Flow Rate (L/min): 2 L/min      Temp (24hrs), Av.7 °F (37.6 °C), Min:98.6 °F (37 °C), Max:100.8 °F (38.2 °C)        PHYSICAL EXAM:  Gen thin built  Neck Supple  CVS RRR  Resp Symmetric expansion  Abdomen soft, NT/ND  Ext moves all  Neuro Alert normal speech  Psych Normal Affect  Skin no visible Rash        Data Review:      Recent Labs     24  1500 12/10/24  0911   WBC 5.6 4.8   HGB 9.9* 9.2*   HCT 29.6* 27.9* 
4 Eyes Skin Assessment     NAME:  Darcy Arthur  YOB: 1960  MEDICAL RECORD NUMBER:  [id number]    The patient is being assessed for  Admission    I agree that at least one RN has performed a thorough Head to Toe Skin Assessment on the patient. ALL assessment sites listed below have been assessed.      Areas assessed by both nurses:    Head, Face, Ears, Shoulders, Back, Chest, Arms, Elbows, Hands, Sacrum. Buttock, Coccyx, Ischium, Legs. Feet and Heels, and Under Medical Devices         Does the Patient have a Wound? No noted wound(s)       Wesly Prevention initiated by RN: Yes  Wound Care Orders initiated by RN: No    Pressure Injury (Stage 3,4, Unstageable, DTI, NWPT, and Complex wounds) if present, place Wound referral order by RN under : No    New Ostomies, if present place, Ostomy referral order under : No     Nurse 1 eSignature: Electronically signed by Sonya Qureshi RN on 12/9/24 at 1:37 AM EST    **SHARE this note so that the co-signing nurse can place an eSignature**    Nurse 2 eSignature: Electronically signed by Noa Raza LPN on 12/9/24 at 4:26 AM EST    
4 Eyes Skin Assessment     NAME:  Darcy Arthur  YOB: 1960  MEDICAL RECORD NUMBER:  [id number]    The patient is being assessed for  Other weekly assessment    I agree that at least one RN has performed a thorough Head to Toe Skin Assessment on the patient. ALL assessment sites listed below have been assessed.      Areas assessed by both nurses:    Head, Face, Ears, Shoulders, Back, Chest, Arms, Elbows, Hands, Sacrum. Buttock, Coccyx, Ischium, Legs. Feet and Heels, and Under Medical Devices         Does the Patient have a Wound? No noted wound(s)       Wesly Prevention initiated by RN: Yes  Wound Care Orders initiated by RN: No    Pressure Injury (Stage 3,4, Unstageable, DTI, NWPT, and Complex wounds) if present, place Wound referral order by RN under : No    New Ostomies, if present place, Ostomy referral order under : No     Nurse 1 eSignature: Electronically signed by KEVAN HYLTON RN on 12/11/24 at 12:39 AM EST    **SHARE this note so that the co-signing nurse can place an eSignature**    Nurse 2 eSignature: Electronically signed by Sonya Qureshi RN on 12/11/24 at 12:47 AM EST   
Chart reviewed and history obtained. Attempt PT eval this morning. Spoke with RN and patient has a temperature this am. Patient appears lethargic. Patient HR at 0800 105 bpm. Will hold PT eval at this time and attempt at later time/date. Thank you.   
Consult received with request to evaluate swallow secondary for concerns for PO intake tolerance   given disorientation and lethargy.    RN whom entered the order yesterday reports that patient is much more awake/alert today.    SLP spoke with patient briefly. Awake and alert.  RN present, gave patient pill whole w/thin.   No overt difficulty nor s/s of aspiration.     No formal oropharyngeal swallowing evaluation warranted at this time.   MD gave approval for SLP to cancel consult order.     Thank you,   Marialuisa Harley M.Ed., CCC-SLP  
Discharge process complete pt discharged to group home via medicaid cab on a wheelchair.  
Echo completed. Report to follow.    
OCCUPATIONAL THERAPY TREATMENT  Patient: Darcy Arthur (64 y.o. female)  Date: 12/12/2024  Primary Diagnosis: Fever and chills [R50.9]  Pneumonia due to infectious organism, unspecified laterality, unspecified part of lung [J18.9]       Precautions: Fall Risk, Bed Alarm, Other (Comment) (tele sitter)                Recommendations for nursing mobility: Out of bed to chair for meals, Encourage HEP in prep for ADLs/mobility; see handout for details, Use of bed/chair alarm for safety, AD and gt belt for bed to chair , Amb to bathroom with AD and gait belt, and Assist x1    In place during session: EKG/telemetry  and virtual sitter  Chart, occupational therapy assessment, plan of care, and goals were reviewed.  ASSESSMENT  Patient continues with skilled OT services and is progressing towards goals. Pt presented side lying upon RODRIGUEZ arrival, agreeable to session. Pt A&O x 2. Orientation provided w/ poor carry over noted. Pt was stand by assistance for bed mobility.  Pt ambulated to the bathroom with CGA and mod verbal cues to slow down and stay inside the walker.  Pt completed toileting and stood at sink to wash hands.  Pt completed functional ambulation to retrieve a clean hospital gown.  Pt returned to semi supine in bed. Pt required A to open Ensure.  Pt left in high fowlers with all needs met. (See below for objective details and assist levels).     Overall pt tolerated session fair today with toileting and functional mobility.  Current OT recommendations for discharge Intermittent occupational therapy up to 2-3x/week in previous living setting. Will continue to benefit from skilled OT services, and will continue to progress as tolerated.      Start of Session End of Session   SPO2 (%) 98 99   Heart Rate (BPM) 81 94     GOALS:    Problem: Occupational Therapy - Adult  Goal: By Discharge: Performs self-care activities at highest level of function for planned discharge setting.  See evaluation for individualized 
PSYCHIATRIC PROGRESS NOTE         Patient Name  Darcy Arthur   Date of Birth 1960   CSN [id number]   Medical Record Number  [id number]      Age  64 y.o.   PCP Carol Bingham APRN - NP   Admit date:  12/8/2024    Room Number  422/01   [facility]   Date of Service  12/10/2024            HISTORY OF PRESENT ILLNESS/INTERVAL HISTORY:              MENTAL STATUS EXAM & VITALS                    VITALS:     Patient Vitals for the past 24 hrs:   Temp Pulse Resp BP SpO2   12/10/24 2329 99.5 °F (37.5 °C) (!) 110 16 (!) 163/87 93 %   12/10/24 2152 99.3 °F (37.4 °C) 99 20 (!) 170/97 95 %   12/10/24 1650 99 °F (37.2 °C) 92 16 (!) 148/80 98 %   12/10/24 1524 -- 89 -- -- --   12/10/24 1447 -- -- -- 132/74 --   12/10/24 1400 98.6 °F (37 °C) -- -- -- --   12/10/24 1130 99.9 °F (37.7 °C) -- -- -- --   12/10/24 0830 (!) 100.6 °F (38.1 °C) 78 16 (!) 146/90 99 %   12/10/24 0724 -- 98 -- -- --   12/10/24 0547 -- -- -- 134/77 --   12/10/24 0445 99 °F (37.2 °C) -- -- (!) 144/81 --   12/10/24 0330 (!) 100.8 °F (38.2 °C) -- -- -- --   12/10/24 0323 99.9 °F (37.7 °C) (!) 105 20 (!) 176/94 97 %   12/10/24 0015 100 °F (37.8 °C) 98 22 (!) 161/91 98 %     Wt Readings from Last 3 Encounters:   12/10/24 79.8 kg (175 lb 14.8 oz)   12/06/24 75.8 kg (167 lb)   09/24/24 84.8 kg (187 lb)     Temp Readings from Last 3 Encounters:   12/10/24 99.5 °F (37.5 °C) (Oral)   12/06/24 98.2 °F (36.8 °C) (Oral)   09/24/24 97.7 °F (36.5 °C)     BP Readings from Last 3 Encounters:   12/10/24 (!) 163/87   12/06/24 (!) 140/75   09/24/24 130/87     Pulse Readings from Last 3 Encounters:   12/10/24 (!) 110   12/06/24 (!) 103   09/24/24 100            DATA     LABORATORY DATA:(reviewed/updated 12/10/2024)  Recent Results (from the past 24 hour(s))   MRSA by PCR    Collection Time: 12/10/24  3:40 AM    Specimen: Swab   Result Value Ref Range    MRSA by PCR Not Detected Not Detected     CBC with Auto Differential    Collection Time: 12/10/24  9:11 AM 
Piperacillin-tazobactam (Zosyn) Extended-Infusion Dosing/Monitoring  Current regimen: 3.375 g IV every 6 hours    Recent Labs     12/08/24  1500   CREATININE 1.08*   BUN 14     Estimated CrCl: 57 mL/min    Plan: Change to 4.5 g IV x 1 over 30 minutes followed by 3.375 g IV over 240 minutes every 8 hours per [facility] P&T Committee Protocol with respect to extended-infusion ?-lactam antibiotics. Pharmacy will continue to monitor patient daily and will make dosage adjustments based upon changing renal function.       
Progress Note  Date:2024       Room:[facility]  Patient Name:Darcy Arthur     YOB: 1960     Age:64 y.o.        Subjective    Subjective  Patient with HIV infection followed for fever and cough but normal chest imaging. Mycoplasma IgM is reactive. Chiquita is on Levaquin.  Temperature trending downward. She is asleep at this time.       Objective         Vitals Last 24 Hours:  TEMPERATURE:  Temp  Av.8 °F (37.1 °C)  Min: 97.9 °F (36.6 °C)  Max: 99.5 °F (37.5 °C)  RESPIRATIONS RANGE: Resp  Av.4  Min: 16  Max: 20  PULSE OXIMETRY RANGE: SpO2  Av.2 %  Min: 92 %  Max: 98 %  PULSE RANGE: Pulse  Av.3  Min: 76  Max: 110  BLOOD PRESSURE RANGE: Systolic (24hrs), Av , Min:144 , Max:170   ; Diastolic (24hrs), Av, Min:76, Max:97          Objective:  Vital signs: (most recent): Blood pressure (!) 144/76, pulse 76, temperature 98.1 °F (36.7 °C), temperature source Oral, resp. rate 16, height 1.702 m (5' 7\"), weight 77.1 kg (169 lb 15.6 oz), SpO2 98%.       Vitals and nursing note reviewed.   Constitutional:       General: She is not in acute distress.     Appearance: She is ill-appearing.   HENT:      Head: Normocephalic and atraumatic.      Right Ear: External ear normal.      Left Ear: External ear normal.      Nose: Nose normal.      Mouth/Throat:      Pharynx: Oropharynx is clear.   Eyes:      Pupils: Pupils are equal, round, and reactive to light.   Cardiovascular:      Rate and Rhythm: Normal rate and regular rhythm.      Heart sounds: No murmur heard.     No gallop.   Pulmonary:      Effort: Pulmonary effort is normal.      Breath sounds: Normal breath sounds.   Abdominal:      General: Bowel sounds are normal. There is no distension.      Palpations: Abdomen is soft.      Tenderness: There is no abdominal tenderness. There is no guarding.   Genitourinary:     Comments: No Cortes catheter  Musculoskeletal:      Cervical back: Neck supple.      Right lower leg: No edema.      Left 
Progress Note  Date:2024       Room:[facility]  Patient Name:Darcy Arthur     YOB: 1960     Age:64 y.o.        Subjective    Subjective  Patient with HIV infection followed for fever and cough but normal chest imaging. Mycoplasma IgM is reactive. Chiquita is on Levaquin.  Temperature trending downward. She is asleep at this time.       Objective         Vitals Last 24 Hours:  TEMPERATURE:  Temp  Av.4 °F (37.4 °C)  Min: 98.6 °F (37 °C)  Max: 100.6 °F (38.1 °C)  RESPIRATIONS RANGE: Resp  Av  Min: 16  Max: 20  PULSE OXIMETRY RANGE: SpO2  Av.6 %  Min: 93 %  Max: 99 %  PULSE RANGE: Pulse  Av.8  Min: 78  Max: 110  BLOOD PRESSURE RANGE: Systolic (24hrs), Av , Min:132 , Max:170   ; Diastolic (24hrs), Av, Min:74, Max:97         Objective   Vitals and nursing note reviewed.   Constitutional:       General: She is not in acute distress.     Appearance: She is ill-appearing.   HENT:      Head: Normocephalic and atraumatic.      Right Ear: External ear normal.      Left Ear: External ear normal.      Nose: Nose normal.      Mouth/Throat:      Pharynx: Oropharynx is clear.   Eyes:      Pupils: Pupils are equal, round, and reactive to light.   Cardiovascular:      Rate and Rhythm: Normal rate and regular rhythm.      Heart sounds: No murmur heard.     No gallop.   Pulmonary:      Effort: Pulmonary effort is normal.      Breath sounds: Normal breath sounds.   Abdominal:      General: Bowel sounds are normal. There is no distension.      Palpations: Abdomen is soft.      Tenderness: There is no abdominal tenderness. There is no guarding.   Genitourinary:     Comments: No Cortes catheter  Musculoskeletal:      Cervical back: Neck supple.      Right lower leg: No edema.      Left lower leg: No edema.   Skin:     Findings: No lesion or rash.   Neurological:      General: No focal deficit present.      Mental Status: She is alert. She is disoriented.   Psychiatric:         Behavior: Behavior 
RRT Clinical Rounding Nurse Progress Report      SUBJECTIVE: Patient assessed secondary to elevated Deterioration Index Score.      Vitals:    12/08/24 2245 12/08/24 2345 12/09/24 0015 12/09/24 0115   BP: (!) 195/120 (!) 184/90 (!) 172/86 (!) 162/88   Pulse: 97 81 81 (!) 107   Resp: 17 18 18 20   Temp:    (!) 101.2 °F (38.4 °C)   TempSrc:    Oral   SpO2: 100% 94% 95% 91%   Weight:       Height:            DETERIORATION INDEX SCORE: 60    ASSESSMENT:  Spoke with MADAN Alejandra regarding elevated deterioration index. She will assess and follow up as needed.    PLAN:  Will follow per RRT Clinical Rounding Program protocol.    Chano Raza RN  [facility]: [phone]  [facility]: [phone]  
Received Order for Telemetry     Darcy Jerson   1960   [id number]   Fever and chills [R50.9]  Pneumonia due to infectious organism, unspecified laterality, unspecified part of lung [J18.9]   Osorio Beltran MD     Tele Box # [id number] placed on patient at  0045 am  ER Room # 09  Admitting to Room 422  Verified with Primary Nurse EDELMIRA HAGER at  0111 am    
Spo2 on room air at rest 99%  Spo2 on room airwith ambulation 96%    
Vancomycin Dosing Consult  Darcy Arthur is a 64 y.o. female with suspected bacteremia. Pharmacy was consulted by Dr. Reynolds to dose and monitor vancomycin. Today is day 0.    Antibiotic regimen: Vancomycin + levofloxacin    Temp (24hrs), Av.9 °F (37.7 °C), Min:98.3 °F (36.8 °C), Max:101.2 °F (38.4 °C)    Recent Labs     24  1500 24  0602   WBC 5.6  --    CREATININE 1.08* 1.20*   BUN 14 13     Est CrCl: 51 mL/min  Concomitant nephrotoxic drugs: Contrast agents (given on )    Cultures:    Blood: 1 of 4 bottles growing GPCs in clusters (PCR pending)   Blood: Pending    MRSA Swab: Pending    Target range: AUC/LINDA 400-600    Recent level history:  Date/Time Dose & Interval Measured Level (mcg/mL) Associated AUC/LINDA              Assessment/Plan:   Pt with overall c/o cough, congestion that prompted transfer from the patient's group home to [facility]. Blood cultures obtained on  showing prelim growth of GPCs in clusters (1 of 4 bottles). ID consulted. Empiric vancomycin initiated today. May be able to consider de-escalation if cultures do not grow Staph aureus. Hemodynamically stable with fever on admission.   Renal function appears to be mildly above baseline; Scr slightly increased from admission yesterday. Will watch trend, but still seems appropriate to start with AUC guided dosing.   Loading dose of vancomycin 1750mg x 1 dose administered  Start scheduled vancomycin 1250mg q24h (predicted AUC of 525)  Level scheduled for  @ 1000  BMP ordered through   Antimicrobial stop date: TBD       
Writer contacted staff at [facility] regarding pt medication list, staff able to answer most of the questions.   
intended for the qualitative detection of nucleic acids from SARS-CoV-2, Influenza A, and Influenza B in nasopharyngeal and nasal swab specimens for use under the FDA's Emergency Use Authorization (EUA) only.   Fact sheet for Patients: https://www.fda.gov/media/144231/download Fact sheet   for Healthcare Providers: https://www.fda.fov/media/465659/download         Blood Culture 1 [[id number]] Collected: 12/08/24 1500    Order Status: Completed Specimen: Blood Updated: 12/09/24 0758     Special Requests --        No Special Requests  Right  Antecubital       Culture No growth after 16 hours       Blood Culture 2 [[id number]] Collected: 12/08/24 1500    Order Status: Completed Specimen: Blood Updated: 12/09/24 0758     Special Requests No Special Requests        Culture No growth after 16 hours       COVID-19 & Influenza Combo [[id number]]     Order Status: Sent Specimen: Nasopharyngeal Swab              Scheduled Meds:   benztropine  0.5 mg Oral BID    bictegravir-emtricitab-tenofovir alafenamide  1 tablet Oral Daily    dilTIAZem  120 mg Oral Daily    divalproex  250 mg Oral BID    famotidine  20 mg Oral BID    hydrALAZINE  50 mg Oral 3 times per day    risperiDONE  2 mg Oral BID    sodium chloride flush  5-40 mL IntraVENous 2 times per day    enoxaparin  40 mg SubCUTAneous Daily    piperacillin-tazobactam  3,375 mg IntraVENous Q8H     Continuous Infusions:   sodium chloride 5 mL/hr at 12/09/24 0144     PRN Meds:.albuterol sulfate HFA, sodium chloride flush, sodium chloride, potassium chloride **OR** potassium alternative oral replacement **OR** potassium chloride, magnesium sulfate, ondansetron **OR** ondansetron, polyethylene glycol, acetaminophen **OR** acetaminophen      CTA CHEST W WO CONTRAST--Result Date: 12/8/2024  1. No pulmonary embolism, no acute airspace disease. 2. Trace left pleural effusion. 3. Cirrhosis. 4. Coronary artery calcifications. Electronically signed by BAHMAN BURGER CHEST 
echo in 3 to 6 months.  CTA chest was normal as above with calcifications.  Will have cardiology follow-up as outpatient.    Transient hypoxia from mycoplasma pneumonia continue to monitor, 6-minute walking test today to see if she qualifies for home oxygen    HIV continue home meds as per ID recommendations. CD4  pending follow-up with ID after discharge    HTN monitor BP on home meds fu PCP after discharge    Suspected encephalopathy etiology unclear continue to monitor likely from sepsis    Previous alcohol abuse thiamine multivitamin folic acid for now, stable    Schizophrenia psych consulted to adjust meds, follow-up with psych after discharge         Discussion/MDM:-  Patient with multiple medical comorbidities, each with high likelihood for morbidity and mortality if left untreated.   I have reviewed patient's presenting subjective and objective findings, as well as all laboratory studies from today/yesterday including cbc/bmp or any other labs, imaging studies from today/yesterday if available, consultant notes from today/yesterday and vital signs to date as well as treatment rendered and patient's response to those treatments. Pt need IV fluids with additives , IV antibiotics if needed and Drug therapy requiring intensive monitoring for toxicity with labs and levels including but not limited to insulin with hypoglycemia glucose monitoring on sliding scale, heparin/Lovenox for DVT prophylaxis with periodic hemoglobin monitoring for bleeding issues. Personally reviewed in detail in conjunction w/ labs as documented for evidence of drug toxicity. In addition, prior medical, surgical and relevant social and family histories were reviewed. I have discussed management plan with patient/family/consultant and with nursing staff as much as feasible.     Medical Decision Making:   I personally reviewed labs:  CBC/BMP  I personally reviewed imaging:    I personally reviewed EKG:  Toxic drug monitoring: Lovenox 
estimated EF of 60 - 65%. Left ventricle size is normal. Increased wall thickness. EF BP is 59%. Normal wall motion. Normal diastolic function.    Aorta: Normal sized aortic root. Dilated ascending aorta. Ao ascending diameter is 3.5 cm.    Image quality is adequate.    Assessment/Plan:     Fever  likely from Mycoplasma PNA-  Bld Cultures stap epi suspected contaminant  CTA chest no pneumonia  Mycoplasma IgM +  Continue  oral levaquin per ID   Pro-Main  0.10  Echo  normal as above  Follow ID recommendations    Slight dilated ascending aorta-would benefit from repeating echo in 3 to 6 months.  CTA chest was normal as above with calcifications.  Will have cardiology follow-up as outpatient.    Transient hypoxia from mycoplasma pneumonia continue to monitor, 6-minute walking test to see if she qualifies for home oxygen    HIV continue home meds as per ID recommendations. CD4  pending follow-up with ID after discharge    HTN monitor BP on home meds fu PCP after discharge    Suspected encephalopathy etiology unclear continue to monitor likely from sepsis    Previous alcohol abuse thiamine multivitamin folic acid for now, stable    Schizophrenia psych consulted to adjust meds, follow-up with psych after discharge         Discussion/MDM:-  Patient with multiple medical comorbidities, each with high likelihood for morbidity and mortality if left untreated.   I have reviewed patient's presenting subjective and objective findings, as well as all laboratory studies from today/yesterday including cbc/bmp or any other labs, imaging studies from today/yesterday if available, consultant notes from today/yesterday and vital signs to date as well as treatment rendered and patient's response to those treatments. Pt need IV fluids with additives , IV antibiotics if needed and Drug therapy requiring intensive monitoring for toxicity with labs and levels including but not limited to insulin with hypoglycemia glucose monitoring on sliding

## 2024-12-13 LAB
BACTERIA SPEC CULT: ABNORMAL
Lab: ABNORMAL

## 2024-12-15 LAB
BACTERIA SPEC CULT: NORMAL
BACTERIA SPEC CULT: NORMAL
Lab: NORMAL
Lab: NORMAL

## 2025-03-25 ENCOUNTER — OFFICE VISIT (OUTPATIENT)
Age: 65
End: 2025-03-25
Payer: MEDICAID

## 2025-03-25 VITALS
BODY MASS INDEX: 25.74 KG/M2 | OXYGEN SATURATION: 97 % | DIASTOLIC BLOOD PRESSURE: 89 MMHG | SYSTOLIC BLOOD PRESSURE: 150 MMHG | WEIGHT: 164 LBS | RESPIRATION RATE: 18 BRPM | TEMPERATURE: 99.6 F | HEIGHT: 67 IN | HEART RATE: 104 BPM

## 2025-03-25 DIAGNOSIS — B18.2 CHRONIC HEPATITIS C WITHOUT HEPATIC COMA (HCC): Primary | ICD-10-CM

## 2025-03-25 DIAGNOSIS — Z21 ASYMPTOMATIC HIV INFECTION, WITH NO HISTORY OF HIV-RELATED ILLNESS (HCC): ICD-10-CM

## 2025-03-25 PROCEDURE — 3077F SYST BP >= 140 MM HG: CPT | Performed by: INTERNAL MEDICINE

## 2025-03-25 PROCEDURE — 99214 OFFICE O/P EST MOD 30 MIN: CPT | Performed by: INTERNAL MEDICINE

## 2025-03-25 PROCEDURE — 3079F DIAST BP 80-89 MM HG: CPT | Performed by: INTERNAL MEDICINE

## 2025-03-25 RX ORDER — LISINOPRIL 20 MG/1
20 TABLET ORAL DAILY
COMMUNITY

## 2025-03-25 ASSESSMENT — ENCOUNTER SYMPTOMS
WHEEZING: 0
SHORTNESS OF BREATH: 0
COUGH: 1
GASTROINTESTINAL NEGATIVE: 1

## 2025-03-25 NOTE — PROGRESS NOTES
Hetal Arthur is a 64 y.o. female    HPI  Patient with HIV-1 infection and chronic hepatitis C infection, currently on Biktarvy and pending HCV treatment. She was hospitalized at [facility] in 12/2024 at which time she was diagnosed with Mycoplasma tracheobronchitis treated with Levaquin.  Her CD4 count was 287/mm3 with VL 80 copies/ml.  Repeat HCV RNA was 19,500,000 IU/ml. Patient is here for hospital follow-up.  She has apparently done well since discharge but still affirms occasional cough.  States that she is hungry.    Review of Systems   Constitutional:  Negative for appetite change, chills, fatigue and fever.   Respiratory:  Positive for cough. Negative for shortness of breath and wheezing.    Gastrointestinal: Negative.    Skin: Negative.    Hematological: Negative.          Past Medical History:   Diagnosis Date    Alcohol abuse 5/29/2017    Autoimmune disease     Hepatitis C     History of seizure     HIV (human immunodeficiency virus infection) (HCC)     Hypertension     Psychotic disorder (HCC)     Schizophrenia (HCC)         History reviewed. No pertinent surgical history.     Vitals:    03/25/25 0842 03/25/25 0852   BP: (!) 154/94 (!) 150/89   BP Site: Right Upper Arm Right Upper Arm   Patient Position: Sitting Sitting   BP Cuff Size: Medium Adult Medium Adult   Pulse: (!) 104    Resp: 18    Temp: 99.6 °F (37.6 °C)    TempSrc: Temporal    SpO2: 97%    Weight: 74.4 kg (164 lb)    Height: 1.702 m (5' 7\")        Objective  Physical Exam  Vitals and nursing note reviewed. Exam conducted with a chaperone present (Aide).   Constitutional:       Appearance: She is not ill-appearing.   HENT:      Head: Normocephalic and atraumatic.      Right Ear: External ear normal.      Left Ear: External ear normal.      Nose: Nose normal.      Mouth/Throat:      Pharynx: Oropharynx is clear.      Comments: Multiple missing teeth  Eyes:      General: No scleral icterus.     Extraocular Movements: Extraocular movements

## 2025-03-25 NOTE — PROGRESS NOTES
\"Have you been to the ER, urgent care clinic since your last visit?  Hospitalized since your last visit?\"    NO    “Have you seen or consulted any other health care providers outside our system since your last visit?”    YES - When: approximately 2 weeks ago.  Where and Why: PCP for follow up.    Have you had a mammogram?”   NO    No breast cancer screening on file      “Have you had a pap smear?”    NO    No cervical cancer screening on file       “Have you had a colorectal cancer screening such as a colonoscopy/FIT/Cologuard?    NO    No colonoscopy on file  No cologuard on file  No FIT/FOBT on file   No flexible sigmoidoscopy on file     Chief Complaint   Patient presents with    Follow-up     HIV     BP (!) 150/89 (BP Site: Right Upper Arm, Patient Position: Sitting, BP Cuff Size: Medium Adult)   Pulse (!) 104   Temp 99.6 °F (37.6 °C) (Temporal)   Resp 18   Ht 1.702 m (5' 7\")   Wt 74.4 kg (164 lb)   SpO2 97%   BMI 25.69 kg/m²

## 2025-03-26 ENCOUNTER — TELEPHONE (OUTPATIENT)
Age: 65
End: 2025-03-26

## 2025-03-26 NOTE — TELEPHONE ENCOUNTER
Jerry from [facility] is calling to confirm that the physical address: [address] [place] [address]. [place] [address] is the correct address to use for the patient to receive Hep C treatment through [facility]. If any questions she can be reached at [phone].

## 2025-08-04 ENCOUNTER — APPOINTMENT (OUTPATIENT)
Facility: HOSPITAL | Age: 65
End: 2025-08-04
Payer: MEDICAID

## 2025-08-04 ENCOUNTER — HOSPITAL ENCOUNTER (EMERGENCY)
Facility: HOSPITAL | Age: 65
Discharge: HOME | End: 2025-08-04
Payer: MEDICAID

## 2025-08-04 VITALS
HEART RATE: 73 BPM | SYSTOLIC BLOOD PRESSURE: 190 MMHG | DIASTOLIC BLOOD PRESSURE: 105 MMHG | OXYGEN SATURATION: 99 % | HEIGHT: 67 IN | WEIGHT: 178.13 LBS | TEMPERATURE: 98.1 F | RESPIRATION RATE: 20 BRPM | BODY MASS INDEX: 27.96 KG/M2

## 2025-08-04 DIAGNOSIS — M87.00 AVASCULAR NECROSIS (HCC): Primary | ICD-10-CM

## 2025-08-04 PROBLEM — B20 CURRENTLY ASYMPTOMATIC HIV INFECTION, WITH HISTORY OF HIV-RELATED ILLNESS (HCC): Status: ACTIVE | Noted: 2025-08-04

## 2025-08-04 PROBLEM — B18.2 CHRONIC HEPATITIS C WITHOUT HEPATIC COMA (HCC): Status: ACTIVE | Noted: 2025-08-04

## 2025-08-04 LAB
ALBUMIN SERPL-MCNC: 2.7 G/DL (ref 3.5–5)
ALBUMIN/GLOB SERPL: 0.4 (ref 1.1–2.2)
ALP SERPL-CCNC: 114 U/L (ref 45–117)
ALT SERPL-CCNC: 25 U/L (ref 12–78)
ANION GAP SERPL CALC-SCNC: 5 MMOL/L (ref 2–12)
AST SERPL W P-5'-P-CCNC: 57 U/L (ref 15–37)
BASOPHILS # BLD: 0.02 K/UL (ref 0–0.1)
BASOPHILS NFR BLD: 0.6 % (ref 0–1)
BILIRUB SERPL-MCNC: 0.6 MG/DL (ref 0.2–1)
BUN SERPL-MCNC: 12 MG/DL (ref 6–20)
BUN/CREAT SERPL: 13 (ref 12–20)
CA-I BLD-MCNC: 8.8 MG/DL (ref 8.5–10.1)
CHLORIDE SERPL-SCNC: 109 MMOL/L (ref 97–108)
CO2 SERPL-SCNC: 29 MMOL/L (ref 21–32)
CREAT SERPL-MCNC: 0.92 MG/DL (ref 0.55–1.02)
DIFFERENTIAL METHOD BLD: ABNORMAL
EOSINOPHIL # BLD: 0.02 K/UL (ref 0–0.4)
EOSINOPHIL NFR BLD: 0.6 % (ref 0–7)
ERYTHROCYTE [DISTWIDTH] IN BLOOD BY AUTOMATED COUNT: 16 % (ref 11.5–14.5)
GLOBULIN SER CALC-MCNC: 6.2 G/DL (ref 2–4)
GLUCOSE SERPL-MCNC: 79 MG/DL (ref 65–100)
HCT VFR BLD AUTO: 31.6 % (ref 35–47)
HGB BLD-MCNC: 10.8 G/DL (ref 11.5–16)
IMM GRANULOCYTES # BLD AUTO: 0.01 K/UL (ref 0–0.04)
IMM GRANULOCYTES NFR BLD AUTO: 0.3 % (ref 0–0.5)
LYMPHOCYTES # BLD: 1.64 K/UL (ref 0.8–3.5)
LYMPHOCYTES NFR BLD: 47.3 % (ref 12–49)
MCH RBC QN AUTO: 27.4 PG (ref 26–34)
MCHC RBC AUTO-ENTMCNC: 34.2 G/DL (ref 30–36.5)
MCV RBC AUTO: 80.2 FL (ref 80–99)
MONOCYTES # BLD: 0.41 K/UL (ref 0–1)
MONOCYTES NFR BLD: 11.8 % (ref 5–13)
NEUTS SEG # BLD: 1.37 K/UL (ref 1.8–8)
NEUTS SEG NFR BLD: 39.4 % (ref 32–75)
NRBC # BLD: 0 K/UL (ref 0–0.01)
NRBC BLD-RTO: 0 PER 100 WBC
PLATELET # BLD AUTO: 148 K/UL (ref 150–400)
POTASSIUM SERPL-SCNC: 3.9 MMOL/L (ref 3.5–5.1)
PROT SERPL-MCNC: 8.9 G/DL (ref 6.4–8.2)
RBC # BLD AUTO: 3.94 M/UL (ref 3.8–5.2)
SODIUM SERPL-SCNC: 143 MMOL/L (ref 136–145)
WBC # BLD AUTO: 3.5 K/UL (ref 3.6–11)

## 2025-08-04 PROCEDURE — 86708 HEPATITIS A ANTIBODY: CPT

## 2025-08-04 PROCEDURE — 87901 NFCT AGT GNTYP ALYS HIV1 REV: CPT

## 2025-08-04 PROCEDURE — 80074 ACUTE HEPATITIS PANEL: CPT

## 2025-08-04 PROCEDURE — 99284 EMERGENCY DEPT VISIT MOD MDM: CPT

## 2025-08-04 PROCEDURE — 87536 HIV-1 QUANT&REVRSE TRNSCRPJ: CPT

## 2025-08-04 PROCEDURE — 36415 COLL VENOUS BLD VENIPUNCTURE: CPT

## 2025-08-04 PROCEDURE — 80053 COMPREHEN METABOLIC PANEL: CPT

## 2025-08-04 PROCEDURE — 86704 HEP B CORE ANTIBODY TOTAL: CPT

## 2025-08-04 PROCEDURE — 99223 1ST HOSP IP/OBS HIGH 75: CPT | Performed by: INTERNAL MEDICINE

## 2025-08-04 PROCEDURE — 72170 X-RAY EXAM OF PELVIS: CPT

## 2025-08-04 PROCEDURE — 6370000000 HC RX 637 (ALT 250 FOR IP): Performed by: EMERGENCY MEDICINE

## 2025-08-04 PROCEDURE — 81596 NFCT DS CHRNC HCV 6 ASSAYS: CPT

## 2025-08-04 PROCEDURE — 87522 HEPATITIS C REVRS TRNSCRPJ: CPT

## 2025-08-04 PROCEDURE — 85025 COMPLETE CBC W/AUTO DIFF WBC: CPT

## 2025-08-04 PROCEDURE — 86706 HEP B SURFACE ANTIBODY: CPT

## 2025-08-04 RX ORDER — HYDRALAZINE HYDROCHLORIDE 50 MG/1
50 TABLET, FILM COATED ORAL ONCE
Status: COMPLETED | OUTPATIENT
Start: 2025-08-04 | End: 2025-08-04

## 2025-08-04 RX ADMIN — HYDRALAZINE HYDROCHLORIDE 50 MG: 50 TABLET ORAL at 19:40

## 2025-08-04 ASSESSMENT — ENCOUNTER SYMPTOMS
EYES NEGATIVE: 1
RESPIRATORY NEGATIVE: 1
GASTROINTESTINAL NEGATIVE: 1

## 2025-08-04 ASSESSMENT — PAIN - FUNCTIONAL ASSESSMENT: PAIN_FUNCTIONAL_ASSESSMENT: 0-10

## 2025-08-04 ASSESSMENT — PAIN SCALES - GENERAL: PAINLEVEL_OUTOF10: 9

## 2025-08-05 LAB
A2 MACROGLOB SERPL-MCNC: 331 MG/DL (ref 110–276)
ALT SERPL-CCNC: 25 IU/L (ref 0–40)
APO A-I SERPL-MCNC: 100 MG/DL (ref 116–209)
BILIRUB SERPL-MCNC: 0.6 MG/DL (ref 0–1.2)
FIBROSIS SCORE: 0.76 (ref 0–0.21)
FIBROSIS SCORING:: ABNORMAL
FIBROSIS STAGE: ABNORMAL
GGT SERPL-CCNC: 71 IU/L (ref 0–60)
HAPTOGLOB SERPL-MCNC: 85 MG/DL (ref 37–355)
HAV AB SER QL IA: NEGATIVE
HAV IGM SERPL QL IA: NEGATIVE
HBV CORE AB SERPL QL IA: POSITIVE
HBV CORE IGM SERPL QL IA: NEGATIVE
HBV SURFACE AB SER QL: REACTIVE INDEX VALUE
HBV SURFACE AG SERPL QL IA: NEGATIVE
HCV IGG SERPL QL IA: REACTIVE
HIV1 RNA # SERPL NAA+PROBE: 40 COPIES/ML
HIV1 RNA SERPL NAA+PROBE-LOG#: 1.6 LOG10COPY/ML
INTERPRETATION: ABNORMAL
LABORATORY COMMENT REPORT: ABNORMAL
LIMITATIONS:: ABNORMAL
Lab: ABNORMAL
NECROINFLAMM ACTIVITY SCORING:: 0.2 (ref 0–0.17)
NECROINFLAMM ACTIVITY SCORING:: ABNORMAL
NECROINFLAMMAT ACTIVITY GRADE: ABNORMAL

## 2025-08-06 LAB
GRAPH: NORMAL
HCV RNA SERPL NAA+PROBE-ACNC: NORMAL IU/ML
HCV RNA SERPL NAA+PROBE-LOG IU: NORMAL LOG10 IU/ML
HIV RT+PR MUT DET ISLT: NORMAL
HIV RT+PR MUT DET ISLT: NORMAL
TEST INFORMATION: NORMAL